# Patient Record
Sex: FEMALE | Race: WHITE | Employment: OTHER | URBAN - METROPOLITAN AREA
[De-identification: names, ages, dates, MRNs, and addresses within clinical notes are randomized per-mention and may not be internally consistent; named-entity substitution may affect disease eponyms.]

---

## 2024-04-09 ENCOUNTER — HOSPITAL ENCOUNTER (EMERGENCY)
Age: 69
Discharge: HOME | End: 2024-04-09
Payer: COMMERCIAL

## 2024-04-09 VITALS
TEMPERATURE: 97.9 F | SYSTOLIC BLOOD PRESSURE: 138 MMHG | WEIGHT: 184 LBS | HEART RATE: 62 BPM | DIASTOLIC BLOOD PRESSURE: 80 MMHG | OXYGEN SATURATION: 97 % | RESPIRATION RATE: 18 BRPM

## 2024-04-09 DIAGNOSIS — H61.23 BILATERAL IMPACTED CERUMEN: Primary | ICD-10-CM

## 2024-04-09 DIAGNOSIS — H92.01 EARACHE ON RIGHT: ICD-10-CM

## 2024-04-09 PROCEDURE — 99211 OFF/OP EST MAY X REQ PHY/QHP: CPT

## 2024-04-09 RX ORDER — POTASSIUM CHLORIDE 20 MEQ/1
20 TABLET, EXTENDED RELEASE ORAL 2 TIMES DAILY
COMMUNITY

## 2024-04-09 RX ORDER — AZITHROMYCIN 250 MG/1
TABLET, FILM COATED ORAL
Qty: 1 PACKET | Refills: 0 | Status: SHIPPED | OUTPATIENT
Start: 2024-04-09 | End: 2024-04-19

## 2024-04-09 RX ORDER — ATORVASTATIN CALCIUM 10 MG/1
10 TABLET, FILM COATED ORAL DAILY
COMMUNITY

## 2024-04-09 RX ORDER — ATENOLOL 25 MG/1
25 TABLET ORAL DAILY
COMMUNITY

## 2024-04-09 NOTE — ED PROVIDER NOTES
of this note were completed with a voice recognition program.  Efforts were made to edit the dictations but occasionally words are mis-transcribed.)    --------------------------------- IMPRESSION AND DISPOSITION ---------------------------------    IMPRESSION  1. Bilateral impacted cerumen    2. Earache on right        DISPOSITION Decision To Discharge 04/09/2024 10:20:14 AM    Disposition: Discharge to home  Patient condition is good    I am the Primary Clinician of Record.     Azam Lyles PA-C (electronically signed) on 4/9/2024 at 10:26 AM         Azam Lyles PA-C  04/09/24 1032

## 2024-04-09 NOTE — DISCHARGE INSTRUCTIONS
Starting on 4/10/24 instill 4 drops of debrox to both ears 4 times daily x 5 days. Then have ears irrigated to remove the wax.  Ibuprofen for pain and inflammation

## 2024-04-09 NOTE — ED NOTES
Josef ears flushed. Small amount of debre removed from left ear. None from right. Provider notified.      Deisy Gillis LPN  04/09/24 1020

## 2024-04-15 ENCOUNTER — HOSPITAL ENCOUNTER (EMERGENCY)
Age: 69
Discharge: HOME | End: 2024-04-15
Payer: COMMERCIAL

## 2024-04-15 VITALS
OXYGEN SATURATION: 99 % | WEIGHT: 185 LBS | RESPIRATION RATE: 18 BRPM | DIASTOLIC BLOOD PRESSURE: 68 MMHG | TEMPERATURE: 98.6 F | SYSTOLIC BLOOD PRESSURE: 138 MMHG | HEART RATE: 56 BPM

## 2024-04-15 DIAGNOSIS — H61.23 BILATERAL IMPACTED CERUMEN: Primary | ICD-10-CM

## 2024-04-15 PROCEDURE — 69209 REMOVE IMPACTED EAR WAX UNI: CPT

## 2024-04-15 PROCEDURE — 99211 OFF/OP EST MAY X REQ PHY/QHP: CPT

## 2024-04-15 PROCEDURE — 6370000000 HC RX 637 (ALT 250 FOR IP): Performed by: NURSE PRACTITIONER

## 2024-04-15 RX ADMIN — Medication 10 DROP: at 12:21

## 2024-04-15 ASSESSMENT — PAIN - FUNCTIONAL ASSESSMENT: PAIN_FUNCTIONAL_ASSESSMENT: 0-10

## 2024-04-15 ASSESSMENT — PAIN SCALES - GENERAL: PAINLEVEL_OUTOF10: 0

## 2024-04-15 NOTE — ED PROVIDER NOTES
auscultation and breath sounds equal.    CV: Regular rate and rhythm, normal heart sounds, without pathological murmurs, ectopy, gallops, or rubs.  Skin:  No rashes, erythema present, unless noted elsewhere.  Lymphatic: No lymphangitis or adenopathy noted.  Neurological:  Oriented.  Motor functions intact.    Lab / Imaging Results   (All laboratory and radiology results have been personally reviewed by myself)  Labs:  No results found for this visit on 04/15/24.  Imaging:  All Radiology results interpreted by Radiologist unless otherwise noted.  No orders to display     ED Course / Medical Decision Making     Medications   carbamide peroxide (DEBROX) 6.5 % otic solution 10 drop (10 drops Both Ears Given 4/15/24 1221)         Procedure(s):     PROCEDURE  4/15/24       Time: 1235    FOREIGN BODY REMOVAL  Risks, benefits and alternatives (for applicable procedures below) described.   Performed By: DONALD Fernando CNP.    Location:   Foreign body of Bilateral auditory canal.    Informed consent: Verbal consent obtained.  The patient was counseled regarding the procedure in person, it's indications, risks, potential complications and alternatives and any questions were answered. Verbal consent was obtained.  Skin Prep:  none  required.  Local Anesthesia:  not required/indicated.  The patient's head was positioned appropriately and the foreign body was removed using irrigation and an ear curette.  Complications: none.  Patient tolerated the procedure well.        MDM:   Patient is well-appearing, afebrile presents to urgent care today for cerumen impaction.  Cerumen was removed using Debrox drops, irrigation and an ear curette.  After removal tympanic membrane's were intact and normal-appearing.  Patient advised on home symptom control outpatient follow-up as well as return precautions.    Plan of Care/Counseling:  DONALD Fernando CNP reviewed today's visit with the patient in addition to providing specific

## 2025-04-22 ENCOUNTER — HOSPITAL ENCOUNTER (EMERGENCY)
Age: 70
Discharge: ANOTHER HOSPITAL | End: 2025-04-22
Attending: STUDENT IN AN ORGANIZED HEALTH CARE EDUCATION/TRAINING PROGRAM
Payer: COMMERCIAL

## 2025-04-22 ENCOUNTER — HOSPITAL ENCOUNTER (INPATIENT)
Facility: HOSPITAL | Age: 70
End: 2025-04-22
Attending: STUDENT IN AN ORGANIZED HEALTH CARE EDUCATION/TRAINING PROGRAM | Admitting: NEUROLOGICAL SURGERY
Payer: COMMERCIAL

## 2025-04-22 ENCOUNTER — ANESTHESIA (OUTPATIENT)
Dept: OPERATING ROOM | Facility: HOSPITAL | Age: 70
End: 2025-04-22
Payer: COMMERCIAL

## 2025-04-22 ENCOUNTER — ANESTHESIA EVENT (OUTPATIENT)
Dept: OPERATING ROOM | Facility: HOSPITAL | Age: 70
End: 2025-04-22
Payer: COMMERCIAL

## 2025-04-22 ENCOUNTER — APPOINTMENT (OUTPATIENT)
Dept: CT IMAGING | Age: 70
End: 2025-04-22
Payer: COMMERCIAL

## 2025-04-22 ENCOUNTER — CLINICAL SUPPORT (OUTPATIENT)
Dept: EMERGENCY MEDICINE | Facility: HOSPITAL | Age: 70
DRG: 003 | End: 2025-04-22
Payer: COMMERCIAL

## 2025-04-22 ENCOUNTER — APPOINTMENT (OUTPATIENT)
Dept: GENERAL RADIOLOGY | Age: 70
End: 2025-04-22
Payer: COMMERCIAL

## 2025-04-22 ENCOUNTER — APPOINTMENT (OUTPATIENT)
Dept: RADIOLOGY | Facility: HOSPITAL | Age: 70
DRG: 003 | End: 2025-04-22
Payer: COMMERCIAL

## 2025-04-22 ENCOUNTER — APPOINTMENT (OUTPATIENT)
Dept: CARDIOLOGY | Facility: HOSPITAL | Age: 70
DRG: 003 | End: 2025-04-22
Payer: COMMERCIAL

## 2025-04-22 VITALS
WEIGHT: 175 LBS | OXYGEN SATURATION: 97 % | RESPIRATION RATE: 16 BRPM | HEART RATE: 97 BPM | DIASTOLIC BLOOD PRESSURE: 58 MMHG | SYSTOLIC BLOOD PRESSURE: 80 MMHG

## 2025-04-22 DIAGNOSIS — I60.9 SAH (SUBARACHNOID HEMORRHAGE) (HCC): Primary | ICD-10-CM

## 2025-04-22 DIAGNOSIS — I46.9 CARDIAC ARREST (HCC): ICD-10-CM

## 2025-04-22 DIAGNOSIS — I46.9 CARDIAC ARREST, CAUSE UNSPECIFIED: ICD-10-CM

## 2025-04-22 DIAGNOSIS — S06.369A TRAUMATIC INTRACEREBRAL HEMORRHAGE WITH LOSS OF CONSCIOUSNESS, UNSPECIFIED LATERALITY, INITIAL ENCOUNTER (HCC): ICD-10-CM

## 2025-04-22 DIAGNOSIS — Q27.30 AVM (ARTERIOVENOUS MALFORMATION) (HHS-HCC): Primary | ICD-10-CM

## 2025-04-22 DIAGNOSIS — I60.9 SUBARACHNOID HEMORRHAGE (MULTI): ICD-10-CM

## 2025-04-22 LAB
ABO GROUP (TYPE) IN BLOOD: NORMAL
ABO GROUP (TYPE) IN BLOOD: NORMAL
ALBUMIN SERPL BCP-MCNC: 3.2 G/DL (ref 3.4–5)
ALBUMIN SERPL BCP-MCNC: 3.4 G/DL (ref 3.4–5)
ALBUMIN SERPL BCP-MCNC: 3.8 G/DL (ref 3.4–5)
ALBUMIN SERPL-MCNC: 4.3 G/DL (ref 3.5–5.2)
ALP SERPL-CCNC: 118 U/L (ref 35–104)
ALP SERPL-CCNC: 80 U/L (ref 33–136)
ALP SERPL-CCNC: 95 U/L (ref 33–136)
ALT SERPL W P-5'-P-CCNC: 601 U/L (ref 7–45)
ALT SERPL W P-5'-P-CCNC: 805 U/L (ref 7–45)
ALT SERPL-CCNC: 21 U/L (ref 0–32)
ANION GAP BLDA CALCULATED.4IONS-SCNC: 15 MMO/L (ref 10–25)
ANION GAP BLDA CALCULATED.4IONS-SCNC: 16 MMO/L (ref 10–25)
ANION GAP BLDA CALCULATED.4IONS-SCNC: 16 MMO/L (ref 10–25)
ANION GAP BLDA CALCULATED.4IONS-SCNC: 18 MMO/L (ref 10–25)
ANION GAP BLDA CALCULATED.4IONS-SCNC: 20 MMO/L (ref 10–25)
ANION GAP BLDV CALCULATED.4IONS-SCNC: 15 MMOL/L (ref 10–25)
ANION GAP SERPL CALC-SCNC: 15 MMOL/L (ref 10–20)
ANION GAP SERPL CALC-SCNC: 19 MMOL/L (ref 10–20)
ANION GAP SERPL CALC-SCNC: 20 MMOL/L (ref 10–20)
ANION GAP SERPL CALCULATED.3IONS-SCNC: 15 MMOL/L (ref 7–16)
ANTIBODY SCREEN: NORMAL
APTT PPP: 25 SECONDS (ref 26–36)
AST SERPL W P-5'-P-CCNC: 697 U/L (ref 9–39)
AST SERPL W P-5'-P-CCNC: 823 U/L (ref 9–39)
AST SERPL-CCNC: 26 U/L (ref 0–31)
ATRIAL RATE: 65 BPM
BASE EXCESS BLDA CALC-SCNC: -4.8 MMOL/L (ref -2–3)
BASE EXCESS BLDA CALC-SCNC: -5.8 MMOL/L (ref -2–3)
BASE EXCESS BLDA CALC-SCNC: -6.3 MMOL/L (ref -2–3)
BASE EXCESS BLDA CALC-SCNC: -7.1 MMOL/L (ref -2–3)
BASE EXCESS BLDA CALC-SCNC: -7.7 MMOL/L (ref -2–3)
BASE EXCESS BLDV CALC-SCNC: -1.8 MMOL/L (ref -2–3)
BASOPHILS # BLD AUTO: 0.02 X10*3/UL (ref 0–0.1)
BASOPHILS # BLD AUTO: 0.03 X10*3/UL (ref 0–0.1)
BASOPHILS # BLD: 0.08 K/UL (ref 0–0.2)
BASOPHILS NFR BLD AUTO: 0.1 %
BASOPHILS NFR BLD AUTO: 0.2 %
BASOPHILS NFR BLD: 1 % (ref 0–2)
BILIRUB SERPL-MCNC: 0.3 MG/DL (ref 0–1.2)
BILIRUB SERPL-MCNC: 0.4 MG/DL (ref 0–1.2)
BILIRUB SERPL-MCNC: 0.5 MG/DL (ref 0–1.2)
BLOOD EXPIRATION DATE: NORMAL
BNP SERPL-MCNC: 199 PG/ML (ref 0–450)
BODY TEMPERATURE: 34 DEGREES CELSIUS
BODY TEMPERATURE: 37 DEGREES CELSIUS
BODY TEMPERATURE: ABNORMAL
BODY TEMPERATURE: ABNORMAL
BUN SERPL-MCNC: 15 MG/DL (ref 6–23)
BUN SERPL-MCNC: 16 MG/DL (ref 6–23)
BUN SERPL-MCNC: 18 MG/DL (ref 6–23)
BUN SERPL-MCNC: 19 MG/DL (ref 6–23)
CA-I BLD-SCNC: 1.22 MMOL/L (ref 1.1–1.33)
CA-I BLDA-SCNC: 1.07 MMOL/L (ref 1.1–1.33)
CA-I BLDA-SCNC: 1.08 MMOL/L (ref 1.1–1.33)
CA-I BLDA-SCNC: 1.09 MMOL/L (ref 1.1–1.33)
CA-I BLDA-SCNC: 1.24 MMOL/L (ref 1.1–1.33)
CA-I BLDA-SCNC: 1.24 MMOL/L (ref 1.1–1.33)
CA-I BLDV-SCNC: 1.2 MMOL/L (ref 1.1–1.33)
CALCIUM SERPL-MCNC: 8.6 MG/DL (ref 8.6–10.6)
CALCIUM SERPL-MCNC: 9.1 MG/DL (ref 8.6–10.6)
CALCIUM SERPL-MCNC: 9.3 MG/DL (ref 8.6–10.6)
CALCIUM SERPL-MCNC: 9.8 MG/DL (ref 8.6–10.2)
CARDIAC TROPONIN I PNL SERPL HS: 650 NG/L (ref 0–34)
CARDIAC TROPONIN I PNL SERPL HS: 990 NG/L (ref 0–34)
CFT FORM KAOLIN IND BLD RES TEG: 0.8 MIN (ref 0.8–2.1)
CHLORIDE BLDA-SCNC: 104 MMOL/L (ref 98–107)
CHLORIDE BLDA-SCNC: 107 MMOL/L (ref 98–107)
CHLORIDE BLDA-SCNC: 109 MMOL/L (ref 98–107)
CHLORIDE BLDA-SCNC: 112 MMOL/L (ref 98–107)
CHLORIDE BLDA-SCNC: 112 MMOL/L (ref 98–107)
CHLORIDE BLDV-SCNC: 104 MMOL/L (ref 98–107)
CHLORIDE SERPL-SCNC: 103 MMOL/L (ref 98–107)
CHLORIDE SERPL-SCNC: 103 MMOL/L (ref 98–107)
CHLORIDE SERPL-SCNC: 111 MMOL/L (ref 98–107)
CHLORIDE SERPL-SCNC: 113 MMOL/L (ref 98–107)
CLOT ANGLE.KAOLIN INDUCED BLD RES TEG: 77.9 DEG (ref 63–78)
CLOT INIT KAO IND P HEP NEUT BLD RES TEG: 4.5 MIN (ref 4.6–9.1)
CLOT INIT KAO IND P HEP NEUT BLD RES TEG: 5.6 MIN (ref 4.3–8.3)
CO2 SERPL-SCNC: 19 MMOL/L (ref 21–32)
CO2 SERPL-SCNC: 19 MMOL/L (ref 21–32)
CO2 SERPL-SCNC: 21 MMOL/L (ref 22–29)
CO2 SERPL-SCNC: 24 MMOL/L (ref 21–32)
CREAT SERPL-MCNC: 0.8 MG/DL (ref 0.5–1)
CREAT SERPL-MCNC: 0.82 MG/DL (ref 0.5–1.05)
CREAT SERPL-MCNC: 0.87 MG/DL (ref 0.5–1.05)
CREAT SERPL-MCNC: 0.91 MG/DL (ref 0.5–1.05)
DISPENSE STATUS: NORMAL
EGFRCR SERPLBLD CKD-EPI 2021: 69 ML/MIN/1.73M*2
EGFRCR SERPLBLD CKD-EPI 2021: 73 ML/MIN/1.73M*2
EGFRCR SERPLBLD CKD-EPI 2021: 78 ML/MIN/1.73M*2
EOSINOPHIL # BLD AUTO: 0 X10*3/UL (ref 0–0.7)
EOSINOPHIL # BLD AUTO: 0.02 X10*3/UL (ref 0–0.7)
EOSINOPHIL # BLD: 0.26 K/UL (ref 0.05–0.5)
EOSINOPHIL NFR BLD AUTO: 0 %
EOSINOPHIL NFR BLD AUTO: 0.2 %
EOSINOPHILS RELATIVE PERCENT: 3 % (ref 0–6)
ERYTHROCYTE [DISTWIDTH] IN BLOOD BY AUTOMATED COUNT: 12.4 % (ref 11.5–14.5)
ERYTHROCYTE [DISTWIDTH] IN BLOOD BY AUTOMATED COUNT: 12.4 % (ref 11.5–15)
ERYTHROCYTE [DISTWIDTH] IN BLOOD BY AUTOMATED COUNT: 12.8 % (ref 11.5–14.5)
ERYTHROCYTE [DISTWIDTH] IN BLOOD BY AUTOMATED COUNT: 13.1 % (ref 11.5–14.5)
EST. AVERAGE GLUCOSE BLD GHB EST-MCNC: 111 MG/DL
FIBRINOGEN BLD CALC-MCNC: 447 MG/DL (ref 278–581)
FIO2: 100
GFR, ESTIMATED: 77 ML/MIN/1.73M2
GLUCOSE BLD MANUAL STRIP-MCNC: 178 MG/DL (ref 74–99)
GLUCOSE BLD MANUAL STRIP-MCNC: 224 MG/DL (ref 74–99)
GLUCOSE BLD-MCNC: 187 MG/DL (ref 74–99)
GLUCOSE BLDA-MCNC: 203 MG/DL (ref 74–99)
GLUCOSE BLDA-MCNC: 203 MG/DL (ref 74–99)
GLUCOSE BLDA-MCNC: 204 MG/DL (ref 74–99)
GLUCOSE BLDA-MCNC: 219 MG/DL (ref 74–99)
GLUCOSE BLDA-MCNC: 232 MG/DL (ref 74–99)
GLUCOSE BLDV-MCNC: 201 MG/DL (ref 74–99)
GLUCOSE SERPL-MCNC: 195 MG/DL (ref 74–99)
GLUCOSE SERPL-MCNC: 205 MG/DL (ref 74–99)
GLUCOSE SERPL-MCNC: 210 MG/DL (ref 74–99)
GLUCOSE SERPL-MCNC: 233 MG/DL (ref 74–99)
HBA1C MFR BLD: 5.5 % (ref ?–5.7)
HCO3 BLDA-SCNC: 16 MMOL/L (ref 22–26)
HCO3 BLDA-SCNC: 17 MMOL/L (ref 22–26)
HCO3 BLDA-SCNC: 18.3 MMOL/L (ref 22–26)
HCO3 BLDA-SCNC: 18.7 MMOL/L (ref 22–26)
HCO3 BLDA-SCNC: 19.4 MMOL/L (ref 22–26)
HCO3 BLDV-SCNC: 23.7 MMOL/L (ref 22–26)
HCT VFR BLD AUTO: 30.9 % (ref 36–46)
HCT VFR BLD AUTO: 34.5 % (ref 36–46)
HCT VFR BLD AUTO: 35.4 % (ref 36–46)
HCT VFR BLD AUTO: 39.5 % (ref 34–48)
HCT VFR BLD EST: 32 % (ref 36–46)
HCT VFR BLD EST: 32 % (ref 36–46)
HCT VFR BLD EST: 33 % (ref 36–46)
HCT VFR BLD EST: 34 % (ref 36–46)
HCT VFR BLD EST: 35 % (ref 36–46)
HCT VFR BLD EST: 38 % (ref 36–46)
HGB BLD-MCNC: 10.1 G/DL (ref 12–16)
HGB BLD-MCNC: 11.1 G/DL (ref 12–16)
HGB BLD-MCNC: 12 G/DL (ref 12–16)
HGB BLD-MCNC: 12.9 G/DL (ref 11.5–15.5)
HGB BLDA-MCNC: 10.7 G/DL (ref 12–16)
HGB BLDA-MCNC: 10.8 G/DL (ref 12–16)
HGB BLDA-MCNC: 11.1 G/DL (ref 12–16)
HGB BLDA-MCNC: 11.3 G/DL (ref 12–16)
HGB BLDA-MCNC: 11.7 G/DL (ref 12–16)
HGB BLDV-MCNC: 12.5 G/DL (ref 12–16)
IMM GRANULOCYTES # BLD AUTO: 0.06 K/UL (ref 0–0.58)
IMM GRANULOCYTES # BLD AUTO: 0.11 X10*3/UL (ref 0–0.7)
IMM GRANULOCYTES # BLD AUTO: 0.19 X10*3/UL (ref 0–0.7)
IMM GRANULOCYTES NFR BLD AUTO: 0.8 % (ref 0–0.9)
IMM GRANULOCYTES NFR BLD AUTO: 1.5 % (ref 0–0.9)
IMM GRANULOCYTES NFR BLD: 1 % (ref 0–5)
INHALED O2 CONCENTRATION: 100 %
INHALED O2 CONCENTRATION: 100 %
INHALED O2 CONCENTRATION: 36 %
INHALED O2 CONCENTRATION: 40 %
INHALED O2 CONCENTRATION: 40 %
INR PPP: 1.1
INR PPP: 1.1 (ref 0.9–1.1)
INR PPP: 1.2 (ref 0.9–1.1)
LACTATE BLDA-SCNC: 6 MMOL/L (ref 0.4–2)
LACTATE BLDA-SCNC: 7.2 MMOL/L (ref 0.4–2)
LACTATE BLDA-SCNC: 8 MMOL/L (ref 0.4–2)
LACTATE BLDA-SCNC: 8 MMOL/L (ref 0.4–2)
LACTATE BLDA-SCNC: 8.2 MMOL/L (ref 0.4–2)
LACTATE BLDV-SCNC: 5.5 MMOL/L (ref 0.4–2)
LACTATE BLDV-SCNC: 8.7 MMOL/L (ref 0.4–2)
LYMPHOCYTES # BLD AUTO: 0.69 X10*3/UL (ref 1.2–4.8)
LYMPHOCYTES # BLD AUTO: 0.76 X10*3/UL (ref 1.2–4.8)
LYMPHOCYTES NFR BLD AUTO: 5.1 %
LYMPHOCYTES NFR BLD AUTO: 5.9 %
LYMPHOCYTES NFR BLD: 3.82 K/UL (ref 1.5–4)
LYMPHOCYTES RELATIVE PERCENT: 39 % (ref 20–42)
MA KAOLIN BLD RES TEG: 66 MM (ref 52–69)
MA KAOLIN+TF BLD RES TEG: 66.5 MM (ref 52–70)
MA TF IND+IIB-IIIA INH BLD RES TEG: 24.5 MM (ref 15–32)
MAGNESIUM SERPL-MCNC: 1.84 MG/DL (ref 1.6–2.4)
MAGNESIUM SERPL-MCNC: 1.9 MG/DL (ref 1.6–2.6)
MCH RBC QN AUTO: 28.5 PG (ref 26–34)
MCH RBC QN AUTO: 28.8 PG (ref 26–35)
MCH RBC QN AUTO: 29.3 PG (ref 26–34)
MCH RBC QN AUTO: 29.4 PG (ref 26–34)
MCHC RBC AUTO-ENTMCNC: 31.4 G/DL (ref 32–36)
MCHC RBC AUTO-ENTMCNC: 32.7 G/DL (ref 32–34.5)
MCHC RBC AUTO-ENTMCNC: 32.7 G/DL (ref 32–36)
MCHC RBC AUTO-ENTMCNC: 34.8 G/DL (ref 32–36)
MCV RBC AUTO: 85 FL (ref 80–100)
MCV RBC AUTO: 88.2 FL (ref 80–99.9)
MCV RBC AUTO: 90 FL (ref 80–100)
MCV RBC AUTO: 91 FL (ref 80–100)
MODE: AC
MONOCYTES # BLD AUTO: 0.4 X10*3/UL (ref 0.1–1)
MONOCYTES # BLD AUTO: 0.51 X10*3/UL (ref 0.1–1)
MONOCYTES NFR BLD AUTO: 3 %
MONOCYTES NFR BLD AUTO: 4 %
MONOCYTES NFR BLD: 0.81 K/UL (ref 0.1–0.95)
MONOCYTES NFR BLD: 8 % (ref 2–12)
NEUTROPHILS # BLD AUTO: 11.37 X10*3/UL (ref 1.2–7.7)
NEUTROPHILS # BLD AUTO: 12.29 X10*3/UL (ref 1.2–7.7)
NEUTROPHILS NFR BLD AUTO: 88.2 %
NEUTROPHILS NFR BLD AUTO: 91 %
NEUTROPHILS NFR BLD: 49 % (ref 43–80)
NEUTS SEG NFR BLD: 4.89 K/UL (ref 1.8–7.3)
NRBC BLD-RTO: 0 /100 WBCS (ref 0–0)
NRBC BLD-RTO: 0 /100 WBCS (ref 0–0)
NRBC BLD-RTO: 0.2 /100 WBCS (ref 0–0)
O2 DELIVERY DEVICE: ABNORMAL
OXYHGB MFR BLDA: 96.9 % (ref 94–98)
OXYHGB MFR BLDA: 97.2 % (ref 94–98)
OXYHGB MFR BLDA: 97.6 % (ref 94–98)
OXYHGB MFR BLDA: 97.7 % (ref 94–98)
OXYHGB MFR BLDA: 98.7 % (ref 94–98)
OXYHGB MFR BLDV: 85.7 % (ref 45–75)
P AXIS: 62 DEGREES
P OFFSET: 165 MS
P ONSET: 98 MS
PCO2 BLDA: 23 MM HG (ref 38–42)
PCO2 BLDA: 25 MM HG (ref 38–42)
PCO2 BLDA: 32 MM HG (ref 38–42)
PCO2 BLDA: 38 MM HG (ref 38–42)
PCO2 BLDA: 38 MM HG (ref 38–42)
PCO2 BLDV: 42 MM HG (ref 41–51)
PEEP: 5
PH BLDA: 7.29 PH (ref 7.38–7.42)
PH BLDA: 7.3 PH (ref 7.38–7.42)
PH BLDA: 7.39 PH (ref 7.38–7.42)
PH BLDA: 7.44 PH (ref 7.38–7.42)
PH BLDA: 7.45 PH (ref 7.38–7.42)
PH BLDV: 7.36 PH (ref 7.33–7.43)
PHOSPHATE SERPL-MCNC: 3.3 MG/DL (ref 2.5–4.9)
PLATELET # BLD AUTO: 263 X10*3/UL (ref 150–450)
PLATELET # BLD AUTO: 287 X10*3/UL (ref 150–450)
PLATELET # BLD AUTO: 380 X10*3/UL (ref 150–450)
PLATELET # BLD AUTO: 410 K/UL (ref 130–450)
PMV BLD AUTO: 8.7 FL (ref 7–12)
PO2 BLDA: 142 MM HG (ref 85–95)
PO2 BLDA: 159 MM HG (ref 85–95)
PO2 BLDA: 184 MM HG (ref 85–95)
PO2 BLDA: 429 MM HG (ref 85–95)
PO2 BLDA: 441 MM HG (ref 85–95)
PO2 BLDV: 55 MM HG (ref 35–45)
POC HCO3: 26.8 MMOL/L (ref 22–26)
POC O2 SATURATION: 100 % (ref 92–98.5)
POC PCO2: 47.2 MM HG (ref 35–45)
POC PH: 7.36 (ref 7.35–7.45)
POC PO2: 390.3 MM HG (ref 60–80)
POSITIVE BASE EXCESS, ART: 1 MMOL/L (ref 0–3)
POTASSIUM BLDA-SCNC: 3 MMOL/L (ref 3.5–5.3)
POTASSIUM BLDA-SCNC: 3.5 MMOL/L (ref 3.5–5.3)
POTASSIUM BLDA-SCNC: 3.8 MMOL/L (ref 3.5–5.3)
POTASSIUM BLDA-SCNC: 3.8 MMOL/L (ref 3.5–5.3)
POTASSIUM BLDA-SCNC: 4.2 MMOL/L (ref 3.5–5.3)
POTASSIUM BLDV-SCNC: 3.4 MMOL/L (ref 3.5–5.3)
POTASSIUM SERPL-SCNC: 3.1 MMOL/L (ref 3.5–5)
POTASSIUM SERPL-SCNC: 3.4 MMOL/L (ref 3.5–5.3)
POTASSIUM SERPL-SCNC: 3.9 MMOL/L (ref 3.5–5.3)
POTASSIUM SERPL-SCNC: 3.9 MMOL/L (ref 3.5–5.3)
PR INTERVAL: 224 MS
PRODUCT BLOOD TYPE: 6200
PRODUCT BLOOD TYPE: 8400
PRODUCT CODE: NORMAL
PROT SERPL-MCNC: 6 G/DL (ref 6.4–8.2)
PROT SERPL-MCNC: 6.9 G/DL (ref 6.4–8.2)
PROT SERPL-MCNC: 8.1 G/DL (ref 6.4–8.3)
PROTHROMBIN TIME: 11.5 SEC (ref 9.3–12.4)
PROTHROMBIN TIME: 11.7 SECONDS (ref 9.8–12.4)
PROTHROMBIN TIME: 13.6 SECONDS (ref 9.8–12.4)
Q ONSET: 210 MS
QRS COUNT: 11 BEATS
QRS DURATION: 168 MS
QT INTERVAL: 530 MS
QTC CALCULATION(BAZETT): 551 MS
QTC FREDERICIA: 544 MS
R AXIS: 93 DEGREES
RBC # BLD AUTO: 3.45 X10*6/UL (ref 4–5.2)
RBC # BLD AUTO: 3.9 X10*6/UL (ref 4–5.2)
RBC # BLD AUTO: 4.08 X10*6/UL (ref 4–5.2)
RBC # BLD AUTO: 4.48 M/UL (ref 3.5–5.5)
RH FACTOR (ANTIGEN D): NORMAL
RH FACTOR (ANTIGEN D): NORMAL
SAO2 % BLDA: 100 % (ref 94–100)
SAO2 % BLDA: 100 % (ref 94–100)
SAO2 % BLDA: 99 % (ref 94–100)
SAO2 % BLDV: 87 % (ref 45–75)
SET RATE, POC: 16
SODIUM BLDA-SCNC: 135 MMOL/L (ref 136–145)
SODIUM BLDA-SCNC: 137 MMOL/L (ref 136–145)
SODIUM BLDA-SCNC: 138 MMOL/L (ref 136–145)
SODIUM BLDA-SCNC: 143 MMOL/L (ref 136–145)
SODIUM BLDA-SCNC: 146 MMOL/L (ref 136–145)
SODIUM BLDV-SCNC: 139 MMOL/L (ref 136–145)
SODIUM SERPL-SCNC: 139 MMOL/L (ref 132–146)
SODIUM SERPL-SCNC: 139 MMOL/L (ref 136–145)
SODIUM SERPL-SCNC: 146 MMOL/L (ref 136–145)
SODIUM SERPL-SCNC: 147 MMOL/L (ref 136–145)
T AXIS: 31 DEGREES
T OFFSET: 475 MS
T4 FREE SERPL-MCNC: 1.1 NG/DL (ref 0.9–1.7)
TIDAL VOLUME: 400
TROPONIN I SERPL HS-MCNC: 14 NG/L (ref 0–14)
TSH SERPL DL<=0.05 MIU/L-ACNC: 10.17 UIU/ML (ref 0.27–4.2)
UFH PPP CHRO-ACNC: <0.1 IU/ML (ref ?–1.1)
UNIT ABO: NORMAL
UNIT NUMBER: NORMAL
UNIT RH: NORMAL
UNIT VOLUME: 201
UNIT VOLUME: 210
UNIT VOLUME: 235
UNIT VOLUME: 285
VENTRICULAR RATE: 65 BPM
WBC # BLD AUTO: 10.7 X10*3/UL (ref 4.4–11.3)
WBC # BLD AUTO: 12.9 X10*3/UL (ref 4.4–11.3)
WBC # BLD AUTO: 13.5 X10*3/UL (ref 4.4–11.3)
WBC OTHER # BLD: 9.9 K/UL (ref 4.5–11.5)

## 2025-04-22 PROCEDURE — P9016 RBC LEUKOCYTES REDUCED: HCPCS

## 2025-04-22 PROCEDURE — 2500000005 HC RX 250 GENERAL PHARMACY W/O HCPCS: Performed by: ANESTHESIOLOGIST ASSISTANT

## 2025-04-22 PROCEDURE — 71045 X-RAY EXAM CHEST 1 VIEW: CPT | Mod: FOREIGN READ | Performed by: RADIOLOGY

## 2025-04-22 PROCEDURE — 71275 CT ANGIOGRAPHY CHEST: CPT

## 2025-04-22 PROCEDURE — 2500000004 HC RX 250 GENERAL PHARMACY W/ HCPCS (ALT 636 FOR OP/ED): Mod: JZ

## 2025-04-22 PROCEDURE — 2500000004 HC RX 250 GENERAL PHARMACY W/ HCPCS (ALT 636 FOR OP/ED): Mod: JZ | Performed by: ANESTHESIOLOGY

## 2025-04-22 PROCEDURE — 2500000005 HC RX 250 GENERAL PHARMACY W/O HCPCS: Performed by: NEUROLOGICAL SURGERY

## 2025-04-22 PROCEDURE — 86850 RBC ANTIBODY SCREEN: CPT

## 2025-04-22 PROCEDURE — 96375 TX/PRO/DX INJ NEW DRUG ADDON: CPT

## 2025-04-22 PROCEDURE — 05BL0ZZ EXCISION OF INTRACRANIAL VEIN, OPEN APPROACH: ICD-10-PCS | Performed by: NEUROLOGICAL SURGERY

## 2025-04-22 PROCEDURE — 69990 MICROSURGERY ADD-ON: CPT | Performed by: NEUROLOGICAL SURGERY

## 2025-04-22 PROCEDURE — 99223 1ST HOSP IP/OBS HIGH 75: CPT | Performed by: NEUROLOGICAL SURGERY

## 2025-04-22 PROCEDURE — 84439 ASSAY OF FREE THYROXINE: CPT

## 2025-04-22 PROCEDURE — 83735 ASSAY OF MAGNESIUM: CPT

## 2025-04-22 PROCEDURE — 3600000018 HC OR TIME - INITIAL BASE CHARGE - PROCEDURE LEVEL SIX: Performed by: NEUROLOGICAL SURGERY

## 2025-04-22 PROCEDURE — 61686 INTRACRANIAL VESSEL SURGERY: CPT | Performed by: NEUROLOGICAL SURGERY

## 2025-04-22 PROCEDURE — 2500000002 HC RX 250 W HCPCS SELF ADMINISTERED DRUGS (ALT 637 FOR MEDICARE OP, ALT 636 FOR OP/ED): Performed by: ANESTHESIOLOGIST ASSISTANT

## 2025-04-22 PROCEDURE — 93005 ELECTROCARDIOGRAM TRACING: CPT

## 2025-04-22 PROCEDURE — 96374 THER/PROPH/DIAG INJ IV PUSH: CPT

## 2025-04-22 PROCEDURE — 37799 UNLISTED PX VASCULAR SURGERY: CPT | Performed by: STUDENT IN AN ORGANIZED HEALTH CARE EDUCATION/TRAINING PROGRAM

## 2025-04-22 PROCEDURE — 37799 UNLISTED PX VASCULAR SURGERY: CPT

## 2025-04-22 PROCEDURE — 2720000007 HC OR 272 NO HCPCS: Performed by: NEUROLOGICAL SURGERY

## 2025-04-22 PROCEDURE — 93010 ELECTROCARDIOGRAM REPORT: CPT | Performed by: STUDENT IN AN ORGANIZED HEALTH CARE EDUCATION/TRAINING PROGRAM

## 2025-04-22 PROCEDURE — 2500000004 HC RX 250 GENERAL PHARMACY W/ HCPCS (ALT 636 FOR OP/ED)

## 2025-04-22 PROCEDURE — 3700000002 HC GENERAL ANESTHESIA TIME - EACH INCREMENTAL 1 MINUTE: Performed by: NEUROLOGICAL SURGERY

## 2025-04-22 PROCEDURE — 2500000004 HC RX 250 GENERAL PHARMACY W/ HCPCS (ALT 636 FOR OP/ED): Mod: JZ | Performed by: STUDENT IN AN ORGANIZED HEALTH CARE EDUCATION/TRAINING PROGRAM

## 2025-04-22 PROCEDURE — 70450 CT HEAD/BRAIN W/O DYE: CPT | Mod: RSC | Performed by: RADIOLOGY

## 2025-04-22 PROCEDURE — 84295 ASSAY OF SERUM SODIUM: CPT

## 2025-04-22 PROCEDURE — 6360000002 HC RX W HCPCS

## 2025-04-22 PROCEDURE — 3700000001 HC GENERAL ANESTHESIA TIME - INITIAL BASE CHARGE: Performed by: NEUROLOGICAL SURGERY

## 2025-04-22 PROCEDURE — 86920 COMPATIBILITY TEST SPIN: CPT

## 2025-04-22 PROCEDURE — 00C40ZZ EXTIRPATION OF MATTER FROM INTRACRANIAL SUBDURAL SPACE, OPEN APPROACH: ICD-10-PCS | Performed by: NEUROLOGICAL SURGERY

## 2025-04-22 PROCEDURE — 71045 X-RAY EXAM CHEST 1 VIEW: CPT

## 2025-04-22 PROCEDURE — 2020000001 HC ICU ROOM DAILY

## 2025-04-22 PROCEDURE — 2500000005 HC RX 250 GENERAL PHARMACY W/O HCPCS: Performed by: STUDENT IN AN ORGANIZED HEALTH CARE EDUCATION/TRAINING PROGRAM

## 2025-04-22 PROCEDURE — 2500000004 HC RX 250 GENERAL PHARMACY W/ HCPCS (ALT 636 FOR OP/ED): Mod: JW | Performed by: STUDENT IN AN ORGANIZED HEALTH CARE EDUCATION/TRAINING PROGRAM

## 2025-04-22 PROCEDURE — 2550000001 HC RX 255 CONTRASTS

## 2025-04-22 PROCEDURE — 82435 ASSAY OF BLOOD CHLORIDE: CPT

## 2025-04-22 PROCEDURE — 88313 SPECIAL STAINS GROUP 2: CPT | Performed by: PATHOLOGY

## 2025-04-22 PROCEDURE — 84484 ASSAY OF TROPONIN QUANT: CPT | Performed by: STUDENT IN AN ORGANIZED HEALTH CARE EDUCATION/TRAINING PROGRAM

## 2025-04-22 PROCEDURE — 70498 CT ANGIOGRAPHY NECK: CPT | Performed by: RADIOLOGY

## 2025-04-22 PROCEDURE — 2500000004 HC RX 250 GENERAL PHARMACY W/ HCPCS (ALT 636 FOR OP/ED): Mod: JZ | Performed by: REGISTERED NURSE

## 2025-04-22 PROCEDURE — 009630Z DRAINAGE OF CEREBRAL VENTRICLE WITH DRAINAGE DEVICE, PERCUTANEOUS APPROACH: ICD-10-PCS | Performed by: NEUROLOGICAL SURGERY

## 2025-04-22 PROCEDURE — 94002 VENT MGMT INPAT INIT DAY: CPT | Mod: CCI

## 2025-04-22 PROCEDURE — 83880 ASSAY OF NATRIURETIC PEPTIDE: CPT

## 2025-04-22 PROCEDURE — 5A1955Z RESPIRATORY VENTILATION, GREATER THAN 96 CONSECUTIVE HOURS: ICD-10-PCS | Performed by: STUDENT IN AN ORGANIZED HEALTH CARE EDUCATION/TRAINING PROGRAM

## 2025-04-22 PROCEDURE — 72125 CT NECK SPINE W/O DYE: CPT

## 2025-04-22 PROCEDURE — 87040 BLOOD CULTURE FOR BACTERIA: CPT

## 2025-04-22 PROCEDURE — 85610 PROTHROMBIN TIME: CPT

## 2025-04-22 PROCEDURE — 82810 BLOOD GASES O2 SAT ONLY: CPT

## 2025-04-22 PROCEDURE — 6360000004 HC RX CONTRAST MEDICATION: Performed by: RADIOLOGY

## 2025-04-22 PROCEDURE — 83036 HEMOGLOBIN GLYCOSYLATED A1C: CPT

## 2025-04-22 PROCEDURE — 85025 COMPLETE CBC W/AUTO DIFF WBC: CPT

## 2025-04-22 PROCEDURE — 88307 TISSUE EXAM BY PATHOLOGIST: CPT | Performed by: PATHOLOGY

## 2025-04-22 PROCEDURE — 94002 VENT MGMT INPAT INIT DAY: CPT

## 2025-04-22 PROCEDURE — 85520 HEPARIN ASSAY: CPT

## 2025-04-22 PROCEDURE — 99285 EMERGENCY DEPT VISIT HI MDM: CPT | Mod: 25 | Performed by: STUDENT IN AN ORGANIZED HEALTH CARE EDUCATION/TRAINING PROGRAM

## 2025-04-22 PROCEDURE — P9037 PLATE PHERES LEUKOREDU IRRAD: HCPCS

## 2025-04-22 PROCEDURE — 70450 CT HEAD/BRAIN W/O DYE: CPT

## 2025-04-22 PROCEDURE — 82810 BLOOD GASES O2 SAT ONLY: CPT | Performed by: STUDENT IN AN ORGANIZED HEALTH CARE EDUCATION/TRAINING PROGRAM

## 2025-04-22 PROCEDURE — 96374 THER/PROPH/DIAG INJ IV PUSH: CPT | Mod: 59

## 2025-04-22 PROCEDURE — 2580000003 HC RX 258: Performed by: STUDENT IN AN ORGANIZED HEALTH CARE EDUCATION/TRAINING PROGRAM

## 2025-04-22 PROCEDURE — 36415 COLL VENOUS BLD VENIPUNCTURE: CPT

## 2025-04-22 PROCEDURE — 2500000004 HC RX 250 GENERAL PHARMACY W/ HCPCS (ALT 636 FOR OP/ED): Performed by: NEUROLOGICAL SURGERY

## 2025-04-22 PROCEDURE — 85730 THROMBOPLASTIN TIME PARTIAL: CPT | Performed by: STUDENT IN AN ORGANIZED HEALTH CARE EDUCATION/TRAINING PROGRAM

## 2025-04-22 PROCEDURE — 36556 INSERT NON-TUNNEL CV CATH: CPT

## 2025-04-22 PROCEDURE — 03BG0ZZ EXCISION OF INTRACRANIAL ARTERY, OPEN APPROACH: ICD-10-PCS | Performed by: NEUROLOGICAL SURGERY

## 2025-04-22 PROCEDURE — 83605 ASSAY OF LACTIC ACID: CPT

## 2025-04-22 PROCEDURE — 2500000005 HC RX 250 GENERAL PHARMACY W/O HCPCS

## 2025-04-22 PROCEDURE — 80053 COMPREHEN METABOLIC PANEL: CPT

## 2025-04-22 PROCEDURE — 93010 ELECTROCARDIOGRAM REPORT: CPT | Performed by: INTERNAL MEDICINE

## 2025-04-22 PROCEDURE — 70498 CT ANGIOGRAPHY NECK: CPT

## 2025-04-22 PROCEDURE — 85025 COMPLETE CBC W/AUTO DIFF WBC: CPT | Performed by: STUDENT IN AN ORGANIZED HEALTH CARE EDUCATION/TRAINING PROGRAM

## 2025-04-22 PROCEDURE — 99291 CRITICAL CARE FIRST HOUR: CPT | Performed by: STUDENT IN AN ORGANIZED HEALTH CARE EDUCATION/TRAINING PROGRAM

## 2025-04-22 PROCEDURE — 99140 ANES COMP EMERGENCY COND: CPT | Performed by: ANESTHESIOLOGY

## 2025-04-22 PROCEDURE — 83605 ASSAY OF LACTIC ACID: CPT | Performed by: STUDENT IN AN ORGANIZED HEALTH CARE EDUCATION/TRAINING PROGRAM

## 2025-04-22 PROCEDURE — 36430 TRANSFUSION BLD/BLD COMPNT: CPT

## 2025-04-22 PROCEDURE — 36600 WITHDRAWAL OF ARTERIAL BLOOD: CPT

## 2025-04-22 PROCEDURE — 82962 GLUCOSE BLOOD TEST: CPT

## 2025-04-22 PROCEDURE — 70450 CT HEAD/BRAIN W/O DYE: CPT | Performed by: RADIOLOGY

## 2025-04-22 PROCEDURE — 74018 RADEX ABDOMEN 1 VIEW: CPT

## 2025-04-22 PROCEDURE — A61343 PR SUBOCCIPT DECOMP MEDULLA/SP CRD: Performed by: ANESTHESIOLOGY

## 2025-04-22 PROCEDURE — 31500 INSERT EMERGENCY AIRWAY: CPT

## 2025-04-22 PROCEDURE — 2500000003 HC RX 250 WO HCPCS: Performed by: STUDENT IN AN ORGANIZED HEALTH CARE EDUCATION/TRAINING PROGRAM

## 2025-04-22 PROCEDURE — 88307 TISSUE EXAM BY PATHOLOGIST: CPT | Mod: TC,SUR | Performed by: NEUROLOGICAL SURGERY

## 2025-04-22 PROCEDURE — 61107 TDH PNXR IMPLT VENTR CATH: CPT | Performed by: NEUROLOGICAL SURGERY

## 2025-04-22 PROCEDURE — 70496 CT ANGIOGRAPHY HEAD: CPT | Performed by: RADIOLOGY

## 2025-04-22 PROCEDURE — 85018 HEMOGLOBIN: CPT | Performed by: ANESTHESIOLOGIST ASSISTANT

## 2025-04-22 PROCEDURE — 85347 COAGULATION TIME ACTIVATED: CPT

## 2025-04-22 PROCEDURE — 3600000017 HC OR TIME - EACH INCREMENTAL 1 MINUTE - PROCEDURE LEVEL SIX: Performed by: NEUROLOGICAL SURGERY

## 2025-04-22 PROCEDURE — 3E043XZ INTRODUCTION OF VASOPRESSOR INTO CENTRAL VEIN, PERCUTANEOUS APPROACH: ICD-10-PCS | Performed by: STUDENT IN AN ORGANIZED HEALTH CARE EDUCATION/TRAINING PROGRAM

## 2025-04-22 PROCEDURE — 84443 ASSAY THYROID STIM HORMONE: CPT

## 2025-04-22 PROCEDURE — 2580000003 HC RX 258

## 2025-04-22 PROCEDURE — 84484 ASSAY OF TROPONIN QUANT: CPT

## 2025-04-22 PROCEDURE — 82947 ASSAY GLUCOSE BLOOD QUANT: CPT

## 2025-04-22 PROCEDURE — 96365 THER/PROPH/DIAG IV INF INIT: CPT

## 2025-04-22 PROCEDURE — A61343 PR SUBOCCIPT DECOMP MEDULLA/SP CRD: Performed by: ANESTHESIOLOGIST ASSISTANT

## 2025-04-22 PROCEDURE — 36430 TRANSFUSION BLD/BLD COMPNT: CPT | Performed by: ANESTHESIOLOGIST ASSISTANT

## 2025-04-22 PROCEDURE — 2500000004 HC RX 250 GENERAL PHARMACY W/ HCPCS (ALT 636 FOR OP/ED): Performed by: STUDENT IN AN ORGANIZED HEALTH CARE EDUCATION/TRAINING PROGRAM

## 2025-04-22 PROCEDURE — 70496 CT ANGIOGRAPHY HEAD: CPT

## 2025-04-22 PROCEDURE — 2500000004 HC RX 250 GENERAL PHARMACY W/ HCPCS (ALT 636 FOR OP/ED): Mod: TB | Performed by: STUDENT IN AN ORGANIZED HEALTH CARE EDUCATION/TRAINING PROGRAM

## 2025-04-22 PROCEDURE — 2500000005 HC RX 250 GENERAL PHARMACY W/O HCPCS: Performed by: ANESTHESIOLOGY

## 2025-04-22 PROCEDURE — 84484 ASSAY OF TROPONIN QUANT: CPT | Performed by: REGISTERED NURSE

## 2025-04-22 PROCEDURE — 03LG0CZ OCCLUSION OF INTRACRANIAL ARTERY WITH EXTRALUMINAL DEVICE, OPEN APPROACH: ICD-10-PCS | Performed by: NEUROLOGICAL SURGERY

## 2025-04-22 PROCEDURE — C1763 CONN TISS, NON-HUMAN: HCPCS | Performed by: NEUROLOGICAL SURGERY

## 2025-04-22 PROCEDURE — 85027 COMPLETE CBC AUTOMATED: CPT

## 2025-04-22 PROCEDURE — 82330 ASSAY OF CALCIUM: CPT

## 2025-04-22 PROCEDURE — 96365 THER/PROPH/DIAG IV INF INIT: CPT | Mod: 59

## 2025-04-22 PROCEDURE — 82803 BLOOD GASES ANY COMBINATION: CPT

## 2025-04-22 PROCEDURE — 51702 INSERT TEMP BLADDER CATH: CPT

## 2025-04-22 PROCEDURE — 2500000004 HC RX 250 GENERAL PHARMACY W/ HCPCS (ALT 636 FOR OP/ED): Mod: JZ | Performed by: ANESTHESIOLOGIST ASSISTANT

## 2025-04-22 PROCEDURE — 70450 CT HEAD/BRAIN W/O DYE: CPT | Mod: RSC

## 2025-04-22 PROCEDURE — 99285 EMERGENCY DEPT VISIT HI MDM: CPT

## 2025-04-22 PROCEDURE — 2780000003 HC OR 278 NO HCPCS: Performed by: NEUROLOGICAL SURGERY

## 2025-04-22 PROCEDURE — 6360000002 HC RX W HCPCS: Performed by: STUDENT IN AN ORGANIZED HEALTH CARE EDUCATION/TRAINING PROGRAM

## 2025-04-22 DEVICE — IMPLANTABLE DEVICE: Type: IMPLANTABLE DEVICE | Site: BRAIN | Status: NON-FUNCTIONAL

## 2025-04-22 DEVICE — IMPLANTABLE DEVICE: Type: IMPLANTABLE DEVICE | Site: BRAIN | Status: FUNCTIONAL

## 2025-04-22 DEVICE — DURAGEN® PLUS DURAL REGENERATION MATRIX , 4 IN X 5 IN
Type: IMPLANTABLE DEVICE | Site: BRAIN | Status: FUNCTIONAL
Brand: DURAGEN® PLUS

## 2025-04-22 RX ORDER — SODIUM CHLORIDE, SODIUM LACTATE, POTASSIUM CHLORIDE, CALCIUM CHLORIDE 600; 310; 30; 20 MG/100ML; MG/100ML; MG/100ML; MG/100ML
100 INJECTION, SOLUTION INTRAVENOUS CONTINUOUS
Status: ACTIVE | OUTPATIENT
Start: 2025-04-22 | End: 2025-04-23

## 2025-04-22 RX ORDER — NOREPINEPHRINE BITARTRATE/D5W 8 MG/250ML
.01-1 PLASTIC BAG, INJECTION (ML) INTRAVENOUS CONTINUOUS
Status: DISCONTINUED | OUTPATIENT
Start: 2025-04-22 | End: 2025-04-23

## 2025-04-22 RX ORDER — IOPAMIDOL 755 MG/ML
70 INJECTION, SOLUTION INTRAVASCULAR
Status: COMPLETED | OUTPATIENT
Start: 2025-04-22 | End: 2025-04-22

## 2025-04-22 RX ORDER — FENTANYL CITRATE 50 UG/ML
INJECTION, SOLUTION INTRAMUSCULAR; INTRAVENOUS AS NEEDED
Status: DISCONTINUED | OUTPATIENT
Start: 2025-04-22 | End: 2025-04-22

## 2025-04-22 RX ORDER — PROPOFOL 10 MG/ML
0-50 INJECTION, EMULSION INTRAVENOUS CONTINUOUS
Status: DISCONTINUED | OUTPATIENT
Start: 2025-04-22 | End: 2025-04-25

## 2025-04-22 RX ORDER — DEXAMETHASONE SODIUM PHOSPHATE 10 MG/ML
10 INJECTION, SOLUTION INTRA-ARTICULAR; INTRALESIONAL; INTRAMUSCULAR; INTRAVENOUS; SOFT TISSUE ONCE
Status: COMPLETED | OUTPATIENT
Start: 2025-04-22 | End: 2025-04-22

## 2025-04-22 RX ORDER — POLYETHYLENE GLYCOL 3350 17 G/17G
17 POWDER, FOR SOLUTION ORAL DAILY
Status: DISCONTINUED | OUTPATIENT
Start: 2025-04-22 | End: 2025-05-06

## 2025-04-22 RX ORDER — ONDANSETRON 2 MG/ML
4 INJECTION INTRAMUSCULAR; INTRAVENOUS EVERY 6 HOURS PRN
Status: DISCONTINUED | OUTPATIENT
Start: 2025-04-22 | End: 2025-04-22 | Stop reason: HOSPADM

## 2025-04-22 RX ORDER — POTASSIUM CHLORIDE 1.5 G/1.58G
40 POWDER, FOR SOLUTION ORAL EVERY 6 HOURS PRN
Status: DISCONTINUED | OUTPATIENT
Start: 2025-04-22 | End: 2025-05-11 | Stop reason: HOSPADM

## 2025-04-22 RX ORDER — SODIUM CHLORIDE, SODIUM LACTATE, POTASSIUM CHLORIDE, CALCIUM CHLORIDE 600; 310; 30; 20 MG/100ML; MG/100ML; MG/100ML; MG/100ML
INJECTION, SOLUTION INTRAVENOUS CONTINUOUS PRN
Status: DISCONTINUED | OUTPATIENT
Start: 2025-04-22 | End: 2025-04-22

## 2025-04-22 RX ORDER — POLYMYXIN B 500000 [USP'U]/1
INJECTION, POWDER, LYOPHILIZED, FOR SOLUTION INTRAMUSCULAR; INTRATHECAL; INTRAVENOUS; OPHTHALMIC AS NEEDED
Status: DISCONTINUED | OUTPATIENT
Start: 2025-04-22 | End: 2025-04-22 | Stop reason: HOSPADM

## 2025-04-22 RX ORDER — NICARDIPINE HYDROCHLORIDE 0.2 MG/ML
INJECTION INTRAVENOUS
Status: COMPLETED
Start: 2025-04-22 | End: 2025-04-22

## 2025-04-22 RX ORDER — 0.9 % SODIUM CHLORIDE 0.9 %
1000 INTRAVENOUS SOLUTION INTRAVENOUS ONCE
Status: COMPLETED | OUTPATIENT
Start: 2025-04-22 | End: 2025-04-22

## 2025-04-22 RX ORDER — MAGNESIUM SULFATE HEPTAHYDRATE 40 MG/ML
4 INJECTION, SOLUTION INTRAVENOUS EVERY 6 HOURS PRN
Status: DISCONTINUED | OUTPATIENT
Start: 2025-04-22 | End: 2025-05-11 | Stop reason: HOSPADM

## 2025-04-22 RX ORDER — CEFAZOLIN 1 G/1
INJECTION, POWDER, FOR SOLUTION INTRAVENOUS AS NEEDED
Status: DISCONTINUED | OUTPATIENT
Start: 2025-04-22 | End: 2025-04-22

## 2025-04-22 RX ORDER — INSULIN LISPRO 100 [IU]/ML
0-5 INJECTION, SOLUTION INTRAVENOUS; SUBCUTANEOUS EVERY 4 HOURS
Status: DISCONTINUED | OUTPATIENT
Start: 2025-04-23 | End: 2025-05-07

## 2025-04-22 RX ORDER — LEVETIRACETAM 15 MG/ML
INJECTION INTRAVASCULAR
Status: COMPLETED
Start: 2025-04-22 | End: 2025-04-22

## 2025-04-22 RX ORDER — CALCIUM CHLORIDE INJECTION 100 MG/ML
INJECTION, SOLUTION INTRAVENOUS AS NEEDED
Status: DISCONTINUED | OUTPATIENT
Start: 2025-04-22 | End: 2025-04-22

## 2025-04-22 RX ORDER — INSULIN LISPRO 100 [IU]/ML
INJECTION, SOLUTION INTRAVENOUS; SUBCUTANEOUS AS NEEDED
Status: DISCONTINUED | OUTPATIENT
Start: 2025-04-22 | End: 2025-04-22

## 2025-04-22 RX ORDER — CALCIUM GLUCONATE 20 MG/ML
2 INJECTION, SOLUTION INTRAVENOUS EVERY 6 HOURS PRN
Status: DISCONTINUED | OUTPATIENT
Start: 2025-04-22 | End: 2025-05-11 | Stop reason: HOSPADM

## 2025-04-22 RX ORDER — DESMOPRESSIN ACETATE 4 UG/ML
0.5 INJECTION, SOLUTION INTRAVENOUS; SUBCUTANEOUS ONCE
Status: DISCONTINUED | OUTPATIENT
Start: 2025-04-22 | End: 2025-04-22

## 2025-04-22 RX ORDER — DEXTROSE 50 % IN WATER (D50W) INTRAVENOUS SYRINGE
25
Status: DISCONTINUED | OUTPATIENT
Start: 2025-04-22 | End: 2025-05-11 | Stop reason: HOSPADM

## 2025-04-22 RX ORDER — IOPAMIDOL 755 MG/ML
70 INJECTION, SOLUTION INTRAVASCULAR
Status: DISCONTINUED | OUTPATIENT
Start: 2025-04-22 | End: 2025-04-22 | Stop reason: HOSPADM

## 2025-04-22 RX ORDER — LEVETIRACETAM 15 MG/ML
1500 INJECTION INTRAVASCULAR ONCE
Status: COMPLETED | OUTPATIENT
Start: 2025-04-22 | End: 2025-04-22

## 2025-04-22 RX ORDER — ROCURONIUM BROMIDE 10 MG/ML
INJECTION, SOLUTION INTRAVENOUS AS NEEDED
Status: DISCONTINUED | OUTPATIENT
Start: 2025-04-22 | End: 2025-04-22

## 2025-04-22 RX ORDER — POTASSIUM CHLORIDE 1500 MG/1
40 TABLET, EXTENDED RELEASE ORAL ONCE
Status: DISCONTINUED | OUTPATIENT
Start: 2025-04-22 | End: 2025-04-22

## 2025-04-22 RX ORDER — NOREPINEPHRINE BITARTRATE 0.03 MG/ML
INJECTION, SOLUTION INTRAVENOUS CONTINUOUS PRN
Status: DISCONTINUED | OUTPATIENT
Start: 2025-04-22 | End: 2025-04-22

## 2025-04-22 RX ORDER — SODIUM CHLORIDE 234 MG/ML
30 INJECTION, SOLUTION INTRAVENOUS ONCE
Status: COMPLETED | OUTPATIENT
Start: 2025-04-22 | End: 2025-04-22

## 2025-04-22 RX ORDER — NOREPINEPHRINE BITARTRATE/D5W 8 MG/250ML
PLASTIC BAG, INJECTION (ML) INTRAVENOUS
Status: COMPLETED
Start: 2025-04-22 | End: 2025-04-22

## 2025-04-22 RX ORDER — SODIUM BICARBONATE 1 MEQ/ML
SYRINGE (ML) INTRAVENOUS AS NEEDED
Status: DISCONTINUED | OUTPATIENT
Start: 2025-04-22 | End: 2025-04-22

## 2025-04-22 RX ORDER — INDOMETHACIN 25 MG/1
CAPSULE ORAL DAILY PRN
Status: COMPLETED | OUTPATIENT
Start: 2025-04-22 | End: 2025-04-22

## 2025-04-22 RX ORDER — FENTANYL CITRATE 0.05 MG/ML
50 INJECTION, SOLUTION INTRAMUSCULAR; INTRAVENOUS ONCE
Status: DISCONTINUED | OUTPATIENT
Start: 2025-04-22 | End: 2025-04-22

## 2025-04-22 RX ORDER — DEXTROSE 50 % IN WATER (D50W) INTRAVENOUS SYRINGE
12.5
Status: DISCONTINUED | OUTPATIENT
Start: 2025-04-22 | End: 2025-05-11 | Stop reason: HOSPADM

## 2025-04-22 RX ORDER — ONDANSETRON 4 MG/1
4 TABLET, ORALLY DISINTEGRATING ORAL EVERY 8 HOURS PRN
Status: DISCONTINUED | OUTPATIENT
Start: 2025-04-22 | End: 2025-04-22 | Stop reason: HOSPADM

## 2025-04-22 RX ORDER — NIMODIPINE 30 MG/1
60 CAPSULE, LIQUID FILLED ORAL EVERY 4 HOURS
Status: DISCONTINUED | OUTPATIENT
Start: 2025-04-22 | End: 2025-04-22

## 2025-04-22 RX ORDER — CEFAZOLIN SODIUM 2 G/100ML
2 INJECTION, SOLUTION INTRAVENOUS ONCE
Status: COMPLETED | OUTPATIENT
Start: 2025-04-22 | End: 2025-04-22

## 2025-04-22 RX ORDER — NICARDIPINE HYDROCHLORIDE 0.2 MG/ML
2.5-15 INJECTION INTRAVENOUS CONTINUOUS
Status: DISCONTINUED | OUTPATIENT
Start: 2025-04-22 | End: 2025-04-22

## 2025-04-22 RX ORDER — CALCIUM CHLORIDE 100 MG/ML
INJECTION INTRAVENOUS; INTRAVENTRICULAR DAILY PRN
Status: COMPLETED | OUTPATIENT
Start: 2025-04-22 | End: 2025-04-22

## 2025-04-22 RX ORDER — LEVETIRACETAM 500 MG/5ML
1500 INJECTION, SOLUTION, CONCENTRATE INTRAVENOUS ONCE
Status: COMPLETED | OUTPATIENT
Start: 2025-04-22 | End: 2025-04-22

## 2025-04-22 RX ORDER — EPINEPHRINE IN 0.9 % SOD CHLOR 4MG/250ML
0-1 PLASTIC BAG, INJECTION (ML) INTRAVENOUS CONTINUOUS
Status: DISCONTINUED | OUTPATIENT
Start: 2025-04-22 | End: 2025-04-22

## 2025-04-22 RX ORDER — LIDOCAINE HYDROCHLORIDE AND EPINEPHRINE 5; 5 MG/ML; UG/ML
INJECTION, SOLUTION INFILTRATION; PERINEURAL AS NEEDED
Status: DISCONTINUED | OUTPATIENT
Start: 2025-04-22 | End: 2025-04-22 | Stop reason: HOSPADM

## 2025-04-22 RX ORDER — LABETALOL HYDROCHLORIDE 5 MG/ML
10 INJECTION, SOLUTION INTRAVENOUS EVERY 10 MIN PRN
Status: DISCONTINUED | OUTPATIENT
Start: 2025-04-22 | End: 2025-05-02

## 2025-04-22 RX ORDER — LEVETIRACETAM 5 MG/ML
500 INJECTION INTRAVASCULAR EVERY 12 HOURS
Status: DISCONTINUED | OUTPATIENT
Start: 2025-04-22 | End: 2025-04-22

## 2025-04-22 RX ORDER — HYDRALAZINE HYDROCHLORIDE 20 MG/ML
10 INJECTION INTRAMUSCULAR; INTRAVENOUS
Status: DISCONTINUED | OUTPATIENT
Start: 2025-04-22 | End: 2025-05-02

## 2025-04-22 RX ORDER — MAGNESIUM SULFATE HEPTAHYDRATE 40 MG/ML
2 INJECTION, SOLUTION INTRAVENOUS EVERY 6 HOURS PRN
Status: DISCONTINUED | OUTPATIENT
Start: 2025-04-22 | End: 2025-05-11 | Stop reason: HOSPADM

## 2025-04-22 RX ORDER — POTASSIUM CHLORIDE 1.5 G/1.58G
20 POWDER, FOR SOLUTION ORAL EVERY 6 HOURS PRN
Status: DISCONTINUED | OUTPATIENT
Start: 2025-04-22 | End: 2025-05-11 | Stop reason: HOSPADM

## 2025-04-22 RX ORDER — SODIUM CHLORIDE 9 MG/ML
100 INJECTION, SOLUTION INTRAVENOUS CONTINUOUS
Status: DISCONTINUED | OUTPATIENT
Start: 2025-04-22 | End: 2025-04-22

## 2025-04-22 RX ORDER — MANNITOL 20 G/100ML
100 INJECTION, SOLUTION INTRAVENOUS ONCE
Status: COMPLETED | OUTPATIENT
Start: 2025-04-22 | End: 2025-04-22

## 2025-04-22 RX ORDER — POTASSIUM CHLORIDE 29.8 MG/ML
INJECTION INTRAVENOUS AS NEEDED
Status: DISCONTINUED | OUTPATIENT
Start: 2025-04-22 | End: 2025-04-22

## 2025-04-22 RX ORDER — POTASSIUM CHLORIDE 20 MEQ/1
40 TABLET, EXTENDED RELEASE ORAL EVERY 6 HOURS PRN
Status: DISCONTINUED | OUTPATIENT
Start: 2025-04-22 | End: 2025-05-11 | Stop reason: HOSPADM

## 2025-04-22 RX ORDER — MAGNESIUM SULFATE HEPTAHYDRATE 500 MG/ML
INJECTION, SOLUTION INTRAMUSCULAR; INTRAVENOUS DAILY PRN
Status: COMPLETED | OUTPATIENT
Start: 2025-04-22 | End: 2025-04-22

## 2025-04-22 RX ORDER — POTASSIUM CHLORIDE 7.45 MG/ML
10 INJECTION INTRAVENOUS
Status: DISCONTINUED | OUTPATIENT
Start: 2025-04-22 | End: 2025-04-22 | Stop reason: HOSPADM

## 2025-04-22 RX ORDER — NICARDIPINE HYDROCHLORIDE 0.2 MG/ML
1-15 INJECTION INTRAVENOUS CONTINUOUS PRN
Status: DISCONTINUED | OUTPATIENT
Start: 2025-04-22 | End: 2025-04-23

## 2025-04-22 RX ORDER — BACITRACIN 500 [USP'U]/G
OINTMENT TOPICAL AS NEEDED
Status: DISCONTINUED | OUTPATIENT
Start: 2025-04-22 | End: 2025-04-22 | Stop reason: HOSPADM

## 2025-04-22 RX ORDER — EPINEPHRINE IN SOD CHLOR,ISO 1 MG/10 ML
SYRINGE (ML) INTRAVENOUS DAILY PRN
Status: COMPLETED | OUTPATIENT
Start: 2025-04-22 | End: 2025-04-22

## 2025-04-22 RX ORDER — FENTANYL CITRATE-0.9 % NACL/PF 10 MCG/ML
25-200 PLASTIC BAG, INJECTION (ML) INTRAVENOUS CONTINUOUS
Status: DISCONTINUED | OUTPATIENT
Start: 2025-04-22 | End: 2025-04-26

## 2025-04-22 RX ORDER — POTASSIUM CHLORIDE 20 MEQ/1
20 TABLET, EXTENDED RELEASE ORAL EVERY 6 HOURS PRN
Status: DISCONTINUED | OUTPATIENT
Start: 2025-04-22 | End: 2025-05-11 | Stop reason: HOSPADM

## 2025-04-22 RX ORDER — LEVETIRACETAM 500 MG/1
500 TABLET ORAL 2 TIMES DAILY
Status: DISCONTINUED | OUTPATIENT
Start: 2025-04-22 | End: 2025-04-22

## 2025-04-22 RX ORDER — LEVETIRACETAM 15 MG/ML
1500 INJECTION INTRAVASCULAR ONCE
Status: DISCONTINUED | OUTPATIENT
Start: 2025-04-22 | End: 2025-04-22

## 2025-04-22 RX ORDER — PHENYLEPHRINE HCL IN 0.9% NACL 0.4MG/10ML
SYRINGE (ML) INTRAVENOUS AS NEEDED
Status: DISCONTINUED | OUTPATIENT
Start: 2025-04-22 | End: 2025-04-22

## 2025-04-22 RX ORDER — CALCIUM GLUCONATE 20 MG/ML
1 INJECTION, SOLUTION INTRAVENOUS EVERY 6 HOURS PRN
Status: DISCONTINUED | OUTPATIENT
Start: 2025-04-22 | End: 2025-05-11 | Stop reason: HOSPADM

## 2025-04-22 RX ORDER — LEVETIRACETAM 5 MG/ML
500 INJECTION INTRAVASCULAR EVERY 12 HOURS
Status: COMPLETED | OUTPATIENT
Start: 2025-04-22 | End: 2025-04-25

## 2025-04-22 RX ORDER — AMOXICILLIN 250 MG
2 CAPSULE ORAL 2 TIMES DAILY
Status: DISCONTINUED | OUTPATIENT
Start: 2025-04-22 | End: 2025-05-06

## 2025-04-22 RX ADMIN — SODIUM CHLORIDE: 0.9 INJECTION, SOLUTION INTRAVENOUS at 15:05

## 2025-04-22 RX ADMIN — SODIUM CHLORIDE 1000 ML: 0.9 INJECTION, SOLUTION INTRAVENOUS at 09:50

## 2025-04-22 RX ADMIN — NOREPINEPHRINE BITARTRATE 8 MCG: 1 INJECTION, SOLUTION, CONCENTRATE INTRAVENOUS at 14:23

## 2025-04-22 RX ADMIN — SODIUM CHLORIDE, SODIUM LACTATE, POTASSIUM CHLORIDE, AND CALCIUM CHLORIDE 1000 ML: .6; .31; .03; .02 INJECTION, SOLUTION INTRAVENOUS at 21:56

## 2025-04-22 RX ADMIN — FENTANYL CITRATE 50 MCG: 50 INJECTION, SOLUTION INTRAMUSCULAR; INTRAVENOUS at 13:56

## 2025-04-22 RX ADMIN — DESMOPRESSIN ACETATE 22.4 MCG: 4 INJECTION, SOLUTION INTRAVENOUS; SUBCUTANEOUS at 15:01

## 2025-04-22 RX ADMIN — HYDRALAZINE HYDROCHLORIDE 20 MG: 20 INJECTION INTRAMUSCULAR; INTRAVENOUS at 13:10

## 2025-04-22 RX ADMIN — POTASSIUM CHLORIDE 10 MEQ: 7.46 INJECTION, SOLUTION INTRAVENOUS at 09:56

## 2025-04-22 RX ADMIN — MAGNESIUM SULFATE HEPTAHYDRATE 2000 MG: 500 INJECTION, SOLUTION INTRAMUSCULAR; INTRAVENOUS at 08:19

## 2025-04-22 RX ADMIN — SODIUM CHLORIDE, SODIUM LACTATE, POTASSIUM CHLORIDE, AND CALCIUM CHLORIDE 100 ML/HR: .6; .31; .03; .02 INJECTION, SOLUTION INTRAVENOUS at 19:32

## 2025-04-22 RX ADMIN — ROCURONIUM BROMIDE 20 MG: 10 INJECTION INTRAVENOUS at 16:01

## 2025-04-22 RX ADMIN — Medication 25 MCG/HR: at 18:10

## 2025-04-22 RX ADMIN — CALCIUM CHLORIDE 1 G: 100 INJECTION INTRAVENOUS; INTRAVENTRICULAR at 15:18

## 2025-04-22 RX ADMIN — SODIUM BICARBONATE 50 MEQ: 84 INJECTION, SOLUTION INTRAVENOUS at 08:18

## 2025-04-22 RX ADMIN — SODIUM CHLORIDE: 0.9 INJECTION, SOLUTION INTRAVENOUS at 13:41

## 2025-04-22 RX ADMIN — NOREPINEPHRINE BITARTRATE 8 MCG: 1 INJECTION, SOLUTION, CONCENTRATE INTRAVENOUS at 15:01

## 2025-04-22 RX ADMIN — IOPAMIDOL 70 ML: 755 INJECTION, SOLUTION INTRAVENOUS at 08:00

## 2025-04-22 RX ADMIN — ROCURONIUM BROMIDE 20 MG: 10 INJECTION INTRAVENOUS at 17:12

## 2025-04-22 RX ADMIN — ROCURONIUM BROMIDE 20 MG: 10 INJECTION INTRAVENOUS at 16:42

## 2025-04-22 RX ADMIN — INSULIN LISPRO 2 UNITS: 100 INJECTION, SOLUTION INTRAVENOUS; SUBCUTANEOUS at 15:16

## 2025-04-22 RX ADMIN — SODIUM CHLORIDE, SODIUM LACTATE, POTASSIUM CHLORIDE, AND CALCIUM CHLORIDE: 600; 310; 30; 20 INJECTION, SOLUTION INTRAVENOUS at 13:41

## 2025-04-22 RX ADMIN — Medication 40 PERCENT: at 21:12

## 2025-04-22 RX ADMIN — Medication 100 PERCENT: at 12:40

## 2025-04-22 RX ADMIN — LEVETIRACETAM 500 MG: 5 INJECTION INTRAVENOUS at 21:51

## 2025-04-22 RX ADMIN — ROCURONIUM BROMIDE 50 MG: 10 INJECTION INTRAVENOUS at 13:47

## 2025-04-22 RX ADMIN — VASOPRESSIN 120 MEQ: 20 INJECTION, SOLUTION INTRAVENOUS at 13:00

## 2025-04-22 RX ADMIN — LEVETIRACETAM 1500 MG: 15 INJECTION INTRAVASCULAR at 11:58

## 2025-04-22 RX ADMIN — SODIUM CHLORIDE 1000 ML: 0.9 INJECTION, SOLUTION INTRAVENOUS at 12:04

## 2025-04-22 RX ADMIN — PHENYLEPHRINE HYDROCHLORIDE 30 MCG/MIN: 10 INJECTION INTRAVENOUS at 09:53

## 2025-04-22 RX ADMIN — SODIUM CHLORIDE, POTASSIUM CHLORIDE, SODIUM LACTATE AND CALCIUM CHLORIDE: 600; 310; 30; 20 INJECTION, SOLUTION INTRAVENOUS at 15:05

## 2025-04-22 RX ADMIN — Medication 30 PERCENT: at 17:50

## 2025-04-22 RX ADMIN — LEVETIRACETAM 1500 MG: 500 INJECTION, SOLUTION, CONCENTRATE INTRAVENOUS at 08:59

## 2025-04-22 RX ADMIN — Medication 0.02 MCG/KG/MIN: at 13:23

## 2025-04-22 RX ADMIN — CEFAZOLIN SODIUM 2 G: 2 INJECTION, SOLUTION INTRAVENOUS at 13:00

## 2025-04-22 RX ADMIN — SODIUM BICARBONATE 50 MEQ: 84 INJECTION INTRAVENOUS at 15:39

## 2025-04-22 RX ADMIN — SODIUM CHLORIDE 100 ML/HR: 0.9 INJECTION, SOLUTION INTRAVENOUS at 18:24

## 2025-04-22 RX ADMIN — IOPAMIDOL 70 ML: 755 INJECTION, SOLUTION INTRAVENOUS at 08:27

## 2025-04-22 RX ADMIN — NICARDIPINE HYDROCHLORIDE 2.5 MG/HR: 0.2 INJECTION, SOLUTION INTRAVENOUS at 11:28

## 2025-04-22 RX ADMIN — POTASSIUM CHLORIDE 40 MEQ: 29.8 INJECTION, SOLUTION INTRAVENOUS at 14:34

## 2025-04-22 RX ADMIN — IOHEXOL 75 ML: 350 INJECTION, SOLUTION INTRAVENOUS at 11:43

## 2025-04-22 RX ADMIN — DEXAMETHASONE SODIUM PHOSPHATE 10 MG: 10 INJECTION INTRAMUSCULAR; INTRAVENOUS at 09:15

## 2025-04-22 RX ADMIN — NOREPINEPHRINE BITARTRATE 16 MCG: 1 INJECTION, SOLUTION, CONCENTRATE INTRAVENOUS at 15:55

## 2025-04-22 RX ADMIN — CEFAZOLIN 2 G: 1 INJECTION, POWDER, FOR SOLUTION INTRAMUSCULAR; INTRAVENOUS at 14:11

## 2025-04-22 RX ADMIN — MANNITOL 100 G: 20 INJECTION, SOLUTION INTRAVENOUS at 13:10

## 2025-04-22 RX ADMIN — SODIUM BICARBONATE 50 MEQ: 84 INJECTION INTRAVENOUS at 15:25

## 2025-04-22 RX ADMIN — Medication 0.06 MCG/KG/MIN: at 13:41

## 2025-04-22 RX ADMIN — SODIUM BICARBONATE 50 MEQ: 84 INJECTION, SOLUTION INTRAVENOUS at 08:13

## 2025-04-22 RX ADMIN — NICARDIPINE HYDROCHLORIDE 2.5 MG/HR: 0.2 INJECTION INTRAVENOUS at 11:28

## 2025-04-22 RX ADMIN — LEVETIRACETAM 1500 MG: 15 INJECTION INTRAVENOUS at 11:58

## 2025-04-22 RX ADMIN — Medication 1 MG: at 08:13

## 2025-04-22 RX ADMIN — SODIUM CHLORIDE, SODIUM LACTATE, POTASSIUM CHLORIDE, AND CALCIUM CHLORIDE 1000 ML: .6; .31; .03; .02 INJECTION, SOLUTION INTRAVENOUS at 19:45

## 2025-04-22 RX ADMIN — Medication 80 MCG: at 15:55

## 2025-04-22 RX ADMIN — ROCURONIUM BROMIDE 20 MG: 10 INJECTION INTRAVENOUS at 15:36

## 2025-04-22 RX ADMIN — NICARDIPINE HYDROCHLORIDE 2.5 MG/HR: 0.2 INJECTION, SOLUTION INTRAVENOUS at 21:11

## 2025-04-22 RX ADMIN — SODIUM CHLORIDE 1000 ML: 0.9 INJECTION, SOLUTION INTRAVENOUS at 08:59

## 2025-04-22 RX ADMIN — CALCIUM CHLORIDE 1000 MG: 100 INJECTION, SOLUTION INTRAVENOUS at 08:16

## 2025-04-22 RX ADMIN — PROPOFOL 15 MCG/KG/MIN: 10 INJECTION, EMULSION INTRAVENOUS at 17:33

## 2025-04-22 RX ADMIN — NOREPINEPHRINE BITARTRATE 8 MCG: 1 INJECTION, SOLUTION, CONCENTRATE INTRAVENOUS at 14:43

## 2025-04-22 RX ADMIN — SODIUM CHLORIDE 1000 ML: 0.9 INJECTION, SOLUTION INTRAVENOUS at 18:34

## 2025-04-22 RX ADMIN — Medication 100 PERCENT: at 11:22

## 2025-04-22 RX ADMIN — INSULIN LISPRO 2 UNITS: 100 INJECTION, SOLUTION INTRAVENOUS; SUBCUTANEOUS at 17:11

## 2025-04-22 RX ADMIN — NOREPINEPHRINE BITARTRATE 0.02 MCG/KG/MIN: 8 INJECTION, SOLUTION INTRAVENOUS at 13:23

## 2025-04-22 ASSESSMENT — COLUMBIA-SUICIDE SEVERITY RATING SCALE - C-SSRS
2. HAVE YOU ACTUALLY HAD ANY THOUGHTS OF KILLING YOURSELF?: NO
1. IN THE PAST MONTH, HAVE YOU WISHED YOU WERE DEAD OR WISHED YOU COULD GO TO SLEEP AND NOT WAKE UP?: NO
6. HAVE YOU EVER DONE ANYTHING, STARTED TO DO ANYTHING, OR PREPARED TO DO ANYTHING TO END YOUR LIFE?: NO

## 2025-04-22 ASSESSMENT — PULMONARY FUNCTION TESTS
PIF_VALUE: 20
PIF_VALUE: 21

## 2025-04-22 ASSESSMENT — PAIN SCALES - GENERAL: PAIN_LEVEL: 0

## 2025-04-22 ASSESSMENT — PAIN - FUNCTIONAL ASSESSMENT
PAIN_FUNCTIONAL_ASSESSMENT: CPOT (CRITICAL CARE PAIN OBSERVATION TOOL)
PAIN_FUNCTIONAL_ASSESSMENT: CPOT (CRITICAL CARE PAIN OBSERVATION TOOL)

## 2025-04-22 NOTE — SIGNIFICANT EVENT
Excepted from [facility] as ED to ED transfer.  69-year-old female with subarachnoid hemorrhage noted on CT head with arrest in CT scan.  Intubated and initiated on Cardene.  SBP goal <140.  Patient to be flown with CCT.

## 2025-04-22 NOTE — ED TRIAGE NOTES
Transfer from [place], [place] for post cardiac arrest. Per Flight nurse, patient was at home with her  got some bad news, hung up the phone, sudden onset headache and vomiting. OSH scans showed SAH. Patient intubated and sedated upon arrival, GCS 3.

## 2025-04-22 NOTE — ED PROVIDER NOTES
[facility] EMERGENCY DEPARTMENT  EMERGENCY DEPARTMENT ENCOUNTER      Pt Name: Jessie Morris  MRN: [id number]  Birthdate 1955  Date of evaluation: 4/22/2025  Provider: Zaid Dick MD  PCP: Maciej Triplett MD  Note Started: 7:48 AM EDT 4/22/25    CHIEF COMPLAINT       Chief Complaint   Patient presents with    Unresponsive     Pt from home, pt's family states she was not responding.        HISTORY OF PRESENT ILLNESS: 1 or more Elements   History From: Patient, patient's   Limitations to history : None    Jessie Morris is a 69 y.o. female who presents brought in by private vehicle from home with complaints of headache and episode of unresponsiveness that occurred just prior to arrival.  Patient currently responsive alert and oriented x 3 although she does appear diaphoretic and uncomfortable on my initial evaluation.  Reports that she was told some stressful news by her daughter last night and began having a headache.  Patient's  who presents afterwards reports that she did take some aspirin prior to presenting to the emergency department.  Patient's  drove her here and he reports that by the time they arrived at the emergency department she appeared to be unresponsive.  Patient endorsing headache at the top of her head at this time.  Denies other acute complaints.  Denies chest pain.    Nursing Notes were all reviewed and agreed with or any disagreements were addressed in the HPI.    REVIEW OF SYSTEMS :    Positives and Pertinent negatives as per HPI.     PAST MEDICAL HISTORY/Chronic Conditions Affecting Care    has a past medical history of Hypertension and Panic attack.     SURGICAL HISTORY     Past Surgical History:   Procedure Laterality Date    FOOT SURGERY Right     TONSILLECTOMY      WISDOM TOOTH EXTRACTION         CURRENTMEDICATIONS       Previous Medications    ATENOLOL (TENORMIN) 25 MG TABLET    Take 1 tablet by mouth daily    ATORVASTATIN

## 2025-04-22 NOTE — ANESTHESIA PREPROCEDURE EVALUATION
Patient: Muriel De Souza    Procedure Information       Anesthesia Start Date/Time: 04/22/25 1341    Procedure: DECOMPRESSIVE PRONE SUBOCCIPITAL CRANIECTOMY - PRONE, GEL ROLLS, AVM CLIPS    Location: [facility] OR  / [facility] OR    Surgeons: Moy Hood MD            Relevant Problems   No relevant active problems       Clinical information reviewed:    Allergies                NPO Detail:  No data recorded     Physical Exam    Airway  Mallampati: unable to assess     Cardiovascular   Rhythm: regular     Dental    Pulmonary - normal exam   Abdominal            Anesthesia Plan    History of general anesthesia?: unknown/emergency  History of complications of general anesthesia?: unknown/emergency    ASA 5 - emergent     general   (CVC, art line, additional IV access. Hot line. Blood in room. Pressors and inotropes available. )  Plan/risks discussed with: emergent.    Blood Products Consent Comment: emergent  Plan discussed with CRNA and attending.

## 2025-04-22 NOTE — CODE DOCUMENTATION
RRT CALLED TO CT FOR THIS PT. PATIENT WAS UNRESPONSIVE WITHOUT A PULSE. CPR/ACLS PROTOCOL STARTED AT THIS TIME. BAGGING PATIENT. PT BROUGHT TO ER ROOM 10.

## 2025-04-22 NOTE — OP NOTE
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)  Intraoperative microscope for microdissection    Surgeons      * Moy Hood - Primary    Resident/Fellow/Other Assistant:  Surgeons and Role:     * Rudolph Álvarez MD - Resident - Assisting     * Chaevz Vang MD - Resident - Assisting    Staff:   Circulator: Mary  Scrub Person: Emma Campos Circulator: Camryn Campos Scrub: Akilah Campos Scrub: Antal    Anesthesia Staff: Anesthesiologist: Fifi Padgett MD; Bucky Sung MD; Liliana Boss MD  C-AA: SLIME Jenkins  BISHNU: Esteban Sigala    Procedure Summary  Anesthesia: General  ASA: V  Estimated Blood Loss: 800mL  Intra-op Medications:   Administrations occurring from 1305 to 1835 on 25:   Medication Name Total Dose   polymyxin B injection 500,000 Units   lidocaine-epinephrine (Xylocaine W/EPI) injection 10 mL   thrombin-bovine (JMI) 5,000 unit topical solution 20,000 Units   bacitracin ointment 1 Application   calcium chloride 100 mg/mL syringe 1 g   ceFAZolin (Ancef) vial 1 g 2 g   EPINEPHrine (Adrenalin) 4 mg in sodium chloride 0.9% 250 mL (16 mcg/mL) infusion (premix) Cannot be calculated   fentaNYL (Sublimaze) injection 50 mcg/mL 50 mcg   hydrALAZINE (Apresoline) injection 10 mg 20 mg   insulin lispro injection 100 units/mL 2 Units   LR bolus Cannot be calculated   LR infusion 350 mL   norepinephrine (Levophed) 8 mg in dextrose 5% 250 mL (0.032  mg/mL) infusion (premix) 1.26 mg   norepinephrine (Levophed) 8 mg / 250 mL NS  (premix) 1.93 mg   norepinephrine (Levophed) 16 mcg/mL IV bolus 40 mcg   perflutren lipid microspheres (Definity) injection 0.5-10 mL of dilution Cannot be calculated   phenylephrine 40 mcg/mL syringe 10 mL 80 mcg   potassium chloride 40 mEq in 100 mL IV premix 40 mEq   rocuronium 10 mg/mL 110 mg   Sodium Bicaronate 8.4% Intravenous Syringe 100 mEq   NaCl 0.9 % bolus Cannot be calculated   NaCl 0.9 % bolus Cannot be calculated   desmopressin (DDAVP) 24.4 mcg in sodium chloride 0.9% 50 mL IV 22.4 mcg   levETIRAcetam (Keppra) 1,500 mg in sodium chloride (iso)  mL Cannot be calculated   mannitol 20 %  g 100 g              Anesthesia Record               Intraprocedure I/O Totals          Intake    RBC - EMERGENT 350.00 mL    NaCl 0.9 % bolus 1400.00 mL    Norepinephrine Drip 0.00 mL    The total shown is the total volume documented since Anesthesia Start was filed.    desmopressin (DDAVP) 24.4 mcg in sodium chloride 0.9% 50 mL IV 56.10 mL    Total Intake 1806.1 mL       Output    Est. Blood Loss 800 mL    Urine 1100 mL    Total Output 1900 mL       Net    Net Volume -93.9 mL          Specimen:   ID Type Source Tests Collected by Time   1 : AVM Tissue BRAIN RESECTION SURGICAL PATHOLOGY EXAM Moy Hood MD 4/22/2025 1616                 Drains and/or Catheters:   Intracranial Pressure/Ventriculostomy Ventricular drainage catheter with ICP transducer Right Frontal region (Active)   Drain Status Clamped 04/22/25 1322   Level 15 cm;At external auditory meatus 04/22/25 1322   Site Assessment Clean;Dry 04/22/25 1322   CSF Color Clear 04/22/25 1322   Dressing Status Clean;Dry;Occlusive 04/22/25 1322   Ventric/ICP Waveform Appropriate;Square wave test performed 04/22/25 1322   Ventric/ICP Interventions Zeroed and calibrated;Tubing changed;Connections checked and tightened 04/22/25 1322   Site Care Performed 04/22/25 1322   Dressing  Intervention New dressing 04/22/25 1322   Dressing Change Due 04/23/25 04/22/25 1322       Tourniquet Times:         Implants:  Implants       Type Name Action Serial No.      Graft GRAFT MATRIX, DURAL, DURAGEN PLUS 4X5 (1/PK) - [id number] Implanted      Neuro Interventional Implant CLIP, ANEURYSM, YASARGIL, MINI, STRAIGHT, 7 MM, TITANIUM - [id number] Implanted      Neuro Interventional Implant CLIP, ANEURYSM, STRAIGHT, YASARGIL, STANDARD, 7 MM, TITANIUM - [id number] Implanted      Neuro Interventional Implant CLIP, ANEURYSM YASARGIL YC033A - [id number] Implanted      Neuro Interventional Implant CLIP, ANEURYSM, YASARGIL, STANDARD, CURVED, 6.4 MM, TITANIUM - [id number] Used, Not Implanted               Findings: Large left cerebellar AVM with multiple feeding vessels; nidus >3 cm, eloquent location (deep cerebellar nuclei)    Indications: Muriel De Souza is an 69 y.o. female who is having surgery for AVM (arteriovenous malformation) ([facility]) [Q27.30].     The patient was seen in the preoperative area. The risks, benefits, complications, treatment options, non-operative alternatives, expected recovery and outcomes were discussed with the patient. The possibilities of reaction to medication, pulmonary aspiration, injury to surrounding structures, bleeding, recurrent infection, the need for additional procedures, failure to diagnose a condition, and creating a complication requiring transfusion or operation were discussed with the patient. The patient concurred with the proposed plan, giving informed consent.  The site of surgery was properly noted/marked if necessary per policy. The patient has been actively warmed in preoperative area. Preoperative antibiotics have been ordered and given within 1 hours of incision. Venous thrombosis prophylaxis have been ordered including bilateral sequential compression devices    Procedure Details:     The patient was brought back from NSU to OR by the anesthesia and OR staff  emergently. A preoperative timeout was performed in order to confirm the correct procedure, patient, and laterality. The patient was already intubated with appropriate vascular access and ventriculostomy in place.  The patient was placed in the prone position with gel rolls on a regular neuro bed. The arms were padded. The patient's head was placed on a Ventura head medina in a flexed position. The occipital scalp and neck were cleansed, prepped and draped in usual sterile fashion, and a midline linear incision was marked from inion to upper cervical spine.  Another timeout was performed. Local anesthetic was used to infiltrate skin over incision.      The incision was fully opened with sharp dissection and electrocautery.The scalp was retracted using Cerebellar retractors. A craniectomy was planned widely.     A large craniectomy was made with the  drill, acorn drill, kerrasin rongeurs, and curettes with care taken not to violate the underlying dura. Hemostasis was achieved and dura was coagulated. The dura was opened with a #11 blade and Metzenbaum scissors in standard Y-shaped fashion. There was obvious subdural hematoma present, which was irrigated copiously and evacuated. The occipital sinus was ligated prior to cutting.     At this point the operative microscope was brought in for better visualization.    The cerebellar hemispheres were carefully dissected. Cisterna magna was opened in order to trace PICA proximal to distal. It appeared that the AVM was supplied in part by a cortical PICA. This was ligated with a 7mm straight aneurysm clip. This vessel was traced superiorly until portions of AVM nidus was identified, which was seen radiographically to be draining into the torcula/transverse sinus. Significant bleeding was encountered during this dissection, which was controlled with bipolar electrocautery. We were able to dissect around much of the nidus and surrounding intraparenchymal hematoma.  The draining vein was also ligated with a 7mm straight aneurysm clip. We identified a feeding artery at the posterior aspect of the resection cavity, possibly the AICA feeder, and a 7mm straight aneurysm was placed. The flow-related aneurysm seen on CTA was likely deeper within the parenchyma, and was not identified. Clot was removed with suction, irrigation, and gentle forceps retraction. The nidus and surrounding clot/parenchyma was sent for pathology.     The resection cavity was then copiously irrigated and hemostasis was obtained using thrombin soaked gel foam, floseal, and cotton balls. Surgicel was placed in the resection bed and over the cerebellar tonsilsfor hemostasis. The dural defect was covered with duragen inlay and onlay, and duraseal was placed over this closure.      Incision was irrigated one last time and incision was closed with 0 vicryl suture in the mucle and fascia, 2-0 vicryls on superficial dermal layers, and nylon suture for skin. Patient was taken out of Ventura pins, turned in supine position. A dressing was placed and patient was turned over to anesthesia without complication.     All counts were correct at end of case.  Patient was planned to be taken to NSU in guarded position    Complications:  None; patient tolerated the procedure well.    Disposition: ICU - intubated and critically ill.  Condition: stable       Attending Attestation: I was present and scrubbed for the key portions of the procedure.    Moy Hood  Phone Number: [phone]

## 2025-04-22 NOTE — ED PROVIDER NOTES
Emergency Department Provider Note          History of Present Illness     CC: poat cardiac arrest     History provided by: Patient and EMS  Limitations to History: Altered Mental Status and Respiratory Distress    HPI:   Muriel De Souza is a 69 y.o.female with PMH HTN, presenting to the Emergency Department for became unresponsive following an episode of severe headache and vomiting earlier today after receiving a stressful phone call. Prior to arriving at the emergency department, the patient was reportedly well and asymptomatic, according to his spouse. Patient developed a sudden onset headache severe enough to prompt a visit to the emergency room, leading to subsequent unresponsiveness and a Pulseless Electrical Activity arrest during a CT scan. The patient received cardiopulmonary resuscitation for approximately seven minutes and was subsequently intubated. Return of spontaneous circulation was achieved, and the patient was stabilized.  CT imaging at outside hospital showed subarachnoid hemorrhage and was transferred to [facility] for definitive neurosurgical evaluation.  On arrival to [facility], the patients current [place] Coma Score is 5 (withdrawing in lower extremities).     Records Reviewed: Recent available ED and inpatient notes reviewed in EMR.    PMHx/PSHx:  Per HPI.   - has no past medical history on file.  - has no past surgical history on file.  - has AVM (arteriovenous malformation) ([facility]) on their problem list.    Medications:  No current outpatient medications     Allergies:  Patient has no known allergies.    Social History:  - Tobacco:  has no history on file for tobacco use.   - Alcohol:  has no history on file for alcohol use.   - Illicit Drugs:  has no history on file for drug use.     ROS:  Per HPI.       Physical Exam     Triage Vitals:  T 34.9 °C (94.8 °F)  HR 62  /83  RR 18  O2 100 % Supplemental oxygen    General: Intubated, minimallu responsive.  Eyes: Gaze conjugate.  No  scleral icterus or injection  HENT: Normo-cephalic, atraumatic. No stridor  CV: Regular rate, regular rhythm. Radial pulses 2+ bilaterally  Resp: Breathing non-labored, speaking in full sentences.  Clear to auscultation bilaterally  GI: Pelvic distention consistent with urinary retention.  MSK/Extremities: No gross bony deformities. Moving all extremities  Skin: Warm. Appropriate color  Neuro: Withdrawing in the bilateral lower extremities, no upper extremity withdrawal.  No gag, no corneal reflex.  Psych: Appropriate mood and affect          [place] Coma Scale Score: 3                    Medical Decision Making & ED Course     EKG: EKG interpreted by myself. If applicable, please see ED Course for full interpretation.    Medical Decision Making   Muriel De Souza is a 69 y.o.female presenting to the Emergency Department as a transfer from [facility] for subarachnoid hemorrhage and cardiac arrest.  On arrival, blood pressure 150/66.  Rest of vital signs within normal limits.  Saturating 100%.  Currently vented with good equal breath sounds bilaterally.  On examination, has notable pelvic distention consistent with urinary retention, Brooks was placed at bedside.  OG in place.  Patient started on Cardene drip for blood pressure goals of 130-150.  Head of bed raised to 30 degrees.  Seen emergently to CT scan that showed diffuse subarachnoid hemorrhage with Hydrocephalus.  AVM suspected to left cerebellar hemisphere that is the cause of her symptoms today.  Neurosurgery at bedside.  Exam on arrival shows withdrawing to the bilateral lower extremities.  No gag, no corneal reflex.  No withdrawal in the bilateral upper extremities.  Started on 20 cc/kg Keppra for seizure prophylaxis.  Initial venous blood gas showed a lactate of 5.5 likely secondary to patient's recent cardiac arrest.  Normal pH.  Started on normal saline bolus.  Per neurosurgery recommendations, sent emergently to the NSU for potential  decompression versus EVD placement in guarded condition.      ED Course as of 04/22/25 1522   e Apr 22, 2025   1206 EKG interpreted by me: Regular rate, rate rhythm.  FL interval notably prolonged at 224 consistent with first-degree block.  QRS notably prolonged at 168 consistent with right bundle branch block.  QTc notably prolonged at 551.  No obvious ST elevations.  T wave inversion seen in lead V1. [AS]   1211 I independently evaluated and agree with resident interpretation of ECG, except for QTc 458   [KV]   1220 Family updated at bedside about her clinical condition and potential prognosis. [AS]      ED Course User Index  [AS] Bill Matute MD  [KV] Valentina Canas MD         Diagnoses as of 04/22/25 1522   Subarachnoid hemorrhage (Multi)   AVM (arteriovenous malformation) ([facility])       Independent Result Review and Interpretation: Relevant laboratory and radiographic results were reviewed and independently interpreted by myself.  As necessary, they are commented on in the ED Course.    Chronic conditions affecting the patient's care: As documented above in MDM.      Disposition   Admit    Bill Matute MD  Emergency Medicine PGY3      Procedures     Procedures ? SmartLinks last updated 4/22/2025 3:22 PM        Bill Matute MD  Resident  04/22/25 1522

## 2025-04-22 NOTE — PROGRESS NOTES
[facility]  NEUROSCIENCE INTENSIVE CARE UNIT  DAILY PROGRESS NOTE       Patient Name: Muriel De Souza   MRN: [id number]     Admit Date: 2025     : 1955 AGE: 69 y.o. GENDER: female        Subjective    Muriel De Souza is a 69 y.o. female with h/o HTN, HLD p/w HA, arrested in CT scanner at OSH, ROSC after 7 min CPR,  CTH w diffuse SAH and L cerebellar ICH, triventriculomegally, effaced 4th vents, CTA with L cerebellar AVM w venous varices s/p RF EVD and now admitted for further management.    Per chart review, patient had received bad news at home and then had sudden onset headache and vomiting. Found to have SAH at OSH with course complicated by cardiac arrest. She was intubated and sedated and transferred from [place], [place] to [facility].    Significant Events:  - post-op    Interval Events:   -  EVD drain placed, went to OR for decompression     Objective   VITALS (24H):  Temp:  [34.1 °C (93.4 °F)-35.3 °C (95.5 °F)] 34.1 °C (93.4 °F)  Heart Rate:  [48-95] 95  Resp:  [18-27] 22  BP: ()/(38-93) 121/38  Arterial Line BP 1: ()/(40-65) 97/40  INTAKE/OUTPUT:  Intake/Output Summary (Last 24 hours) at 2025 1441  Last data filed at 2025 1426  Gross per 24 hour   Intake 267 ml   Output 400 ml   Net -133 ml     VENT SETTINGS:  Vent Mode: Volume control/assist control  S RR:  [18-22] 22  S VT:  [400 mL-450 mL] 400 mL  PEEP/CPAP (cm H2O):  [5 cm H20] 5 cm H20  MAP (cm H2O):  [10] 10     PHYSICAL EXAM:  NEURO:  - Intubated, sedated  - Eyes closed to nox stim  - No spontaneous movements  CV:  - RRR on telemetry, NSR  - Arterial line in place  RESP:  - intubated  :  - Brooks catheter in place  GI:  - Abdomen NT/ND, soft  SKIN:  - Intact    MEDICATIONS:  Scheduled: PRN: Continuous:   Scheduled Medications[1] PRN Medications[2] Continuous Medications[3]     IMAGING RESULTS:  CT head wo IV contrast   Final Result   * Diffuse subarachnoid hemorrhage with hydrocephalus   *Left  cerebellar hematoma suggesting a likely source.        MACRO:   none        Signed by: Cruz Salgado 4/22/2025 11:59 AM   Dictation workstation:   [id number]      CT angio head and neck w and wo IV contrast   Final Result   * Diffuse subarachnoid hemorrhage with hydrocephalus as described   *Suspected vascular malformation in the left cerebellar hemisphere   with enlarged arterial and venous structures largely in the left   cerebellar hemisphere and residual partial opacification of a venous   aneurysm near the torcula. *Bilateral carotid stenosis as described        MACRO:   none        Signed by: Cruz Salgado 4/22/2025 12:07 PM   Dictation workstation:   [id number]             Assessment/Plan    Muriel De Souza is a 69 y.o. female with h/o HTN, HLD p/w HA, arrested in CT scanner at OSH, ROSC after 7 min CPR,  CTH w diffuse SAH and L cerebellar ICH, triventriculomegally, effaced 4th vents, CTA with L cerebellar AVM w venous varices s/p RF EVD and now admitted for further management.    NEURO:  #SAH  # L cerebellar ICH  #Triventriculomegaly with effaced 4th ventricle  #L cerebellar AVM w venous varices  #Intracranial hypertension  # Unsecured AVM  Assessment:  - First day 4/22 presented with SAH, L cerebellar ICH, and ventriculomegaly on CTH  - 4/22 CTA showing L cerebellar AVM  - 4/22 RF EVD for ICH  - 4/22 OR for decompressive occipital craniotomy  Plan:  - NSU  - Keppra  - Neuro Checks: Q1H  - Sedation: Propofol and Fentanyl  - Pain: PRN  - Nausea: ondansetron  - PT/OT/SLP    CARDIOVASCULAR:  #Cardiac arrest  #Lactic acidosis  Assessment:  - 7 min to ROSC  - Assess for ischemic and stress-induced cardiomyopathy   - Assess for arrhythmias following cardiac arrest     Plan:  - Cardiology consult (4/23)  - ECHO plan for 4/23  - Pressors PRN  - Trend troponins q4  - Monitor on telemetry  - BP goal: SBP < 140    --> PRN: Labetalol, Hydralazine, and Nicardipine     RESPIRATORY:  #Acute hypoxic respiratory  failure  #Mechanical ventilation  Assessment:  - Pt intubated for decreased LOC    Plan:  - Continue mechanical ventilation    RENAL/:  #TIM  Assessment:  Results from last 72 hours   Lab Units 25  1132   BUN mg/dL 19   CREATININE mg/dL 0.91       Plan:  - Monitor with daily RFP  - Maintain forbes catheter for: critically ill patient who need accurate urinary output measurements    FEN/GI:  #TIM  Assessment:    Plan:  - Monitor and replace electrolytes per protocol  - IVF: PRN  - Diet: PRN    - Bowel Regimen: Docusate-Senna PRN and Miralax PRN    ENDOCRINE:  #TIM  Assessment:  Results from last 7 days   Lab Units 25  1132   GLUCOSE mg/dL 205*   SODIUM mmol/L 139    - HbA1C 5.5 on     Plan:  - Accuchecks & ISS PRN     HEMATOLOGY:  #TIM  Assessment:  - Baseline Hgb: 12.0  - Baseline Plts: 287  Results from last 7 days   Lab Units 25  1132   HEMOGLOBIN g/dL 12.0   HEMATOCRIT % 34.5*   PLATELETS AUTO x10*3/uL 287     Plan:  - Continue to monitor with daily CBC and Coag panel    INFECTIOUS DISEASE:  #TIM  Assessment:  Results from last 7 days   Lab Units 25  1132   WBC AUTO x10*3/uL 12.9*    - Temp (24hrs), Av.6 °C (94.3 °F), Min:34.1 °C (93.4 °F), Max:35.3 °C (95.5 °F)    Plan:  - Continue to monitor for s/sx of infection  - Pan culture for temperature > 38.4 C    MUSCULOSKELETAL:  - No acute issues    SKIN:  - No acute issues  - Turns and skin care per NSU protocol    ACCESS:  - R Femoral  - R arterial  - L PIV    PROPHYLAXIS:  - DVT Ppx: SCDs  - GI Ppx: PRN    RESTRAINTS:  Not indicated/Patient does not meet criteria for restraints    LUIS LLAMAS  Neuroscience Intensive Care    Jian Ellington MD PhD         Plan of care to be discussed with neurocritical care attending         [1] ceFAZolin, 2 g, intravenous, Once  [Transfer Hold] desmopressin, 0.3 mcg/kg, intravenous, Once  dexAMETHasone, 2 mg, intravenous, q8h BHARGAV  levETIRAcetam, 500 mg, intravenous, q12h  niMODipine, 60 mg, oral,  q4h   Or  niMODipine, 60 mg, oral, q4h  oxygen, , inhalation, Continuous - Inhalation  [Transfer Hold] perflutren lipid microspheres, 0.5-10 mL of dilution, intravenous, Once in imaging  polyethylene glycol, 17 g, oral, Daily  sennosides-docusate sodium, 2 tablet, oral, BID  [2] PRN medications: bacitracin, hydrALAZINE, labetaloL, lidocaine-epinephrine, niCARdipine, oxygen, polymyxin B  [3] EPINEPHrine, 0-1 mcg/kg/min  fentaNYL,  mcg/hr  niCARdipine, 1-15 mg/hr  norepinephrine, 0.01-1 mcg/kg/min, Last Rate: 0.05 mcg/kg/min (04/22/25 2589)  propofol, 0-50 mcg/kg/min

## 2025-04-22 NOTE — Clinical Note
Diagnostic angiogram complete with anesthesia. VSS. Dressing C/D/I with a 6 Fr. mynx closure device. Neuro exam unchanged. Report given to NSU RNAnalia. Pt to lie flat or <30 degrees for 3 hours, RN aware. EVD clamped and drained. pt in transport back to room in NSU.

## 2025-04-22 NOTE — PROGRESS NOTES
ABG drawn x 1 from Right Radial. Patient had NormalAllen's Test.  Patient was on 100% O2 via ventilator AC/VC 16rr 400tv +5 PEEP at time of puncture. Pressure held for 5.  No bleeding or bruising noted at puncture site.  Patient tolerated procedure well.      Performed by Gali Wagoner RCP

## 2025-04-22 NOTE — ANESTHESIA POSTPROCEDURE EVALUATION
Patient: Muriel De Souza    Procedure Summary       Date: 04/22/25 Room / Location: [facility] OR 23 / [facility] [place] [facility]    Anesthesia Start: 1341 Anesthesia Stop: 1753    Procedure: AVM RESECTION Diagnosis:       AVM (arteriovenous malformation) (HHS-HCC)      (AVM (arteriovenous malformation) (HHS-HCC) [Q27.30])    Surgeons: Moy Hood MD Responsible Provider: Bucky Sung MD    Anesthesia Type: general ASA Status: 5 - Emergent            Anesthesia Type: general    Vitals Value Taken Time   /60 04/22/25 17:55   Temp 33.9 °C (93.02 °F) 04/22/25 17:56   Pulse 63 04/22/25 17:56   Resp 16 04/22/25 17:56   SpO2 100 % 04/22/25 17:56   Vitals shown include unfiled device data.    Anesthesia Post Evaluation    Patient location during evaluation: ICU  Patient participation: complete - patient participated  Level of consciousness: awake and obtunded/minimal responses  Pain score: 0  Pain management: adequate  Airway patency: patent  Cardiovascular status: acceptable  Respiratory status: acceptable  Hydration status: acceptable  Postoperative Nausea and Vomiting: none        There were no known notable events for this encounter.

## 2025-04-22 NOTE — PROCEDURES
"Ventriculostomy    Date/Time: 4/22/2025 1:49 PM    Performed by: Mayra Dumont MD  Authorized by: Moy Hood MD  Consent: Written consent obtained  Risks and benefits: risks, benefits and alternatives were discussed  Consent given by: spouse  Patient understanding: patient states understanding of the procedure being performed  Patient consent: the patient's understanding of the procedure matches consent given  Procedure consent: procedure consent matches procedure scheduled  Relevant documents: relevant documents present and verified  Test results: test results available and properly labeled  Site marked: the operative site was marked  Imaging studies: imaging studies available  Required items: required blood products, implants, devices, and special equipment available  Patient identity confirmed: arm band  Time out: Immediately prior to procedure a \"time out\" was called to verify the correct patient, procedure, equipment, support staff and site/side marked as required.  Preparation: Patient was prepped and draped in the usual sterile fashion.  Local anesthesia used: yes  Anesthesia: local infiltration and see MAR for details    Anesthesia:  Local anesthesia used: yes  Anesthetic total: 3 mL    Sedation:  Patient sedated: yes  Sedatives: see MAR for details  Analgesia: see MAR for details    Patient tolerance: patient tolerated the procedure well with no immediate complications        The right side of the patients head was shaved, prepped, and draped in the usual sterile fashion. Approximately 3 cc 1% lidocaine was used to anesthetize the scalp at Kocher's point. A 1 cm incision was made and the hand drill was used to penetrate the calvarium. The dura was then incised and the ventriculostomy catheter was inserted to approximately 7 cm to the level of the skin with good CSF return. The catheter was sutured in place and connected to the MoniTorr drainage bag. The skin was closed with a running moncryl. The area " was cleaned.    Opening pressure was noted at 18 mmHg.    Mayra Dumont MD

## 2025-04-22 NOTE — ED PROCEDURE NOTE
Brief Attending Summary: 69-year-old female sent by flight for neurosurgical evaluation given intraparenchymal/subarachnoid hemorrhage, already intubated, tube confirmed, writing slightly high on x-ray but oxygenating well, sent for repeat CT head and CTA showing active stroke with likely cerebellum etiology.  Patient admitted to neurosurgery ICU.     I have reviewed and evaluated the most recent data and results, personally examined the patient, and formulated the plan of care as presented above. This patient was critically ill and required continued critical care treatment. Teaching and any separately billable procedures are not included in the time calculation.    Billing Provider Critical Care Time: 40 minutes     Valentina Canas MD  04/22/25 9769

## 2025-04-22 NOTE — H&P
"History Of Present Illness  Muriel De Souza is a 69 y.o. female with h/o HTN, HLD p/w HA, arrested in CT scanner at OSH, ROSC after 7 min CPR,  CTH w diffuse SAH and L cerebellar ICH, triventriculomegally, effaced 4th vents, CTA H/N L cerebellar AVM w venous varices , s/p RF EVD (OP 18)     Per patient's spouse, pt became unresponsive following an episode of severe headache and vomiting earlier today after receiving a stressful phone call. The patient was reportedly well and asymptomatic, according to his spouse. Patient developed a sudden onset headache severe enough to prompt a visit to the emergency room, leading to subsequent unresponsiveness and a Pulseless Electrical Activity arrest during a CT scan. The patient received CPR for about seven minutes and ROSC and was subsequently intubated    Patient's past, patient received 4 tabs of 81 mg aspirin this morning, however, she vomited right after.    Otherwise, she is not on any AC/AP.    Imaging is personally reviewed and CTH w diffuse SAH and L cerebellar ICH, triventriculomegally, effaced 4th vents, CTA H/N L cerebellar AVM w venous varices    Past Medical History  Medical History[1]    Surgical History  Surgical History[2]     Social History  She has no history on file for tobacco use, alcohol use, and drug use.    Family History  Family History[3]     Allergies  Patient has no known allergies.    Review of Systems   Review of systems was reviewed and otherwise negative other than what was listed in the HPI.    Physical Exam   int  ECNS  Ou3 min reac  -corneasl, weak c/g  BUE flaccid  BLE TF    Last Recorded Vitals  Blood pressure 133/66, pulse 73, temperature 34.9 °C (94.8 °F), temperature source Temporal, resp. rate 18, height 1.727 m (5' 8\"), weight 81.6 kg (180 lb), SpO2 100%.    Relevant Results        Results for orders placed or performed during the hospital encounter of 04/22/25 (from the past 24 hours)   Blood Gas Venous Full Panel Unsolicited "   Result Value Ref Range    POCT pH, Venous 7.36 7.33 - 7.43 pH    POCT pCO2, Venous 42 41 - 51 mm Hg    POCT pO2, Venous 55 (H) 35 - 45 mm Hg    POCT SO2, Venous 87 (H) 45 - 75 %    POCT Oxy Hemoglobin, Venous 85.7 (H) 45.0 - 75.0 %    POCT Hematocrit Calculated, Venous 38.0 36.0 - 46.0 %    POCT Sodium, Venous 139 136 - 145 mmol/L    POCT Potassium, Venous 3.4 (L) 3.5 - 5.3 mmol/L    POCT Chloride, Venous 104 98 - 107 mmol/L    POCT Ionized Calicum, Venous 1.20 1.10 - 1.33 mmol/L    POCT Glucose, Venous 201 (H) 74 - 99 mg/dL    POCT Lactate, Venous 5.5 (HH) 0.4 - 2.0 mmol/L    POCT Base Excess, Venous -1.8 -2.0 - 3.0 mmol/L    POCT HCO3 Calculated, Venous 23.7 22.0 - 26.0 mmol/L    POCT Hemoglobin, Venous 12.5 12.0 - 16.0 g/dL    POCT Anion Gap, Venous 15.0 10.0 - 25.0 mmol/L    Patient Temperature 37.0 degrees Celsius   CBC and Auto Differential   Result Value Ref Range    WBC 12.9 (H) 4.4 - 11.3 x10*3/uL    nRBC 0.2 (H) 0.0 - 0.0 /100 WBCs    RBC 4.08 4.00 - 5.20 x10*6/uL    Hemoglobin 12.0 12.0 - 16.0 g/dL    Hematocrit 34.5 (L) 36.0 - 46.0 %    MCV 85 80 - 100 fL    MCH 29.4 26.0 - 34.0 pg    MCHC 34.8 32.0 - 36.0 g/dL    RDW 12.4 11.5 - 14.5 %    Platelets 287 150 - 450 x10*3/uL    Neutrophils % 88.2 40.0 - 80.0 %    Immature Granulocytes %, Automated 1.5 (H) 0.0 - 0.9 %    Lymphocytes % 5.9 13.0 - 44.0 %    Monocytes % 4.0 2.0 - 10.0 %    Eosinophils % 0.2 0.0 - 6.0 %    Basophils % 0.2 0.0 - 2.0 %    Neutrophils Absolute 11.37 (H) 1.20 - 7.70 x10*3/uL    Immature Granulocytes Absolute, Automated 0.19 0.00 - 0.70 x10*3/uL    Lymphocytes Absolute 0.76 (L) 1.20 - 4.80 x10*3/uL    Monocytes Absolute 0.51 0.10 - 1.00 x10*3/uL    Eosinophils Absolute 0.02 0.00 - 0.70 x10*3/uL    Basophils Absolute 0.03 0.00 - 0.10 x10*3/uL          Assessment & Plan  Subarachnoid hemorrhage (Multi)      Muriel De Souza is a 69 y.o. female with h/o HTN, HLD p/w HA, arrested in CT scanner at OSH, ROSC after 7 min CPR,  [facility] w  diffuse SAH and L cerebellar ICH, triventriculomegally, effaced 4th vents, CTA H/N L cerebellar AVM w venous varices , s/p RF EVD (OP 18)     Patient is presenting with ruptured cerebellar AVM with diffuse subarachnoid hemorrhage and hydrocephalus.  Given patient's imaging finding, will place EVD emergently and the patient will go to the OR for suboccipital craniectomy for decompression.    Plan  NSU  RF EVD at 15  OR for SOC  SBP <140  angio planning  TCDs  IO  SLP  PTOT      Mayra Dumont MD         [1] No past medical history on file.  [2] No past surgical history on file.  [3] No family history on file.

## 2025-04-22 NOTE — PROGRESS NOTES
Patient has been identified as having an emergent need for administration of iodinated contrast for CT scan prior to result of laboratory studies OR despite known elevated GFR due to possibility of life and/or limb threatening pathology.    I acknowledge the risks and benefits of emergently proceeding with contrast administration including that, at present, it is the position of the American College of Radiology that contrast induced nephropathy (CANDELARIO) is a rare but possible consequence. At this time the benefits of proceeding with contrast administration outweigh the risks.    Attempts will be made to mitigate possible CANDELARIO risk with IV fluid hydration if able.    Bill Matute, PGY3  Emergency Medicine

## 2025-04-22 NOTE — DISCHARGE INSTR - COC
Continuity of Care Form    Patient Name: Jessie Mesa   :  1955  MRN:  [id number]    Admit date:  2025  Discharge date:  ***    Code Status Order: No Order   Advance Directives:     Admitting Physician:  No admitting provider for patient encounter.  PCP: Maciej Triplett MD    Discharging Nurse: ***  Discharging Hospital Unit/Room#: 10/10  Discharging Unit Phone Number: ***    Emergency Contact:   Extended Emergency Contact Information  Primary Emergency Contact: mary mesa  Mobile Phone: [phone]  Relation: Spouse    Past Surgical History:  Past Surgical History:   Procedure Laterality Date    FOOT SURGERY Right     TONSILLECTOMY      WISDOM TOOTH EXTRACTION         Immunization History:   Immunization History   Administered Date(s) Administered    COVID-19, MODERNA Bivalent, (age 12y+), IM, 50 mcg/0.5 mL 10/03/2022    Influenza, FLUCELVAX, (age 6 mo+), MDCK, Quadv PF, 0.5mL 2020    Pneumococcal, PCV-13, PREVNAR 13, (age 6w+), IM, 0.5mL 2022    Zoster Recombinant (Shingrix) 2020, 2020       Active Problems:  There is no problem list on file for this patient.      Isolation/Infection:   Isolation            No Isolation          Patient Infection Status    None to display         Nurse Assessment:  Last Vital Signs: BP (!) 80/58   Pulse 97   Resp 16   Wt 79.4 kg (175 lb)   SpO2 97%     Last documented pain score (0-10 scale):    Last Weight:   Wt Readings from Last 1 Encounters:   25 79.4 kg (175 lb)     Mental Status:  {IP PT MENTAL STATUS:}    IV Access:  { GILMA IV ACCESS:804191510}    Nursing Mobility/ADLs:  Walking   {CHP DME ADLs:409740211}  Transfer  {CHP DME ADLs:602415865}  Bathing  {CHP DME ADLs:123322236}  Dressing  {CHP DME ADLs:702082430}  Toileting  {CHP DME ADLs:374472418}  Feeding  {P DME ADLs:866225258}  Med Admin  {P DME ADLs:250208758}  Med Delivery   { GILMA MED Delivery:193177025}    Wound Care Documentation and

## 2025-04-22 NOTE — ED NOTES
Radiology Procedure Waiver   Name: Jessie Morris  : 1955  MRN: [id number]    Date:  25    Time: 7:50 AM EDT    Benefits of immediately proceeding with Radiology exam(s) without pre-testing outweigh the risks or are not indicated as specified below and therefore the following is/are being waived:    [] Pregnancy test   [] Patients LMP on-time and regular.   [] Patient had Tubal Ligation or has other Contraception Device.   [] Patient  is Menopausal or Premenarcheal.    [] Patient had Full or Partial Hysterectomy.    [] Protocol for Iodine allergy    [] MRI Questionnaire     [x] BUN/Creatinine   [] Patient age w/no hx of renal dysfunction.   [] Patient on Dialysis.   [] Recent Normal Labs.  Electronically signed by Zaid Dick MD on 25 at 7:50 AM EDT

## 2025-04-23 ENCOUNTER — ANESTHESIA (OUTPATIENT)
Dept: RADIOLOGY | Facility: HOSPITAL | Age: 70
End: 2025-04-23
Payer: COMMERCIAL

## 2025-04-23 ENCOUNTER — APPOINTMENT (OUTPATIENT)
Dept: RADIOLOGY | Facility: HOSPITAL | Age: 70
DRG: 003 | End: 2025-04-23
Payer: COMMERCIAL

## 2025-04-23 ENCOUNTER — APPOINTMENT (OUTPATIENT)
Dept: CARDIOLOGY | Facility: HOSPITAL | Age: 70
DRG: 003 | End: 2025-04-23
Payer: COMMERCIAL

## 2025-04-23 ENCOUNTER — APPOINTMENT (OUTPATIENT)
Dept: CARDIOLOGY | Facility: HOSPITAL | Age: 70
End: 2025-04-23
Payer: COMMERCIAL

## 2025-04-23 ENCOUNTER — ANESTHESIA EVENT (OUTPATIENT)
Dept: RADIOLOGY | Facility: HOSPITAL | Age: 70
End: 2025-04-23
Payer: COMMERCIAL

## 2025-04-23 LAB
ACT BLD: 136 SEC (ref 83–199)
ALBUMIN SERPL BCP-MCNC: 3 G/DL (ref 3.4–5)
ALBUMIN SERPL BCP-MCNC: 3 G/DL (ref 3.4–5)
ALBUMIN SERPL BCP-MCNC: 3.1 G/DL (ref 3.4–5)
ALBUMIN SERPL BCP-MCNC: 3.2 G/DL (ref 3.4–5)
ALP SERPL-CCNC: 63 U/L (ref 33–136)
ALT SERPL W P-5'-P-CCNC: 375 U/L (ref 7–45)
ANION GAP BLDA CALCULATED.4IONS-SCNC: 10 MMO/L (ref 10–25)
ANION GAP BLDA CALCULATED.4IONS-SCNC: 11 MMO/L (ref 10–25)
ANION GAP BLDA CALCULATED.4IONS-SCNC: 9 MMO/L (ref 10–25)
ANION GAP SERPL CALC-SCNC: 12 MMOL/L (ref 10–20)
ANION GAP SERPL CALC-SCNC: 16 MMOL/L (ref 10–20)
AORTIC VALVE PEAK VELOCITY: 1.86 M/S
AST SERPL W P-5'-P-CCNC: 301 U/L (ref 9–39)
ATRIAL RATE: 65 BPM
AV PEAK GRADIENT: 14 MMHG
AVA (PEAK VEL): 3.25 CM2
BASE EXCESS BLDA CALC-SCNC: 0.1 MMOL/L (ref -2–3)
BASE EXCESS BLDA CALC-SCNC: 1.2 MMOL/L (ref -2–3)
BASE EXCESS BLDA CALC-SCNC: 1.2 MMOL/L (ref -2–3)
BASE EXCESS BLDA CALC-SCNC: 1.7 MMOL/L (ref -2–3)
BASOPHILS # BLD AUTO: 0.01 X10*3/UL (ref 0–0.1)
BASOPHILS NFR BLD AUTO: 0.1 %
BILIRUB SERPL-MCNC: 0.3 MG/DL (ref 0–1.2)
BLOOD EXPIRATION DATE: NORMAL
BODY TEMPERATURE: ABNORMAL
BUN SERPL-MCNC: 14 MG/DL (ref 6–23)
CA-I BLD-SCNC: 1.2 MMOL/L (ref 1.1–1.33)
CA-I BLDA-SCNC: 1.19 MMOL/L (ref 1.1–1.33)
CA-I BLDA-SCNC: 1.2 MMOL/L (ref 1.1–1.33)
CA-I BLDA-SCNC: 1.22 MMOL/L (ref 1.1–1.33)
CALCIUM SERPL-MCNC: 8.3 MG/DL (ref 8.6–10.6)
CALCIUM SERPL-MCNC: 8.3 MG/DL (ref 8.6–10.6)
CALCIUM SERPL-MCNC: 8.5 MG/DL (ref 8.6–10.6)
CALCIUM SERPL-MCNC: 8.8 MG/DL (ref 8.6–10.6)
CARDIAC TROPONIN I PNL SERPL HS: 1111 NG/L (ref 0–34)
CARDIAC TROPONIN I PNL SERPL HS: 471 NG/L (ref 0–34)
CARDIAC TROPONIN I PNL SERPL HS: 740 NG/L (ref 0–34)
CARDIAC TROPONIN I PNL SERPL HS: 931 NG/L (ref 0–34)
CFT FORM KAOLIN IND BLD RES TEG: 0.8 MIN (ref 0.8–2.1)
CHLORIDE BLDA-SCNC: 112 MMOL/L (ref 98–107)
CHLORIDE BLDA-SCNC: 112 MMOL/L (ref 98–107)
CHLORIDE BLDA-SCNC: 113 MMOL/L (ref 98–107)
CHLORIDE SERPL-SCNC: 111 MMOL/L (ref 98–107)
CHLORIDE SERPL-SCNC: 112 MMOL/L (ref 98–107)
CHLORIDE SERPL-SCNC: 113 MMOL/L (ref 98–107)
CHLORIDE SERPL-SCNC: 113 MMOL/L (ref 98–107)
CLOT ANGLE.KAOLIN INDUCED BLD RES TEG: 78.2 DEG (ref 63–78)
CLOT INIT KAO IND P HEP NEUT BLD RES TEG: 4.1 MIN (ref 4.6–9.1)
CLOT INIT KAO IND P HEP NEUT BLD RES TEG: 5.4 MIN (ref 4.3–8.3)
CO2 SERPL-SCNC: 22 MMOL/L (ref 21–32)
CO2 SERPL-SCNC: 25 MMOL/L (ref 21–32)
CO2 SERPL-SCNC: 26 MMOL/L (ref 21–32)
CO2 SERPL-SCNC: 26 MMOL/L (ref 21–32)
CREAT SERPL-MCNC: 0.52 MG/DL (ref 0.5–1.05)
CREAT SERPL-MCNC: 0.6 MG/DL (ref 0.5–1.05)
CREAT SERPL-MCNC: 0.68 MG/DL (ref 0.5–1.05)
CREAT SERPL-MCNC: 0.7 MG/DL (ref 0.5–1.05)
DISPENSE STATUS: NORMAL
EGFRCR SERPLBLD CKD-EPI 2021: >90 ML/MIN/1.73M*2
EJECTION FRACTION APICAL 4 CHAMBER: 71
EJECTION FRACTION: 71 %
EKG ATRIAL RATE: 59 BPM
EKG Q-T INTERVAL: 454 MS
EKG QRS DURATION: 158 MS
EKG QTC CALCULATION (BAZETT): 567 MS
EKG R AXIS: 103 DEGREES
EKG T AXIS: -60 DEGREES
EKG VENTRICULAR RATE: 94 BPM
EOSINOPHIL # BLD AUTO: 0 X10*3/UL (ref 0–0.7)
EOSINOPHIL NFR BLD AUTO: 0 %
ERYTHROCYTE [DISTWIDTH] IN BLOOD BY AUTOMATED COUNT: 13.2 % (ref 11.5–14.5)
FIBRINOGEN BLD CALC-MCNC: 433 MG/DL (ref 278–581)
GLUCOSE BLD MANUAL STRIP-MCNC: 119 MG/DL (ref 74–99)
GLUCOSE BLD MANUAL STRIP-MCNC: 134 MG/DL (ref 74–99)
GLUCOSE BLD MANUAL STRIP-MCNC: 134 MG/DL (ref 74–99)
GLUCOSE BLD MANUAL STRIP-MCNC: 143 MG/DL (ref 74–99)
GLUCOSE BLD MANUAL STRIP-MCNC: 147 MG/DL (ref 74–99)
GLUCOSE BLDA-MCNC: 135 MG/DL (ref 74–99)
GLUCOSE BLDA-MCNC: 139 MG/DL (ref 74–99)
GLUCOSE BLDA-MCNC: 144 MG/DL (ref 74–99)
GLUCOSE SERPL-MCNC: 130 MG/DL (ref 74–99)
GLUCOSE SERPL-MCNC: 135 MG/DL (ref 74–99)
GLUCOSE SERPL-MCNC: 136 MG/DL (ref 74–99)
GLUCOSE SERPL-MCNC: 147 MG/DL (ref 74–99)
HCO3 BLDA-SCNC: 23.8 MMOL/L (ref 22–26)
HCO3 BLDA-SCNC: 24.7 MMOL/L (ref 22–26)
HCO3 BLDA-SCNC: 24.9 MMOL/L (ref 22–26)
HCO3 BLDA-SCNC: 25.6 MMOL/L (ref 22–26)
HCT VFR BLD AUTO: 25.1 % (ref 36–46)
HCT VFR BLD EST: 20 % (ref 36–46)
HCT VFR BLD EST: 27 % (ref 36–46)
HCT VFR BLD EST: 28 % (ref 36–46)
HGB BLD-MCNC: 8.7 G/DL (ref 12–16)
HGB BLDA-MCNC: 6.7 G/DL (ref 12–16)
HGB BLDA-MCNC: 9 G/DL (ref 12–16)
HGB BLDA-MCNC: 9.4 G/DL (ref 12–16)
IMM GRANULOCYTES # BLD AUTO: 0.04 X10*3/UL (ref 0–0.7)
IMM GRANULOCYTES NFR BLD AUTO: 0.4 % (ref 0–0.9)
INHALED O2 CONCENTRATION: 40 %
LACTATE BLDA-SCNC: 1.3 MMOL/L (ref 0.4–2)
LACTATE BLDA-SCNC: 1.5 MMOL/L (ref 0.4–2)
LACTATE BLDA-SCNC: 2.4 MMOL/L (ref 0.4–2)
LACTATE SERPL-SCNC: 2.5 MMOL/L (ref 0.4–2)
LACTATE SERPL-SCNC: 3.8 MMOL/L (ref 0.4–2)
LACTATE SERPL-SCNC: 6.4 MMOL/L (ref 0.4–2)
LEFT ATRIUM VOLUME AREA LENGTH INDEX BSA: 37.3 ML/M2
LEFT VENTRICLE INTERNAL DIMENSION DIASTOLE: 4.6 CM (ref 3.5–6)
LEFT VENTRICULAR OUTFLOW TRACT DIAMETER: 2.2 CM
LYMPHOCYTES # BLD AUTO: 0.58 X10*3/UL (ref 1.2–4.8)
LYMPHOCYTES NFR BLD AUTO: 5.5 %
MA KAOLIN BLD RES TEG: 65.8 MM (ref 52–69)
MA KAOLIN+TF BLD RES TEG: 66 MM (ref 52–70)
MA TF IND+IIB-IIIA INH BLD RES TEG: 23.7 MM (ref 15–32)
MAGNESIUM SERPL-MCNC: 1.7 MG/DL (ref 1.6–2.4)
MCH RBC QN AUTO: 29.2 PG (ref 26–34)
MCHC RBC AUTO-ENTMCNC: 34.7 G/DL (ref 32–36)
MCV RBC AUTO: 84 FL (ref 80–100)
MITRAL VALVE E/A RATIO: 0.9
MONOCYTES # BLD AUTO: 0.61 X10*3/UL (ref 0.1–1)
MONOCYTES NFR BLD AUTO: 5.8 %
NEUTROPHILS # BLD AUTO: 9.36 X10*3/UL (ref 1.2–7.7)
NEUTROPHILS NFR BLD AUTO: 88.2 %
NRBC BLD-RTO: 0 /100 WBCS (ref 0–0)
OXYHGB MFR BLDA: 96.3 % (ref 94–98)
OXYHGB MFR BLDA: 96.7 % (ref 94–98)
OXYHGB MFR BLDA: 97.6 % (ref 94–98)
OXYHGB MFR BLDA: 98.4 % (ref 94–98)
P AXIS: 57 DEGREES
P OFFSET: 158 MS
P ONSET: 105 MS
PCO2 BLDA: 34 MM HG (ref 38–42)
PCO2 BLDA: 35 MM HG (ref 38–42)
PCO2 BLDA: 35 MM HG (ref 38–42)
PCO2 BLDA: 36 MM HG (ref 38–42)
PH BLDA: 7.44 PH (ref 7.38–7.42)
PH BLDA: 7.46 PH (ref 7.38–7.42)
PH BLDA: 7.46 PH (ref 7.38–7.42)
PH BLDA: 7.47 PH (ref 7.38–7.42)
PHOSPHATE SERPL-MCNC: 3 MG/DL (ref 2.5–4.9)
PHOSPHATE SERPL-MCNC: 3.1 MG/DL (ref 2.5–4.9)
PHOSPHATE SERPL-MCNC: 3.5 MG/DL (ref 2.5–4.9)
PLATELET # BLD AUTO: 226 X10*3/UL (ref 150–450)
PO2 BLDA: 117 MM HG (ref 85–95)
PO2 BLDA: 122 MM HG (ref 85–95)
PO2 BLDA: 81 MM HG (ref 85–95)
PO2 BLDA: 87 MM HG (ref 85–95)
POTASSIUM BLDA-SCNC: 3.4 MMOL/L (ref 3.5–5.3)
POTASSIUM BLDA-SCNC: 3.5 MMOL/L (ref 3.5–5.3)
POTASSIUM BLDA-SCNC: 3.8 MMOL/L (ref 3.5–5.3)
POTASSIUM SERPL-SCNC: 3.5 MMOL/L (ref 3.5–5.3)
POTASSIUM SERPL-SCNC: 3.6 MMOL/L (ref 3.5–5.3)
POTASSIUM SERPL-SCNC: 3.8 MMOL/L (ref 3.5–5.3)
POTASSIUM SERPL-SCNC: 3.8 MMOL/L (ref 3.5–5.3)
PR INTERVAL: 216 MS
PRODUCT BLOOD TYPE: 6200
PRODUCT BLOOD TYPE: 6200
PRODUCT BLOOD TYPE: 9500
PRODUCT CODE: NORMAL
PROT SERPL-MCNC: 5 G/DL (ref 6.4–8.2)
Q ONSET: 213 MS
QRS COUNT: 11 BEATS
QRS DURATION: 156 MS
QT INTERVAL: 564 MS
QTC CALCULATION(BAZETT): 586 MS
QTC FREDERICIA: 579 MS
R AXIS: 95 DEGREES
RBC # BLD AUTO: 2.98 X10*6/UL (ref 4–5.2)
RIGHT VENTRICLE FREE WALL PEAK S': 13.4 CM/S
RIGHT VENTRICLE PEAK SYSTOLIC PRESSURE: 31.5 MMHG
SAO2 % BLDA: 98 % (ref 94–100)
SAO2 % BLDA: 98 % (ref 94–100)
SAO2 % BLDA: 99 % (ref 94–100)
SAO2 % BLDA: 99 % (ref 94–100)
SODIUM BLDA-SCNC: 143 MMOL/L (ref 136–145)
SODIUM BLDA-SCNC: 144 MMOL/L (ref 136–145)
SODIUM BLDA-SCNC: 144 MMOL/L (ref 136–145)
SODIUM SERPL-SCNC: 145 MMOL/L (ref 136–145)
SODIUM SERPL-SCNC: 146 MMOL/L (ref 136–145)
SODIUM SERPL-SCNC: 146 MMOL/L (ref 136–145)
SODIUM SERPL-SCNC: 147 MMOL/L (ref 136–145)
T AXIS: 51 DEGREES
T OFFSET: 495 MS
TRICUSPID ANNULAR PLANE SYSTOLIC EXCURSION: 3.2 CM
UNIT ABO: NORMAL
UNIT NUMBER: NORMAL
UNIT RH: NORMAL
UNIT VOLUME: 267
UNIT VOLUME: 277
UNIT VOLUME: 278
UNIT VOLUME: 281
UNIT VOLUME: 287
UNIT VOLUME: 315
UNIT VOLUME: 350
UNIT VOLUME: 350
VENTRICULAR RATE: 65 BPM
WBC # BLD AUTO: 10.6 X10*3/UL (ref 4.4–11.3)
XM INTEP: NORMAL

## 2025-04-23 PROCEDURE — 36430 TRANSFUSION BLD/BLD COMPNT: CPT

## 2025-04-23 PROCEDURE — 99291 CRITICAL CARE FIRST HOUR: CPT | Performed by: PSYCHIATRY & NEUROLOGY

## 2025-04-23 PROCEDURE — C1760 CLOSURE DEV, VASC: HCPCS | Performed by: STUDENT IN AN ORGANIZED HEALTH CARE EDUCATION/TRAINING PROGRAM

## 2025-04-23 PROCEDURE — 85347 COAGULATION TIME ACTIVATED: CPT | Performed by: REGISTERED NURSE

## 2025-04-23 PROCEDURE — 85025 COMPLETE CBC W/AUTO DIFF WBC: CPT | Performed by: STUDENT IN AN ORGANIZED HEALTH CARE EDUCATION/TRAINING PROGRAM

## 2025-04-23 PROCEDURE — 36222 PLACE CATH CAROTID/INOM ART: CPT | Mod: RT | Performed by: STUDENT IN AN ORGANIZED HEALTH CARE EDUCATION/TRAINING PROGRAM

## 2025-04-23 PROCEDURE — 2500000004 HC RX 250 GENERAL PHARMACY W/ HCPCS (ALT 636 FOR OP/ED): Performed by: STUDENT IN AN ORGANIZED HEALTH CARE EDUCATION/TRAINING PROGRAM

## 2025-04-23 PROCEDURE — C1769 GUIDE WIRE: HCPCS | Performed by: STUDENT IN AN ORGANIZED HEALTH CARE EDUCATION/TRAINING PROGRAM

## 2025-04-23 PROCEDURE — 93010 ELECTROCARDIOGRAM REPORT: CPT | Performed by: INTERNAL MEDICINE

## 2025-04-23 PROCEDURE — 2500000005 HC RX 250 GENERAL PHARMACY W/O HCPCS: Performed by: STUDENT IN AN ORGANIZED HEALTH CARE EDUCATION/TRAINING PROGRAM

## 2025-04-23 PROCEDURE — 36226 PLACE CATH VERTEBRAL ART: CPT | Performed by: STUDENT IN AN ORGANIZED HEALTH CARE EDUCATION/TRAINING PROGRAM

## 2025-04-23 PROCEDURE — C8929 TTE W OR WO FOL WCON,DOPPLER: HCPCS

## 2025-04-23 PROCEDURE — 93308 TTE F-UP OR LMTD: CPT | Performed by: INTERNAL MEDICINE

## 2025-04-23 PROCEDURE — 82435 ASSAY OF BLOOD CHLORIDE: CPT | Performed by: STUDENT IN AN ORGANIZED HEALTH CARE EDUCATION/TRAINING PROGRAM

## 2025-04-23 PROCEDURE — 82435 ASSAY OF BLOOD CHLORIDE: CPT

## 2025-04-23 PROCEDURE — 37799 UNLISTED PX VASCULAR SURGERY: CPT

## 2025-04-23 PROCEDURE — 2500000004 HC RX 250 GENERAL PHARMACY W/ HCPCS (ALT 636 FOR OP/ED): Mod: JZ | Performed by: REGISTERED NURSE

## 2025-04-23 PROCEDURE — 2020000001 HC ICU ROOM DAILY

## 2025-04-23 PROCEDURE — 94003 VENT MGMT INPAT SUBQ DAY: CPT

## 2025-04-23 PROCEDURE — 2780000003 HC OR 278 NO HCPCS: Performed by: STUDENT IN AN ORGANIZED HEALTH CARE EDUCATION/TRAINING PROGRAM

## 2025-04-23 PROCEDURE — 93306 TTE W/DOPPLER COMPLETE: CPT | Performed by: INTERNAL MEDICINE

## 2025-04-23 PROCEDURE — 83605 ASSAY OF LACTIC ACID: CPT

## 2025-04-23 PROCEDURE — B31G1ZZ FLUOROSCOPY OF BILATERAL VERTEBRAL ARTERIES USING LOW OSMOLAR CONTRAST: ICD-10-PCS | Performed by: STUDENT IN AN ORGANIZED HEALTH CARE EDUCATION/TRAINING PROGRAM

## 2025-04-23 PROCEDURE — 2500000004 HC RX 250 GENERAL PHARMACY W/ HCPCS (ALT 636 FOR OP/ED): Mod: JZ

## 2025-04-23 PROCEDURE — 84484 ASSAY OF TROPONIN QUANT: CPT | Performed by: REGISTERED NURSE

## 2025-04-23 PROCEDURE — 36227 PLACE CATH XTRNL CAROTID: CPT | Mod: BILATERAL PROCEDURE | Performed by: STUDENT IN AN ORGANIZED HEALTH CARE EDUCATION/TRAINING PROGRAM

## 2025-04-23 PROCEDURE — 2500000001 HC RX 250 WO HCPCS SELF ADMINISTERED DRUGS (ALT 637 FOR MEDICARE OP): Performed by: STUDENT IN AN ORGANIZED HEALTH CARE EDUCATION/TRAINING PROGRAM

## 2025-04-23 PROCEDURE — 93005 ELECTROCARDIOGRAM TRACING: CPT

## 2025-04-23 PROCEDURE — 76377 3D RENDER W/INTRP POSTPROCES: CPT | Performed by: STUDENT IN AN ORGANIZED HEALTH CARE EDUCATION/TRAINING PROGRAM

## 2025-04-23 PROCEDURE — 83735 ASSAY OF MAGNESIUM: CPT | Performed by: STUDENT IN AN ORGANIZED HEALTH CARE EDUCATION/TRAINING PROGRAM

## 2025-04-23 PROCEDURE — 82330 ASSAY OF CALCIUM: CPT | Performed by: STUDENT IN AN ORGANIZED HEALTH CARE EDUCATION/TRAINING PROGRAM

## 2025-04-23 PROCEDURE — B3181ZZ FLUOROSCOPY OF BILATERAL INTERNAL CAROTID ARTERIES USING LOW OSMOLAR CONTRAST: ICD-10-PCS | Performed by: STUDENT IN AN ORGANIZED HEALTH CARE EDUCATION/TRAINING PROGRAM

## 2025-04-23 PROCEDURE — B31C1ZZ FLUOROSCOPY OF BILATERAL EXTERNAL CAROTID ARTERIES USING LOW OSMOLAR CONTRAST: ICD-10-PCS | Performed by: STUDENT IN AN ORGANIZED HEALTH CARE EDUCATION/TRAINING PROGRAM

## 2025-04-23 PROCEDURE — 2500000002 HC RX 250 W HCPCS SELF ADMINISTERED DRUGS (ALT 637 FOR MEDICARE OP, ALT 636 FOR OP/ED): Performed by: REGISTERED NURSE

## 2025-04-23 PROCEDURE — 36226 PLACE CATH VERTEBRAL ART: CPT | Mod: BILATERAL PROCEDURE | Performed by: STUDENT IN AN ORGANIZED HEALTH CARE EDUCATION/TRAINING PROGRAM

## 2025-04-23 PROCEDURE — 82805 BLOOD GASES W/O2 SATURATION: CPT | Performed by: STUDENT IN AN ORGANIZED HEALTH CARE EDUCATION/TRAINING PROGRAM

## 2025-04-23 PROCEDURE — 3700000002 HC GENERAL ANESTHESIA TIME - EACH INCREMENTAL 1 MINUTE: Performed by: STUDENT IN AN ORGANIZED HEALTH CARE EDUCATION/TRAINING PROGRAM

## 2025-04-23 PROCEDURE — 36227 PLACE CATH XTRNL CAROTID: CPT | Performed by: STUDENT IN AN ORGANIZED HEALTH CARE EDUCATION/TRAINING PROGRAM

## 2025-04-23 PROCEDURE — 37799 UNLISTED PX VASCULAR SURGERY: CPT | Performed by: STUDENT IN AN ORGANIZED HEALTH CARE EDUCATION/TRAINING PROGRAM

## 2025-04-23 PROCEDURE — 2500000004 HC RX 250 GENERAL PHARMACY W/ HCPCS (ALT 636 FOR OP/ED): Mod: JW | Performed by: STUDENT IN AN ORGANIZED HEALTH CARE EDUCATION/TRAINING PROGRAM

## 2025-04-23 PROCEDURE — 36224 PLACE CATH CAROTD ART: CPT | Performed by: STUDENT IN AN ORGANIZED HEALTH CARE EDUCATION/TRAINING PROGRAM

## 2025-04-23 PROCEDURE — 2500000001 HC RX 250 WO HCPCS SELF ADMINISTERED DRUGS (ALT 637 FOR MEDICARE OP)

## 2025-04-23 PROCEDURE — C9248 INJ, CLEVIDIPINE BUTYRATE: HCPCS | Mod: JZ,TB | Performed by: STUDENT IN AN ORGANIZED HEALTH CARE EDUCATION/TRAINING PROGRAM

## 2025-04-23 PROCEDURE — 36224 PLACE CATH CAROTD ART: CPT | Mod: BILATERAL PROCEDURE | Performed by: STUDENT IN AN ORGANIZED HEALTH CARE EDUCATION/TRAINING PROGRAM

## 2025-04-23 PROCEDURE — 2500000004 HC RX 250 GENERAL PHARMACY W/ HCPCS (ALT 636 FOR OP/ED): Mod: JZ | Performed by: STUDENT IN AN ORGANIZED HEALTH CARE EDUCATION/TRAINING PROGRAM

## 2025-04-23 PROCEDURE — 2720000007 HC OR 272 NO HCPCS: Performed by: STUDENT IN AN ORGANIZED HEALTH CARE EDUCATION/TRAINING PROGRAM

## 2025-04-23 PROCEDURE — 99223 1ST HOSP IP/OBS HIGH 75: CPT

## 2025-04-23 PROCEDURE — 84484 ASSAY OF TROPONIN QUANT: CPT

## 2025-04-23 PROCEDURE — 3700000001 HC GENERAL ANESTHESIA TIME - INITIAL BASE CHARGE: Performed by: STUDENT IN AN ORGANIZED HEALTH CARE EDUCATION/TRAINING PROGRAM

## 2025-04-23 PROCEDURE — 80053 COMPREHEN METABOLIC PANEL: CPT | Performed by: STUDENT IN AN ORGANIZED HEALTH CARE EDUCATION/TRAINING PROGRAM

## 2025-04-23 PROCEDURE — 85347 COAGULATION TIME ACTIVATED: CPT

## 2025-04-23 PROCEDURE — 2500000004 HC RX 250 GENERAL PHARMACY W/ HCPCS (ALT 636 FOR OP/ED)

## 2025-04-23 PROCEDURE — 82947 ASSAY GLUCOSE BLOOD QUANT: CPT

## 2025-04-23 PROCEDURE — 2550000001 HC RX 255 CONTRASTS: Mod: JZ | Performed by: NEUROLOGICAL SURGERY

## 2025-04-23 PROCEDURE — 93308 TTE F-UP OR LMTD: CPT

## 2025-04-23 PROCEDURE — B3131ZZ FLUOROSCOPY OF RIGHT COMMON CAROTID ARTERY USING LOW OSMOLAR CONTRAST: ICD-10-PCS | Performed by: STUDENT IN AN ORGANIZED HEALTH CARE EDUCATION/TRAINING PROGRAM

## 2025-04-23 PROCEDURE — P9017 PLASMA 1 DONOR FRZ W/IN 8 HR: HCPCS

## 2025-04-23 PROCEDURE — 75710 ARTERY X-RAYS ARM/LEG: CPT | Performed by: STUDENT IN AN ORGANIZED HEALTH CARE EDUCATION/TRAINING PROGRAM

## 2025-04-23 RX ORDER — POTASSIUM CHLORIDE 29.8 MG/ML
40 INJECTION INTRAVENOUS ONCE
Status: COMPLETED | OUTPATIENT
Start: 2025-04-23 | End: 2025-04-23

## 2025-04-23 RX ORDER — GLYCOPYRROLATE 0.2 MG/ML
INJECTION INTRAMUSCULAR; INTRAVENOUS AS NEEDED
Status: DISCONTINUED | OUTPATIENT
Start: 2025-04-23 | End: 2025-04-23

## 2025-04-23 RX ORDER — ATORVASTATIN CALCIUM 10 MG/1
10 TABLET, FILM COATED ORAL NIGHTLY
Status: DISCONTINUED | OUTPATIENT
Start: 2025-04-23 | End: 2025-05-06

## 2025-04-23 RX ORDER — ATENOLOL 25 MG/1
25 TABLET ORAL DAILY
COMMUNITY
End: 2025-05-11 | Stop reason: HOSPADM

## 2025-04-23 RX ORDER — ESCITALOPRAM OXALATE 20 MG/1
10 TABLET ORAL DAILY
COMMUNITY
Start: 2025-01-29 | End: 2025-05-11 | Stop reason: HOSPADM

## 2025-04-23 RX ORDER — PHENYLEPHRINE HCL IN 0.9% NACL 0.4MG/10ML
SYRINGE (ML) INTRAVENOUS AS NEEDED
Status: DISCONTINUED | OUTPATIENT
Start: 2025-04-23 | End: 2025-04-23

## 2025-04-23 RX ORDER — POTASSIUM CHLORIDE 20 MEQ/1
20 TABLET, EXTENDED RELEASE ORAL EVERY 20 MIN
Status: DISCONTINUED | OUTPATIENT
Start: 2025-04-23 | End: 2025-04-23

## 2025-04-23 RX ORDER — PHENYLEPHRINE 10 MG/250 ML(40 MCG/ML)IN 0.9 % SOD.CHLORIDE INTRAVENOUS
CONTINUOUS PRN
Status: DISCONTINUED | OUTPATIENT
Start: 2025-04-23 | End: 2025-04-23

## 2025-04-23 RX ORDER — ATORVASTATIN CALCIUM 10 MG/1
10 TABLET, FILM COATED ORAL DAILY
COMMUNITY

## 2025-04-23 RX ORDER — SODIUM CHLORIDE, SODIUM LACTATE, POTASSIUM CHLORIDE, CALCIUM CHLORIDE 600; 310; 30; 20 MG/100ML; MG/100ML; MG/100ML; MG/100ML
100 INJECTION, SOLUTION INTRAVENOUS CONTINUOUS
Status: DISCONTINUED | OUTPATIENT
Start: 2025-04-23 | End: 2025-04-23

## 2025-04-23 RX ORDER — SODIUM CHLORIDE, SODIUM LACTATE, POTASSIUM CHLORIDE, CALCIUM CHLORIDE 600; 310; 30; 20 MG/100ML; MG/100ML; MG/100ML; MG/100ML
100 INJECTION, SOLUTION INTRAVENOUS CONTINUOUS
Status: DISCONTINUED | OUTPATIENT
Start: 2025-04-23 | End: 2025-04-24

## 2025-04-23 RX ORDER — ROCURONIUM BROMIDE 10 MG/ML
INJECTION, SOLUTION INTRAVENOUS AS NEEDED
Status: DISCONTINUED | OUTPATIENT
Start: 2025-04-23 | End: 2025-04-23

## 2025-04-23 RX ORDER — ATENOLOL 25 MG/1
25 TABLET ORAL DAILY
Status: DISCONTINUED | OUTPATIENT
Start: 2025-04-23 | End: 2025-04-25

## 2025-04-23 RX ADMIN — CLEVIPIDINE 6.75 MG/HR: 0.5 EMULSION INTRAVENOUS at 19:50

## 2025-04-23 RX ADMIN — Medication 75 MCG/HR: at 19:53

## 2025-04-23 RX ADMIN — PROPOFOL 30 MCG/KG/MIN: 10 INJECTION, EMULSION INTRAVENOUS at 18:40

## 2025-04-23 RX ADMIN — Medication 80 MCG: at 10:30

## 2025-04-23 RX ADMIN — LEVETIRACETAM 500 MG: 5 INJECTION INTRAVENOUS at 08:38

## 2025-04-23 RX ADMIN — Medication 120 MCG: at 09:24

## 2025-04-23 RX ADMIN — SODIUM CHLORIDE, SODIUM LACTATE, POTASSIUM CHLORIDE, AND CALCIUM CHLORIDE 1000 ML: .6; .31; .03; .02 INJECTION, SOLUTION INTRAVENOUS at 00:05

## 2025-04-23 RX ADMIN — GLYCOPYRROLATE 0.2 MG: 0.2 INJECTION INTRAMUSCULAR; INTRAVENOUS at 09:36

## 2025-04-23 RX ADMIN — Medication 80 MCG: at 09:46

## 2025-04-23 RX ADMIN — SUGAMMADEX 200 MG: 100 INJECTION, SOLUTION INTRAVENOUS at 11:46

## 2025-04-23 RX ADMIN — POTASSIUM CHLORIDE 40 MEQ: 29.8 INJECTION, SOLUTION INTRAVENOUS at 15:58

## 2025-04-23 RX ADMIN — Medication 120 MCG: at 09:36

## 2025-04-23 RX ADMIN — SODIUM CHLORIDE, SODIUM LACTATE, POTASSIUM CHLORIDE, AND CALCIUM CHLORIDE 100 ML/HR: .6; .31; .03; .02 INJECTION, SOLUTION INTRAVENOUS at 13:45

## 2025-04-23 RX ADMIN — Medication 40 MCG: at 10:43

## 2025-04-23 RX ADMIN — IOHEXOL 100 ML: 350 INJECTION, SOLUTION INTRAVENOUS at 11:03

## 2025-04-23 RX ADMIN — LEVETIRACETAM 500 MG: 5 INJECTION INTRAVENOUS at 20:50

## 2025-04-23 RX ADMIN — Medication 80 MCG: at 09:31

## 2025-04-23 RX ADMIN — INSULIN LISPRO 1 UNITS: 100 INJECTION, SOLUTION INTRAVENOUS; SUBCUTANEOUS at 00:04

## 2025-04-23 RX ADMIN — ATORVASTATIN CALCIUM 10 MG: 10 TABLET, FILM COATED ORAL at 20:50

## 2025-04-23 RX ADMIN — PROPOFOL 40 MCG/KG/MIN: 10 INJECTION, EMULSION INTRAVENOUS at 12:45

## 2025-04-23 RX ADMIN — ATENOLOL 25 MG: 25 TABLET ORAL at 20:50

## 2025-04-23 RX ADMIN — PROPOFOL 35 MCG/KG/MIN: 10 INJECTION, EMULSION INTRAVENOUS at 01:06

## 2025-04-23 RX ADMIN — Medication 40 PERCENT: at 07:46

## 2025-04-23 RX ADMIN — CLEVIPIDINE 6.75 MG/HR: 0.5 EMULSION INTRAVENOUS at 23:20

## 2025-04-23 RX ADMIN — Medication 40 PERCENT: at 12:19

## 2025-04-23 RX ADMIN — CLEVIPIDINE 1 MG/HR: 0.5 EMULSION INTRAVENOUS at 08:36

## 2025-04-23 RX ADMIN — ROCURONIUM 20 MG: 50 INJECTION, SOLUTION INTRAVENOUS at 10:33

## 2025-04-23 RX ADMIN — Medication 75 MCG/HR: at 04:07

## 2025-04-23 RX ADMIN — Medication 40 MCG: at 10:49

## 2025-04-23 RX ADMIN — PERFLUTREN 2 ML OF DILUTION: 6.52 INJECTION, SUSPENSION INTRAVENOUS at 07:24

## 2025-04-23 RX ADMIN — CLEVIPIDINE 6.75 MG/HR: 0.5 EMULSION INTRAVENOUS at 16:00

## 2025-04-23 RX ADMIN — PROPOFOL 40 MCG/KG/MIN: 10 INJECTION, EMULSION INTRAVENOUS at 05:23

## 2025-04-23 RX ADMIN — Medication 40 PERCENT: at 14:01

## 2025-04-23 RX ADMIN — PHENYLEPHRINE-NACL IV SOLUTION 10 MG/250ML-0.9% 0.2 MCG/KG/MIN: 10-0.9/25 SOLUTION at 09:40

## 2025-04-23 RX ADMIN — IOHEXOL 100 ML: 350 INJECTION, SOLUTION INTRAVENOUS at 11:24

## 2025-04-23 RX ADMIN — IOHEXOL 100 ML: 350 INJECTION, SOLUTION INTRAVENOUS at 10:27

## 2025-04-23 RX ADMIN — SODIUM CHLORIDE, SODIUM LACTATE, POTASSIUM CHLORIDE, AND CALCIUM CHLORIDE 100 ML/HR: .6; .31; .03; .02 INJECTION, SOLUTION INTRAVENOUS at 20:53

## 2025-04-23 RX ADMIN — ROCURONIUM 50 MG: 50 INJECTION, SOLUTION INTRAVENOUS at 09:07

## 2025-04-23 RX ADMIN — SENNOSIDES AND DOCUSATE SODIUM 2 TABLET: 50; 8.6 TABLET ORAL at 20:50

## 2025-04-23 RX ADMIN — CLEVIPIDINE 1.5 MG/HR: 0.5 EMULSION INTRAVENOUS at 08:45

## 2025-04-23 RX ADMIN — Medication 80 MCG: at 10:18

## 2025-04-23 ASSESSMENT — COGNITIVE AND FUNCTIONAL STATUS - GENERAL
WALKING IN HOSPITAL ROOM: TOTAL
MOBILITY SCORE: 6
TURNING FROM BACK TO SIDE WHILE IN FLAT BAD: TOTAL
DRESSING REGULAR UPPER BODY CLOTHING: TOTAL
MOVING FROM LYING ON BACK TO SITTING ON SIDE OF FLAT BED WITH BEDRAILS: TOTAL
CLIMB 3 TO 5 STEPS WITH RAILING: TOTAL
MOVING TO AND FROM BED TO CHAIR: TOTAL
TOILETING: TOTAL
EATING MEALS: TOTAL
HELP NEEDED FOR BATHING: TOTAL
STANDING UP FROM CHAIR USING ARMS: TOTAL
PERSONAL GROOMING: TOTAL
DRESSING REGULAR LOWER BODY CLOTHING: TOTAL
DAILY ACTIVITIY SCORE: 6

## 2025-04-23 ASSESSMENT — PAIN - FUNCTIONAL ASSESSMENT
PAIN_FUNCTIONAL_ASSESSMENT: CPOT (CRITICAL CARE PAIN OBSERVATION TOOL)

## 2025-04-23 ASSESSMENT — ACTIVITIES OF DAILY LIVING (ADL): LACK_OF_TRANSPORTATION: NO

## 2025-04-23 NOTE — PRE-PROCEDURE NOTE
Pre-Procedure H&P     Provider Assessment:  Diagnosis/Reason for Procedure: Cerebellar Hemorrhage   Procedure: Diagnostic Cerebral Angiogram  Medications Reviewed:   yes   Prophylatic Antibiotics Needed:   no    Neuro status: intubated sedated  Mouth Opening OK: yes   Neck Flexibility OK: yes   Sedation Plan: moderate sedation   COVID-19 Risk Consent:  Surgeon has reviewed key risks related to the risk of jim COVID-19 and if they contract COVID-19 what the risks are.

## 2025-04-23 NOTE — PROGRESS NOTES
Communication Note    Patient Name: Muriel De Souza  MRN: [id number]  Today's Date: 4/23/2025   Room: 07/07A    Discipline: Physical Therapy      PT Missed Visit: Yes  Missed Visit Reason:  (Pt with up-trending Troponin I and pending angio today (presently off unit for this.) Hold PT.)      04/23/25 at 9:02 AM   Reshma Reese, PT   Rehab Office: [phone]

## 2025-04-23 NOTE — PROGRESS NOTES
04/23/25 1208   Discharge Planning   Living Arrangements Spouse/significant other   Support Systems Spouse/significant other;Children   Type of Residence Private residence   Number of Stairs to Enter Residence 2   Number of Stairs Within Residence 12   Home or Post Acute Services   (TBD: PT and OT will evaluate for discharge level of care recommendations.)   Does the patient need discharge transport arranged? Yes   RoundTrip coordination needed? Yes   Has discharge transport been arranged? No   Financial Resource Strain   How hard is it for you to pay for the very basics like food, housing, medical care, and heating? Not hard  (per )   Housing Stability   In the last 12 months, was there a time when you were not able to pay the mortgage or rent on time? N  (per )   At any time in the past 12 months, were you homeless or living in a shelter (including now)? N  (per )   Transportation Needs   In the past 12 months, has lack of transportation kept you from medical appointments or from getting medications? no  (per )   In the past 12 months, has lack of transportation kept you from meetings, work, or from getting things needed for daily living? No  (per )     Social Work Discharge Planning note:    -Patient discussed during interdisciplinary rounds.   -Team members present: Fellow, PT and OT, and SW  -Plan per medical team: Pt is not medically ready for discharge. Angio this morning.   -Payer: Erica  -Status: Inpatient  -Discharge disposition: TBD - SW will continue to follow for PT and OT recommendations, and to assist with discharge planning.   -Support to Pt/family: Pt does not appear to be oriented so SW met with Pt's family (, Oliver; and daughter, Kimberly) to offer support and to further discuss discharge planning. Per family, Pt was fully independent with her ADL's at baseline, with no equipment needs. Oliver is still working, so SW will follow to assist with FMLA.  Work excuses given for Ursula (their son). Family reported no other issues or concerns. SW will continue to follow to assist with discharge planning.    -Potential Barriers: none  -Anticipated Date of Discharge:  4/29/25    This SW   ALBA Wiggins, LSW    Office: [phone]  Secure chat via [employer]

## 2025-04-23 NOTE — ANESTHESIA PREPROCEDURE EVALUATION
Patient: Muriel De Souza    Procedure Information       Anesthesia Start Date/Time: 04/23/25 0900    Scheduled providers: Nick Panda MD    Procedure: IR INTERVENTION NEURO EMBOLIZATION    Location: [facility]            Muriel De Souza is a 69 y.o. female with h/o HTN, HLD p/w HA, arrested in CT scanner at OSH, ROSC after 7 min CPR,  CTH w diffuse SAH and L cerebellar ICH, triventriculomegally, effaced 4th vents, CTA with L cerebellar AVM w venous varices s/p RF EVD       Clinical information reviewed: PMH, PSH, allergies     Meds               NPO Detail:  No data recorded     Physical Exam    Airway  Mallampati: unable to assess     Cardiovascular   Rhythm: regular  Rate: normal     Dental    Pulmonary Breath sounds clear to auscultation  Comments: Intubated   Abdominal            Anesthesia Plan    History of general anesthesia?: yes  History of complications of general anesthesia?: no    ASA 4 - emergent     general       Plan discussed with CRNA.

## 2025-04-23 NOTE — PROGRESS NOTES
Pharmacy Medication History Review    Muriel De Souza is a 69 y.o. female admitted for AVM (arteriovenous malformation) ([facility] Pharmacy reviewed the patient's [id number] medications and allergies for accuracy.    Medications ADDED  All medications  Medications CHANGED  N/A  Medications REMOVED/NOT TAKING   N/A     The list below reflects the updated PTA list.   Prior to Admission Medications   Prescriptions Last Dose Informant   CALCIUM ORAL  Spouse/Significant Other   Sig: Take 1 tablet by mouth 2 times a day.   atenolol (Tenormin) 25 mg tablet  Spouse/Significant Other   Sig: Take 1 tablet (25 mg) by mouth once daily.   atorvastatin (Lipitor) 10 mg tablet  Spouse/Significant Other   Sig: Take 1 tablet (10 mg) by mouth once daily.   escitalopram (Lexapro) 20 mg tablet  Spouse/Significant Other   Sig: Take 0.5 tablets (10 mg) by mouth once daily.      Facility-Administered Medications: None       The list below reflects the updated allergy list. Please review each documented allergy for additional clarification and justification.  Allergies  Indicated as Unable to Assess by Darnell Israel PharmD on 4/23/2025 (Patient unable to communicate)        Severity Reactions Comments    Thimerosal Not Specified Unknown             Patient was unable to be assessed for M2B at discharge. Pharmacy has been updated to [facility] in [place].    Sources  [facility]  Pharmacy dispense history  Spouse, Good historian   Picture of home medication list available on phone for review    Additional Comments  N/A    Darnell Israel PharmD  [facility]  [address]  [facility], [address]  [place] [address]  Phone: [phone]  Please reach out via Secure Chat for questions, or if no response call [employer] or [employer]

## 2025-04-23 NOTE — PROCEDURES
Pre-Procedure Verification and Time Out:  Procedure Location: procedure area  HUDDLE - Pre-procedure Verification:  completed  TIME OUT - Final Verification:  completed immediately prior to procedure start  DEBRIEF: completed    Complications:  None; patient tolerated the procedure well.     Disposition: NSU   Condition: stable  Specimens Collected: No specimens collected    General Information:   Anesthesia/ sedation: Non-Anesthesia  Indication(s)/Pre - Procedure Diagnoses: Cerebellar Hemorrhage  Post-Procedure Diagnosis: Dural Arteriovenous Fistula   Procedure Name: Diagnostic Cerebral Angiogram  Procedure performed by: Valentina Shaw   Assistant(s): Jimmy Panda   Estimated Blood Loss (mL): 10  Specimen: no  Informed Consent: consent obtained and in chart    Access: 6 Fr Sheath in L CFA  Closure: Mynx  Vessels Injected: R VA, R CCA, R ECA, R ICA, L ICA, L ECA, L VA, L CFA   Deep sedation provided by independent anesthesia   Findings: dural arteriovenous fistula with direct cortical venous drainage and ectasia, Full report to follow in PACS dictation

## 2025-04-23 NOTE — PROGRESS NOTES
[facility]  NEUROSCIENCE INTENSIVE CARE UNIT  DAILY PROGRESS NOTE       Patient Name: Muriel De Souza   MRN: [id number]     Admit Date: 2025     : 1955 AGE: 69 y.o. GENDER: female        Subjective    Muriel De Souza is a 69 y.o. female with h/o HTN, HLD p/w HA, arrested in CT scanner at OSH, ROSC after 7 min CPR,  CTH w diffuse SAH and L cerebellar ICH, triventriculomegally, effaced 4th vents, CTA with L cerebellar AVM w venous varices s/p RF EVD and now admitted for further management.    Per chart review, patient had received bad news at home and then had sudden onset headache and vomiting. Found to have SAH at OSH with course complicated by cardiac arrest. She was intubated and sedated and transferred from [place], [place] to [facility].    Significant Events:  -  came in with L cerebellar AVM bleed with course complicated by cardiac arrest ROSC after 7 min CPR, uppon arrival to [facility] NSU, EVD drain placed, went to OR for decompression and AVM resection    Interval Events:   Overnight, large volume fluid resuscitation with improvement in lactate, troponins still uptrending       Objective   VITALS (24H):  Temp:  [33.8 °C (92.8 °F)-37 °C (98.6 °F)] 37 °C (98.6 °F)  Heart Rate:  [] 69  Resp:  [13-27] 14  BP: ()/(38-93) 125/51  Arterial Line BP 1: ()/(38-76) 140/55  FiO2 (%):  [40 %] 40 %  INTAKE/OUTPUT:  Intake/Output Summary (Last 24 hours) at 2025 0726  Last data filed at 2025 0600  Gross per 24 hour   Intake 75067.59 ml   Output 6882 ml   Net 5590.59 ml     VENT SETTINGS:  Vent Mode: Volume control/assist control  FiO2 (%):  [40 %] 40 %  S RR:  [14-22] 14  S VT:  [400 mL-450 mL] 400 mL  PEEP/CPAP (cm H2O):  [5 cm H20] 5 cm H20  MAP (cm H2O):  [8-10] 8     - EVD Output: ; 37 mL in 24H    PHYSICAL EXAM:  NEURO: (examined on fentanyl 75 and propofol 40  - Intubated, sedated  - Eyes closed to nox stim  - no VOR  - pupils minimally reactive to light  - corneals  negative  - no cough or gag  - no spontaneous movements  - no increased tone  CV:  - RRR on telemetry, NSR  - Arterial line in place  RESP:  - intubated  :  - Brooks catheter in place  GI:  - Abdomen NT/ND, soft  SKIN:  - Intact  - left arm swollen and tense to palpation    MEDICATIONS:  Scheduled: PRN: Continuous:   Scheduled Medications[1] PRN Medications[2] Continuous Medications[3]     IMAGING RESULTS:  CT head wo IV contrast   Final Result   * Diffuse subarachnoid hemorrhage with hydrocephalus   *Left cerebellar hematoma suggesting a likely source.        MACRO:   none        Signed by: Cruz Salgado 4/22/2025 11:59 AM   Dictation workstation:   [id number]      CT angio head and neck w and wo IV contrast   Final Result   * Diffuse subarachnoid hemorrhage with hydrocephalus as described   *Suspected vascular malformation in the left cerebellar hemisphere   with enlarged arterial and venous structures largely in the left   cerebellar hemisphere and residual partial opacification of a venous   aneurysm near the torcula. *Bilateral carotid stenosis as described        MACRO:   none        Signed by: Cruz Salgado 4/22/2025 12:07 PM   Dictation workstation:   [id number]             Assessment/Plan    Muriel De Souza is a 69 y.o. female with h/o HTN, HLD p/w HA, arrested in CT scanner at OSH, ROSC after 7 min CPR,  CTH w diffuse SAH and L cerebellar ICH, triventriculomegally, effaced 4th vents, CTA with L cerebellar AVM w venous varices s/p RF EVD as well as AVM resection on 4/22 and now admitted for further management.    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  #Intracranial hypertension  Assessment:  - First day 4/22 presented with SAH, L cerebellar  ICH, and ventriculomegaly on CTH  - 4/22 CTA showing L cerebellar AVM  - 4/22 RF EVD for ICH  - 4/22 OR AVM resection  Plan:  - NSU  - Keppra  - repeat angio per NSGY timing  - EVD in place  - Neuro Checks: Q1H  - Sedation: Propofol and Fentanyl  - Pain: PRN  - Nausea: ondansetron  - PT/OT/SLP    CARDIOVASCULAR:  #Cardiac arrest  #Lactic acidosis, improving  Assessment:  - 7 min to ROSC  - Assess for ischemic and stress-induced cardiomyopathy   - Assess for arrhythmias following cardiac arrest   Plan:  - Cardiology consult (4/23)  - ECHO 4/23:  CONCLUSIONS:   1. Left ventricular ejection fraction is normal, calculated by Adrian's biplane at 71%.   2. Spectral Doppler shows a Grade I (impaired relaxation pattern) of left ventricular diastolic filling with normal left atrial filling pressure.   3. There is normal right ventricular global systolic function.   4. The left atrium is mildly dilated.   5. There is no evidence of a patent foramen ovale.   6. The inferior vena cava appears mildly dilated, on vent.    - Pressors PRN  - Trend troponins q4  - Monitor on telemetry  - BP goal: SBP < 140    --> PRN: Labetalol, Hydralazine, and Nicardipine     RESPIRATORY:  #Acute hypoxic respiratory failure  #Mechanical ventilation  Assessment:  - Pt intubated for decreased LOC  Plan:  - Continue mechanical ventilation    RENAL/:  #TIM  Assessment:  Results from last 72 hours   Lab Units 04/23/25 0412 04/22/25 2056   BUN mg/dL 14  14 15   CREATININE mg/dL 0.70  0.68 0.82       Plan:  - Monitor with daily RFP  - Maintain forbes catheter for: critically ill patient who need accurate urinary output measurements    FEN/GI:  #TIM  Assessment:    Plan:  - Monitor and replace electrolytes per protocol  - IVF: PRN  - Diet: PRN    - Bowel Regimen: Docusate-Senna PRN and Miralax PRN    ENDOCRINE:  #TIM  Assessment:  Results from last 7 days   Lab Units 04/23/25  0412 04/23/25  0337 04/22/25 2328 04/22/25 2056 04/22/25 2010  25  1132   POCT GLUCOSE mg/dL  --  134* 178*  --  224*  --   --    GLUCOSE mg/dL 130*  135*  --   --  210*  --  233* 205*   SODIUM mmol/L 147*  146*  --   --  147*  --  146* 139    - HbA1C 5.5 on     Plan:  - Accuchecks & ISS PRN     HEMATOLOGY:  #Anemia, likely dilutional  Assessment:  - Baseline Hgb: 12.0  - Baseline Plts: 287  Results from last 7 days   Lab Units 25  175   HEMOGLOBIN g/dL 8.7* 10.1* 11.1*   HEMATOCRIT % 25.1* 30.9* 35.4*   PLATELETS AUTO x10*3/uL 226 263 380     Plan:  - Continue to monitor with daily CBC and Coag panel  - maintain active type and screen as needed    INFECTIOUS DISEASE:  #TIM  Assessment:  Results from last 7 days   Lab Units 25   WBC AUTO x10*3/uL 10.6 10.7 13.5*    - Temp (24hrs), Av.7 °C (96.3 °F), Min:33.8 °C (92.8 °F), Max:37 °C (98.6 °F)    Plan:  - Continue to monitor for s/sx of infection  - Pan culture for temperature > 38.4 C    MUSCULOSKELETAL:  - No acute issues    SKIN:  - No acute issues  - Turns and skin care per NSU protocol    ACCESS:  - R Femoral  - R arterial  - L PIV    PROPHYLAXIS:  - DVT Ppx: SCDs  - GI Ppx: PRN    RESTRAINTS:  Not indicated/Patient does not meet criteria for restraints    Jian Ellington MD PhD  Neuroscience Intensive Care         Plan of care to be discussed with neurocritical care attending      Fellow Addendum:  I have reviewed the information above and I agree with the assessment and plan as outlined by the resident/medical student with the following additions/and or corrections:    69 y.o. female with h/o HTN, HLD p/w HA, arrested in CT scanner at OSH, ROSC after 7 min CPR, CTH w diffuse SAH and L cerebellar ICH, triventriculomegally, effaced 4th vents, CTA with L cerebellar AVM w/ venous varices s/p RF EVD as well as AVM resection on  and now admitted for further management.     Neuro: Exam remains poor with only weak cough  on prop/fent. Angio 4/23. EVD per nsgy.   CV:   -cardiac arrest on arrival to hospital, likely PEA in setting of profound intracranial pressure elevation.   -Labile blood pressures, goal 100-140. On Cleviprex.   Resp: Intubated, minimal vent settings.   Renal: Monitor UOP  FEN/GI: Advance TF as tolerated. Mild elevation in AST/ALT  Endo: SSI PRN  Heme: Hb goal >7  ID: watch for signs of fever, not on abx      Jace Hernandez MD - 04/23/25 at 10:02 AM  Neurocritical Care Fellow  PGY-6             [1] insulin lispro, 0-5 Units, subcutaneous, q4h  levETIRAcetam, 500 mg, intravenous, q12h  oxygen, , inhalation, Continuous - Inhalation  perflutren protein A microsphere, 0.5 mL, intravenous, Once in imaging  polyethylene glycol, 17 g, oral, Daily  potassium chloride, 40 mEq, intravenous, Once  sennosides-docusate sodium, 2 tablet, oral, BID  sulfur hexafluoride microsphr, 2 mL, intravenous, Once in imaging     [2] PRN medications: calcium gluconate, calcium gluconate, dextrose, dextrose, glucagon, glucagon, hydrALAZINE, labetaloL, magnesium sulfate, magnesium sulfate, niCARdipine, oxygen, potassium chloride CR **OR** potassium chloride, potassium chloride CR **OR** potassium chloride  [3] fentaNYL,  mcg/hr, Last Rate: 75 mcg/hr (04/23/25 0448)  lactated Ringer's, 100 mL/hr, Last Rate: 100 mL/hr (04/23/25 0448)  niCARdipine, 1-15 mg/hr, Last Rate: 2.5 mg/hr (04/22/25 2111)  norepinephrine, 0.01-1 mcg/kg/min, Last Rate: Stopped (04/22/25 2200)  propofol, 0-50 mcg/kg/min, Last Rate: 40 mcg/kg/min (04/23/25 4223)

## 2025-04-23 NOTE — CONSULTS
"Reason For Consult  \"Arrhythmia\" \"Cardiac arrest\"     History Of Present Illness  Muriel De Souza is a 69 y.o. female with history of HTN, DLD; initally presented with a headache; had a cardiac arrest in CT scanner at OSH. Per notes; she had ROSC after 7 mins of CPR; PEA. CT head showed SAH and L cerebellar ICH. CTA with L cerebellar AVM w venous varices. RF EVD as well as AVM resection on 4/22.      Per NSGY team; patient had some form of arrhythmia for which the cardiology team is consulted as well as post cardiac arrest care.     TTE 4/23;   CONCLUSIONS:   1. Left ventricular ejection fraction is normal, calculated by Adrian's biplane at 71%.   2. Spectral Doppler shows a Grade I (impaired relaxation pattern) of left ventricular diastolic filling with normal left atrial filling pressure.   3. There is normal right ventricular global systolic function.   4. The left atrium is mildly dilated.   5. There is no evidence of a patent foramen ovale.   6. The inferior vena cava appears mildly dilated, on vent.    EKG;   4/22; Sinus rhythm, RBBB    No baseline EKG    Past Medical History  She has no past medical history on file.    Surgical History  She has no past surgical history on file.     Social History  She has no history on file for tobacco use, alcohol use, and drug use.    Family History  Family History[1]     Allergies  Thimerosal      Physical Exam  Vitals reviewed.   Cardiovascular:      Rate and Rhythm: Normal rate and regular rhythm.      Heart sounds: No murmur heard.  Pulmonary:      Effort: Pulmonary effort is normal.      Breath sounds: Normal breath sounds.   Abdominal:      General: Abdomen is flat.      Palpations: Abdomen is soft.   Musculoskeletal:         General: Normal range of motion.             Last Recorded Vitals  Blood pressure 139/63, pulse 74, temperature 37 °C (98.6 °F), resp. rate 14, height 1.727 m (5' 8\"), weight 93 kg (205 lb 0.4 oz), SpO2 100%.    Relevant Results  Scheduled " medications  Scheduled Medications[2]  Continuous medications  Continuous Medications[3]  PRN medications  PRN Medications[4]       Assessment/Plan     Muriel De Souza is a 69 y.o. female with history of HTN, DLD; initally presented with a headache; had a cardiac arrest in CT scanner at OSH. Per notes; she had ROSC after 7 mins of CPR; PEA. CT head showed SAH and L cerebellar ICH. CTA with L cerebellar AVM w venous varices. RF EVD as well as AVM resection on 4/22.      Per NSGY team; patient had some form of arrhythmia for which the cardiology team is consulted as well as post cardiac arrest care.       Impression   #PEA arrest  -In the setting of the above. I am unable to locate any rhythm strips or documentation of any ventricular arrhythmias  -EKG with first degree AV block; RBBB  -TTE normal     #Type 2 MI (demand ischemia)    -650 > 990 > 931 > 1111   -In the setting of SAH  -This is the result of excess sympathetic activity and cathecholamine effect.     Recommendations  -Please let us know if any rhythm strips or EKGs showing malignant rhythms are available and we can review.   -Once troponin trends down; please stop trending   -Post arrest care per the ICU team.     Cardiology will sign off     Patient was staffed with Dr. Pearce     Pager numbers:  General Cardiology Consult Pager: [phone] (weekday 7AM-6PM and weekend 7AM-2PM) and other: [phone]  EP Consult Pager: [phone] (weekday 7AM-6PM and weekend 7AM-2PM) and other: [phone]  EP Device Nurse Pager: [phone] (weekday 7AM-5PM)   Advanced Heart Failure Consult Pager: [phone] anytime  CICU Fellow Pager: [phone] anytime  Endovascular /Limb Salvage Team Pager: [phone] for day coverage (8am-5pm)  Interventional Cardiology Fellow Pager: [phone] for night and weekend coverage  Structural Heart Team Pager: [phone] anytime  Night coverage: [id number] [phone]      I spent 30 minutes in the professional and overall care of this patient.      Karla Green MD         [1] No family history on  file.  [2] insulin lispro, 0-5 Units, subcutaneous, q4h  levETIRAcetam, 500 mg, intravenous, q12h  oxygen, , inhalation, Continuous - Inhalation  perflutren protein A microsphere, 0.5 mL, intravenous, Once in imaging  polyethylene glycol, 17 g, oral, Daily  potassium chloride, 40 mEq, intravenous, Once  sennosides-docusate sodium, 2 tablet, oral, BID  sulfur hexafluoride microsphr, 2 mL, intravenous, Once in imaging  [3] clevidipine, 0-16 mg/hr, Last Rate: 2 mg/hr (04/23/25 1200)  fentaNYL,  mcg/hr, Last Rate: 75 mcg/hr (04/23/25 1200)  lactated Ringer's, 100 mL/hr, Last Rate: 100 mL/hr (04/23/25 0900)  lactated Ringer's, 100 mL/hr  niCARdipine, 1-15 mg/hr, Last Rate: Stopped (04/23/25 0920)  norepinephrine, 0.01-1 mcg/kg/min, Last Rate: Stopped (04/22/25 2200)  propofol, 0-50 mcg/kg/min, Last Rate: 40 mcg/kg/min (04/23/25 1245)  [4] PRN medications: calcium gluconate, calcium gluconate, dextrose, dextrose, glucagon, glucagon, hydrALAZINE, labetaloL, magnesium sulfate, magnesium sulfate, niCARdipine, oxygen, potassium chloride CR **OR** potassium chloride, potassium chloride CR **OR** potassium chloride

## 2025-04-23 NOTE — PROGRESS NOTES
"Muriel De Souza is a 69 y.o. female on day 1 of admission presenting with AVM (arteriovenous malformation) ([facility]).    Subjective   No events overnight    Objective     Physical Exam  Intubated  ECNS  Ou2min R  +cough, weak gag, -corneal  BUE flaccid  BLE withdraw  EVD in place    Last Recorded Vitals  Blood pressure 139/63, pulse 74, temperature 37 °C (98.6 °F), resp. rate 14, height 1.727 m (5' 8\"), weight 93 kg (205 lb 0.4 oz), SpO2 100%.  Intake/Output last 3 Shifts:  I/O last 3 completed shifts:  In: 68905.6 (134.1 mL/kg) [I.V.:1499.5 (16.1 mL/kg); Blood:942; IV Piggyback:61409.1]  Out: 6882 (74 mL/kg) [Urine:5945 (1.8 mL/kg/hr); Emesis/NG output:100; Drains:37; Blood:800]  Weight: 93 kg     Relevant Results            This patient currently has cardiac telemetry ordered; if you would like to modify or discontinue the telemetry order, click here to go to the orders activity to modify/discontinue the order.  This patient has a central line   Reason for the central line remaining today? Hemodynamic monitoring      This patient is intubated   Reason for patient to remain intubated today? they are unable to protect their airway             Assessment & Plan  AVM (arteriovenous malformation) ([facility])    Muriel De Souza is a 69 y.o. 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, s/p 1u Plt and DDAVP, s/p RF EVD (OP 18), 4/22 s/p SOC/C1 lami for AVM resection, CTH POC, stable vents, 4/22 TEG R low s/p 1 uFFP    NSU  Q1 NC  EVD at 15, monitor output and ICPs  Angiogram today  SBP <140  Cards recs  TTE  Q6 CBC, RFP, ABG, troponin  Final path  PTOT  SLP    Kiran Robledo MD    "

## 2025-04-23 NOTE — CARE PLAN
The patient's goals for the shift include  yamile    The clinical goals for the shift include patient will remain hemodynamically stable     Over the shift, the patient did not make progress toward the following goals. Barriers to progression include neurological status. Recommendations to address these barriers include continue to monitor.

## 2025-04-24 ENCOUNTER — APPOINTMENT (OUTPATIENT)
Dept: RADIOLOGY | Facility: HOSPITAL | Age: 70
End: 2025-04-24
Payer: COMMERCIAL

## 2025-04-24 LAB
ALBUMIN SERPL BCP-MCNC: 3 G/DL (ref 3.4–5)
ALBUMIN SERPL BCP-MCNC: 3.1 G/DL (ref 3.4–5)
ALBUMIN SERPL BCP-MCNC: 3.2 G/DL (ref 3.4–5)
ALBUMIN SERPL BCP-MCNC: 3.2 G/DL (ref 3.4–5)
ANION GAP BLDA CALCULATED.4IONS-SCNC: 9 MMO/L (ref 10–25)
ANION GAP SERPL CALC-SCNC: 10 MMOL/L (ref 10–20)
ANION GAP SERPL CALC-SCNC: 11 MMOL/L (ref 10–20)
ANION GAP SERPL CALC-SCNC: 11 MMOL/L (ref 10–20)
ANION GAP SERPL CALC-SCNC: 12 MMOL/L (ref 10–20)
ANION GAP SERPL CALC-SCNC: 13 MMOL/L (ref 10–20)
ANION GAP SERPL CALC-SCNC: 9 MMOL/L (ref 10–20)
BASE EXCESS BLDA CALC-SCNC: 1.8 MMOL/L (ref -2–3)
BASOPHILS # BLD AUTO: 0.02 X10*3/UL (ref 0–0.1)
BASOPHILS # BLD AUTO: 0.02 X10*3/UL (ref 0–0.1)
BASOPHILS NFR BLD AUTO: 0.1 %
BASOPHILS NFR BLD AUTO: 0.1 %
BODY TEMPERATURE: 37 DEGREES CELSIUS
BUN SERPL-MCNC: 13 MG/DL (ref 6–23)
BUN SERPL-MCNC: 14 MG/DL (ref 6–23)
BUN SERPL-MCNC: 14 MG/DL (ref 6–23)
BUN SERPL-MCNC: 15 MG/DL (ref 6–23)
BUN SERPL-MCNC: 15 MG/DL (ref 6–23)
BUN SERPL-MCNC: 16 MG/DL (ref 6–23)
CA-I BLD-SCNC: 1.17 MMOL/L (ref 1.1–1.33)
CA-I BLDA-SCNC: 1.19 MMOL/L (ref 1.1–1.33)
CALCIUM SERPL-MCNC: 8.1 MG/DL (ref 8.6–10.6)
CALCIUM SERPL-MCNC: 8.2 MG/DL (ref 8.6–10.6)
CALCIUM SERPL-MCNC: 8.3 MG/DL (ref 8.6–10.6)
CALCIUM SERPL-MCNC: 8.3 MG/DL (ref 8.6–10.6)
CALCIUM SERPL-MCNC: 8.5 MG/DL (ref 8.6–10.6)
CALCIUM SERPL-MCNC: 8.6 MG/DL (ref 8.6–10.6)
CHLORIDE BLDA-SCNC: 110 MMOL/L (ref 98–107)
CHLORIDE SERPL-SCNC: 107 MMOL/L (ref 98–107)
CHLORIDE SERPL-SCNC: 108 MMOL/L (ref 98–107)
CHLORIDE SERPL-SCNC: 109 MMOL/L (ref 98–107)
CHLORIDE SERPL-SCNC: 110 MMOL/L (ref 98–107)
CHLORIDE SERPL-SCNC: 111 MMOL/L (ref 98–107)
CHLORIDE SERPL-SCNC: 111 MMOL/L (ref 98–107)
CO2 SERPL-SCNC: 26 MMOL/L (ref 21–32)
CO2 SERPL-SCNC: 27 MMOL/L (ref 21–32)
CO2 SERPL-SCNC: 27 MMOL/L (ref 21–32)
CREAT SERPL-MCNC: 0.49 MG/DL (ref 0.5–1.05)
CREAT SERPL-MCNC: 0.49 MG/DL (ref 0.5–1.05)
CREAT SERPL-MCNC: 0.5 MG/DL (ref 0.5–1.05)
CREAT SERPL-MCNC: 0.5 MG/DL (ref 0.5–1.05)
CREAT SERPL-MCNC: 0.51 MG/DL (ref 0.5–1.05)
CREAT SERPL-MCNC: 0.51 MG/DL (ref 0.5–1.05)
EGFRCR SERPLBLD CKD-EPI 2021: >90 ML/MIN/1.73M*2
EOSINOPHIL # BLD AUTO: 0 X10*3/UL (ref 0–0.7)
EOSINOPHIL # BLD AUTO: 0.01 X10*3/UL (ref 0–0.7)
EOSINOPHIL NFR BLD AUTO: 0 %
EOSINOPHIL NFR BLD AUTO: 0.1 %
ERYTHROCYTE [DISTWIDTH] IN BLOOD BY AUTOMATED COUNT: 14 % (ref 11.5–14.5)
ERYTHROCYTE [DISTWIDTH] IN BLOOD BY AUTOMATED COUNT: 14.2 % (ref 11.5–14.5)
GLUCOSE BLD MANUAL STRIP-MCNC: 128 MG/DL (ref 74–99)
GLUCOSE BLD MANUAL STRIP-MCNC: 136 MG/DL (ref 74–99)
GLUCOSE BLD MANUAL STRIP-MCNC: 143 MG/DL (ref 74–99)
GLUCOSE BLD MANUAL STRIP-MCNC: 143 MG/DL (ref 74–99)
GLUCOSE BLD MANUAL STRIP-MCNC: 147 MG/DL (ref 74–99)
GLUCOSE BLDA-MCNC: 137 MG/DL (ref 74–99)
GLUCOSE SERPL-MCNC: 124 MG/DL (ref 74–99)
GLUCOSE SERPL-MCNC: 130 MG/DL (ref 74–99)
GLUCOSE SERPL-MCNC: 132 MG/DL (ref 74–99)
GLUCOSE SERPL-MCNC: 133 MG/DL (ref 74–99)
GLUCOSE SERPL-MCNC: 142 MG/DL (ref 74–99)
GLUCOSE SERPL-MCNC: 144 MG/DL (ref 74–99)
HCO3 BLDA-SCNC: 25.7 MMOL/L (ref 22–26)
HCT VFR BLD AUTO: 26.6 % (ref 36–46)
HCT VFR BLD AUTO: 27.3 % (ref 36–46)
HCT VFR BLD EST: 39 % (ref 36–46)
HGB BLD-MCNC: 8.5 G/DL (ref 12–16)
HGB BLD-MCNC: 9 G/DL (ref 12–16)
HGB BLDA-MCNC: 13 G/DL (ref 12–16)
IMM GRANULOCYTES # BLD AUTO: 0.11 X10*3/UL (ref 0–0.7)
IMM GRANULOCYTES # BLD AUTO: 0.13 X10*3/UL (ref 0–0.7)
IMM GRANULOCYTES NFR BLD AUTO: 0.7 % (ref 0–0.9)
IMM GRANULOCYTES NFR BLD AUTO: 0.9 % (ref 0–0.9)
INHALED O2 CONCENTRATION: 40 %
LACTATE BLDA-SCNC: 1 MMOL/L (ref 0.4–2)
LYMPHOCYTES # BLD AUTO: 1.01 X10*3/UL (ref 1.2–4.8)
LYMPHOCYTES # BLD AUTO: 1.03 X10*3/UL (ref 1.2–4.8)
LYMPHOCYTES NFR BLD AUTO: 6.2 %
LYMPHOCYTES NFR BLD AUTO: 6.6 %
MAGNESIUM SERPL-MCNC: 1.87 MG/DL (ref 1.6–2.4)
MCH RBC QN AUTO: 29.3 PG (ref 26–34)
MCH RBC QN AUTO: 29.6 PG (ref 26–34)
MCHC RBC AUTO-ENTMCNC: 32 G/DL (ref 32–36)
MCHC RBC AUTO-ENTMCNC: 33 G/DL (ref 32–36)
MCV RBC AUTO: 90 FL (ref 80–100)
MCV RBC AUTO: 92 FL (ref 80–100)
MONOCYTES # BLD AUTO: 0.77 X10*3/UL (ref 0.1–1)
MONOCYTES # BLD AUTO: 0.81 X10*3/UL (ref 0.1–1)
MONOCYTES NFR BLD AUTO: 4.9 %
MONOCYTES NFR BLD AUTO: 5 %
NEUTROPHILS # BLD AUTO: 13.36 X10*3/UL (ref 1.2–7.7)
NEUTROPHILS # BLD AUTO: 14.57 X10*3/UL (ref 1.2–7.7)
NEUTROPHILS NFR BLD AUTO: 87.4 %
NEUTROPHILS NFR BLD AUTO: 88 %
NRBC BLD-RTO: 0 /100 WBCS (ref 0–0)
NRBC BLD-RTO: 0 /100 WBCS (ref 0–0)
OXYHGB MFR BLDA: 96.6 % (ref 94–98)
PCO2 BLDA: 37 MM HG (ref 38–42)
PH BLDA: 7.45 PH (ref 7.38–7.42)
PHOSPHATE SERPL-MCNC: 2.5 MG/DL (ref 2.5–4.9)
PHOSPHATE SERPL-MCNC: 2.6 MG/DL (ref 2.5–4.9)
PHOSPHATE SERPL-MCNC: 2.7 MG/DL (ref 2.5–4.9)
PHOSPHATE SERPL-MCNC: 2.9 MG/DL (ref 2.5–4.9)
PLATELET # BLD AUTO: 210 X10*3/UL (ref 150–450)
PLATELET # BLD AUTO: 221 X10*3/UL (ref 150–450)
PO2 BLDA: 85 MM HG (ref 85–95)
POTASSIUM BLDA-SCNC: 3.2 MMOL/L (ref 3.5–5.3)
POTASSIUM SERPL-SCNC: 3.4 MMOL/L (ref 3.5–5.3)
POTASSIUM SERPL-SCNC: 3.6 MMOL/L (ref 3.5–5.3)
RBC # BLD AUTO: 2.9 X10*6/UL (ref 4–5.2)
RBC # BLD AUTO: 3.04 X10*6/UL (ref 4–5.2)
SAO2 % BLDA: 98 % (ref 94–100)
SODIUM BLDA-SCNC: 141 MMOL/L (ref 136–145)
SODIUM SERPL-SCNC: 142 MMOL/L (ref 136–145)
SODIUM SERPL-SCNC: 143 MMOL/L (ref 136–145)
SODIUM SERPL-SCNC: 144 MMOL/L (ref 136–145)
SODIUM SERPL-SCNC: 145 MMOL/L (ref 136–145)
TRIGL SERPL-MCNC: 208 MG/DL (ref 0–149)
WBC # BLD AUTO: 15.3 X10*3/UL (ref 4.4–11.3)
WBC # BLD AUTO: 16.6 X10*3/UL (ref 4.4–11.3)

## 2025-04-24 PROCEDURE — 70450 CT HEAD/BRAIN W/O DYE: CPT

## 2025-04-24 PROCEDURE — 2500000004 HC RX 250 GENERAL PHARMACY W/ HCPCS (ALT 636 FOR OP/ED): Mod: JZ

## 2025-04-24 PROCEDURE — 2500000002 HC RX 250 W HCPCS SELF ADMINISTERED DRUGS (ALT 637 FOR MEDICARE OP, ALT 636 FOR OP/ED)

## 2025-04-24 PROCEDURE — 70450 CT HEAD/BRAIN W/O DYE: CPT | Performed by: RADIOLOGY

## 2025-04-24 PROCEDURE — 80069 RENAL FUNCTION PANEL: CPT

## 2025-04-24 PROCEDURE — 2500000001 HC RX 250 WO HCPCS SELF ADMINISTERED DRUGS (ALT 637 FOR MEDICARE OP): Performed by: STUDENT IN AN ORGANIZED HEALTH CARE EDUCATION/TRAINING PROGRAM

## 2025-04-24 PROCEDURE — 37799 UNLISTED PX VASCULAR SURGERY: CPT | Performed by: STUDENT IN AN ORGANIZED HEALTH CARE EDUCATION/TRAINING PROGRAM

## 2025-04-24 PROCEDURE — 2500000004 HC RX 250 GENERAL PHARMACY W/ HCPCS (ALT 636 FOR OP/ED): Performed by: STUDENT IN AN ORGANIZED HEALTH CARE EDUCATION/TRAINING PROGRAM

## 2025-04-24 PROCEDURE — 83930 ASSAY OF BLOOD OSMOLALITY: CPT

## 2025-04-24 PROCEDURE — 85018 HEMOGLOBIN: CPT

## 2025-04-24 PROCEDURE — 83735 ASSAY OF MAGNESIUM: CPT | Performed by: STUDENT IN AN ORGANIZED HEALTH CARE EDUCATION/TRAINING PROGRAM

## 2025-04-24 PROCEDURE — 80069 RENAL FUNCTION PANEL: CPT | Performed by: STUDENT IN AN ORGANIZED HEALTH CARE EDUCATION/TRAINING PROGRAM

## 2025-04-24 PROCEDURE — 84295 ASSAY OF SERUM SODIUM: CPT

## 2025-04-24 PROCEDURE — 82330 ASSAY OF CALCIUM: CPT | Performed by: STUDENT IN AN ORGANIZED HEALTH CARE EDUCATION/TRAINING PROGRAM

## 2025-04-24 PROCEDURE — 94003 VENT MGMT INPAT SUBQ DAY: CPT

## 2025-04-24 PROCEDURE — 99291 CRITICAL CARE FIRST HOUR: CPT

## 2025-04-24 PROCEDURE — 82435 ASSAY OF BLOOD CHLORIDE: CPT | Performed by: STUDENT IN AN ORGANIZED HEALTH CARE EDUCATION/TRAINING PROGRAM

## 2025-04-24 PROCEDURE — 85025 COMPLETE CBC W/AUTO DIFF WBC: CPT

## 2025-04-24 PROCEDURE — 2500000005 HC RX 250 GENERAL PHARMACY W/O HCPCS: Performed by: STUDENT IN AN ORGANIZED HEALTH CARE EDUCATION/TRAINING PROGRAM

## 2025-04-24 PROCEDURE — C9248 INJ, CLEVIDIPINE BUTYRATE: HCPCS | Mod: JZ,TB | Performed by: STUDENT IN AN ORGANIZED HEALTH CARE EDUCATION/TRAINING PROGRAM

## 2025-04-24 PROCEDURE — 2580000001 HC RX 258 IV SOLUTIONS

## 2025-04-24 PROCEDURE — 82947 ASSAY GLUCOSE BLOOD QUANT: CPT

## 2025-04-24 PROCEDURE — 84478 ASSAY OF TRIGLYCERIDES: CPT

## 2025-04-24 PROCEDURE — 2020000001 HC ICU ROOM DAILY

## 2025-04-24 PROCEDURE — 2500000004 HC RX 250 GENERAL PHARMACY W/ HCPCS (ALT 636 FOR OP/ED): Mod: JZ,TB | Performed by: STUDENT IN AN ORGANIZED HEALTH CARE EDUCATION/TRAINING PROGRAM

## 2025-04-24 PROCEDURE — 2500000001 HC RX 250 WO HCPCS SELF ADMINISTERED DRUGS (ALT 637 FOR MEDICARE OP)

## 2025-04-24 PROCEDURE — 85025 COMPLETE CBC W/AUTO DIFF WBC: CPT | Performed by: STUDENT IN AN ORGANIZED HEALTH CARE EDUCATION/TRAINING PROGRAM

## 2025-04-24 PROCEDURE — 2500000004 HC RX 250 GENERAL PHARMACY W/ HCPCS (ALT 636 FOR OP/ED): Mod: JZ | Performed by: STUDENT IN AN ORGANIZED HEALTH CARE EDUCATION/TRAINING PROGRAM

## 2025-04-24 PROCEDURE — 2500000001 HC RX 250 WO HCPCS SELF ADMINISTERED DRUGS (ALT 637 FOR MEDICARE OP): Performed by: REGISTERED NURSE

## 2025-04-24 PROCEDURE — 84100 ASSAY OF PHOSPHORUS: CPT | Performed by: STUDENT IN AN ORGANIZED HEALTH CARE EDUCATION/TRAINING PROGRAM

## 2025-04-24 PROCEDURE — 51701 INSERT BLADDER CATHETER: CPT

## 2025-04-24 RX ORDER — SODIUM CHLORIDE, SODIUM LACTATE, POTASSIUM CHLORIDE, CALCIUM CHLORIDE 600; 310; 30; 20 MG/100ML; MG/100ML; MG/100ML; MG/100ML
100 INJECTION, SOLUTION INTRAVENOUS CONTINUOUS
Status: ACTIVE | OUTPATIENT
Start: 2025-04-24 | End: 2025-04-25

## 2025-04-24 RX ORDER — POTASSIUM CHLORIDE 20 MEQ/1
40 TABLET, EXTENDED RELEASE ORAL ONCE
Status: COMPLETED | OUTPATIENT
Start: 2025-04-24 | End: 2025-04-24

## 2025-04-24 RX ORDER — POTASSIUM CHLORIDE 1.5 G/1.58G
40 POWDER, FOR SOLUTION ORAL ONCE
Status: COMPLETED | OUTPATIENT
Start: 2025-04-24 | End: 2025-04-24

## 2025-04-24 RX ORDER — 3% SODIUM CHLORIDE 3 G/100ML
75 INJECTION, SOLUTION INTRAVENOUS CONTINUOUS
Status: DISCONTINUED | OUTPATIENT
Start: 2025-04-24 | End: 2025-04-26

## 2025-04-24 RX ORDER — 3% SODIUM CHLORIDE 3 G/100ML
75 INJECTION, SOLUTION INTRAVENOUS ONCE
Status: DISCONTINUED | OUTPATIENT
Start: 2025-04-24 | End: 2025-04-24

## 2025-04-24 RX ADMIN — CLEVIPIDINE 6.75 MG/HR: 0.5 EMULSION INTRAVENOUS at 11:45

## 2025-04-24 RX ADMIN — SENNOSIDES AND DOCUSATE SODIUM 2 TABLET: 50; 8.6 TABLET ORAL at 21:53

## 2025-04-24 RX ADMIN — POTASSIUM CHLORIDE 40 MEQ: 1500 TABLET, EXTENDED RELEASE ORAL at 09:54

## 2025-04-24 RX ADMIN — POTASSIUM CHLORIDE 40 MEQ: 1.5 POWDER, FOR SOLUTION ORAL at 02:29

## 2025-04-24 RX ADMIN — SODIUM CHLORIDE 75 ML/HR: 3 INJECTION, SOLUTION INTRAVENOUS at 18:20

## 2025-04-24 RX ADMIN — CLEVIPIDINE 6.75 MG/HR: 0.5 EMULSION INTRAVENOUS at 06:43

## 2025-04-24 RX ADMIN — SODIUM CHLORIDE 500 ML: 0.9 INJECTION, SOLUTION INTRAVENOUS at 18:20

## 2025-04-24 RX ADMIN — SODIUM CHLORIDE, SODIUM LACTATE, POTASSIUM CHLORIDE, AND CALCIUM CHLORIDE 100 ML/HR: .6; .31; .03; .02 INJECTION, SOLUTION INTRAVENOUS at 18:19

## 2025-04-24 RX ADMIN — ATORVASTATIN CALCIUM 10 MG: 10 TABLET, FILM COATED ORAL at 21:53

## 2025-04-24 RX ADMIN — Medication 40 PERCENT: at 07:47

## 2025-04-24 RX ADMIN — CLEVIPIDINE 6.75 MG/HR: 0.5 EMULSION INTRAVENOUS at 03:09

## 2025-04-24 RX ADMIN — CLEVIPIDINE 10.12 MG/HR: 0.5 EMULSION INTRAVENOUS at 18:20

## 2025-04-24 RX ADMIN — CLEVIPIDINE 15.18 MG/HR: 0.5 EMULSION INTRAVENOUS at 15:52

## 2025-04-24 RX ADMIN — Medication 50 MCG/HR: at 14:01

## 2025-04-24 RX ADMIN — SODIUM CHLORIDE, SODIUM LACTATE, POTASSIUM CHLORIDE, AND CALCIUM CHLORIDE 100 ML/HR: .6; .31; .03; .02 INJECTION, SOLUTION INTRAVENOUS at 09:39

## 2025-04-24 RX ADMIN — CLEVIPIDINE 10.12 MG/HR: 0.5 EMULSION INTRAVENOUS at 21:54

## 2025-04-24 RX ADMIN — PROPOFOL 15 MCG/KG/MIN: 10 INJECTION, EMULSION INTRAVENOUS at 16:03

## 2025-04-24 RX ADMIN — PROPOFOL 20 MCG/KG/MIN: 10 INJECTION, EMULSION INTRAVENOUS at 03:10

## 2025-04-24 RX ADMIN — ATENOLOL 25 MG: 25 TABLET ORAL at 21:53

## 2025-04-24 RX ADMIN — LEVETIRACETAM 500 MG: 5 INJECTION INTRAVENOUS at 09:56

## 2025-04-24 RX ADMIN — POLYETHYLENE GLYCOL 3350 17 G: 17 POWDER, FOR SOLUTION ORAL at 09:55

## 2025-04-24 RX ADMIN — LEVETIRACETAM 500 MG: 5 INJECTION INTRAVENOUS at 23:10

## 2025-04-24 RX ADMIN — SENNOSIDES AND DOCUSATE SODIUM 2 TABLET: 50; 8.6 TABLET ORAL at 09:54

## 2025-04-24 RX ADMIN — CLEVIPIDINE 10.12 MG/HR: 0.5 EMULSION INTRAVENOUS at 20:28

## 2025-04-24 ASSESSMENT — COGNITIVE AND FUNCTIONAL STATUS - GENERAL
MOVING TO AND FROM BED TO CHAIR: TOTAL
HELP NEEDED FOR BATHING: TOTAL
MOBILITY SCORE: 6
EATING MEALS: TOTAL
STANDING UP FROM CHAIR USING ARMS: TOTAL
CLIMB 3 TO 5 STEPS WITH RAILING: TOTAL
DRESSING REGULAR LOWER BODY CLOTHING: TOTAL
DRESSING REGULAR UPPER BODY CLOTHING: TOTAL
WALKING IN HOSPITAL ROOM: TOTAL
PERSONAL GROOMING: TOTAL
TURNING FROM BACK TO SIDE WHILE IN FLAT BAD: TOTAL
TOILETING: TOTAL
DAILY ACTIVITIY SCORE: 6
MOVING FROM LYING ON BACK TO SITTING ON SIDE OF FLAT BED WITH BEDRAILS: TOTAL

## 2025-04-24 ASSESSMENT — PAIN - FUNCTIONAL ASSESSMENT
PAIN_FUNCTIONAL_ASSESSMENT: CPOT (CRITICAL CARE PAIN OBSERVATION TOOL)

## 2025-04-24 NOTE — PROGRESS NOTES
Communication Note    Patient Name: Muriel De Souza  MRN: [id number]  Today's Date: 4/24/2025   Room: 07/07-A    Discipline: Physical Therapy      PT Missed Visit: Yes  Missed Visit Reason:  (RASS -4, not ready for PT services. Hold.)      04/24/25 at 8:55 AM   Reshma Reese, PT   Rehab Office: [phone]

## 2025-04-24 NOTE — CARE PLAN
Problem: General Stroke  Goal: Maintain BP within ordered limits throughout shift  Outcome: Progressing     Problem: ICU Stroke  Goal: Maintain ICP within ordered limits throughout shift  Outcome: Progressing     Problem: Pain - Adult  Goal: Verbalizes/displays adequate comfort level or baseline comfort level  Outcome: Progressing     Problem: Chronic Conditions and Co-morbidities  Goal: Patient's chronic conditions and co-morbidity symptoms are monitored and maintained or improved  Outcome: Progressing  Flowsheets (Taken 4/24/2025 0113)  Care Plan - Patient's Chronic Conditions and Co-Morbidity Symptoms are Monitored and Maintained or Improved:   Monitor and assess patient's chronic conditions and comorbid symptoms for stability, deterioration, or improvement   Collaborate with multidisciplinary team to address chronic and comorbid conditions and prevent exacerbation or deterioration   Update acute care plan with appropriate goals if chronic or comorbid symptoms are exacerbated and prevent overall improvement and discharge     Problem: Nutrition  Goal: Nutrient intake appropriate for maintaining nutritional needs  Outcome: Progressing     Problem: Mechanical Ventilation  Goal: Oral health is maintained or improved  Outcome: Progressing  Flowsheets (Taken 4/24/2025 0113)  Oral Health is Maintained or Improved:   Assess and monitor condition of lips and mouth and perform oral care per hospital policy   Perform oral care with an oral swab   Apply water-based moisturizer to lips   Suction oral pharynx     Problem: Skin  Goal: Participates in plan/prevention/treatment measures  Outcome: Progressing  Flowsheets (Taken 4/24/2025 0113)  Participates in plan/prevention/treatment measures: Elevate heels  Goal: Prevent/minimize sheer/friction injuries  Flowsheets (Taken 4/24/2025 0113)  Prevent/minimize sheer/friction injuries:   Complete micro-shifts as needed if patient unable. Adjust patient position to relieve pressure  points, not a full turn   HOB 30 degrees or less   Increase activity/out of bed for meals   Turn/reposition every 2 hours/use positioning/transfer devices   Use pull sheet   Utilize specialty bed per algorithm

## 2025-04-24 NOTE — PROGRESS NOTES
[facility]  NEUROSCIENCE INTENSIVE CARE UNIT  DAILY PROGRESS NOTE       Patient Name: Muriel De Souza   MRN: [id number]     Admit Date: 2025     : 1955 AGE: 69 y.o. GENDER: female        Subjective    Muriel De Souza is a 69 y.o. female with h/o HTN, HLD p/w HA, arrested in CT scanner at OSH, ROSC after 7 min CPR,  CTH w diffuse SAH and L cerebellar ICH, triventriculomegally, effaced 4th vents, CTA with L cerebellar AVM w venous varices s/p RF EVD and now admitted for further management.    Per chart review, patient had received bad news at home and then had sudden onset headache and vomiting. Found to have SAH at OSH with course complicated by cardiac arrest. She was intubated and sedated and transferred from [place], [place] to [facility].    Significant Events:  -  came in with L cerebellar AVM bleed with course complicated by cardiac arrest ROSC after 7 min CPR, uppon arrival to [facility] NSU, EVD drain placed, went to OR for decompression and AVM resection    Interval Events:   - BP goal < 140 maintained on prop, fent, and clevidipine  - EVD maintained at 10 this morning   - +981.5 oer 24 hours  - Trop trending down, d/c'd overnight  - Started trickle feeds, ordered nutrition recs       Objective   VITALS (24H):  Temp:  [35.7 °C (96.3 °F)-36.9 °C (98.4 °F)] 35.7 °C (96.3 °F)  Heart Rate:  [69-85] 82  Resp:  [11-18] 14  BP: (110-136)/(46-63) 114/48  Arterial Line BP 1: (121-180)/(40-66) 136/51  FiO2 (%):  [40 %] 40 %  INTAKE/OUTPUT:  Intake/Output Summary (Last 24 hours) at 2025 0838  Last data filed at 2025 0700  Gross per 24 hour   Intake 3602.2 ml   Output 2887 ml   Net 715.2 ml     VENT SETTINGS:  Vent Mode: Volume control/assist control  FiO2 (%):  [40 %] 40 %  S RR:  [12-14] 14  S VT:  [400 mL] 400 mL  PEEP/CPAP (cm H2O):  [5 cm H20] 5 cm H20  MAP (cm H2O):  [9] 9       PHYSICAL EXAM:  NEURO: (examined on fentanyl 50 and propofol 15)  - Intubated, sedated  - Eyes closed  to nox stim  - pupils minimally reactive to light  - no VOR  - corneals negative  - no cough or gag  - no spontaneous movements  - No response to pain in UEs  - wiggling toes to pain RLE  - possible triple flexion in LLE  - no increased tone  CV:  - RRR on telemetry, NSR  - Arterial line in place  RESP:  - intubated  :  - Brooks catheter in place  GI:  - Abdomen NT/ND, soft  SKIN:  - Intact  - left arm swollen and tense to palpation, not pitting, non-erythematous    MEDICATIONS:  Scheduled: PRN: Continuous:   Scheduled Medications[1] PRN Medications[2] Continuous Medications[3]     IMAGING RESULTS:  CT head wo IV contrast   Final Result   * Diffuse subarachnoid hemorrhage with hydrocephalus   *Left cerebellar hematoma suggesting a likely source.        MACRO:   none        Signed by: Cruz Salgado 4/22/2025 11:59 AM   Dictation workstation:   [id number]      CT angio head and neck w and wo IV contrast   Final Result   * Diffuse subarachnoid hemorrhage with hydrocephalus as described   *Suspected vascular malformation in the left cerebellar hemisphere   with enlarged arterial and venous structures largely in the left   cerebellar hemisphere and residual partial opacification of a venous   aneurysm near the torcula. *Bilateral carotid stenosis as described        MACRO:   none        Signed by: Cruz Salgado 4/22/2025 12:07 PM   Dictation workstation:   [id number]             Assessment/Plan    Muriel De Souza is a 69 y.o. female with h/o HTN, HLD p/w HA, arrested in CT scanner at OSH, ROSC after 7 min CPR,  CTH w diffuse SAH and L cerebellar ICH, triventriculomegally, effaced 4th vents, CTA with L cerebellar AVM w venous varices s/p RF EVD as well as AVM resection on 4/22 and now admitted for further management.    Changes 04/24/25  - cards consulted  - possible repeat angio today  - sensitive to anti-hypertensives to switching cardene to cleviprex  - echo normal ef    NEURO:  #SAH  # L cerebellar  ICH  #Triventriculomegaly with effaced 4th ventricle  #L cerebellar AVM w venous varices s/p resection on 4/22  #Intracranial hypertension  Assessment:  - First day 4/22 presented with SAH, L cerebellar ICH, and ventriculomegaly on CTH  - 4/22 CTA showing L cerebellar AVM  - 4/22 RF EVD for ICH  - 4/22 OR AVM resection  Plan:  - NSU  - Keppra  - repeat angio per NSGY timing  - EVD in place, maintained at 10  - CTH today 4/24, consider EVD clamp  - Neuro Checks: Q1H  - Sedation: Propofol and Fentanyl  - Pain: PRN  - Nausea: ondansetron  - PT/OT/SLP    CARDIOVASCULAR:  #Cardiac arrest  #Lactic acidosis, improving  # elevated troponins, improved  Assessment:  - 7 min to ROSC  - Assess for ischemic and stress-induced cardiomyopathy   - Assess for arrhythmias following cardiac arrest   - Cards consult (4/23)  - Type 2 MI (demand ischemia), leading to troponin up-trend (4/23)  - Trop trending down, d/c'd 4/24  - ECHO 4/23:  CONCLUSIONS:   1. Left ventricular ejection fraction is normal, calculated by Adrian's biplane at 71%.   2. Spectral Doppler shows a Grade I (impaired relaxation pattern) of left ventricular diastolic filling with normal left atrial filling pressure.   3. There is normal right ventricular global systolic function.   4. The left atrium is mildly dilated.   5. There is no evidence of a patent foramen ovale.   6. The inferior vena cava appears mildly dilated, on vent.    Plan:  - Pressors PRN  - Monitor on telemetry  - BP goal: SBP < 140    --> PRN: Labetalol, Hydralazine, and Nicardipine     RESPIRATORY:  #Acute hypoxic respiratory failure  #Mechanical ventilation  Assessment:  - Pt intubated for decreased LOC  Plan:  - Continue mechanical ventilation    RENAL/:  #TIM  Assessment:  Results from last 72 hours   Lab Units 04/24/25  0651 04/24/25  0302   BUN mg/dL 14 13   CREATININE mg/dL 0.51 0.50       Plan:  - Monitor with daily RFP  - Maintain forbes catheter for: critically ill patient who need  accurate urinary output measurements    FEN/GI:  #TIM  Plan:  - Monitor and replace electrolytes per protocol  - IVF: PRN  - Diet: PRN    - Bowel Regimen: Docusate-Senna PRN and Miralax PRN    ENDOCRINE:  #TIM  Assessment:  Results from last 7 days   Lab Units 25  0809 25  0651 25  0429 25  0302 25  0034 25  2348 25  1947 25  1848 25  1457 25  1333 25  0811 25  0412   POCT GLUCOSE mg/dL 128*  --  136*  --   --  147* 143*  --  119*  --  134*  --    GLUCOSE mg/dL  --  133*  --  142* 144*  --   --  147*  --  136*  --  130*  135*   SODIUM mmol/L  --  143  --  143 145  --   --  145  --  146*  --  147*  146*    - HbA1C 5.5 on     Plan:  - Accuchecks & ISS PRN     HEMATOLOGY:  #Anemia, likely dilutional  Assessment:  - Baseline Hgb: 12.0  - Baseline Plts: 287  Results from last 7 days   Lab Units 25   HEMOGLOBIN g/dL 9.0* 8.7* 10.1*   HEMATOCRIT % 27.3* 25.1* 30.9*   PLATELETS AUTO x10*3/uL 210 226 263     Plan:  - Continue to monitor with daily CBC and Coag panel  - maintain active type and screen as needed    INFECTIOUS DISEASE:  #TIM  Assessment:  Results from last 7 days   Lab Units 25  03025  0412 25  205   WBC AUTO x10*3/uL 16.6* 10.6 10.7    - Temp (24hrs), Av.1 °C (96.9 °F), Min:35.7 °C (96.3 °F), Max:36.9 °C (98.4 °F)  - Afebrile with increase in WBCs overnight (10.6 --> 16.6), pt not on steroids or Tylenol  - Neutrophil predominance, no increase in bands  - Brokos in place, urine clear  - CXR (): Perihilar and interstitial opacities  - CPIS score 4, low likelihood of VAP    Plan:  - CXR  - UA with reflex culture  - Continue to monitor for s/sx of infection  - Pan culture for temperature > 38.4 C    MUSCULOSKELETAL:  - No acute issues    SKIN:  #Swollen left arm  Assessment:   - LUE with persistent swelling since   - Non-pitting edema, non-erythematous  - POD2  -  Not on DVT prophylaxis  - +715.2 over 24 hours  - Swelling may be due to volume status and/or inactivity    Plan:   - Turns and skin care per NSU protocol    ACCESS:  - R Femoral  - R arterial  - L PIV    PROPHYLAXIS:  - DVT Ppx: SCDs  - GI Ppx: PRN    RESTRAINTS:  Not indicated/Patient does not meet criteria for restraints    [facility]  Neuroscience Intensive Care    Jian Ellington MD PhD  PGY-2 Neurology         [1] atenolol, 25 mg, oral, Daily  atorvastatin, 10 mg, oral, Nightly  insulin lispro, 0-5 Units, subcutaneous, q4h  levETIRAcetam, 500 mg, intravenous, q12h  perflutren protein A microsphere, 0.5 mL, intravenous, Once in imaging  polyethylene glycol, 17 g, oral, Daily  potassium chloride CR, 40 mEq, nasogastric tube, Once  sennosides-docusate sodium, 2 tablet, oral, BID  sulfur hexafluoride microsphr, 2 mL, intravenous, Once in imaging     [2] PRN medications: calcium gluconate, calcium gluconate, dextrose, dextrose, glucagon, glucagon, hydrALAZINE, labetaloL, magnesium sulfate, magnesium sulfate, oxygen, oxygen, potassium chloride CR **OR** potassium chloride, potassium chloride CR **OR** potassium chloride  [3] clevidipine, 0-16 mg/hr, Last Rate: 6.75 mg/hr (04/24/25 0643)  fentaNYL,  mcg/hr, Last Rate: 50 mcg/hr (04/24/25 0000)  lactated Ringer's, 100 mL/hr, Last Rate: 100 mL/hr (04/24/25 0400)  lactated Ringer's, 100 mL/hr  propofol, 0-50 mcg/kg/min, Last Rate: 20 mcg/kg/min (04/24/25 0400)

## 2025-04-24 NOTE — ANESTHESIA POSTPROCEDURE EVALUATION
Patient: Muriel De Souza    Procedure Summary       Date: 04/23/25 Room / Location: [facility]    Anesthesia Start: 0900 Anesthesia Stop: 1211    Procedure: IR ANGIOGRAM CEREBRAL BILATERAL Diagnosis: (AVM s/p resection)    Scheduled Providers: Nick Panda MD Responsible Provider: Mariusz Dexter MD    Anesthesia Type: general ASA Status: 4 - Emergent            Anesthesia Type: general    Vitals Value Taken Time   /55 04/23/25 23:00   Temp 35.7 °C (96.26 °F) 04/23/25 23:00   Pulse 70 04/23/25 23:00   Resp 14 04/23/25 23:00   SpO2 95 % 04/23/25 23:00   Vitals shown include unfiled device data.    Anesthesia Post Evaluation    Patient location during evaluation: ICU  Patient participation: complete - patient cannot participate  Level of consciousness: sedated  Pain management: adequate  Airway patency: patent  Cardiovascular status: hemodynamically stable  Respiratory status: ETT and ventilator  Hydration status: acceptable  Postoperative Nausea and Vomiting: none        There were no known notable events for this encounter.

## 2025-04-24 NOTE — CONSULTS
"Nutrition Initial Assessment:   Nutrition Assessment    Reason for Assessment: Enteral assessment/recommendation (TF)    Patient is a 69 y.o. female initially presented with HA - arrested in CT scanner at OSH, ROSC after 7 min CPR. CTH w diffuse SAH and L cerebellar ICH, triventriculomegally, effaced 4th vents, CTA with L cerebellar AVM w venous varices s/p RF EVD as well as AVM resection on 4/22 and now admitted for further management.     Pt currently intubated, Isosource 1.5 running @ 10ml/hr via OGT. Propofol running @ ~7ml/hr (provides 185kcals)      Nutrition History:  Energy Intake: Good > 75 %  Food and Nutrient History: No recent decrease in intake. Family noted she had been using the \"Lose It\" hung in order to try and lose weight for their vacation. He said that she was trying to eat better as well as she is pre-diabetic       Anthropometrics:  Height: 172.7 cm (5' 7.99\")   Weight: 93 kg (205 lb 0.4 oz)   BMI (Calculated): 31.18  IBW/kg (Dietitian Calculated): 63.6 kg  Percent of IBW: 146 %  Adjusted Body Weight (kg): 71 kg    Weight History:   Wt Readings from Last 15 Encounters:   04/23/25 93 kg (205 lb 0.4 oz)     Weight Change %:  Weight History / % Weight Change: Family denied any changes    Nutrition Focused Physical Exam Findings:    Subcutaneous Fat Loss:   Orbital Fat Pads: Well nourished (slightly bulging fat pads)  Buccal Fat Pads: Well nourished (full, rounded cheeks)  Triceps: Well nourished (ample fat tissue)  Ribs: Defer  Muscle Wasting:  Temporalis: Well nourished (well-defined muscle)  Pectoralis (Clavicular Region): Well nourished (clavicle not visible)  Deltoid/Trapezius: Well nourished (rounded appearance at arm, shoulder, neck)  Interosseous: Defer  Trapezius/Infraspinatus/Supraspinatus (Scapular Region): Defer  Quadriceps: Well nourished (well developed, well rounded)  Gastrocnemius: Defer  Edema:  Edema: +2 mild  Edema Location: generalized    Nutrition Significant Labs:  CBC Trend: "   Results from last 7 days   Lab Units 04/24/25  0302 04/23/25  0412 04/22/25  2056 04/22/25  1752   WBC AUTO x10*3/uL 16.6* 10.6 10.7 13.5*   RBC AUTO x10*6/uL 3.04* 2.98* 3.45* 3.90*   HEMOGLOBIN g/dL 9.0* 8.7* 10.1* 11.1*   HEMATOCRIT % 27.3* 25.1* 30.9* 35.4*   MCV fL 90 84 90 91   PLATELETS AUTO x10*3/uL 210 226 263 380    , BMP Trend:   Results from last 7 days   Lab Units 04/24/25  1234 04/24/25  0651 04/24/25  0302 04/24/25  0034   GLUCOSE mg/dL 124* 133* 142* 144*   CALCIUM mg/dL 8.3* 8.5* 8.3* 8.6   SODIUM mmol/L 143 143 143 145   POTASSIUM mmol/L 3.6 3.4* 3.4* 3.4*   CO2 mmol/L 26 26 26 26   CHLORIDE mmol/L 108* 109* 111* 111*   BUN mg/dL 15 14 13 14   CREATININE mg/dL 0.51 0.51 0.50 0.49*    , A1C:  Lab Results   Component Value Date    HGBA1C 5.5 04/22/2025   , BG POCT trend:   Results from last 7 days   Lab Units 04/24/25  1155 04/24/25  0809 04/24/25  0429 04/23/25  2348 04/23/25  1947   POCT GLUCOSE mg/dL 143* 128* 136* 147* 143*    , Renal Lab Trend:   Results from last 7 days   Lab Units 04/24/25  1234 04/24/25  0651 04/24/25  0302 04/24/25  0034   POTASSIUM mmol/L 3.6 3.4* 3.4* 3.4*   PHOSPHORUS mg/dL 2.5 2.6 2.6 2.7   SODIUM mmol/L 143 143 143 145   MAGNESIUM mg/dL  --   --  1.87  --    EGFR mL/min/1.73m*2 >90 >90 >90 >90   BUN mg/dL 15 14 13 14   CREATININE mg/dL 0.51 0.51 0.50 0.49*     Scheduled medications  Scheduled Medications[1]  Continuous medications  Continuous Medications[2]    Dietary Orders (From admission, onward)       Start     Ordered    04/23/25 2150  Enteral feeding with NPO Isosource 1.5; PO/NG (by mouth/nasogastric tube); 10; 200; Water; Tap water; Every 6 hours  Diet effective now        Question Answer Comment   Tube feeding formula: Isosource 1.5    Feeding route: PO/NG (by mouth/nasogastric tube)    Tube feeding continuous rate (mL/hr): 10    Tube feeding flush (mL): 200    Flush type: Water    Water type: Tap water    Flush frequency: Every 6 hours        04/23/25 2151          Estimated Needs:   Total Energy Estimated Needs in 24 hours (kCal): 1300 kCal  Method for Estimating Needs: 14kcal/kg per act wt (ASPEN Critical Care guidelines BMI >30)  Total Protein Estimated Needs in 24 Hours (g): 125 g  Method for Estimating 24 Hour Protein Needs: 2.0g/kg per IBW (ASPEN Critical Care guidelines BMI >30)  Total Fluid Estimated Needs in 24 Hours (mL):  (per team)        Nutrition Diagnosis   Malnutrition Diagnosis  Patient has Malnutrition Diagnosis: No    Nutrition Diagnosis  Patient has Nutrition Diagnosis: Yes  Diagnosis Status (1): New  Nutrition Diagnosis 1: Increased nutrient needs  Related to (1): increased metabolic demand  As Evidenced by (1): s/p resection       Nutrition Interventions/Recommendations   Nutrition prescription for enteral nutrition    Nutrition Recommendations:  Will change to the following regimen:   Start Pivot 1.5 @ 10ml/hr, increase by 10mls every 8hrs to reach goal of 30ml/hr.   Provide 1 packet Pro Stat AWC TID.   Additional water flushes per team.    Nutrition Interventions/Goals:   Enteral Intake: Management of delivery rate of enteral nutrition  Goal: Goal TF rate + Pro Stat provides: 1380kcals and 118g pro       Nutrition Monitoring and Evaluation   Food/Nutrient Related History Monitoring  Monitoring and Evaluation Plan: Enteral and parenteral nutrition intake determination  Enteral and Parenteral Nutrition Intake Determination: Enteral nutrition intake - Tolerate TF at goal rate    Anthropometric Measurements  Monitoring and Evaluation Plan: Body weight  Body Weight: Body weight - Maintain stable weight    Biochemical Data, Medical Tests and Procedures  Monitoring and Evaluation Plan: Electrolyte/renal panel, Glucose/endocrine profile  Electrolyte and Renal Panel: Electrolytes within normal limits  Glucose/Endocrine Profile: Glucose within normal limits - ICU (140-180 mg/dL)    Goal Status: New goal(s) identified    Time Spent (min): 60 minutes               [1] atenolol, 25 mg, oral, Daily  atorvastatin, 10 mg, oral, Nightly  insulin lispro, 0-5 Units, subcutaneous, q4h  levETIRAcetam, 500 mg, intravenous, q12h  perflutren protein A microsphere, 0.5 mL, intravenous, Once in imaging  polyethylene glycol, 17 g, oral, Daily  sennosides-docusate sodium, 2 tablet, oral, BID  sulfur hexafluoride microsphr, 2 mL, intravenous, Once in imaging     [2] clevidipine, 0-16 mg/hr, Last Rate: 10.12 mg/hr (04/24/25 1625)  fentaNYL,  mcg/hr, Last Rate: 50 mcg/hr (04/24/25 1401)  lactated Ringer's, 100 mL/hr, Last Rate: 100 mL/hr (04/24/25 0939)  propofol, 0-50 mcg/kg/min, Last Rate: 15 mcg/kg/min (04/24/25 1603)

## 2025-04-24 NOTE — PROGRESS NOTES
"Muriel De Souza is a 69 y.o. female on day 2 of admission presenting with AVM (arteriovenous malformation) ([facility]).    Subjective   No events overnight    Objective     Physical Exam  Intubated  ECNS  Ou2min R  +cough, weak gag, -corneal  BUE flaccid  BLE withdraw  EVD in place    Last Recorded Vitals  Blood pressure 115/51, pulse 77, temperature 36.2 °C (97.2 °F), resp. rate 14, height 1.727 m (5' 8\"), weight 93 kg (205 lb 0.4 oz), SpO2 97%.  Intake/Output last 3 Shifts:  I/O last 3 completed shifts:  In: 23117.1 (153.8 mL/kg) [I.V.:3230 (34.7 mL/kg); Blood:942; IV Piggyback:66756.1]  Out: 8591 (92.4 mL/kg) [Urine:7530 (2.2 mL/kg/hr); Emesis/NG output:100; Drains:161; Blood:800]  Weight: 93 kg     Relevant Results            This patient currently has cardiac telemetry ordered; if you would like to modify or discontinue the telemetry order, click here to go to the orders activity to modify/discontinue the order.  This patient has a central line   Reason for the central line remaining today? Hemodynamic monitoring      This patient is intubated   Reason for patient to remain intubated today? they are unable to protect their airway             Assessment & Plan  AVM (arteriovenous malformation) ([facility])    Muriel De Souza is a 69 y.o. 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

## 2025-04-25 ENCOUNTER — APPOINTMENT (OUTPATIENT)
Dept: RADIOLOGY | Facility: HOSPITAL | Age: 70
DRG: 003 | End: 2025-04-25
Payer: COMMERCIAL

## 2025-04-25 ENCOUNTER — APPOINTMENT (OUTPATIENT)
Dept: RADIOLOGY | Facility: HOSPITAL | Age: 70
End: 2025-04-25
Payer: COMMERCIAL

## 2025-04-25 LAB
ALBUMIN SERPL BCP-MCNC: 2.9 G/DL (ref 3.4–5)
ALBUMIN SERPL BCP-MCNC: 3 G/DL (ref 3.4–5)
ALBUMIN SERPL BCP-MCNC: 3.6 G/DL (ref 3.4–5)
ANION GAP BLDA CALCULATED.4IONS-SCNC: 10 MMO/L (ref 10–25)
ANION GAP BLDA CALCULATED.4IONS-SCNC: 9 MMO/L (ref 10–25)
ANION GAP BLDA CALCULATED.4IONS-SCNC: 9 MMO/L (ref 10–25)
ANION GAP SERPL CALC-SCNC: 10 MMOL/L (ref 10–20)
ANION GAP SERPL CALC-SCNC: 12 MMOL/L (ref 10–20)
ANION GAP SERPL CALC-SCNC: 12 MMOL/L (ref 10–20)
APPEARANCE UR: CLEAR
BASE EXCESS BLDA CALC-SCNC: 1.1 MMOL/L (ref -2–3)
BASE EXCESS BLDA CALC-SCNC: 1.6 MMOL/L (ref -2–3)
BASE EXCESS BLDA CALC-SCNC: 1.9 MMOL/L (ref -2–3)
BASOPHILS # BLD AUTO: 0.02 X10*3/UL (ref 0–0.1)
BASOPHILS NFR BLD AUTO: 0.2 %
BILIRUB UR STRIP.AUTO-MCNC: NEGATIVE MG/DL
BODY TEMPERATURE: ABNORMAL
BUN SERPL-MCNC: 15 MG/DL (ref 6–23)
BUN SERPL-MCNC: 16 MG/DL (ref 6–23)
CA-I BLD-SCNC: 1.27 MMOL/L (ref 1.1–1.33)
CA-I BLDA-SCNC: 1.16 MMOL/L (ref 1.1–1.33)
CA-I BLDA-SCNC: 1.2 MMOL/L (ref 1.1–1.33)
CA-I BLDA-SCNC: 1.2 MMOL/L (ref 1.1–1.33)
CALCIUM SERPL-MCNC: 8 MG/DL (ref 8.6–10.6)
CALCIUM SERPL-MCNC: 8 MG/DL (ref 8.6–10.6)
CALCIUM SERPL-MCNC: 8.2 MG/DL (ref 8.6–10.6)
CALCIUM SERPL-MCNC: 8.4 MG/DL (ref 8.6–10.6)
CALCIUM SERPL-MCNC: 8.4 MG/DL (ref 8.6–10.6)
CARDIAC TROPONIN I PNL SERPL HS: 101 NG/L (ref 0–34)
CARDIAC TROPONIN I PNL SERPL HS: 99 NG/L (ref 0–34)
CHLORIDE BLDA-SCNC: 113 MMOL/L (ref 98–107)
CHLORIDE BLDA-SCNC: 114 MMOL/L (ref 98–107)
CHLORIDE BLDA-SCNC: 116 MMOL/L (ref 98–107)
CHLORIDE SERPL-SCNC: 112 MMOL/L (ref 98–107)
CHLORIDE SERPL-SCNC: 112 MMOL/L (ref 98–107)
CHLORIDE SERPL-SCNC: 114 MMOL/L (ref 98–107)
CHLORIDE SERPL-SCNC: 115 MMOL/L (ref 98–107)
CHLORIDE SERPL-SCNC: 117 MMOL/L (ref 98–107)
CO2 SERPL-SCNC: 26 MMOL/L (ref 21–32)
CO2 SERPL-SCNC: 27 MMOL/L (ref 21–32)
COLOR UR: COLORLESS
CREAT SERPL-MCNC: 0.45 MG/DL (ref 0.5–1.05)
CREAT SERPL-MCNC: 0.48 MG/DL (ref 0.5–1.05)
CREAT SERPL-MCNC: 0.48 MG/DL (ref 0.5–1.05)
CREAT SERPL-MCNC: 0.51 MG/DL (ref 0.5–1.05)
CREAT SERPL-MCNC: 0.59 MG/DL (ref 0.5–1.05)
EGFRCR SERPLBLD CKD-EPI 2021: >90 ML/MIN/1.73M*2
EOSINOPHIL # BLD AUTO: 0 X10*3/UL (ref 0–0.7)
EOSINOPHIL # BLD AUTO: 0.01 X10*3/UL (ref 0–0.7)
EOSINOPHIL # BLD AUTO: 0.01 X10*3/UL (ref 0–0.7)
EOSINOPHIL NFR BLD AUTO: 0 %
EOSINOPHIL NFR BLD AUTO: 0.1 %
EOSINOPHIL NFR BLD AUTO: 0.1 %
ERYTHROCYTE [DISTWIDTH] IN BLOOD BY AUTOMATED COUNT: 14 % (ref 11.5–14.5)
ERYTHROCYTE [DISTWIDTH] IN BLOOD BY AUTOMATED COUNT: 14.1 % (ref 11.5–14.5)
ERYTHROCYTE [DISTWIDTH] IN BLOOD BY AUTOMATED COUNT: 14.2 % (ref 11.5–14.5)
GLUCOSE BLD MANUAL STRIP-MCNC: 134 MG/DL (ref 74–99)
GLUCOSE BLD MANUAL STRIP-MCNC: 138 MG/DL (ref 74–99)
GLUCOSE BLD MANUAL STRIP-MCNC: 154 MG/DL (ref 74–99)
GLUCOSE BLD MANUAL STRIP-MCNC: 168 MG/DL (ref 74–99)
GLUCOSE BLD MANUAL STRIP-MCNC: 180 MG/DL (ref 74–99)
GLUCOSE BLD MANUAL STRIP-MCNC: 181 MG/DL (ref 74–99)
GLUCOSE BLDA-MCNC: 142 MG/DL (ref 74–99)
GLUCOSE BLDA-MCNC: 164 MG/DL (ref 74–99)
GLUCOSE BLDA-MCNC: 187 MG/DL (ref 74–99)
GLUCOSE SERPL-MCNC: 123 MG/DL (ref 74–99)
GLUCOSE SERPL-MCNC: 146 MG/DL (ref 74–99)
GLUCOSE SERPL-MCNC: 153 MG/DL (ref 74–99)
GLUCOSE SERPL-MCNC: 161 MG/DL (ref 74–99)
GLUCOSE SERPL-MCNC: 186 MG/DL (ref 74–99)
GLUCOSE UR STRIP.AUTO-MCNC: NORMAL MG/DL
HCO3 BLDA-SCNC: 25.8 MMOL/L (ref 22–26)
HCO3 BLDA-SCNC: 26 MMOL/L (ref 22–26)
HCO3 BLDA-SCNC: 26.6 MMOL/L (ref 22–26)
HCT VFR BLD AUTO: 26.5 % (ref 36–46)
HCT VFR BLD AUTO: 26.5 % (ref 36–46)
HCT VFR BLD AUTO: 27.8 % (ref 36–46)
HCT VFR BLD EST: 26 % (ref 36–46)
HCT VFR BLD EST: 27 % (ref 36–46)
HCT VFR BLD EST: 27 % (ref 36–46)
HGB BLD-MCNC: 8.4 G/DL (ref 12–16)
HGB BLD-MCNC: 8.5 G/DL (ref 12–16)
HGB BLD-MCNC: 8.5 G/DL (ref 12–16)
HGB BLDA-MCNC: 8.7 G/DL (ref 12–16)
HGB BLDA-MCNC: 9.1 G/DL (ref 12–16)
HGB BLDA-MCNC: 9.1 G/DL (ref 12–16)
IMM GRANULOCYTES # BLD AUTO: 0.13 X10*3/UL (ref 0–0.7)
IMM GRANULOCYTES # BLD AUTO: 0.13 X10*3/UL (ref 0–0.7)
IMM GRANULOCYTES # BLD AUTO: 0.15 X10*3/UL (ref 0–0.7)
IMM GRANULOCYTES NFR BLD AUTO: 1 % (ref 0–0.9)
IMM GRANULOCYTES NFR BLD AUTO: 1.1 % (ref 0–0.9)
IMM GRANULOCYTES NFR BLD AUTO: 1.2 % (ref 0–0.9)
INHALED O2 CONCENTRATION: 40 %
INHALED O2 CONCENTRATION: 40 %
INHALED O2 CONCENTRATION: 50 %
KETONES UR STRIP.AUTO-MCNC: NEGATIVE MG/DL
LACTATE BLDA-SCNC: 0.5 MMOL/L (ref 0.4–2)
LACTATE BLDA-SCNC: 0.5 MMOL/L (ref 0.4–2)
LACTATE BLDA-SCNC: 1.2 MMOL/L (ref 0.4–2)
LEUKOCYTE ESTERASE UR QL STRIP.AUTO: NEGATIVE
LYMPHOCYTES # BLD AUTO: 0.57 X10*3/UL (ref 1.2–4.8)
LYMPHOCYTES # BLD AUTO: 0.6 X10*3/UL (ref 1.2–4.8)
LYMPHOCYTES # BLD AUTO: 0.86 X10*3/UL (ref 1.2–4.8)
LYMPHOCYTES NFR BLD AUTO: 4.5 %
LYMPHOCYTES NFR BLD AUTO: 4.8 %
LYMPHOCYTES NFR BLD AUTO: 6.9 %
MAGNESIUM SERPL-MCNC: 1.97 MG/DL (ref 1.6–2.4)
MCH RBC QN AUTO: 28.4 PG (ref 26–34)
MCH RBC QN AUTO: 29 PG (ref 26–34)
MCH RBC QN AUTO: 29.7 PG (ref 26–34)
MCHC RBC AUTO-ENTMCNC: 30.6 G/DL (ref 32–36)
MCHC RBC AUTO-ENTMCNC: 31.7 G/DL (ref 32–36)
MCHC RBC AUTO-ENTMCNC: 32.1 G/DL (ref 32–36)
MCV RBC AUTO: 91 FL (ref 80–100)
MCV RBC AUTO: 93 FL (ref 80–100)
MCV RBC AUTO: 93 FL (ref 80–100)
MONOCYTES # BLD AUTO: 0.52 X10*3/UL (ref 0.1–1)
MONOCYTES # BLD AUTO: 0.55 X10*3/UL (ref 0.1–1)
MONOCYTES # BLD AUTO: 0.65 X10*3/UL (ref 0.1–1)
MONOCYTES NFR BLD AUTO: 4.1 %
MONOCYTES NFR BLD AUTO: 4.4 %
MONOCYTES NFR BLD AUTO: 5.2 %
NEUTROPHILS # BLD AUTO: 10.82 X10*3/UL (ref 1.2–7.7)
NEUTROPHILS # BLD AUTO: 11.07 X10*3/UL (ref 1.2–7.7)
NEUTROPHILS # BLD AUTO: 11.54 X10*3/UL (ref 1.2–7.7)
NEUTROPHILS NFR BLD AUTO: 86.6 %
NEUTROPHILS NFR BLD AUTO: 89.4 %
NEUTROPHILS NFR BLD AUTO: 90 %
NITRITE UR QL STRIP.AUTO: NEGATIVE
NRBC BLD-RTO: 0 /100 WBCS (ref 0–0)
OSMOLALITY SERPL: 297 MOSM/KG (ref 280–300)
OSMOLALITY SERPL: 301 MOSM/KG (ref 280–300)
OSMOLALITY SERPL: 304 MOSM/KG (ref 280–300)
OSMOLALITY SERPL: 305 MOSM/KG (ref 280–300)
OSMOLALITY SERPL: 306 MOSM/KG (ref 280–300)
OSMOLALITY SERPL: 311 MOSM/KG (ref 280–300)
OSMOLALITY SERPL: 320 MOSM/KG (ref 280–300)
OXYHGB MFR BLDA: 94.9 % (ref 94–98)
OXYHGB MFR BLDA: 97.3 % (ref 94–98)
OXYHGB MFR BLDA: 98.2 % (ref 94–98)
PCO2 BLDA: 38 MM HG (ref 38–42)
PCO2 BLDA: 41 MM HG (ref 38–42)
PCO2 BLDA: 42 MM HG (ref 38–42)
PH BLDA: 7.4 PH (ref 7.38–7.42)
PH BLDA: 7.42 PH (ref 7.38–7.42)
PH BLDA: 7.44 PH (ref 7.38–7.42)
PH UR STRIP.AUTO: 5 [PH]
PHOSPHATE SERPL-MCNC: 2.2 MG/DL (ref 2.5–4.9)
PHOSPHATE SERPL-MCNC: 2.3 MG/DL (ref 2.5–4.9)
PHOSPHATE SERPL-MCNC: 2.4 MG/DL (ref 2.5–4.9)
PHOSPHATE SERPL-MCNC: 2.8 MG/DL (ref 2.5–4.9)
PHOSPHATE SERPL-MCNC: 3.4 MG/DL (ref 2.5–4.9)
PLATELET # BLD AUTO: 202 X10*3/UL (ref 150–450)
PLATELET # BLD AUTO: 202 X10*3/UL (ref 150–450)
PLATELET # BLD AUTO: 205 X10*3/UL (ref 150–450)
PO2 BLDA: 112 MM HG (ref 85–95)
PO2 BLDA: 67 MM HG (ref 85–95)
PO2 BLDA: 90 MM HG (ref 85–95)
POTASSIUM BLDA-SCNC: 3.2 MMOL/L (ref 3.5–5.3)
POTASSIUM BLDA-SCNC: 3.6 MMOL/L (ref 3.5–5.3)
POTASSIUM BLDA-SCNC: 3.6 MMOL/L (ref 3.5–5.3)
POTASSIUM SERPL-SCNC: 3.3 MMOL/L (ref 3.5–5.3)
POTASSIUM SERPL-SCNC: 3.6 MMOL/L (ref 3.5–5.3)
POTASSIUM SERPL-SCNC: 3.6 MMOL/L (ref 3.5–5.3)
POTASSIUM SERPL-SCNC: 3.7 MMOL/L (ref 3.5–5.3)
POTASSIUM SERPL-SCNC: 3.7 MMOL/L (ref 3.5–5.3)
PROT UR STRIP.AUTO-MCNC: NEGATIVE MG/DL
RBC # BLD AUTO: 2.86 X10*6/UL (ref 4–5.2)
RBC # BLD AUTO: 2.9 X10*6/UL (ref 4–5.2)
RBC # BLD AUTO: 2.99 X10*6/UL (ref 4–5.2)
RBC # UR STRIP.AUTO: NEGATIVE MG/DL
SAO2 % BLDA: 96 % (ref 94–100)
SAO2 % BLDA: 99 % (ref 94–100)
SAO2 % BLDA: 99 % (ref 94–100)
SODIUM BLDA-SCNC: 145 MMOL/L (ref 136–145)
SODIUM BLDA-SCNC: 146 MMOL/L (ref 136–145)
SODIUM BLDA-SCNC: 148 MMOL/L (ref 136–145)
SODIUM SERPL-SCNC: 145 MMOL/L (ref 136–145)
SODIUM SERPL-SCNC: 147 MMOL/L (ref 136–145)
SODIUM SERPL-SCNC: 147 MMOL/L (ref 136–145)
SODIUM SERPL-SCNC: 149 MMOL/L (ref 136–145)
SODIUM SERPL-SCNC: 150 MMOL/L (ref 136–145)
SODIUM SERPL-SCNC: 151 MMOL/L (ref 136–145)
SODIUM SERPL-SCNC: 152 MMOL/L (ref 136–145)
SODIUM SERPL-SCNC: 153 MMOL/L (ref 136–145)
SP GR UR STRIP.AUTO: 1.01
UROBILINOGEN UR STRIP.AUTO-MCNC: NORMAL MG/DL
WBC # BLD AUTO: 12.4 X10*3/UL (ref 4.4–11.3)
WBC # BLD AUTO: 12.5 X10*3/UL (ref 4.4–11.3)
WBC # BLD AUTO: 12.8 X10*3/UL (ref 4.4–11.3)

## 2025-04-25 PROCEDURE — 93971 EXTREMITY STUDY: CPT | Mod: DISTINCT PROCEDURAL SERVICE | Performed by: RADIOLOGY

## 2025-04-25 PROCEDURE — 2500000004 HC RX 250 GENERAL PHARMACY W/ HCPCS (ALT 636 FOR OP/ED): Mod: JZ

## 2025-04-25 PROCEDURE — 2500000001 HC RX 250 WO HCPCS SELF ADMINISTERED DRUGS (ALT 637 FOR MEDICARE OP)

## 2025-04-25 PROCEDURE — 84295 ASSAY OF SERUM SODIUM: CPT

## 2025-04-25 PROCEDURE — 2500000004 HC RX 250 GENERAL PHARMACY W/ HCPCS (ALT 636 FOR OP/ED): Mod: JZ | Performed by: STUDENT IN AN ORGANIZED HEALTH CARE EDUCATION/TRAINING PROGRAM

## 2025-04-25 PROCEDURE — 82330 ASSAY OF CALCIUM: CPT

## 2025-04-25 PROCEDURE — 84484 ASSAY OF TROPONIN QUANT: CPT

## 2025-04-25 PROCEDURE — 94669 MECHANICAL CHEST WALL OSCILL: CPT

## 2025-04-25 PROCEDURE — 83605 ASSAY OF LACTIC ACID: CPT | Performed by: STUDENT IN AN ORGANIZED HEALTH CARE EDUCATION/TRAINING PROGRAM

## 2025-04-25 PROCEDURE — 2500000002 HC RX 250 W HCPCS SELF ADMINISTERED DRUGS (ALT 637 FOR MEDICARE OP, ALT 636 FOR OP/ED): Performed by: REGISTERED NURSE

## 2025-04-25 PROCEDURE — 83930 ASSAY OF BLOOD OSMOLALITY: CPT

## 2025-04-25 PROCEDURE — 2020000001 HC ICU ROOM DAILY

## 2025-04-25 PROCEDURE — 71045 X-RAY EXAM CHEST 1 VIEW: CPT

## 2025-04-25 PROCEDURE — 2580000001 HC RX 258 IV SOLUTIONS

## 2025-04-25 PROCEDURE — 94640 AIRWAY INHALATION TREATMENT: CPT

## 2025-04-25 PROCEDURE — 99291 CRITICAL CARE FIRST HOUR: CPT | Performed by: PSYCHIATRY & NEUROLOGY

## 2025-04-25 PROCEDURE — 70450 CT HEAD/BRAIN W/O DYE: CPT

## 2025-04-25 PROCEDURE — 37799 UNLISTED PX VASCULAR SURGERY: CPT

## 2025-04-25 PROCEDURE — 2500000004 HC RX 250 GENERAL PHARMACY W/ HCPCS (ALT 636 FOR OP/ED): Performed by: STUDENT IN AN ORGANIZED HEALTH CARE EDUCATION/TRAINING PROGRAM

## 2025-04-25 PROCEDURE — 82330 ASSAY OF CALCIUM: CPT | Performed by: STUDENT IN AN ORGANIZED HEALTH CARE EDUCATION/TRAINING PROGRAM

## 2025-04-25 PROCEDURE — C9248 INJ, CLEVIDIPINE BUTYRATE: HCPCS | Mod: JZ,TB | Performed by: STUDENT IN AN ORGANIZED HEALTH CARE EDUCATION/TRAINING PROGRAM

## 2025-04-25 PROCEDURE — 80069 RENAL FUNCTION PANEL: CPT | Performed by: STUDENT IN AN ORGANIZED HEALTH CARE EDUCATION/TRAINING PROGRAM

## 2025-04-25 PROCEDURE — 2500000002 HC RX 250 W HCPCS SELF ADMINISTERED DRUGS (ALT 637 FOR MEDICARE OP, ALT 636 FOR OP/ED): Performed by: STUDENT IN AN ORGANIZED HEALTH CARE EDUCATION/TRAINING PROGRAM

## 2025-04-25 PROCEDURE — 93970 EXTREMITY STUDY: CPT | Performed by: RADIOLOGY

## 2025-04-25 PROCEDURE — 94003 VENT MGMT INPAT SUBQ DAY: CPT

## 2025-04-25 PROCEDURE — 2500000001 HC RX 250 WO HCPCS SELF ADMINISTERED DRUGS (ALT 637 FOR MEDICARE OP): Performed by: STUDENT IN AN ORGANIZED HEALTH CARE EDUCATION/TRAINING PROGRAM

## 2025-04-25 PROCEDURE — 2500000005 HC RX 250 GENERAL PHARMACY W/O HCPCS

## 2025-04-25 PROCEDURE — 37799 UNLISTED PX VASCULAR SURGERY: CPT | Performed by: STUDENT IN AN ORGANIZED HEALTH CARE EDUCATION/TRAINING PROGRAM

## 2025-04-25 PROCEDURE — 51701 INSERT BLADDER CATHETER: CPT

## 2025-04-25 PROCEDURE — 70450 CT HEAD/BRAIN W/O DYE: CPT | Performed by: RADIOLOGY

## 2025-04-25 PROCEDURE — 81003 URINALYSIS AUTO W/O SCOPE: CPT

## 2025-04-25 PROCEDURE — 87075 CULTR BACTERIA EXCEPT BLOOD: CPT

## 2025-04-25 PROCEDURE — 85025 COMPLETE CBC W/AUTO DIFF WBC: CPT

## 2025-04-25 PROCEDURE — 93970 EXTREMITY STUDY: CPT

## 2025-04-25 PROCEDURE — 80069 RENAL FUNCTION PANEL: CPT

## 2025-04-25 PROCEDURE — 2500000004 HC RX 250 GENERAL PHARMACY W/ HCPCS (ALT 636 FOR OP/ED)

## 2025-04-25 PROCEDURE — 3E0G76Z INTRODUCTION OF NUTRITIONAL SUBSTANCE INTO UPPER GI, VIA NATURAL OR ARTIFICIAL OPENING: ICD-10-PCS | Performed by: PSYCHIATRY & NEUROLOGY

## 2025-04-25 PROCEDURE — 83735 ASSAY OF MAGNESIUM: CPT | Performed by: STUDENT IN AN ORGANIZED HEALTH CARE EDUCATION/TRAINING PROGRAM

## 2025-04-25 PROCEDURE — 2500000005 HC RX 250 GENERAL PHARMACY W/O HCPCS: Performed by: STUDENT IN AN ORGANIZED HEALTH CARE EDUCATION/TRAINING PROGRAM

## 2025-04-25 PROCEDURE — 82947 ASSAY GLUCOSE BLOOD QUANT: CPT

## 2025-04-25 PROCEDURE — 84100 ASSAY OF PHOSPHORUS: CPT | Performed by: STUDENT IN AN ORGANIZED HEALTH CARE EDUCATION/TRAINING PROGRAM

## 2025-04-25 RX ORDER — VANCOMYCIN HYDROCHLORIDE 1 G/20ML
INJECTION, POWDER, LYOPHILIZED, FOR SOLUTION INTRAVENOUS DAILY PRN
Status: DISCONTINUED | OUTPATIENT
Start: 2025-04-25 | End: 2025-04-28

## 2025-04-25 RX ORDER — CARVEDILOL 12.5 MG/1
6.25 TABLET ORAL EVERY 12 HOURS
Status: DISCONTINUED | OUTPATIENT
Start: 2025-04-25 | End: 2025-05-06

## 2025-04-25 RX ORDER — FENTANYL CITRATE 50 UG/ML
25 INJECTION, SOLUTION INTRAMUSCULAR; INTRAVENOUS EVERY 30 MIN PRN
Status: DISCONTINUED | OUTPATIENT
Start: 2025-04-25 | End: 2025-05-02

## 2025-04-25 RX ORDER — SODIUM CHLORIDE, SODIUM LACTATE, POTASSIUM CHLORIDE, CALCIUM CHLORIDE 600; 310; 30; 20 MG/100ML; MG/100ML; MG/100ML; MG/100ML
100 INJECTION, SOLUTION INTRAVENOUS CONTINUOUS
Status: DISCONTINUED | OUTPATIENT
Start: 2025-04-25 | End: 2025-04-26

## 2025-04-25 RX ORDER — FUROSEMIDE 10 MG/ML
20 INJECTION INTRAMUSCULAR; INTRAVENOUS ONCE
Status: COMPLETED | OUTPATIENT
Start: 2025-04-25 | End: 2025-04-25

## 2025-04-25 RX ORDER — PANTOPRAZOLE SODIUM 40 MG/10ML
40 INJECTION, POWDER, LYOPHILIZED, FOR SOLUTION INTRAVENOUS
Status: DISCONTINUED | OUTPATIENT
Start: 2025-04-25 | End: 2025-05-11 | Stop reason: HOSPADM

## 2025-04-25 RX ORDER — ALBUTEROL SULFATE 0.83 MG/ML
2.5 SOLUTION RESPIRATORY (INHALATION) EVERY 4 HOURS PRN
Status: DISCONTINUED | OUTPATIENT
Start: 2025-04-25 | End: 2025-05-11 | Stop reason: HOSPADM

## 2025-04-25 RX ORDER — NICARDIPINE HYDROCHLORIDE 0.2 MG/ML
2.5-15 INJECTION INTRAVENOUS CONTINUOUS
Status: DISCONTINUED | OUTPATIENT
Start: 2025-04-25 | End: 2025-05-02

## 2025-04-25 RX ORDER — HYDRALAZINE HYDROCHLORIDE 50 MG/1
50 TABLET, FILM COATED ORAL EVERY 8 HOURS
Status: DISCONTINUED | OUTPATIENT
Start: 2025-04-25 | End: 2025-04-26

## 2025-04-25 RX ORDER — HEPARIN SODIUM 5000 [USP'U]/ML
5000 INJECTION, SOLUTION INTRAVENOUS; SUBCUTANEOUS EVERY 8 HOURS
Status: DISCONTINUED | OUTPATIENT
Start: 2025-04-25 | End: 2025-05-02

## 2025-04-25 RX ADMIN — SODIUM CHLORIDE 75 ML/HR: 3 INJECTION, SOLUTION INTRAVENOUS at 21:38

## 2025-04-25 RX ADMIN — NICARDIPINE HYDROCHLORIDE 15 MG/HR: 0.2 INJECTION, SOLUTION INTRAVENOUS at 21:11

## 2025-04-25 RX ADMIN — HYDRALAZINE HYDROCHLORIDE 10 MG: 20 INJECTION INTRAMUSCULAR; INTRAVENOUS at 15:00

## 2025-04-25 RX ADMIN — Medication 50 PERCENT: at 08:10

## 2025-04-25 RX ADMIN — NICARDIPINE HYDROCHLORIDE 15 MG/HR: 0.2 INJECTION, SOLUTION INTRAVENOUS at 18:29

## 2025-04-25 RX ADMIN — ALBUTEROL SULFATE 2.5 MG: 2.5 SOLUTION RESPIRATORY (INHALATION) at 09:14

## 2025-04-25 RX ADMIN — ATORVASTATIN CALCIUM 10 MG: 10 TABLET, FILM COATED ORAL at 20:11

## 2025-04-25 RX ADMIN — CLEVIPIDINE 10.12 MG/HR: 0.5 EMULSION INTRAVENOUS at 00:25

## 2025-04-25 RX ADMIN — LABETALOL HYDROCHLORIDE 10 MG: 5 INJECTION, SOLUTION INTRAVENOUS at 15:17

## 2025-04-25 RX ADMIN — NICARDIPINE HYDROCHLORIDE 15 MG/HR: 0.2 INJECTION, SOLUTION INTRAVENOUS at 23:53

## 2025-04-25 RX ADMIN — VANCOMYCIN HYDROCHLORIDE 1750 MG: 5 INJECTION, POWDER, LYOPHILIZED, FOR SOLUTION INTRAVENOUS at 12:19

## 2025-04-25 RX ADMIN — PROPOFOL 15 MCG/KG/MIN: 10 INJECTION, EMULSION INTRAVENOUS at 05:40

## 2025-04-25 RX ADMIN — LABETALOL HYDROCHLORIDE 10 MG: 5 INJECTION, SOLUTION INTRAVENOUS at 14:52

## 2025-04-25 RX ADMIN — NICARDIPINE HYDROCHLORIDE 15 MG/HR: 0.2 INJECTION, SOLUTION INTRAVENOUS at 16:13

## 2025-04-25 RX ADMIN — HYDRALAZINE HYDROCHLORIDE 10 MG: 20 INJECTION INTRAMUSCULAR; INTRAVENOUS at 15:10

## 2025-04-25 RX ADMIN — HYDRALAZINE HYDROCHLORIDE 10 MG: 20 INJECTION INTRAMUSCULAR; INTRAVENOUS at 13:09

## 2025-04-25 RX ADMIN — SENNOSIDES AND DOCUSATE SODIUM 2 TABLET: 50; 8.6 TABLET ORAL at 20:11

## 2025-04-25 RX ADMIN — FENTANYL CITRATE 25 MCG: 50 INJECTION INTRAMUSCULAR; INTRAVENOUS at 21:50

## 2025-04-25 RX ADMIN — HEPARIN SODIUM 5000 UNITS: 5000 INJECTION INTRAVENOUS; SUBCUTANEOUS at 16:18

## 2025-04-25 RX ADMIN — Medication 50 MCG/HR: at 08:49

## 2025-04-25 RX ADMIN — LABETALOL HYDROCHLORIDE 10 MG: 5 INJECTION, SOLUTION INTRAVENOUS at 11:24

## 2025-04-25 RX ADMIN — HEPARIN SODIUM 5000 UNITS: 5000 INJECTION INTRAVENOUS; SUBCUTANEOUS at 02:56

## 2025-04-25 RX ADMIN — LEVETIRACETAM 500 MG: 5 INJECTION INTRAVENOUS at 08:49

## 2025-04-25 RX ADMIN — CLEVIPIDINE 10.12 MG/HR: 0.5 EMULSION INTRAVENOUS at 05:40

## 2025-04-25 RX ADMIN — HYDRALAZINE HYDROCHLORIDE 10 MG: 20 INJECTION INTRAMUSCULAR; INTRAVENOUS at 23:54

## 2025-04-25 RX ADMIN — LABETALOL HYDROCHLORIDE 10 MG: 5 INJECTION, SOLUTION INTRAVENOUS at 15:07

## 2025-04-25 RX ADMIN — CLEVIPIDINE 16 MG/HR: 0.5 EMULSION INTRAVENOUS at 10:37

## 2025-04-25 RX ADMIN — SENNOSIDES AND DOCUSATE SODIUM 2 TABLET: 50; 8.6 TABLET ORAL at 08:49

## 2025-04-25 RX ADMIN — POTASSIUM PHOSPHATE, MONOBASIC AND POTASSIUM PHOSPHATE, DIBASIC 14.99 MMOL: 224; 236 INJECTION, SOLUTION, CONCENTRATE INTRAVENOUS at 11:31

## 2025-04-25 RX ADMIN — INSULIN LISPRO 1 UNITS: 100 INJECTION, SOLUTION INTRAVENOUS; SUBCUTANEOUS at 12:39

## 2025-04-25 RX ADMIN — CLEVIPIDINE 16 MG/HR: 0.5 EMULSION INTRAVENOUS at 11:40

## 2025-04-25 RX ADMIN — PANTOPRAZOLE SODIUM 40 MG: 40 INJECTION, POWDER, FOR SOLUTION INTRAVENOUS at 09:23

## 2025-04-25 RX ADMIN — SODIUM CHLORIDE 75 ML/HR: 3 INJECTION, SOLUTION INTRAVENOUS at 14:51

## 2025-04-25 RX ADMIN — SODIUM CHLORIDE, SODIUM LACTATE, POTASSIUM CHLORIDE, AND CALCIUM CHLORIDE 100 ML/HR: .6; .31; .03; .02 INJECTION, SOLUTION INTRAVENOUS at 21:03

## 2025-04-25 RX ADMIN — HEPARIN SODIUM 5000 UNITS: 5000 INJECTION INTRAVENOUS; SUBCUTANEOUS at 09:47

## 2025-04-25 RX ADMIN — PIPERACILLIN SODIUM AND TAZOBACTAM SODIUM 4.5 G: 4; .5 INJECTION, SOLUTION INTRAVENOUS at 16:12

## 2025-04-25 RX ADMIN — NICARDIPINE HYDROCHLORIDE 2.5 MG/HR: 0.2 INJECTION, SOLUTION INTRAVENOUS at 13:00

## 2025-04-25 RX ADMIN — Medication 40 PERCENT: at 15:10

## 2025-04-25 RX ADMIN — INSULIN LISPRO 1 UNITS: 100 INJECTION, SOLUTION INTRAVENOUS; SUBCUTANEOUS at 20:33

## 2025-04-25 RX ADMIN — SODIUM CHLORIDE 250 ML: 3 INJECTION, SOLUTION INTRAVENOUS at 14:00

## 2025-04-25 RX ADMIN — CARVEDILOL 6.25 MG: 12.5 TABLET, FILM COATED ORAL at 18:30

## 2025-04-25 RX ADMIN — HYDRALAZINE HYDROCHLORIDE 50 MG: 50 TABLET ORAL at 16:55

## 2025-04-25 RX ADMIN — CLEVIPIDINE 10.12 MG/HR: 0.5 EMULSION INTRAVENOUS at 02:56

## 2025-04-25 RX ADMIN — LABETALOL HYDROCHLORIDE 10 MG: 5 INJECTION, SOLUTION INTRAVENOUS at 21:19

## 2025-04-25 RX ADMIN — LABETALOL HYDROCHLORIDE 10 MG: 5 INJECTION, SOLUTION INTRAVENOUS at 16:21

## 2025-04-25 RX ADMIN — SODIUM CHLORIDE 75 ML/HR: 3 INJECTION, SOLUTION INTRAVENOUS at 10:32

## 2025-04-25 RX ADMIN — PIPERACILLIN SODIUM AND TAZOBACTAM SODIUM 4.5 G: 4; .5 INJECTION, SOLUTION INTRAVENOUS at 11:37

## 2025-04-25 RX ADMIN — POLYETHYLENE GLYCOL 3350 17 G: 17 POWDER, FOR SOLUTION ORAL at 08:49

## 2025-04-25 RX ADMIN — CLEVIPIDINE 10.12 MG/HR: 0.5 EMULSION INTRAVENOUS at 08:40

## 2025-04-25 RX ADMIN — HYDRALAZINE HYDROCHLORIDE 10 MG: 20 INJECTION INTRAMUSCULAR; INTRAVENOUS at 20:41

## 2025-04-25 RX ADMIN — SODIUM CHLORIDE 75 ML/HR: 3 INJECTION, SOLUTION INTRAVENOUS at 02:56

## 2025-04-25 RX ADMIN — LABETALOL HYDROCHLORIDE 10 MG: 5 INJECTION, SOLUTION INTRAVENOUS at 13:21

## 2025-04-25 RX ADMIN — LABETALOL HYDROCHLORIDE 10 MG: 5 INJECTION, SOLUTION INTRAVENOUS at 20:11

## 2025-04-25 RX ADMIN — FUROSEMIDE 20 MG: 10 INJECTION, SOLUTION INTRAVENOUS at 14:00

## 2025-04-25 ASSESSMENT — PAIN - FUNCTIONAL ASSESSMENT

## 2025-04-25 NOTE — PROGRESS NOTES
Social Work Discharge Planning note:    -Patient discussed during interdisciplinary rounds.   -Team members present: Fellow, PT and OT, and SW  -Plan per medical team: Pt is not medically ready for discharge.   -Payer: Erica  -Status: Inpatient  -Discharge disposition: TBD - SW will continue to follow for PT and OT recommendations, and to assist with discharge planning.    -Potential Barriers: none  -Anticipated Date of Discharge:  5/2/25    This SW   ALBA Wiggins, LSW    Office: [phone]  Secure chat via [employer]

## 2025-04-25 NOTE — PROGRESS NOTES
"Muriel De Souza is a 69 y.o. female on day 3 of admission presenting with AVM (arteriovenous malformation) ([facility]).    Subjective   No events overnight    Objective     Physical Exam  Intubated  ECNS  Ou2min R  -cough, gag, -corneal  BUE flaccid  BLE withdraw  EVD in place    Last Recorded Vitals  Blood pressure (!) 140/49, pulse 81, temperature 36.5 °C (97.7 °F), resp. rate 12, height 1.727 m (5' 7.99\"), weight 93 kg (205 lb 0.4 oz), SpO2 96%.  Intake/Output last 3 Shifts:  I/O last 3 completed shifts:  In: 7569.4 (81.4 mL/kg) [I.V.:5339.4 (57.4 mL/kg); NG/GT:1330; IV Piggyback:900]  Out: 4307 (46.3 mL/kg) [Urine:3910 (1.2 mL/kg/hr); Drains:397]  Weight: 93 kg     Relevant Results    Assessment & Plan  AVM (arteriovenous malformation) ([facility])    Muriel De Souza is a 69 y.o. 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

## 2025-04-25 NOTE — PROGRESS NOTES
[facility]  NEUROSCIENCE INTENSIVE CARE UNIT  DAILY PROGRESS NOTE       Patient Name: Muriel De Souza   MRN: [id number]     Admit Date: 2025     : 1955 AGE: 69 y.o. GENDER: female        Subjective    Muriel De Souza is a 69 y.o. female with h/o HTN, HLD p/w HA, arrested in CT scanner at OSH, ROSC after 7 min CPR,  CTH w diffuse SAH and L cerebellar ICH, triventriculomegally, effaced 4th vents, CTA with L cerebellar AVM w venous varices s/p RF EVD and now admitted for further management.    Per chart review, patient had received bad news at home and then had sudden onset headache and vomiting. Found to have SAH at OSH with course complicated by cardiac arrest. She was intubated and sedated and transferred from [place], [place] to [facility].    Significant Events:  -  came in with L cerebellar AVM bleed with course complicated by cardiac arrest ROSC after 7 min CPR, uppon arrival to [facility] NSU, EVD drain placed, went to OR for decompression and AVM resection    Interval Events:   - NNE        Objective   VITALS (24H):  Temp:  [35.7 °C (96.3 °F)-36.9 °C (98.4 °F)] 36.4 °C (97.5 °F)  Heart Rate:  [75-85] 75  Resp:  [12-17] 12  BP: (104-143)/(42-64) 132/51  Arterial Line BP 1: (117-144)/(41-51) 129/50  FiO2 (%):  [40 %-50 %] 50 %  INTAKE/OUTPUT:  Intake/Output Summary (Last 24 hours) at 2025 0627  Last data filed at 2025 0400  Gross per 24 hour   Intake 4856.28 ml   Output 2806 ml   Net 2050.28 ml     VENT SETTINGS:  Vent Mode: Volume control/assist control  FiO2 (%):  [40 %-50 %] 50 %  S RR:  [12-14] 12  S VT:  [400 mL] 400 mL  PEEP/CPAP (cm H2O):  [5 cm H20] 5 cm H20  MAP (cm H2O):  [8-9] 8       PHYSICAL EXAM:  NEURO: (examined on fentanyl 50 and propofol 15)  - Intubated, sedated  - Eyes closed to nox stim  - pupils minimally reactive to light  - no VOR  - corneals negative  - no cough or gag  - no spontaneous movements  - No response to pain in UEs  - wiggling toes to pain RLE  -  possible triple flexion in LLE  - no increased tone  CV:  - RRR on telemetry, NSR  - Arterial line in place  RESP:  - intubated  :  - Brooks catheter in place  GI:  - Abdomen NT/ND, soft  SKIN:  - Intact  - left arm swollen and tense to palpation, not pitting, non-erythematous    MEDICATIONS:  Scheduled: PRN: Continuous:   Scheduled Medications[1] PRN Medications[2] Continuous Medications[3]     IMAGING RESULTS:  CT head wo IV contrast   Final Result   * Diffuse subarachnoid hemorrhage with hydrocephalus   *Left cerebellar hematoma suggesting a likely source.        MACRO:   none        Signed by: Cruz Salgado 4/22/2025 11:59 AM   Dictation workstation:   [id number]      CT angio head and neck w and wo IV contrast   Final Result   * Diffuse subarachnoid hemorrhage with hydrocephalus as described   *Suspected vascular malformation in the left cerebellar hemisphere   with enlarged arterial and venous structures largely in the left   cerebellar hemisphere and residual partial opacification of a venous   aneurysm near the torcula. *Bilateral carotid stenosis as described        MACRO:   none        Signed by: Cruz Salgado 4/22/2025 12:07 PM   Dictation workstation:   [id number]             Assessment/Plan    Muriel De Souza is a 69 y.o. female with h/o HTN, HLD p/w HA, arrested in CT scanner at OSH, ROSC after 7 min CPR,  CTH w diffuse SAH and L cerebellar ICH, triventriculomegally, effaced 4th vents, CTA with L cerebellar AVM w venous varices s/p RF EVD as well as AVM resection on 4/22 and now admitted for further management.      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  #Intracranial hypertension  Assessment:  - First day 4/22 presented with SAH, L cerebellar ICH, and ventriculomegaly on CTH  - 4/22 CTA showing L cerebellar AVM  - 4/22 RF EVD for ICH  - 4/22 OR AVM resection  Plan:  - NSU  - Keppra  -  repeat angio per NSGY timing  - EVD in place, maintained at 10  - CTH today 4/24, consider EVD clamp  - Neuro Checks: Q1H  - Sedation: Propofol and Fentanyl  - Pain: PRN  - Nausea: ondansetron  - PT/OT/SLP    CARDIOVASCULAR:  #Cardiac arrest  #Lactic acidosis, improving  # elevated troponins, improved  Assessment:  - 7 min to ROSC  - Assess for ischemic and stress-induced cardiomyopathy   - Assess for arrhythmias following cardiac arrest   - Cards consult (4/23)  - Type 2 MI (demand ischemia), leading to troponin up-trend (4/23)  - Trop trending down, d/c'd 4/24  - ECHO 4/23:  CONCLUSIONS:   1. Left ventricular ejection fraction is normal, calculated by Adrian's biplane at 71%.   2. Spectral Doppler shows a Grade I (impaired relaxation pattern) of left ventricular diastolic filling with normal left atrial filling pressure.   3. There is normal right ventricular global systolic function.   4. The left atrium is mildly dilated.   5. There is no evidence of a patent foramen ovale.   6. The inferior vena cava appears mildly dilated, on vent.    Plan:  - Pressors PRN  - Monitor on telemetry  - BP goal: SBP < 140    --> PRN: Labetalol, Hydralazine, and Nicardipine   - Cleviprex infusion    RESPIRATORY:  #Acute hypoxic respiratory failure  #Mechanical ventilation  Assessment:  - Pt intubated for decreased LOC  Plan:  - Continue mechanical ventilation    RENAL/:  #TIM  Assessment:  Results from last 72 hours   Lab Units 04/25/25  0259 04/25/25  0017   BUN mg/dL 15 15   CREATININE mg/dL 0.45* 0.48*       Plan:  - Monitor with daily RFP  - Maintain forbes catheter for: critically ill patient who need accurate urinary output measurements    FEN/GI:  #TIM  Plan:  - Monitor and replace electrolytes per protocol  - IVF: PRN  - Diet: Enteral feeding through NG  - Bowel Regimen: Docusate-Senna PRN and Miralax PRN    ENDOCRINE:  #TIM  Assessment:  Results from last 7 days   Lab Units 04/25/25  0419 04/25/25  0259 04/25/25  0034  25  0017 25  2115 25  2000 25  1835 25  1804 25  1234 25  1155 25  0809 25  0651   POCT GLUCOSE mg/dL 134*  --  138*  --   --  143*  --  147*  --  143* 128*  --    GLUCOSE mg/dL  --  146*  --  153* 132*  --  130*  --  124*  --   --  133*   SODIUM mmol/L  --  145  145  --  145 144  143  --  142  --  143  --   --  143    - HbA1C 5.5 on     Plan:  - Accuchecks & ISS PRN     HEMATOLOGY:  #Anemia, likely dilutional  Assessment:  - Baseline Hgb: 12.0  - Baseline Plts: 287  Results from last 7 days   Lab Units 25  0259 25  18325  0302   HEMOGLOBIN g/dL 8.5* 8.5* 9.0*   HEMATOCRIT % 26.5* 26.6* 27.3*   PLATELETS AUTO x10*3/uL 202 221 210     Plan:  - Continue to monitor with daily CBC and Coag panel  - maintain active type and screen as needed    INFECTIOUS DISEASE:  #TIM  Assessment:  Results from last 7 days   Lab Units 25  0259 25  18325  0302   WBC AUTO x10*3/uL 12.5* 15.3* 16.6*    - Temp (24hrs), Av.5 °C (97.7 °F), Min:35.7 °C (96.3 °F), Max:36.9 °C (98.4 °F)  - Afebrile with increase in WBCs overnight (10.6 --> 16.6), pt not on steroids or Tylenol  - Neutrophil predominance, no increase in bands  - Brooks in place, urine clear  - CXR (): Perihilar and interstitial opacities  - CPIS score 4, low likelihood of VAP      MUSCULOSKELETAL:  - No acute issues    SKIN:  #Swollen left arm  Assessment:   - LUE with persistent swelling since   - Non-pitting edema, non-erythematous  - POD2  - Not on DVT prophylaxis      Plan:   - Turns and skin care per NSU protocol    ACCESS:  - R Femoral  - R arterial  - L PIV    PROPHYLAXIS:  - DVT Ppx: SCDs, SQH  - GI: PPI    RESTRAINTS:  Not indicated/Patient does not meet criteria for restraints    Yessenia Jimenez MD  Emergency Medicine, PGY-2    Fellow Addendum:  I have reviewed the information above and I agree with the assessment and plan as outlined by the resident/medical  [facility] provides services at a reduced cost to those who are determined to be eligible through [facility]’s financial assistance program. Information regarding [facility]’s financial assistance program can be found by going to [email or web address] or by calling [phone]. student with the following additions/and or corrections:    69 y.o. female with h/o HTN, HLD p/w HA, arrested in CT scanner at OSH, ROSC after 7 min CPR, CTH w diffuse SAH and L cerebellar ICH, triventriculomegally, effaced 4th vents, CTA with L cerebellar AVM w/ venous varices s/p RF EVD as well as AVM resection on 4/22    Neuro: Exam remains poor, only pupils intact without other brainstem reflexes ~20 mins with prop/fent held. EVD per nsgy. Continue to hold sedation for neuro exam.   CV:   -cardiac arrest on arrival to hospital, likely PEA in setting of profound intracranial pressure elevation  -Labile blood pressures, goal 100-140. On Cleviprex.   Resp: Intubated, vent settings increased briefy with secretions, increase pulm hygiene today  Renal: Monitor UOP  FEN/GI: Advance TF as tolerated. Mild elevation in AST/ALT. Continue to trend.   Endo: SSI PRN  Heme: Hb goal >7  ID: watch for signs of fever, add abx for desat/secretions        Jace Hernandez MD - 04/25/25 at 11:22 AM  Neurocritical Care Fellow  PGY-6             [1] atenolol, 25 mg, oral, Daily  atorvastatin, 10 mg, oral, Nightly  heparin (porcine), 5,000 Units, subcutaneous, q8h  insulin lispro, 0-5 Units, subcutaneous, q4h  levETIRAcetam, 500 mg, intravenous, q12h  perflutren protein A microsphere, 0.5 mL, intravenous, Once in imaging  polyethylene glycol, 17 g, oral, Daily  sennosides-docusate sodium, 2 tablet, oral, BID  sulfur hexafluoride microsphr, 2 mL, intravenous, Once in imaging     [2] PRN medications: calcium gluconate, calcium gluconate, dextrose, dextrose, glucagon, glucagon, hydrALAZINE, labetaloL, magnesium sulfate, magnesium sulfate, oxygen, potassium chloride CR **OR** potassium chloride, potassium chloride CR **OR** potassium chloride  [3] clevidipine, 0-16 mg/hr, Last Rate: 10.12 mg/hr (04/25/25 0540)  fentaNYL,  mcg/hr, Last Rate: 50 mcg/hr (04/25/25 0400)  lactated Ringer's, 100 mL/hr, Last Rate: 100 mL/hr (04/25/25  0400)  propofol, 0-50 mcg/kg/min, Last Rate: 15 mcg/kg/min (04/25/25 0540)  sodium chloride, 75 mL/hr, Last Rate: 75 mL/hr (04/25/25 0400)

## 2025-04-25 NOTE — CONSULTS
Vancomycin Dosing by Pharmacy- INITIAL    Muriel De Souza is a 69 y.o. year old female who Pharmacy has been consulted for vancomycin dosing for sepsis. Based on the patient's indication and renal status this patient will be dosed based on a goal AUC of 500-600.     Renal function is currently stable.    Visit Vitals  BP (!) 140/49   Pulse 81   Temp 36.5 °C (97.7 °F)   Resp 12        Lab Results   Component Value Date    CREATININE 0.45 (L) 2025    CREATININE 0.48 (L) 2025    CREATININE 0.50 2025    CREATININE 0.49 (L) 2025        Patient weight is as follows:   Vitals:    25 0000   Weight: 93 kg (205 lb 0.4 oz)       Cultures:  No results found for the encounter in last 14 days.        I/O last 3 completed shifts:  In: 7569.4 (81.4 mL/kg) [I.V.:5339.4 (57.4 mL/kg); NG/GT:1330; IV Piggyback:900]  Out: 4307 (46.3 mL/kg) [Urine:3910 (1.2 mL/kg/hr); Drains:397]  Weight: 93 kg   I/O during current shift:  I/O this shift:  In: 227.5 [I.V.:207.5; NG/GT:20]  Out: 205 [Urine:200; Drains:5]    Temp (24hrs), Av.5 °C (97.7 °F), Min:36 °C (96.8 °F), Max:36.9 °C (98.4 °F)         Assessment/Plan     Patient will be given a loading dose of 1750 mg.  Will initiate vancomycin maintenance,  1500 mg every 12 hours.    This dosing regimen is predicted by [employer] to result in the following pharmacokinetic parameters:  Loading dose: 1750 mg at 10:30 2025.  Regimen: 1500 mg IV every 12 hours.  Start time: 18:30 on 2025  Exposure target: AUC24 (range)400-600 mg/L.hr   [id number]: 509 mg/L.hr  AUC24,ss: 553 mg/L.hr  Probability of AUC24 > 400: 80 %  Ctrough,ss: 16.6 mg/L  Probability of Ctrough,ss > 20: 36 %    Follow-up level will be ordered on  at 1000 unless clinically indicated sooner.  Will continue to monitor renal function daily while on vancomycin and order serum creatinine at least every 48 hours if not already ordered.  Follow for continued vancomycin needs, clinical response,  and signs/symptoms of toxicity.       KOKI WALL, PharmD

## 2025-04-25 NOTE — CARE PLAN
Problem: General Stroke  Goal: Establish a mutual long term goal with patient by discharge  Outcome: Progressing  Goal: No symptoms of aspiration throughout shift  Outcome: Progressing     Problem: ICU Stroke  Goal: Maintain ICP within ordered limits throughout shift  Outcome: Progressing     Problem: Skin  Goal: Prevent/manage excess moisture  Outcome: Progressing  Flowsheets (Taken 4/25/2025 3460)  Prevent/manage excess moisture:   Cleanse incontinence/protect with barrier cream   Monitor for/manage infection if present   Follow provider orders for dressing changes   Use wicking fabric (obtain order)   Moisturize dry skin

## 2025-04-26 LAB
ALBUMIN SERPL BCP-MCNC: 2.8 G/DL (ref 3.4–5)
ALBUMIN SERPL BCP-MCNC: 2.9 G/DL (ref 3.4–5)
ANION GAP BLDA CALCULATED.4IONS-SCNC: 10 MMO/L (ref 10–25)
ANION GAP BLDA CALCULATED.4IONS-SCNC: 10 MMO/L (ref 10–25)
ANION GAP BLDA CALCULATED.4IONS-SCNC: 9 MMO/L (ref 10–25)
ANION GAP SERPL CALC-SCNC: 10 MMOL/L (ref 10–20)
ANION GAP SERPL CALC-SCNC: 11 MMOL/L (ref 10–20)
ANION GAP SERPL CALC-SCNC: 12 MMOL/L (ref 10–20)
ANION GAP SERPL CALC-SCNC: 12 MMOL/L (ref 10–20)
ANION GAP SERPL CALC-SCNC: 13 MMOL/L (ref 10–20)
BACTERIA BLD CULT: NORMAL
BACTERIA BLD CULT: NORMAL
BASE EXCESS BLDA CALC-SCNC: 0.8 MMOL/L (ref -2–3)
BASE EXCESS BLDA CALC-SCNC: 1.2 MMOL/L (ref -2–3)
BASE EXCESS BLDA CALC-SCNC: 1.8 MMOL/L (ref -2–3)
BASOPHILS # BLD AUTO: 0.01 X10*3/UL (ref 0–0.1)
BASOPHILS # BLD AUTO: 0.02 X10*3/UL (ref 0–0.1)
BASOPHILS NFR BLD AUTO: 0.1 %
BASOPHILS NFR BLD AUTO: 0.2 %
BODY TEMPERATURE: ABNORMAL
BUN SERPL-MCNC: 21 MG/DL (ref 6–23)
BUN SERPL-MCNC: 23 MG/DL (ref 6–23)
BUN SERPL-MCNC: 24 MG/DL (ref 6–23)
CA-I BLDA-SCNC: 1.17 MMOL/L (ref 1.1–1.33)
CA-I BLDA-SCNC: 1.2 MMOL/L (ref 1.1–1.33)
CA-I BLDA-SCNC: 1.2 MMOL/L (ref 1.1–1.33)
CALCIUM SERPL-MCNC: 8.2 MG/DL (ref 8.6–10.6)
CALCIUM SERPL-MCNC: 8.3 MG/DL (ref 8.6–10.6)
CALCIUM SERPL-MCNC: 8.3 MG/DL (ref 8.6–10.6)
CALCIUM SERPL-MCNC: 8.4 MG/DL (ref 8.6–10.6)
CALCIUM SERPL-MCNC: 8.5 MG/DL (ref 8.6–10.6)
CARDIAC TROPONIN I PNL SERPL HS: 78 NG/L (ref 0–34)
CHLORIDE BLDA-SCNC: 123 MMOL/L (ref 98–107)
CHLORIDE BLDA-SCNC: 124 MMOL/L (ref 98–107)
CHLORIDE BLDA-SCNC: 125 MMOL/L (ref 98–107)
CHLORIDE SERPL-SCNC: 123 MMOL/L (ref 98–107)
CHLORIDE SERPL-SCNC: 124 MMOL/L (ref 98–107)
CHLORIDE SERPL-SCNC: 124 MMOL/L (ref 98–107)
CHLORIDE SERPL-SCNC: 125 MMOL/L (ref 98–107)
CHLORIDE SERPL-SCNC: 125 MMOL/L (ref 98–107)
CO2 SERPL-SCNC: 25 MMOL/L (ref 21–32)
CO2 SERPL-SCNC: 26 MMOL/L (ref 21–32)
CO2 SERPL-SCNC: 26 MMOL/L (ref 21–32)
CREAT SERPL-MCNC: 0.59 MG/DL (ref 0.5–1.05)
CREAT SERPL-MCNC: 0.61 MG/DL (ref 0.5–1.05)
CREAT SERPL-MCNC: 0.65 MG/DL (ref 0.5–1.05)
CREAT SERPL-MCNC: 0.66 MG/DL (ref 0.5–1.05)
CREAT SERPL-MCNC: 0.69 MG/DL (ref 0.5–1.05)
EGFRCR SERPLBLD CKD-EPI 2021: >90 ML/MIN/1.73M*2
EOSINOPHIL # BLD AUTO: 0 X10*3/UL (ref 0–0.7)
EOSINOPHIL # BLD AUTO: 0 X10*3/UL (ref 0–0.7)
EOSINOPHIL NFR BLD AUTO: 0 %
EOSINOPHIL NFR BLD AUTO: 0 %
ERYTHROCYTE [DISTWIDTH] IN BLOOD BY AUTOMATED COUNT: 14.5 % (ref 11.5–14.5)
ERYTHROCYTE [DISTWIDTH] IN BLOOD BY AUTOMATED COUNT: 14.6 % (ref 11.5–14.5)
GLUCOSE BLD MANUAL STRIP-MCNC: 147 MG/DL (ref 74–99)
GLUCOSE BLD MANUAL STRIP-MCNC: 150 MG/DL (ref 74–99)
GLUCOSE BLD MANUAL STRIP-MCNC: 163 MG/DL (ref 74–99)
GLUCOSE BLD MANUAL STRIP-MCNC: 166 MG/DL (ref 74–99)
GLUCOSE BLD MANUAL STRIP-MCNC: 169 MG/DL (ref 74–99)
GLUCOSE BLD MANUAL STRIP-MCNC: 170 MG/DL (ref 74–99)
GLUCOSE BLDA-MCNC: 159 MG/DL (ref 74–99)
GLUCOSE BLDA-MCNC: 161 MG/DL (ref 74–99)
GLUCOSE BLDA-MCNC: 168 MG/DL (ref 74–99)
GLUCOSE SERPL-MCNC: 162 MG/DL (ref 74–99)
GLUCOSE SERPL-MCNC: 166 MG/DL (ref 74–99)
GLUCOSE SERPL-MCNC: 174 MG/DL (ref 74–99)
GLUCOSE SERPL-MCNC: 186 MG/DL (ref 74–99)
GLUCOSE SERPL-MCNC: 191 MG/DL (ref 74–99)
HCO3 BLDA-SCNC: 24.2 MMOL/L (ref 22–26)
HCO3 BLDA-SCNC: 25.3 MMOL/L (ref 22–26)
HCO3 BLDA-SCNC: 25.6 MMOL/L (ref 22–26)
HCT VFR BLD AUTO: 25.7 % (ref 36–46)
HCT VFR BLD AUTO: 27.1 % (ref 36–46)
HCT VFR BLD EST: 26 % (ref 36–46)
HCT VFR BLD EST: 26 % (ref 36–46)
HCT VFR BLD EST: 27 % (ref 36–46)
HGB BLD-MCNC: 8.5 G/DL (ref 12–16)
HGB BLD-MCNC: 8.5 G/DL (ref 12–16)
HGB BLDA-MCNC: 8.5 G/DL (ref 12–16)
HGB BLDA-MCNC: 8.8 G/DL (ref 12–16)
HGB BLDA-MCNC: 9.1 G/DL (ref 12–16)
IMM GRANULOCYTES # BLD AUTO: 0.11 X10*3/UL (ref 0–0.7)
IMM GRANULOCYTES # BLD AUTO: 0.11 X10*3/UL (ref 0–0.7)
IMM GRANULOCYTES NFR BLD AUTO: 1 % (ref 0–0.9)
IMM GRANULOCYTES NFR BLD AUTO: 1 % (ref 0–0.9)
INHALED O2 CONCENTRATION: 40 %
LACTATE BLDA-SCNC: 0.4 MMOL/L (ref 0.4–2)
LACTATE BLDA-SCNC: 0.9 MMOL/L (ref 0.4–2)
LACTATE BLDA-SCNC: 1.2 MMOL/L (ref 0.4–2)
LYMPHOCYTES # BLD AUTO: 0.54 X10*3/UL (ref 1.2–4.8)
LYMPHOCYTES # BLD AUTO: 0.77 X10*3/UL (ref 1.2–4.8)
LYMPHOCYTES NFR BLD AUTO: 4.8 %
LYMPHOCYTES NFR BLD AUTO: 7.2 %
MCH RBC QN AUTO: 29.2 PG (ref 26–34)
MCH RBC QN AUTO: 29.2 PG (ref 26–34)
MCHC RBC AUTO-ENTMCNC: 31.4 G/DL (ref 32–36)
MCHC RBC AUTO-ENTMCNC: 33.1 G/DL (ref 32–36)
MCV RBC AUTO: 88 FL (ref 80–100)
MCV RBC AUTO: 93 FL (ref 80–100)
MONOCYTES # BLD AUTO: 0.49 X10*3/UL (ref 0.1–1)
MONOCYTES # BLD AUTO: 0.64 X10*3/UL (ref 0.1–1)
MONOCYTES NFR BLD AUTO: 4.3 %
MONOCYTES NFR BLD AUTO: 6 %
NEUTROPHILS # BLD AUTO: 10.2 X10*3/UL (ref 1.2–7.7)
NEUTROPHILS # BLD AUTO: 9.14 X10*3/UL (ref 1.2–7.7)
NEUTROPHILS NFR BLD AUTO: 85.7 %
NEUTROPHILS NFR BLD AUTO: 89.7 %
NRBC BLD-RTO: 0 /100 WBCS (ref 0–0)
NRBC BLD-RTO: 0 /100 WBCS (ref 0–0)
OSMOLALITY SERPL: 325 MOSM/KG (ref 280–300)
OSMOLALITY SERPL: 326 MOSM/KG (ref 280–300)
OSMOLALITY SERPL: 330 MOSM/KG (ref 280–300)
OSMOLALITY SERPL: 332 MOSM/KG (ref 280–300)
OSMOLALITY SERPL: 332 MOSM/KG (ref 280–300)
OSMOLALITY SERPL: 334 MOSM/KG (ref 280–300)
OXYHGB MFR BLDA: 96.5 % (ref 94–98)
OXYHGB MFR BLDA: 96.7 % (ref 94–98)
OXYHGB MFR BLDA: 97.9 % (ref 94–98)
PCO2 BLDA: 31 MM HG (ref 38–42)
PCO2 BLDA: 36 MM HG (ref 38–42)
PCO2 BLDA: 39 MM HG (ref 38–42)
PH BLDA: 7.42 PH (ref 7.38–7.42)
PH BLDA: 7.46 PH (ref 7.38–7.42)
PH BLDA: 7.5 PH (ref 7.38–7.42)
PHOSPHATE SERPL-MCNC: 2.2 MG/DL (ref 2.5–4.9)
PHOSPHATE SERPL-MCNC: 2.5 MG/DL (ref 2.5–4.9)
PHOSPHATE SERPL-MCNC: 2.5 MG/DL (ref 2.5–4.9)
PHOSPHATE SERPL-MCNC: 2.6 MG/DL (ref 2.5–4.9)
PHOSPHATE SERPL-MCNC: 2.7 MG/DL (ref 2.5–4.9)
PLATELET # BLD AUTO: 231 X10*3/UL (ref 150–450)
PLATELET # BLD AUTO: 265 X10*3/UL (ref 150–450)
PO2 BLDA: 103 MM HG (ref 85–95)
PO2 BLDA: 70 MM HG (ref 85–95)
PO2 BLDA: 86 MM HG (ref 85–95)
POTASSIUM BLDA-SCNC: 2.9 MMOL/L (ref 3.5–5.3)
POTASSIUM BLDA-SCNC: 3 MMOL/L (ref 3.5–5.3)
POTASSIUM BLDA-SCNC: 3 MMOL/L (ref 3.5–5.3)
POTASSIUM SERPL-SCNC: 3.1 MMOL/L (ref 3.5–5.3)
POTASSIUM SERPL-SCNC: 3.2 MMOL/L (ref 3.5–5.3)
POTASSIUM SERPL-SCNC: 3.2 MMOL/L (ref 3.5–5.3)
RBC # BLD AUTO: 2.91 X10*6/UL (ref 4–5.2)
RBC # BLD AUTO: 2.91 X10*6/UL (ref 4–5.2)
SAO2 % BLDA: 100 % (ref 94–100)
SAO2 % BLDA: 98 % (ref 94–100)
SAO2 % BLDA: 99 % (ref 94–100)
SODIUM BLDA-SCNC: 154 MMOL/L (ref 136–145)
SODIUM BLDA-SCNC: 156 MMOL/L (ref 136–145)
SODIUM BLDA-SCNC: 157 MMOL/L (ref 136–145)
SODIUM SERPL-SCNC: 154 MMOL/L (ref 136–145)
SODIUM SERPL-SCNC: 156 MMOL/L (ref 136–145)
SODIUM SERPL-SCNC: 157 MMOL/L (ref 136–145)
SODIUM SERPL-SCNC: 158 MMOL/L (ref 136–145)
SODIUM SERPL-SCNC: 159 MMOL/L (ref 136–145)
SODIUM SERPL-SCNC: 160 MMOL/L (ref 136–145)
SODIUM SERPL-SCNC: 160 MMOL/L (ref 136–145)
VANCOMYCIN SERPL-MCNC: 12.1 UG/ML (ref 5–20)
WBC # BLD AUTO: 10.7 X10*3/UL (ref 4.4–11.3)
WBC # BLD AUTO: 11.4 X10*3/UL (ref 4.4–11.3)

## 2025-04-26 PROCEDURE — 2500000001 HC RX 250 WO HCPCS SELF ADMINISTERED DRUGS (ALT 637 FOR MEDICARE OP)

## 2025-04-26 PROCEDURE — 85025 COMPLETE CBC W/AUTO DIFF WBC: CPT

## 2025-04-26 PROCEDURE — 82435 ASSAY OF BLOOD CHLORIDE: CPT

## 2025-04-26 PROCEDURE — 82805 BLOOD GASES W/O2 SATURATION: CPT

## 2025-04-26 PROCEDURE — 2500000002 HC RX 250 W HCPCS SELF ADMINISTERED DRUGS (ALT 637 FOR MEDICARE OP, ALT 636 FOR OP/ED): Performed by: REGISTERED NURSE

## 2025-04-26 PROCEDURE — 2500000004 HC RX 250 GENERAL PHARMACY W/ HCPCS (ALT 636 FOR OP/ED): Mod: JZ

## 2025-04-26 PROCEDURE — 84484 ASSAY OF TROPONIN QUANT: CPT

## 2025-04-26 PROCEDURE — 2020000001 HC ICU ROOM DAILY

## 2025-04-26 PROCEDURE — 2500000005 HC RX 250 GENERAL PHARMACY W/O HCPCS: Performed by: STUDENT IN AN ORGANIZED HEALTH CARE EDUCATION/TRAINING PROGRAM

## 2025-04-26 PROCEDURE — 80069 RENAL FUNCTION PANEL: CPT | Performed by: STUDENT IN AN ORGANIZED HEALTH CARE EDUCATION/TRAINING PROGRAM

## 2025-04-26 PROCEDURE — 2500000004 HC RX 250 GENERAL PHARMACY W/ HCPCS (ALT 636 FOR OP/ED): Mod: JZ | Performed by: STUDENT IN AN ORGANIZED HEALTH CARE EDUCATION/TRAINING PROGRAM

## 2025-04-26 PROCEDURE — 2500000004 HC RX 250 GENERAL PHARMACY W/ HCPCS (ALT 636 FOR OP/ED): Performed by: STUDENT IN AN ORGANIZED HEALTH CARE EDUCATION/TRAINING PROGRAM

## 2025-04-26 PROCEDURE — 80069 RENAL FUNCTION PANEL: CPT

## 2025-04-26 PROCEDURE — 2500000001 HC RX 250 WO HCPCS SELF ADMINISTERED DRUGS (ALT 637 FOR MEDICARE OP): Performed by: STUDENT IN AN ORGANIZED HEALTH CARE EDUCATION/TRAINING PROGRAM

## 2025-04-26 PROCEDURE — 2580000001 HC RX 258 IV SOLUTIONS

## 2025-04-26 PROCEDURE — 84295 ASSAY OF SERUM SODIUM: CPT

## 2025-04-26 PROCEDURE — 82947 ASSAY GLUCOSE BLOOD QUANT: CPT

## 2025-04-26 PROCEDURE — 94669 MECHANICAL CHEST WALL OSCILL: CPT

## 2025-04-26 PROCEDURE — 37799 UNLISTED PX VASCULAR SURGERY: CPT

## 2025-04-26 PROCEDURE — 80202 ASSAY OF VANCOMYCIN: CPT

## 2025-04-26 PROCEDURE — 83930 ASSAY OF BLOOD OSMOLALITY: CPT

## 2025-04-26 PROCEDURE — 99291 CRITICAL CARE FIRST HOUR: CPT | Performed by: PSYCHIATRY & NEUROLOGY

## 2025-04-26 PROCEDURE — 83605 ASSAY OF LACTIC ACID: CPT

## 2025-04-26 PROCEDURE — 94003 VENT MGMT INPAT SUBQ DAY: CPT

## 2025-04-26 RX ORDER — POTASSIUM CHLORIDE 14.9 MG/ML
20 INJECTION INTRAVENOUS
Status: DISCONTINUED | OUTPATIENT
Start: 2025-04-26 | End: 2025-04-26

## 2025-04-26 RX ORDER — POTASSIUM CHLORIDE 14.9 MG/ML
20 INJECTION INTRAVENOUS
Status: COMPLETED | OUTPATIENT
Start: 2025-04-26 | End: 2025-04-27

## 2025-04-26 RX ORDER — POTASSIUM CHLORIDE 1.5 G/1.58G
20 POWDER, FOR SOLUTION ORAL ONCE
Status: COMPLETED | OUTPATIENT
Start: 2025-04-26 | End: 2025-04-26

## 2025-04-26 RX ORDER — HYDRALAZINE HYDROCHLORIDE 50 MG/1
75 TABLET, FILM COATED ORAL EVERY 6 HOURS
Status: DISCONTINUED | OUTPATIENT
Start: 2025-04-26 | End: 2025-05-06

## 2025-04-26 RX ORDER — LISINOPRIL 20 MG/1
10 TABLET ORAL DAILY
Status: DISCONTINUED | OUTPATIENT
Start: 2025-04-26 | End: 2025-04-30

## 2025-04-26 RX ORDER — ACETAMINOPHEN 160 MG/5ML
975 SOLUTION ORAL ONCE
Status: COMPLETED | OUTPATIENT
Start: 2025-04-26 | End: 2025-04-26

## 2025-04-26 RX ORDER — POTASSIUM CHLORIDE 14.9 MG/ML
20 INJECTION INTRAVENOUS
Status: COMPLETED | OUTPATIENT
Start: 2025-04-26 | End: 2025-04-26

## 2025-04-26 RX ADMIN — CARVEDILOL 6.25 MG: 12.5 TABLET, FILM COATED ORAL at 18:11

## 2025-04-26 RX ADMIN — POTASSIUM CHLORIDE 20 MEQ: 14.9 INJECTION, SOLUTION INTRAVENOUS at 16:47

## 2025-04-26 RX ADMIN — NICARDIPINE HYDROCHLORIDE 12.5 MG/HR: 0.2 INJECTION, SOLUTION INTRAVENOUS at 12:01

## 2025-04-26 RX ADMIN — LABETALOL HYDROCHLORIDE 10 MG: 5 INJECTION, SOLUTION INTRAVENOUS at 19:55

## 2025-04-26 RX ADMIN — VANCOMYCIN HYDROCHLORIDE 1500 MG: 1.5 INJECTION, POWDER, LYOPHILIZED, FOR SOLUTION INTRAVENOUS at 13:48

## 2025-04-26 RX ADMIN — INSULIN LISPRO 1 UNITS: 100 INJECTION, SOLUTION INTRAVENOUS; SUBCUTANEOUS at 03:56

## 2025-04-26 RX ADMIN — ACETAMINOPHEN 1000 MG: 160 SOLUTION ORAL at 11:17

## 2025-04-26 RX ADMIN — INSULIN LISPRO 1 UNITS: 100 INJECTION, SOLUTION INTRAVENOUS; SUBCUTANEOUS at 02:21

## 2025-04-26 RX ADMIN — PANTOPRAZOLE SODIUM 40 MG: 40 INJECTION, POWDER, FOR SOLUTION INTRAVENOUS at 06:14

## 2025-04-26 RX ADMIN — VANCOMYCIN HYDROCHLORIDE 1500 MG: 1.5 INJECTION, POWDER, LYOPHILIZED, FOR SOLUTION INTRAVENOUS at 23:34

## 2025-04-26 RX ADMIN — SENNOSIDES AND DOCUSATE SODIUM 2 TABLET: 50; 8.6 TABLET ORAL at 20:12

## 2025-04-26 RX ADMIN — LABETALOL HYDROCHLORIDE 10 MG: 5 INJECTION, SOLUTION INTRAVENOUS at 07:39

## 2025-04-26 RX ADMIN — SODIUM CHLORIDE 75 ML/HR: 3 INJECTION, SOLUTION INTRAVENOUS at 08:35

## 2025-04-26 RX ADMIN — HYDRALAZINE HYDROCHLORIDE 75 MG: 50 TABLET ORAL at 20:11

## 2025-04-26 RX ADMIN — HYDRALAZINE HYDROCHLORIDE 50 MG: 50 TABLET ORAL at 02:20

## 2025-04-26 RX ADMIN — PIPERACILLIN SODIUM AND TAZOBACTAM SODIUM 4.5 G: 4; .5 INJECTION, SOLUTION INTRAVENOUS at 00:07

## 2025-04-26 RX ADMIN — VANCOMYCIN HYDROCHLORIDE 1500 MG: 1.5 INJECTION, POWDER, LYOPHILIZED, FOR SOLUTION INTRAVENOUS at 00:07

## 2025-04-26 RX ADMIN — POLYETHYLENE GLYCOL 3350 17 G: 17 POWDER, FOR SOLUTION ORAL at 08:30

## 2025-04-26 RX ADMIN — INSULIN LISPRO 1 UNITS: 100 INJECTION, SOLUTION INTRAVENOUS; SUBCUTANEOUS at 17:40

## 2025-04-26 RX ADMIN — LABETALOL HYDROCHLORIDE 10 MG: 5 INJECTION, SOLUTION INTRAVENOUS at 20:42

## 2025-04-26 RX ADMIN — INSULIN LISPRO 1 UNITS: 100 INJECTION, SOLUTION INTRAVENOUS; SUBCUTANEOUS at 19:56

## 2025-04-26 RX ADMIN — LABETALOL HYDROCHLORIDE 10 MG: 5 INJECTION, SOLUTION INTRAVENOUS at 10:37

## 2025-04-26 RX ADMIN — NICARDIPINE HYDROCHLORIDE 15 MG/HR: 0.2 INJECTION, SOLUTION INTRAVENOUS at 02:39

## 2025-04-26 RX ADMIN — HYDRALAZINE HYDROCHLORIDE 75 MG: 50 TABLET ORAL at 15:17

## 2025-04-26 RX ADMIN — NICARDIPINE HYDROCHLORIDE 15 MG/HR: 0.2 INJECTION, SOLUTION INTRAVENOUS at 19:31

## 2025-04-26 RX ADMIN — HEPARIN SODIUM 5000 UNITS: 5000 INJECTION INTRAVENOUS; SUBCUTANEOUS at 02:20

## 2025-04-26 RX ADMIN — HYDRALAZINE HYDROCHLORIDE 50 MG: 50 TABLET ORAL at 08:30

## 2025-04-26 RX ADMIN — POTASSIUM CHLORIDE 20 MEQ: 14.9 INJECTION, SOLUTION INTRAVENOUS at 21:44

## 2025-04-26 RX ADMIN — POTASSIUM CHLORIDE 20 MEQ: 14.9 INJECTION, SOLUTION INTRAVENOUS at 18:44

## 2025-04-26 RX ADMIN — NICARDIPINE HYDROCHLORIDE 12.5 MG/HR: 0.2 INJECTION, SOLUTION INTRAVENOUS at 16:45

## 2025-04-26 RX ADMIN — PIPERACILLIN SODIUM AND TAZOBACTAM SODIUM 4.5 G: 4; .5 INJECTION, SOLUTION INTRAVENOUS at 12:11

## 2025-04-26 RX ADMIN — LABETALOL HYDROCHLORIDE 10 MG: 5 INJECTION, SOLUTION INTRAVENOUS at 17:36

## 2025-04-26 RX ADMIN — Medication 40 PERCENT: at 08:58

## 2025-04-26 RX ADMIN — LABETALOL HYDROCHLORIDE 10 MG: 5 INJECTION, SOLUTION INTRAVENOUS at 00:19

## 2025-04-26 RX ADMIN — HYDRALAZINE HYDROCHLORIDE 10 MG: 20 INJECTION INTRAMUSCULAR; INTRAVENOUS at 15:22

## 2025-04-26 RX ADMIN — POTASSIUM CHLORIDE 20 MEQ: 14.9 INJECTION, SOLUTION INTRAVENOUS at 23:37

## 2025-04-26 RX ADMIN — HEPARIN SODIUM 5000 UNITS: 5000 INJECTION INTRAVENOUS; SUBCUTANEOUS at 08:30

## 2025-04-26 RX ADMIN — INSULIN LISPRO 1 UNITS: 100 INJECTION, SOLUTION INTRAVENOUS; SUBCUTANEOUS at 08:21

## 2025-04-26 RX ADMIN — ATORVASTATIN CALCIUM 10 MG: 10 TABLET, FILM COATED ORAL at 20:12

## 2025-04-26 RX ADMIN — SENNOSIDES AND DOCUSATE SODIUM 2 TABLET: 50; 8.6 TABLET ORAL at 08:30

## 2025-04-26 RX ADMIN — NICARDIPINE HYDROCHLORIDE 15 MG/HR: 0.2 INJECTION, SOLUTION INTRAVENOUS at 23:31

## 2025-04-26 RX ADMIN — POTASSIUM CHLORIDE 20 MEQ: 14.9 INJECTION, SOLUTION INTRAVENOUS at 05:52

## 2025-04-26 RX ADMIN — NICARDIPINE HYDROCHLORIDE 12.5 MG/HR: 0.2 INJECTION, SOLUTION INTRAVENOUS at 08:58

## 2025-04-26 RX ADMIN — CARVEDILOL 6.25 MG: 12.5 TABLET, FILM COATED ORAL at 06:14

## 2025-04-26 RX ADMIN — NICARDIPINE HYDROCHLORIDE 15 MG/HR: 0.2 INJECTION, SOLUTION INTRAVENOUS at 06:14

## 2025-04-26 RX ADMIN — HEPARIN SODIUM 5000 UNITS: 5000 INJECTION INTRAVENOUS; SUBCUTANEOUS at 18:01

## 2025-04-26 RX ADMIN — POTASSIUM CHLORIDE 20 MEQ: 1.5 POWDER, FOR SOLUTION ORAL at 21:44

## 2025-04-26 RX ADMIN — LISINOPRIL 10 MG: 20 TABLET ORAL at 18:11

## 2025-04-26 RX ADMIN — PIPERACILLIN SODIUM AND TAZOBACTAM SODIUM 4.5 G: 4; .5 INJECTION, SOLUTION INTRAVENOUS at 18:01

## 2025-04-26 RX ADMIN — HYDRALAZINE HYDROCHLORIDE 10 MG: 20 INJECTION INTRAMUSCULAR; INTRAVENOUS at 09:08

## 2025-04-26 RX ADMIN — PIPERACILLIN SODIUM AND TAZOBACTAM SODIUM 4.5 G: 4; .5 INJECTION, SOLUTION INTRAVENOUS at 05:50

## 2025-04-26 ASSESSMENT — PAIN - FUNCTIONAL ASSESSMENT
PAIN_FUNCTIONAL_ASSESSMENT: CPOT (CRITICAL CARE PAIN OBSERVATION TOOL)

## 2025-04-26 NOTE — CARE PLAN
Problem: General Stroke  Goal: Establish a mutual long term goal with patient by discharge  Outcome: Progressing     Problem: ICU Stroke  Goal: Achieve/maintain targeted sodium level throughout shift  Outcome: Progressing     Problem: Mechanical Ventilation  Goal: Oral health is maintained or improved  Outcome: Progressing     Problem: Skin  Goal: Prevent/minimize sheer/friction injuries  Outcome: Progressing  Flowsheets (Taken 4/24/2025 0113)  Prevent/minimize sheer/friction injuries:   Complete micro-shifts as needed if patient unable. Adjust patient position to relieve pressure points, not a full turn   HOB 30 degrees or less   Increase activity/out of bed for meals   Turn/reposition every 2 hours/use positioning/transfer devices   Use pull sheet   Utilize specialty bed per algorithm

## 2025-04-26 NOTE — PROGRESS NOTES
Vancomycin Dosing by Pharmacy- FOLLOW UP    Muriel De Souza is a 69 y.o. year old female who Pharmacy has been consulted for vancomycin dosing for other (sepsis) . Based on the patient's indication and renal status this patient is being dosed based on a goal AUC of 500-600.     Renal function is currently stable.    Current vancomycin dose: 1500 mg given every 12 hours    Estimated vancomycin AUC on current dose: 563 mg/L.hr     Visit Vitals  /51   Pulse 84   Temp (!) 38.1 °C (100.6 °F) Comment: see MAR   Resp 15        Lab Results   Component Value Date    CREATININE 0.65 2025    CREATININE 0.61 2025    CREATININE 0.59 2025    CREATININE 0.59 2025        Patient weight is as follows:   Vitals:    25 0000   Weight: 95.9 kg (211 lb 6.7 oz)       Cultures:  No results found for the encounter in last 14 days.       I/O last 3 completed shifts:  In: 73618.4 (126.8 mL/kg) [I.V.:8217.5 (85.7 mL/kg); NG/GT:1800; IV Piggyback:2139.9]  Out: 5326 (55.5 mL/kg) [Urine:4965 (1.4 mL/kg/hr); Drains:361]  Weight: 95.9 kg   I/O during current shift:  I/O this shift:  In: 1577.3 [I.V.:1167.3; NG/GT:410]  Out: 944 [Urine:865; Drains:79]    Temp (24hrs), Av °C (98.6 °F), Min:36.3 °C (97.3 °F), Max:38.1 °C (100.6 °F)      Assessment/Plan    Within goal AUC range. Continue current vancomycin regimen.    This dosing regimen is predicted by [employer] to result in the following pharmacokinetic parameters:  Loading dose: N/A  Regimen: 1500 mg IV every 12 hours.  Start time: 13:35 on 2025  Exposure target: AUC24 (range)400-600 mg/L.hr   [id number]: 523 mg/L.hr  AUC24,ss: 563 mg/L.hr  Probability of AUC24 > 400: 97 %  Ctrough,ss: 17.5 mg/L  Probability of Ctrough,ss > 20: 34 %    The next level will be obtained on  at 1000. May be obtained sooner if clinically indicated.   Will continue to monitor renal function daily while on vancomycin and order serum creatinine at least every 48 hours if not  already ordered.  Follow for continued vancomycin needs, clinical response, and signs/symptoms of toxicity.       ROSALIE BURTON, PharmD

## 2025-04-26 NOTE — PROGRESS NOTES
Occupational Therapy    Communication Note    Patient Name: Muriel De Souza  MRN: [id number]  Today's Date: 4/26/2025   Room: 07/07A    Discipline: Occupational Therapy      Missed Visit Reason: Patient placed on medical hold (Patient intubated, RASS -4, no command following per RN; will attempt OT next visit as appropriate.)      04/26/25 at 7:58 AM   Barbie Hodgson OT   Rehab Office: [phone]

## 2025-04-26 NOTE — PROGRESS NOTES
[facility]  NEUROSCIENCE INTENSIVE CARE UNIT  DAILY PROGRESS NOTE       Patient Name: Muriel De Souza   MRN: [id number]     Admit Date: 2025     : 1955 AGE: 69 y.o. GENDER: female        Subjective    Muriel De Souza is a 69 y.o. female with h/o HTN, HLD p/w HA, arrested in CT scanner at OSH, ROSC after 7 min CPR,  CTH w diffuse SAH and L cerebellar ICH, triventriculomegally, effaced 4th vents, CTA with L cerebellar AVM w venous varices s/p RF EVD and now admitted for further management.    Per chart review, patient had received bad news at home and then had sudden onset headache and vomiting. Found to have SAH at OSH with course complicated by cardiac arrest. She was intubated and sedated and transferred from [place], [place] to [facility].    Significant Events:  -  came in with L cerebellar AVM bleed with course complicated by cardiac arrest ROSC after 7 min CPR, uppon arrival to [facility] NSU, EVD drain placed, went to OR for decompression and AVM resection    Interval Events:   - Off sedation   - IV clevidipine switched to IV nicardipine  - Home PO atenolol 25 mg switched to carvedilol 6.25 mg BID  - Scheduled hydralazine 50 mg q8  - SBP goal < 140       Objective   VITALS (24H):  Temp:  [36 °C (96.8 °F)-37.4 °C (99.3 °F)] 36.8 °C (98.2 °F)  Heart Rate:  [75-90] 83  Resp:  [12-20] 15  BP: (123-145)/(48-56) 142/51  Arterial Line BP 1: (121-170)/(43-60) 147/46  FiO2 (%):  [40 %-50 %] 40 %  INTAKE/OUTPUT:  Intake/Output Summary (Last 24 hours) at 2025 0724  Last data filed at 2025 0600  Gross per 24 hour   Intake 8126.67 ml   Output 3625 ml   Net 4501.67 ml     VENT SETTINGS:  Vent Mode: Volume control/assist control  FiO2 (%):  [40 %-50 %] 40 %  S RR:  [12] 12  S VT:  [400 mL] 400 mL  PEEP/CPAP (cm H2O):  [5 cm H20-8 cm H20] 8 cm H20  MAP (cm H2O):  [10-13] 12       PHYSICAL EXAM:  NEURO: Off sedation  - Intubated  - Eyes closed to nox stim  - pupils minimally reactive to  light  - no VOR  - corneals negative  - no cough or gag  - no spontaneous movements  - No response to pain in UEs  - No response to pain in RLE  - possible triple flexion in LLE  - no increased tone  CV:  - RRR on telemetry, NSR  - Arterial line in place  RESP:  - intubated  :  - Brooks catheter in place  GI:  - Abdomen NT/ND, soft  SKIN:  - Intact  - left arm swollen and tense to palpation, not pitting, non-erythematous    MEDICATIONS:  Scheduled: PRN: Continuous:   Scheduled Medications[1] PRN Medications[2] Continuous Medications[3]     IMAGING RESULTS:  CT head wo IV contrast   Final Result   * Diffuse subarachnoid hemorrhage with hydrocephalus   *Left cerebellar hematoma suggesting a likely source.        MACRO:   none        Signed by: Cruz Salgado 4/22/2025 11:59 AM   Dictation workstation:   [id number]      CT angio head and neck w and wo IV contrast   Final Result   * Diffuse subarachnoid hemorrhage with hydrocephalus as described   *Suspected vascular malformation in the left cerebellar hemisphere   with enlarged arterial and venous structures largely in the left   cerebellar hemisphere and residual partial opacification of a venous   aneurysm near the torcula. *Bilateral carotid stenosis as described        MACRO:   none        Signed by: Cruz Salgado 4/22/2025 12:07 PM   Dictation workstation:   [id number]             Assessment/Plan    Muriel De Souza is a 69 y.o. female with h/o HTN, HLD p/w HA, arrested in CT scanner at OSH, ROSC after 7 min CPR,  CTH w diffuse SAH and L cerebellar ICH, triventriculomegally, effaced 4th vents, CTA with L cerebellar AVM w venous varices s/p RF EVD as well as AVM resection on 4/22 and now admitted for further management.      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  #Intracranial hypertension  Assessment:  - First day 4/22 presented with SAH, L cerebellar  ICH, and ventriculomegaly on CTH  - 4/22 CTA showing L cerebellar AVM  - 4/22 RF EVD for ICH  - 4/22 OR AVM resection  Plan:  - NSU  - Keppra  - repeat angio per NSGY timing  - EVD in place, maintained at 10  - Neuro Checks: Q1H  - Sedation: Propofol and Fentanyl  - Pain: PRN  - Nausea: ondansetron  - PT/OT/SLP    CARDIOVASCULAR:  #Cardiac arrest  #Lactic acidosis, improving  # elevated troponins, improved  Assessment:  - 7 min to ROSC  - Assess for ischemic and stress-induced cardiomyopathy   - Assess for arrhythmias following cardiac arrest   - Cards consult (4/23)  - Type 2 MI (demand ischemia), leading to troponin up-trend (4/23)  - Trop trending down, d/c'd 4/24  - ECHO 4/23:  CONCLUSIONS:   1. Left ventricular ejection fraction is normal, calculated by Adrian's biplane at 71%.   2. Spectral Doppler shows a Grade I (impaired relaxation pattern) of left ventricular diastolic filling with normal left atrial filling pressure.   3. There is normal right ventricular global systolic function.   4. The left atrium is mildly dilated.   5. There is no evidence of a patent foramen ovale.   6. The inferior vena cava appears mildly dilated, on vent.    Plan:  - Pressors PRN  - Monitor on telemetry  - BP goal: SBP < 140    --> PRN: Labetalol, Hydralazine, and Nicardipine   - IV nicardipine  - PO hydralazine 50 mg q8  - PO Carvedilol 6.25 mg BID    RESPIRATORY:  #Acute hypoxic respiratory failure  #Mechanical ventilation  Assessment:  - Pt intubated for decreased LOC  Plan:  - Continue mechanical ventilation    RENAL/:  #TIM  Assessment:  Results from last 72 hours   Lab Units 04/26/25  0207 04/25/25  1640   BUN mg/dL 21 16   CREATININE mg/dL 0.59 0.59     Plan:  - Monitor with daily RFP  - Maintain forbes catheter for: critically ill patient who need accurate urinary output measurements    FEN/GI:  #TIM    0   Lab Value Date/Time     (H) 04/26/2025 0614     (H) 04/26/2025 0207     (H) 04/25/2025 2018      (H) 2025 1640     (H) 2025     Plan:  - Monitor and replace electrolytes per protocol  - Sodium goal 150 - 160 per NSGY  - IVF: PRN  - Diet: Enteral feeding through NG  - Bowel Regimen: Docusate-Senna PRN and Miralax PRN    ENDOCRINE:  #TIM  Assessment:  Results from last 7 days   Lab Units 25  0319 25  020250 25  1640 25  1609 25  1224 25  1138 25  0759 25  0419 25  025   POCT GLUCOSE mg/dL 170*  --  181*  --  180*  --  168*  --  154*  --  134*  --    GLUCOSE mg/dL  --  186*  --   --   --  186*  --  161*  --  123*  --  146*   SODIUM mmol/L  --  157*  --  153*  --  152*  151*  --  150*  149*  --  147*  147*  --  145  145    - HbA1C 5.5 on     Plan:  - Accuchecks & ISS PRN     HEMATOLOGY:  #Anemia, likely dilutional  Assessment:  - Baseline Hgb: 12.0  - Baseline Plts: 287  Results from last 7 days   Lab Units 25  02025  1224   HEMOGLOBIN g/dL 8.5* 8.5* 8.4*   HEMATOCRIT % 27.1* 27.8* 26.5*   PLATELETS AUTO x10*3/uL 231 205 202     Plan:  - Continue to monitor with daily CBC and Coag panel  - maintain active type and screen as needed    INFECTIOUS DISEASE:  #TIM  Assessment:  Results from last 7 days   Lab Units 25  02025  1224   WBC AUTO x10*3/uL 11.4* 12.8* 12.4*    - Temp (24hrs), Av.7 °C (98.1 °F), Min:36 °C (96.8 °F), Max:37.4 °C (99.3 °F)  - White count trending down  - Pt afebrile  - Bcx no growth at 4 days ()  - Sputum cult  no predominant organism  - UA  unremarkable    MUSCULOSKELETAL:  - No acute issues    SKIN:  #Swollen left arm  Assessment:   - LUE with persistent swelling since   - Non-pitting edema, non-erythematous  - POD4  - SQH started ()  - No DVT in LE ()  - No DVT in RUE ()  - Superficial thrombosis in basilic vein in LUE ()    Plan:   - Turns and skin care per NSU  protocol    ACCESS:  - R Femoral  - R arterial  - L PIV    PROPHYLAXIS:  - DVT Ppx: SCDs, SQH  - GI: PPI    RESTRAINTS:  Not indicated/Patient does not meet criteria for restraints    Antonio Leong  Neuroscience Intensive Care    Fellow Addendum:  I have reviewed the information above and I agree with the assessment and plan as outlined by the resident/medical student with the following additions/and or corrections:    69 y.o. female with h/o HTN, HLD p/w HA, arrested in CT scanner at OSH, ROSC after 7 min CPR, CTH w diffuse SAH and L cerebellar ICH, triventriculomegally, effaced 4th vents, CTA with L cerebellar AVM w/ venous varices s/p RF EVD as well as AVM resection on 4/22 and now admitted for further management.     Neuro: Exam remains poor, only pupils intact and infrequently triggering vent without other brainstem reflexes now off sedation  CV:   -cardiac arrest on arrival to hospital, likely PEA in setting of profound intracranial pressure elevation.   -Labile blood pressures, goal 100-140. Now on nicardipine, coreg, PO Hydral. Increase as needed.  Resp: Intubated, vent settings minimal  Renal: Monitor UOP  FEN/GI: Advance TF as tolerated. Mild elevation in AST/ALT  Endo: SSI PRN  Heme: Hb goal >7  ID: watch for signs of fever, add abx for desat/secretions    Jace Hernandez MD - 04/26/25 at 9:29 AM  Neurocritical Care Fellow  PGY-6             [1] atorvastatin, 10 mg, oral, Nightly  carvedilol, 6.25 mg, oral, q12h  heparin (porcine), 5,000 Units, subcutaneous, q8h  hydrALAZINE, 50 mg, oral, q8h  insulin lispro, 0-5 Units, subcutaneous, q4h  pantoprazole, 40 mg, intravenous, Daily before breakfast  perflutren protein A microsphere, 0.5 mL, intravenous, Once in imaging  piperacillin-tazobactam, 4.5 g, intravenous, q6h  polyethylene glycol, 17 g, oral, Daily  potassium chloride, 20 mEq, intravenous, q2h  sennosides-docusate sodium, 2 tablet, oral, BID  sulfur hexafluoride microsphr, 2 mL, intravenous, Once  in imaging  vancomycin, 1,500 mg, intravenous, q12h     [2] PRN medications: albuterol, calcium gluconate, calcium gluconate, dextrose, dextrose, fentaNYL, glucagon, glucagon, hydrALAZINE, labetaloL, magnesium sulfate, magnesium sulfate, oxygen, potassium chloride CR **OR** potassium chloride, potassium chloride CR **OR** potassium chloride, vancomycin  [3] fentaNYL,  mcg/hr, Last Rate: Stopped (04/25/25 0859)  lactated Ringer's, 100 mL/hr, Last Rate: 100 mL/hr (04/26/25 0400)  niCARdipine, 2.5-15 mg/hr, Last Rate: 12.5 mg/hr (04/26/25 0620)  sodium chloride, 75 mL/hr, Last Rate: 75 mL/hr (04/26/25 0400)

## 2025-04-26 NOTE — PROGRESS NOTES
Communication Note    Patient Name: Mruiel De Souza  MRN: [id number]  Today's Date: 4/26/2025   Room: 07/07-A    Discipline: Physical Therapy      Missed Visit Reason: Patient placed on medical hold (RASS -4, no following commands, will hold PT eval and follow as appropriate)      04/26/25 at 7:59 AM   Maddie Ferguson, PT   Rehab Office: [phone]

## 2025-04-26 NOTE — PROGRESS NOTES
"Muriel De Souza is a 69 y.o. female on day 4 of admission presenting with AVM (arteriovenous malformation) ([facility]).    Subjective   No events overnight    Objective     Physical Exam  Intubated  ECNS  Ou2min R  -cough, gag, -corneal  BUE flaccid  BLE TF  EVD in place    Last Recorded Vitals  Blood pressure 139/54, pulse 85, temperature 37.4 °C (99.3 °F), resp. rate 20, height 1.727 m (5' 7.99\"), weight 95.9 kg (211 lb 6.7 oz), SpO2 98%.  Intake/Output last 3 Shifts:  I/O last 3 completed shifts:  In: 31204.6 (110 mL/kg) [I.V.:6660.7 (71.6 mL/kg); NG/GT:1530; IV Piggyback:2039.9]  Out: 5676 (61 mL/kg) [Urine:5295 (1.6 mL/kg/hr); Drains:381]  Weight: 93 kg     Relevant Results    Assessment & Plan  AVM (arteriovenous malformation) ([facility])    Muriel De Souza is a 69 y.o. 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, s/p 1u Plt and DDAVP, s/p RF EVD (OP 18), 4/22 s/p SOC/C1 lami for AVM resection, CTH POC, stable vents, 4/22 TEG R low s/p 1u FFP, 4/23 TTE EF 71%, 4/23 s/p angio tentorial dural AV fistual w direct cortical venous drainage fed by b/l MMA, b/l tentorial arteries, incidental 2mm R pcomm aneurysm, 4/24 CTH slight decr vents    NSU  EVD at 10, monitor output and ICPs  Na 150-160  SBP <140  Cards recs  Final path  Vasc conf recs  Q12 CBC, RFP  Abx, cxs  PTOT  SLP    Chavez Vang MD    "

## 2025-04-27 ENCOUNTER — APPOINTMENT (OUTPATIENT)
Dept: RADIOLOGY | Facility: HOSPITAL | Age: 70
End: 2025-04-27
Payer: COMMERCIAL

## 2025-04-27 VITALS
TEMPERATURE: 98.1 F | RESPIRATION RATE: 14 BRPM | WEIGHT: 211.42 LBS | DIASTOLIC BLOOD PRESSURE: 45 MMHG | HEART RATE: 68 BPM | BODY MASS INDEX: 32.04 KG/M2 | HEIGHT: 68 IN | OXYGEN SATURATION: 96 % | SYSTOLIC BLOOD PRESSURE: 121 MMHG

## 2025-04-27 LAB
ALBUMIN SERPL BCP-MCNC: 2.8 G/DL (ref 3.4–5)
ALBUMIN SERPL BCP-MCNC: 2.9 G/DL (ref 3.4–5)
ANION GAP BLDA CALCULATED.4IONS-SCNC: 11 MMO/L (ref 10–25)
ANION GAP BLDA CALCULATED.4IONS-SCNC: 8 MMO/L (ref 10–25)
ANION GAP SERPL CALC-SCNC: 11 MMOL/L (ref 10–20)
ANION GAP SERPL CALC-SCNC: 13 MMOL/L (ref 10–20)
BACTERIA SPEC RESP CULT: NORMAL
BASE EXCESS BLDA CALC-SCNC: -0.1 MMOL/L (ref -2–3)
BASE EXCESS BLDA CALC-SCNC: 1.5 MMOL/L (ref -2–3)
BASOPHILS # BLD AUTO: 0.01 X10*3/UL (ref 0–0.1)
BASOPHILS # BLD AUTO: 0.03 X10*3/UL (ref 0–0.1)
BASOPHILS NFR BLD AUTO: 0.1 %
BASOPHILS NFR BLD AUTO: 0.2 %
BODY TEMPERATURE: ABNORMAL
BODY TEMPERATURE: ABNORMAL
BUN SERPL-MCNC: 30 MG/DL (ref 6–23)
BUN SERPL-MCNC: 34 MG/DL (ref 6–23)
CA-I BLD-SCNC: 1.2 MMOL/L (ref 1.1–1.33)
CA-I BLDA-SCNC: 1.16 MMOL/L (ref 1.1–1.33)
CA-I BLDA-SCNC: 1.24 MMOL/L (ref 1.1–1.33)
CALCIUM SERPL-MCNC: 8 MG/DL (ref 8.6–10.6)
CALCIUM SERPL-MCNC: 8.2 MG/DL (ref 8.6–10.6)
CHLORIDE BLDA-SCNC: 123 MMOL/L (ref 98–107)
CHLORIDE BLDA-SCNC: 124 MMOL/L (ref 98–107)
CHLORIDE SERPL-SCNC: 119 MMOL/L (ref 98–107)
CHLORIDE SERPL-SCNC: 122 MMOL/L (ref 98–107)
CO2 SERPL-SCNC: 26 MMOL/L (ref 21–32)
CO2 SERPL-SCNC: 27 MMOL/L (ref 21–32)
CREAT SERPL-MCNC: 0.73 MG/DL (ref 0.5–1.05)
CREAT SERPL-MCNC: 0.85 MG/DL (ref 0.5–1.05)
EGFRCR SERPLBLD CKD-EPI 2021: 75 ML/MIN/1.73M*2
EGFRCR SERPLBLD CKD-EPI 2021: 90 ML/MIN/1.73M*2
EOSINOPHIL # BLD AUTO: 0 X10*3/UL (ref 0–0.7)
EOSINOPHIL # BLD AUTO: 0.01 X10*3/UL (ref 0–0.7)
EOSINOPHIL NFR BLD AUTO: 0 %
EOSINOPHIL NFR BLD AUTO: 0.1 %
ERYTHROCYTE [DISTWIDTH] IN BLOOD BY AUTOMATED COUNT: 14.6 % (ref 11.5–14.5)
ERYTHROCYTE [DISTWIDTH] IN BLOOD BY AUTOMATED COUNT: 14.9 % (ref 11.5–14.5)
GLUCOSE BLD MANUAL STRIP-MCNC: 141 MG/DL (ref 74–99)
GLUCOSE BLD MANUAL STRIP-MCNC: 151 MG/DL (ref 74–99)
GLUCOSE BLD MANUAL STRIP-MCNC: 158 MG/DL (ref 74–99)
GLUCOSE BLD MANUAL STRIP-MCNC: 161 MG/DL (ref 74–99)
GLUCOSE BLD MANUAL STRIP-MCNC: 172 MG/DL (ref 74–99)
GLUCOSE BLD MANUAL STRIP-MCNC: 173 MG/DL (ref 74–99)
GLUCOSE BLDA-MCNC: 154 MG/DL (ref 74–99)
GLUCOSE BLDA-MCNC: 172 MG/DL (ref 74–99)
GLUCOSE SERPL-MCNC: 163 MG/DL (ref 74–99)
GLUCOSE SERPL-MCNC: 164 MG/DL (ref 74–99)
GRAM STN SPEC: NORMAL
GRAM STN SPEC: NORMAL
HCO3 BLDA-SCNC: 23.5 MMOL/L (ref 22–26)
HCO3 BLDA-SCNC: 25.9 MMOL/L (ref 22–26)
HCT VFR BLD AUTO: 26.8 % (ref 36–46)
HCT VFR BLD AUTO: 27.1 % (ref 36–46)
HCT VFR BLD EST: 24 % (ref 36–46)
HCT VFR BLD EST: 28 % (ref 36–46)
HGB BLD-MCNC: 8.4 G/DL (ref 12–16)
HGB BLD-MCNC: 8.6 G/DL (ref 12–16)
HGB BLDA-MCNC: 8.1 G/DL (ref 12–16)
HGB BLDA-MCNC: 9.2 G/DL (ref 12–16)
IMM GRANULOCYTES # BLD AUTO: 0.08 X10*3/UL (ref 0–0.7)
IMM GRANULOCYTES # BLD AUTO: 0.12 X10*3/UL (ref 0–0.7)
IMM GRANULOCYTES NFR BLD AUTO: 0.8 % (ref 0–0.9)
IMM GRANULOCYTES NFR BLD AUTO: 1 % (ref 0–0.9)
INHALED O2 CONCENTRATION: 50 %
INHALED O2 CONCENTRATION: 50 %
LACTATE BLDA-SCNC: 0.6 MMOL/L (ref 0.4–2)
LACTATE BLDA-SCNC: 0.7 MMOL/L (ref 0.4–2)
LYMPHOCYTES # BLD AUTO: 0.89 X10*3/UL (ref 1.2–4.8)
LYMPHOCYTES # BLD AUTO: 1.17 X10*3/UL (ref 1.2–4.8)
LYMPHOCYTES NFR BLD AUTO: 8.4 %
LYMPHOCYTES NFR BLD AUTO: 9.7 %
MAGNESIUM SERPL-MCNC: 2.09 MG/DL (ref 1.6–2.4)
MCH RBC QN AUTO: 28.7 PG (ref 26–34)
MCH RBC QN AUTO: 29.1 PG (ref 26–34)
MCHC RBC AUTO-ENTMCNC: 31 G/DL (ref 32–36)
MCHC RBC AUTO-ENTMCNC: 32.1 G/DL (ref 32–36)
MCV RBC AUTO: 91 FL (ref 80–100)
MCV RBC AUTO: 93 FL (ref 80–100)
MONOCYTES # BLD AUTO: 0.77 X10*3/UL (ref 0.1–1)
MONOCYTES # BLD AUTO: 1.02 X10*3/UL (ref 0.1–1)
MONOCYTES NFR BLD AUTO: 7.3 %
MONOCYTES NFR BLD AUTO: 8.5 %
NEUTROPHILS # BLD AUTO: 8.81 X10*3/UL (ref 1.2–7.7)
NEUTROPHILS # BLD AUTO: 9.7 X10*3/UL (ref 1.2–7.7)
NEUTROPHILS NFR BLD AUTO: 80.5 %
NEUTROPHILS NFR BLD AUTO: 83.4 %
NRBC BLD-RTO: 0 /100 WBCS (ref 0–0)
NRBC BLD-RTO: 0 /100 WBCS (ref 0–0)
OSMOLALITY SERPL: 328 MOSM/KG (ref 280–300)
OSMOLALITY SERPL: 329 MOSM/KG (ref 280–300)
OSMOLALITY SERPL: 330 MOSM/KG (ref 280–300)
OSMOLALITY SERPL: 330 MOSM/KG (ref 280–300)
OSMOLALITY SERPL: 331 MOSM/KG (ref 280–300)
OSMOLALITY SERPL: 332 MOSM/KG (ref 280–300)
OXYHGB MFR BLDA: 96.6 % (ref 94–98)
OXYHGB MFR BLDA: 96.8 % (ref 94–98)
PCO2 BLDA: 33 MM HG (ref 38–42)
PCO2 BLDA: 39 MM HG (ref 38–42)
PH BLDA: 7.43 PH (ref 7.38–7.42)
PH BLDA: 7.46 PH (ref 7.38–7.42)
PHOSPHATE SERPL-MCNC: 2.4 MG/DL (ref 2.5–4.9)
PHOSPHATE SERPL-MCNC: 2.9 MG/DL (ref 2.5–4.9)
PLATELET # BLD AUTO: 250 X10*3/UL (ref 150–450)
PLATELET # BLD AUTO: 283 X10*3/UL (ref 150–450)
PO2 BLDA: 80 MM HG (ref 85–95)
PO2 BLDA: 97 MM HG (ref 85–95)
POTASSIUM BLDA-SCNC: 3.2 MMOL/L (ref 3.5–5.3)
POTASSIUM BLDA-SCNC: 3.3 MMOL/L (ref 3.5–5.3)
POTASSIUM SERPL-SCNC: 3.1 MMOL/L (ref 3.5–5.3)
POTASSIUM SERPL-SCNC: 3.2 MMOL/L (ref 3.5–5.3)
RBC # BLD AUTO: 2.93 X10*6/UL (ref 4–5.2)
RBC # BLD AUTO: 2.96 X10*6/UL (ref 4–5.2)
SAO2 % BLDA: 99 % (ref 94–100)
SAO2 % BLDA: 99 % (ref 94–100)
SODIUM BLDA-SCNC: 154 MMOL/L (ref 136–145)
SODIUM BLDA-SCNC: 155 MMOL/L (ref 136–145)
SODIUM SERPL-SCNC: 154 MMOL/L (ref 136–145)
SODIUM SERPL-SCNC: 155 MMOL/L (ref 136–145)
SODIUM SERPL-SCNC: 156 MMOL/L (ref 136–145)
SODIUM SERPL-SCNC: 157 MMOL/L (ref 136–145)
WBC # BLD AUTO: 10.6 X10*3/UL (ref 4.4–11.3)
WBC # BLD AUTO: 12.1 X10*3/UL (ref 4.4–11.3)

## 2025-04-27 PROCEDURE — 36620 INSERTION CATHETER ARTERY: CPT

## 2025-04-27 PROCEDURE — 2500000001 HC RX 250 WO HCPCS SELF ADMINISTERED DRUGS (ALT 637 FOR MEDICARE OP): Performed by: STUDENT IN AN ORGANIZED HEALTH CARE EDUCATION/TRAINING PROGRAM

## 2025-04-27 PROCEDURE — 84295 ASSAY OF SERUM SODIUM: CPT

## 2025-04-27 PROCEDURE — 85025 COMPLETE CBC W/AUTO DIFF WBC: CPT

## 2025-04-27 PROCEDURE — 82435 ASSAY OF BLOOD CHLORIDE: CPT

## 2025-04-27 PROCEDURE — 2500000001 HC RX 250 WO HCPCS SELF ADMINISTERED DRUGS (ALT 637 FOR MEDICARE OP)

## 2025-04-27 PROCEDURE — 83930 ASSAY OF BLOOD OSMOLALITY: CPT

## 2025-04-27 PROCEDURE — 2500000004 HC RX 250 GENERAL PHARMACY W/ HCPCS (ALT 636 FOR OP/ED): Performed by: STUDENT IN AN ORGANIZED HEALTH CARE EDUCATION/TRAINING PROGRAM

## 2025-04-27 PROCEDURE — 2500000004 HC RX 250 GENERAL PHARMACY W/ HCPCS (ALT 636 FOR OP/ED): Mod: JZ

## 2025-04-27 PROCEDURE — 71045 X-RAY EXAM CHEST 1 VIEW: CPT

## 2025-04-27 PROCEDURE — 94003 VENT MGMT INPAT SUBQ DAY: CPT

## 2025-04-27 PROCEDURE — 2500000004 HC RX 250 GENERAL PHARMACY W/ HCPCS (ALT 636 FOR OP/ED): Mod: JZ | Performed by: STUDENT IN AN ORGANIZED HEALTH CARE EDUCATION/TRAINING PROGRAM

## 2025-04-27 PROCEDURE — 37799 UNLISTED PX VASCULAR SURGERY: CPT

## 2025-04-27 PROCEDURE — 2500000004 HC RX 250 GENERAL PHARMACY W/ HCPCS (ALT 636 FOR OP/ED)

## 2025-04-27 PROCEDURE — 82330 ASSAY OF CALCIUM: CPT

## 2025-04-27 PROCEDURE — 99291 CRITICAL CARE FIRST HOUR: CPT | Performed by: PSYCHIATRY & NEUROLOGY

## 2025-04-27 PROCEDURE — 2500000005 HC RX 250 GENERAL PHARMACY W/O HCPCS: Performed by: STUDENT IN AN ORGANIZED HEALTH CARE EDUCATION/TRAINING PROGRAM

## 2025-04-27 PROCEDURE — 83735 ASSAY OF MAGNESIUM: CPT

## 2025-04-27 PROCEDURE — 2500000002 HC RX 250 W HCPCS SELF ADMINISTERED DRUGS (ALT 637 FOR MEDICARE OP, ALT 636 FOR OP/ED): Performed by: REGISTERED NURSE

## 2025-04-27 PROCEDURE — 2020000001 HC ICU ROOM DAILY

## 2025-04-27 PROCEDURE — 82947 ASSAY GLUCOSE BLOOD QUANT: CPT

## 2025-04-27 PROCEDURE — 94669 MECHANICAL CHEST WALL OSCILL: CPT

## 2025-04-27 RX ORDER — AMLODIPINE BESYLATE 10 MG/1
10 TABLET ORAL DAILY
Status: DISCONTINUED | OUTPATIENT
Start: 2025-04-27 | End: 2025-04-27

## 2025-04-27 RX ORDER — POTASSIUM CHLORIDE 1.5 G/1.58G
40 POWDER, FOR SOLUTION ORAL ONCE
Status: COMPLETED | OUTPATIENT
Start: 2025-04-27 | End: 2025-04-27

## 2025-04-27 RX ORDER — BISACODYL 10 MG/1
10 SUPPOSITORY RECTAL DAILY
Status: DISCONTINUED | OUTPATIENT
Start: 2025-04-27 | End: 2025-05-11 | Stop reason: HOSPADM

## 2025-04-27 RX ORDER — FUROSEMIDE 10 MG/ML
40 INJECTION INTRAMUSCULAR; INTRAVENOUS ONCE
Status: COMPLETED | OUTPATIENT
Start: 2025-04-27 | End: 2025-04-27

## 2025-04-27 RX ADMIN — HYDRALAZINE HYDROCHLORIDE 75 MG: 50 TABLET ORAL at 08:26

## 2025-04-27 RX ADMIN — INSULIN LISPRO 1 UNITS: 100 INJECTION, SOLUTION INTRAVENOUS; SUBCUTANEOUS at 20:01

## 2025-04-27 RX ADMIN — HYDRALAZINE HYDROCHLORIDE 10 MG: 20 INJECTION INTRAMUSCULAR; INTRAVENOUS at 09:48

## 2025-04-27 RX ADMIN — PIPERACILLIN SODIUM AND TAZOBACTAM SODIUM 4.5 G: 4; .5 INJECTION, SOLUTION INTRAVENOUS at 00:52

## 2025-04-27 RX ADMIN — LABETALOL HYDROCHLORIDE 10 MG: 5 INJECTION, SOLUTION INTRAVENOUS at 05:20

## 2025-04-27 RX ADMIN — NICARDIPINE HYDROCHLORIDE 12.5 MG/HR: 0.2 INJECTION, SOLUTION INTRAVENOUS at 11:15

## 2025-04-27 RX ADMIN — PANTOPRAZOLE SODIUM 40 MG: 40 INJECTION, POWDER, FOR SOLUTION INTRAVENOUS at 06:28

## 2025-04-27 RX ADMIN — INSULIN LISPRO 1 UNITS: 100 INJECTION, SOLUTION INTRAVENOUS; SUBCUTANEOUS at 16:44

## 2025-04-27 RX ADMIN — LABETALOL HYDROCHLORIDE 10 MG: 5 INJECTION, SOLUTION INTRAVENOUS at 19:57

## 2025-04-27 RX ADMIN — LABETALOL HYDROCHLORIDE 10 MG: 5 INJECTION, SOLUTION INTRAVENOUS at 00:56

## 2025-04-27 RX ADMIN — SENNOSIDES AND DOCUSATE SODIUM 2 TABLET: 50; 8.6 TABLET ORAL at 20:01

## 2025-04-27 RX ADMIN — NICARDIPINE HYDROCHLORIDE 15 MG/HR: 0.2 INJECTION, SOLUTION INTRAVENOUS at 19:32

## 2025-04-27 RX ADMIN — INSULIN LISPRO 1 UNITS: 100 INJECTION, SOLUTION INTRAVENOUS; SUBCUTANEOUS at 08:29

## 2025-04-27 RX ADMIN — NICARDIPINE HYDROCHLORIDE 15 MG/HR: 0.2 INJECTION, SOLUTION INTRAVENOUS at 22:06

## 2025-04-27 RX ADMIN — HYDRALAZINE HYDROCHLORIDE 10 MG: 20 INJECTION INTRAMUSCULAR; INTRAVENOUS at 21:17

## 2025-04-27 RX ADMIN — ATORVASTATIN CALCIUM 10 MG: 10 TABLET, FILM COATED ORAL at 20:01

## 2025-04-27 RX ADMIN — HYDRALAZINE HYDROCHLORIDE 75 MG: 50 TABLET ORAL at 02:31

## 2025-04-27 RX ADMIN — HEPARIN SODIUM 5000 UNITS: 5000 INJECTION INTRAVENOUS; SUBCUTANEOUS at 08:26

## 2025-04-27 RX ADMIN — HYDRALAZINE HYDROCHLORIDE 75 MG: 50 TABLET ORAL at 13:39

## 2025-04-27 RX ADMIN — CARVEDILOL 6.25 MG: 12.5 TABLET, FILM COATED ORAL at 18:07

## 2025-04-27 RX ADMIN — POLYETHYLENE GLYCOL 3350 17 G: 17 POWDER, FOR SOLUTION ORAL at 08:26

## 2025-04-27 RX ADMIN — NICARDIPINE HYDROCHLORIDE 15 MG/HR: 0.2 INJECTION, SOLUTION INTRAVENOUS at 13:57

## 2025-04-27 RX ADMIN — NICARDIPINE HYDROCHLORIDE 15 MG/HR: 0.2 INJECTION, SOLUTION INTRAVENOUS at 16:58

## 2025-04-27 RX ADMIN — NICARDIPINE HYDROCHLORIDE 15 MG/HR: 0.2 INJECTION, SOLUTION INTRAVENOUS at 03:12

## 2025-04-27 RX ADMIN — LABETALOL HYDROCHLORIDE 10 MG: 5 INJECTION, SOLUTION INTRAVENOUS at 11:08

## 2025-04-27 RX ADMIN — NICARDIPINE HYDROCHLORIDE 15 MG/HR: 0.2 INJECTION, SOLUTION INTRAVENOUS at 08:23

## 2025-04-27 RX ADMIN — BISACODYL 10 MG: 10 SUPPOSITORY RECTAL at 08:27

## 2025-04-27 RX ADMIN — NICARDIPINE HYDROCHLORIDE 15 MG/HR: 0.2 INJECTION, SOLUTION INTRAVENOUS at 05:30

## 2025-04-27 RX ADMIN — HEPARIN SODIUM 5000 UNITS: 5000 INJECTION INTRAVENOUS; SUBCUTANEOUS at 16:47

## 2025-04-27 RX ADMIN — Medication 50 PERCENT: at 08:17

## 2025-04-27 RX ADMIN — POTASSIUM CHLORIDE 40 MEQ: 1.5 POWDER, FOR SOLUTION ORAL at 09:57

## 2025-04-27 RX ADMIN — VANCOMYCIN HYDROCHLORIDE 1500 MG: 1.5 INJECTION, POWDER, LYOPHILIZED, FOR SOLUTION INTRAVENOUS at 22:07

## 2025-04-27 RX ADMIN — INSULIN LISPRO 1 UNITS: 100 INJECTION, SOLUTION INTRAVENOUS; SUBCUTANEOUS at 23:44

## 2025-04-27 RX ADMIN — CARVEDILOL 6.25 MG: 12.5 TABLET, FILM COATED ORAL at 06:29

## 2025-04-27 RX ADMIN — HYDRALAZINE HYDROCHLORIDE 10 MG: 20 INJECTION INTRAMUSCULAR; INTRAVENOUS at 13:47

## 2025-04-27 RX ADMIN — LISINOPRIL 10 MG: 20 TABLET ORAL at 08:26

## 2025-04-27 RX ADMIN — HYDRALAZINE HYDROCHLORIDE 10 MG: 20 INJECTION INTRAMUSCULAR; INTRAVENOUS at 18:52

## 2025-04-27 RX ADMIN — NICARDIPINE HYDROCHLORIDE 15 MG/HR: 0.2 INJECTION, SOLUTION INTRAVENOUS at 00:51

## 2025-04-27 RX ADMIN — HEPARIN SODIUM 5000 UNITS: 5000 INJECTION INTRAVENOUS; SUBCUTANEOUS at 01:17

## 2025-04-27 RX ADMIN — FUROSEMIDE 40 MG: 10 INJECTION, SOLUTION INTRAVENOUS at 11:53

## 2025-04-27 RX ADMIN — INSULIN LISPRO 1 UNITS: 100 INJECTION, SOLUTION INTRAVENOUS; SUBCUTANEOUS at 03:46

## 2025-04-27 RX ADMIN — LABETALOL HYDROCHLORIDE 10 MG: 5 INJECTION, SOLUTION INTRAVENOUS at 17:45

## 2025-04-27 RX ADMIN — HYDRALAZINE HYDROCHLORIDE 75 MG: 50 TABLET ORAL at 20:01

## 2025-04-27 RX ADMIN — PIPERACILLIN SODIUM AND TAZOBACTAM SODIUM 4.5 G: 4; .5 INJECTION, SOLUTION INTRAVENOUS at 06:08

## 2025-04-27 RX ADMIN — PIPERACILLIN SODIUM AND TAZOBACTAM SODIUM 4.5 G: 4; .5 INJECTION, SOLUTION INTRAVENOUS at 11:53

## 2025-04-27 RX ADMIN — PIPERACILLIN SODIUM AND TAZOBACTAM SODIUM 4.5 G: 4; .5 INJECTION, SOLUTION INTRAVENOUS at 17:17

## 2025-04-27 RX ADMIN — VANCOMYCIN HYDROCHLORIDE 1500 MG: 1.5 INJECTION, POWDER, LYOPHILIZED, FOR SOLUTION INTRAVENOUS at 09:55

## 2025-04-27 RX ADMIN — SENNOSIDES AND DOCUSATE SODIUM 2 TABLET: 50; 8.6 TABLET ORAL at 08:26

## 2025-04-27 ASSESSMENT — PAIN - FUNCTIONAL ASSESSMENT
PAIN_FUNCTIONAL_ASSESSMENT: CPOT (CRITICAL CARE PAIN OBSERVATION TOOL)

## 2025-04-27 NOTE — CARE PLAN
The patient's goals for the shift include      The clinical goals for the shift include Patient will remain safe and neurologically stabled throughtout the shift.      Problem: General Stroke  Goal: Establish a mutual long term goal with patient by discharge  Outcome: Progressing  Goal: Demonstrate improvement in neurological exam throughout the shift  Outcome: Progressing  Goal: Maintain BP within ordered limits throughout shift  Outcome: Progressing  Goal: Participate in treatment (ie., meds, therapy) throughout shift  Outcome: Progressing  Goal: No symptoms of aspiration throughout shift  Outcome: Progressing  Goal: No symptoms of hemorrhage throughout shift  Outcome: Progressing  Goal: Tolerate enteral feeding throughout shift  Outcome: Progressing  Goal: Decreased nausea/vomiting throughout shift  Outcome: Progressing  Goal: Controlled blood glucose throughout shift  Outcome: Progressing  Goal: Out of bed three times today  Outcome: Progressing     Problem: ICU Stroke  Goal: Maintain ICP within ordered limits throughout shift  Outcome: Progressing  Goal: Tolerate EVD clamping trial throughout shift  Outcome: Progressing  Goal: Tolerate ventilator weaning trial during shift  Outcome: Progressing  Goal: Maintain patent airway throughout shift  Outcome: Progressing  Goal: Achieve/maintain targeted sodium level throughout shift  Outcome: Progressing     Problem: Pain - Adult  Goal: Verbalizes/displays adequate comfort level or baseline comfort level  Outcome: Progressing     Problem: Safety - Adult  Goal: Free from fall injury  Outcome: Progressing     Problem: Discharge Planning  Goal: Discharge to home or other facility with appropriate resources  Outcome: Progressing     Problem: Chronic Conditions and Co-morbidities  Goal: Patient's chronic conditions and co-morbidity symptoms are monitored and maintained or improved  Outcome: Progressing     Problem: Nutrition  Goal: Nutrient intake appropriate for maintaining  nutritional needs  Outcome: Progressing     Problem: Knowledge Deficit  Goal: Patient/family/caregiver demonstrates understanding of disease process, treatment plan, medications, and discharge instructions  Outcome: Progressing     Problem: Mechanical Ventilation  Goal: Patient Will Maintain Patent Airway  Outcome: Progressing  Goal: Oral health is maintained or improved  Outcome: Progressing  Goal: Tracheostomy will be managed safely  Outcome: Progressing  Goal: ET tube will be managed safely  Outcome: Progressing  Goal: Ability to express needs and understand communication  Outcome: Progressing  Goal: Mobility/activity is maintained at optimum level for patient  Outcome: Progressing     Problem: Skin  Goal: Decreased wound size/increased tissue granulation at next dressing change  Outcome: Progressing  Goal: Participates in plan/prevention/treatment measures  Outcome: Progressing  Goal: Prevent/manage excess moisture  Outcome: Progressing  Goal: Prevent/minimize sheer/friction injuries  Outcome: Progressing  Goal: Promote/optimize nutrition  Outcome: Progressing  Goal: Promote skin healing  Outcome: Progressing

## 2025-04-27 NOTE — PROGRESS NOTES
[facility]  NEUROSCIENCE INTENSIVE CARE UNIT  DAILY PROGRESS NOTE       Patient Name: Muriel De Souza   MRN: [id number]     Admit Date: 2025     : 1955 AGE: 69 y.o. GENDER: female        Subjective    Muriel De Souza is a 69 y.o. female with h/o HTN, HLD p/w HA, arrested in CT scanner at OSH, ROSC after 7 min CPR,  CTH w diffuse SAH and L cerebellar ICH, triventriculomegally, effaced 4th vents, CTA with L cerebellar AVM w venous varices s/p RF EVD and now admitted for further management.    Per chart review, patient had received bad news at home and then had sudden onset headache and vomiting. Found to have SAH at OSH with course complicated by cardiac arrest. She was intubated and sedated and transferred from [place], [place] to [facility].    Significant Events:  -  came in with L cerebellar AVM bleed with course complicated by cardiac arrest ROSC after 7 min CPR, uppon arrival to [facility] NSU, EVD drain placed, went to OR for decompression and AVM resection    Interval Events:   NAEO. Slightly elevated temps, but had ice packs to control temperature. Stomach a bit more distended. Unclear when last bowel movement was. Now has triple flexion in bilateral lowers and cough reflex has returned as of yesterday afternoon       Objective   VITALS (24H):  Temp:  [37.1 °C (98.8 °F)-38.5 °C (101.3 °F)] 37.3 °C (99.1 °F)  Heart Rate:  [74-92] 74  Resp:  [12-26] 16  BP: (139)/(51) 139/51  Arterial Line BP 1: (120-158)/(43-48) 135/46  FiO2 (%):  [40 %-50 %] 50 %  INTAKE/OUTPUT:  Intake/Output Summary (Last 24 hours) at 2025 0714  Last data filed at 2025 0600  Gross per 24 hour   Intake 4038.13 ml   Output 2636 ml   Net 1402.13 ml     VENT SETTINGS:  Vent Mode: Volume control/assist control  FiO2 (%):  [40 %-50 %] 50 %  S RR:  [12] 12  S VT:  [400 mL] 400 mL  PEEP/CPAP (cm H2O):  [8 cm H20] 8 cm H20  MAP (cm H2O):  [13-15] 15       PHYSICAL EXAM:  NEURO: Off sedation  - Intubated  - Eyes closed  to nox stim  - pupils minimally reactive to light  - no VOR  - corneals negative  - has cough reflex  - no gag  - no spontaneous movements  - No response to pain in UEs  - triple flex bilateral lowers  - no increased tone  CV:  - RRR on telemetry, NSR  - Arterial line in place  RESP:  - intubated  :  - Brooks catheter in place  GI:  - Abdomen soft but somewhat distended  SKIN:  - Intact  - left arm swollen and tense to palpation, not pitting, non-erythematous    MEDICATIONS:  Scheduled: PRN: Continuous:   Scheduled Medications[1] PRN Medications[2] Continuous Medications[3]     IMAGING RESULTS:  CT head wo IV contrast   Final Result   * Diffuse subarachnoid hemorrhage with hydrocephalus   *Left cerebellar hematoma suggesting a likely source.        MACRO:   none        Signed by: Cruz Salgado 4/22/2025 11:59 AM   Dictation workstation:   [id number]      CT angio head and neck w and wo IV contrast   Final Result   * Diffuse subarachnoid hemorrhage with hydrocephalus as described   *Suspected vascular malformation in the left cerebellar hemisphere   with enlarged arterial and venous structures largely in the left   cerebellar hemisphere and residual partial opacification of a venous   aneurysm near the torcula. *Bilateral carotid stenosis as described        MACRO:   none        Signed by: Cruz Salgado 4/22/2025 12:07 PM   Dictation workstation:   [id number]             Assessment/Plan    Muriel De Souza is a 69 y.o. female with h/o HTN, HLD p/w HA, arrested in CT scanner at OSH, ROSC after 7 min CPR,  CTH w diffuse SAH and L cerebellar ICH, triventriculomegally, effaced 4th vents, CTA with L cerebellar AVM w venous varices s/p RF EVD as well as AVM resection on 4/22 and now admitted for further management.    Changes 04/27/25  - lisinopril started yesterday to wean cardene  - CT head today  - suppository for bowels    NEURO:  #SAH  # L cerebellar ICH  #Triventriculomegaly with effaced 4th ventricle  #L  cerebellar AVM w venous varices s/p resection on 4/22  #Intracranial hypertension  Assessment:  - First day 4/22 presented with SAH, L cerebellar ICH, and ventriculomegaly on CTH  - 4/22 CTA showing L cerebellar AVM  - 4/22 RF EVD for ICH  - 4/22 OR AVM resection  Plan:  - NSU  - Keppra  - repeat angio per NSGY timing  - EVD in place, maintained at 10  - Neuro Checks: Q1H  - Sedation: Propofol and Fentanyl  - Pain: PRN  - Nausea: ondansetron  - PT/OT/SLP    CARDIOVASCULAR:  #Cardiac arrest  #Lactic acidosis, improving  # elevated troponins, improved  Assessment:  - 7 min to ROSC  - Assess for ischemic and stress-induced cardiomyopathy   - Assess for arrhythmias following cardiac arrest   - Cards consult (4/23)  - Type 2 MI (demand ischemia), leading to troponin up-trend (4/23)  - Trop trending down, d/c'd 4/24  - ECHO 4/23:  CONCLUSIONS:   1. Left ventricular ejection fraction is normal, calculated by Adrian's biplane at 71%.   2. Spectral Doppler shows a Grade I (impaired relaxation pattern) of left ventricular diastolic filling with normal left atrial filling pressure.   3. There is normal right ventricular global systolic function.   4. The left atrium is mildly dilated.   5. There is no evidence of a patent foramen ovale.   6. The inferior vena cava appears mildly dilated, on vent.    Plan:  - Monitor on telemetry  - BP goal: SBP < 140    --> PRN: Labetalol, Hydralazine, and Nicardipine   - IV nicardipine  - PO hydralazine   - PO Carvedilol  - PO  Lisinopril  - PO Amlodipine    RESPIRATORY:  #Acute hypoxic respiratory failure  #Mechanical ventilation  Assessment:  - Pt intubated for decreased LOC  Plan:  - Continue mechanical ventilation    RENAL/:  #TIM  Assessment:  Results from last 72 hours   Lab Units 04/27/25  0527 04/26/25  1953   BUN mg/dL 30* 24*   CREATININE mg/dL 0.73 0.69     Plan:  - Monitor with daily RFP  - Maintain forbes catheter for: critically ill patient who need accurate urinary output  measurements    FEN/GI:  #TIM    0   Lab Value Date/Time     (H) 2025 0614     (H) 2025 0207     (H) 2025 2018     (H) 2025 1640     (H) 2025 1640     Plan:  - Monitor and replace electrolytes per protocol  - Sodium goal 150 - 160 per NSGY  - IVF: PRN  - Diet: Enteral feeding through NG  - Bowel Regimen: Docusate-Senna PRN and Miralax PRN    ENDOCRINE:  #TIM  Assessment:  Results from last 7 days   Lab Units 25  0527 25  0350 25  0345 25  0022 25  2350 25  1953 25  1952 25  1714 25  1656 25  1213 25  1129 25  0902 25  0749   POCT GLUCOSE mg/dL  --   --  161*  --  150*  --  163* 166*  --   --  147*  --  169*   GLUCOSE mg/dL 164*  --   --   --   --  162*  --   --  191* 166*  --    < >  --    SODIUM mmol/L 156* 154*  --  157*  --  157*  157*  --   --  158* 160*  160*  --    < >  --     < > = values in this interval not displayed.    - HbA1C 5.5 on     Plan:  - Accuchecks & ISS PRN     HEMATOLOGY:  #Anemia, likely dilutional  Assessment:  - Baseline Hgb: 12.0  - Baseline Plts: 287  Results from last 7 days   Lab Units 25  0022 25  1656 25  0207   HEMOGLOBIN g/dL 8.4* 8.5* 8.5*   HEMATOCRIT % 27.1* 25.7* 27.1*   PLATELETS AUTO x10*3/uL 250 265 231     Plan:  - Continue to monitor with daily CBC and Coag panel  - maintain active type and screen as needed    INFECTIOUS DISEASE:  #TIM  Assessment:  Results from last 7 days   Lab Units 25  0022 25  1656 25  0207   WBC AUTO x10*3/uL 10.6 10.7 11.4*    - Temp (24hrs), Av.7 °C (99.9 °F), Min:37.1 °C (98.8 °F), Max:38.5 °C (101.3 °F)  - White count trending down  - Pt afebrile  - Bcx no growth at 4 days ()  - Sputum cult  no predominant organism  - UA  unremarkable    MUSCULOSKELETAL:  - No acute issues    SKIN:  #Swollen left arm  Assessment:   - LUE with persistent swelling since  4/23  - Non-pitting edema, non-erythematous  - POD4  - SQH started (4/25)  - No DVT in LE (4/25)  - No DVT in RUE (4/25)  - Superficial thrombosis in basilic vein in LUE (4/25)    Plan:   - Turns and skin care per NSU protocol    ACCESS:  - R Femoral  - R arterial  - L PIV    PROPHYLAXIS:  - DVT Ppx: SCDs, SQH  - GI: PPI    RESTRAINTS:  Not indicated/Patient does not meet criteria for restraints    Jian Ellington MD PhD  Neuroscience Intensive Care    Fellow Addendum:  I have reviewed the information above and I agree with the assessment and plan as outlined by the resident/medical student with the following additions/and or corrections:    69 y.o. female with h/o HTN, HLD p/w HA, arrested in CT scanner at OSH, ROSC after 7 min CPR, CTH w diffuse SAH and L cerebellar ICH, triventriculomegally, effaced 4th vents, CTA with L cerebellar AVM w/ venous varices s/p RF EVD as well as AVM resection on 4/22 and now admitted for further management.      Neuro: Exam remains poor, comatose off sedation. pupils intact, no VOR, has improved cough though impaired CN reflexes off sedation. Flaccid UE. TF LE  CV:   -cardiac arrest on arrival to hospital, likely PEA in setting of profound intracranial pressure elevation.   -Sustained hypertension, goal 100-140. Now on nicardipine, coreg, PO Hydral, lisinopril. Increase as needed.  -Superficial LUE thrombus   Resp: Intubated, increase in FiO2 and PEEP overnight with mod secretions and improving cough, repeat CXR pending, on abx for now. Pulm hygiene  Renal: Monitor UOP  FEN/GI: Advance TF as tolerated. Mild elevation in AST/ALT  Endo: SSI   Heme: Hb goal >7  ID: febrile to 38.5 overnight. Resp cx 4/25 negative, bcx 4/22 negative. On vanc/zosyn since admission. Downtrending white count. Ddx includes fever from DVTs + central fever        Jace Hernandez MD - 04/27/25 at 9:18 AM  Neurocritical Care Fellow  PGY-6                 [1] atorvastatin, 10 mg, oral, Nightly  carvedilol, 6.25  mg, oral, q12h  heparin (porcine), 5,000 Units, subcutaneous, q8h  hydrALAZINE, 75 mg, oral, q6h  insulin lispro, 0-5 Units, subcutaneous, q4h  lisinopril, 10 mg, oral, Daily  pantoprazole, 40 mg, intravenous, Daily before breakfast  perflutren protein A microsphere, 0.5 mL, intravenous, Once in imaging  piperacillin-tazobactam, 4.5 g, intravenous, q6h  polyethylene glycol, 17 g, oral, Daily  sennosides-docusate sodium, 2 tablet, oral, BID  sulfur hexafluoride microsphr, 2 mL, intravenous, Once in imaging  vancomycin, 1,500 mg, intravenous, q12h     [2] PRN medications: albuterol, calcium gluconate, calcium gluconate, dextrose, dextrose, fentaNYL, glucagon, glucagon, hydrALAZINE, labetaloL, magnesium sulfate, magnesium sulfate, oxygen, potassium chloride CR **OR** potassium chloride, potassium chloride CR **OR** potassium chloride, vancomycin  [3] niCARdipine, 2.5-15 mg/hr, Last Rate: 15 mg/hr (04/27/25 0600)

## 2025-04-27 NOTE — PROCEDURES
Arterial Line Insertion    Date/Time: 4/27/2025 6:10 PM    Performed by: DEREK Krishna  Authorized by: DEREK Krishna    Consent:     Consent obtained:  Emergent situation  Universal protocol:     Site/side marked: yes      Immediately prior to procedure, a time out was called: yes      Patient identity confirmed:  Arm band  Indications:     Indications: hemodynamic monitoring    Pre-procedure details:     Skin preparation:  Chlorhexidine    Preparation: Patient was prepped and draped in sterile fashion    Sedation:     Sedation type:  None  Anesthesia:     Anesthesia method:  Local infiltration    Local anesthetic:  Lidocaine 1% w/o epi  Procedure details:     Placement technique:  Ultrasound guided    Number of attempts:  1    Transducer: waveform confirmed    Post-procedure details:     Post-procedure:  Sterile dressing applied and sutured    Procedure completion:  Tolerated  Comments:      R radial A-line stopped drawing back and had a poor waveform. Pt on Cardene, requiring a replacement.

## 2025-04-27 NOTE — PROGRESS NOTES
Occupational Therapy                 Therapy Communication Note    Patient Name: Muriel De Souza  MRN: [id number]  Department: [facility]  Room: 07/07-A  Today's Date: 4/27/2025     Discipline: Occupational Therapy    OT Missed Visit: Yes     Missed Visit Reason: Missed Visit Reason: Patient placed on medical hold (RASS -5, will follow up as medically appropriate)    Missed Time: Attempt

## 2025-04-27 NOTE — PROGRESS NOTES
"Muriel De Souza is a 69 y.o. female on day 5 of admission presenting with AVM (arteriovenous malformation) ([facility]).    Subjective   No events overnight    Objective     Physical Exam  Intubated  ECNS  Ou2min R  +cough/gag  BUE flaccid  BLE TF  EVD in place    Last Recorded Vitals  Blood pressure 139/51, pulse 76, temperature 37.2 °C (99 °F), resp. rate 12, height 1.727 m (5' 7.99\"), weight 95.9 kg (211 lb 6.7 oz), SpO2 94%.  Intake/Output last 3 Shifts:  I/O last 3 completed shifts:  In: 8044.8 (83.9 mL/kg) [I.V.:5364.8 (55.9 mL/kg); NG/GT:2180; IV Piggyback:500]  Out: 4004 (41.8 mL/kg) [Urine:3545 (1 mL/kg/hr); Drains:459]  Weight: 95.9 kg     Relevant Results    Assessment & Plan  AVM (arteriovenous malformation) ([facility])    Muriel De Souza is a 69 y.o. 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, s/p 1u Plt and DDAVP, s/p RF EVD (OP 18), 4/22 s/p SOC/C1 lami for AVM resection, CTH POC, stable vents, 4/22 TEG R low s/p 1u FFP, 4/23 TTE EF 71%, 4/23 s/p angio tentorial dural AV fistual w direct cortical venous drainage fed by b/l MMA, b/l tentorial arteries, incidental 2mm R pcomm aneurysm, 4/24 CTH slight decr vents    NSU  EVD at 10, monitor output and ICPs  Na 150-160  SBP <140  Cards recs  Final path  Vasc conf recs  Q12 CBC, RFP  Abx, cxs  PTOT  SLP    Yanna Rice MD    "

## 2025-04-28 ENCOUNTER — APPOINTMENT (OUTPATIENT)
Dept: RADIOLOGY | Facility: HOSPITAL | Age: 70
End: 2025-04-28
Payer: COMMERCIAL

## 2025-04-28 ENCOUNTER — APPOINTMENT (OUTPATIENT)
Dept: RADIOLOGY | Facility: HOSPITAL | Age: 70
DRG: 003 | End: 2025-04-28
Payer: COMMERCIAL

## 2025-04-28 LAB
ALBUMIN SERPL BCP-MCNC: 2.7 G/DL (ref 3.4–5)
ALBUMIN SERPL BCP-MCNC: 2.7 G/DL (ref 3.4–5)
ALBUMIN SERPL BCP-MCNC: 2.8 G/DL (ref 3.4–5)
ANION GAP SERPL CALC-SCNC: 11 MMOL/L (ref 10–20)
ANION GAP SERPL CALC-SCNC: 13 MMOL/L (ref 10–20)
ANION GAP SERPL CALC-SCNC: 8 MMOL/L (ref 10–20)
BASOPHILS # BLD AUTO: 0.01 X10*3/UL (ref 0–0.1)
BASOPHILS # BLD AUTO: 0.02 X10*3/UL (ref 0–0.1)
BASOPHILS NFR BLD AUTO: 0.1 %
BASOPHILS NFR BLD AUTO: 0.2 %
BUN SERPL-MCNC: 35 MG/DL (ref 6–23)
BUN SERPL-MCNC: 37 MG/DL (ref 6–23)
BUN SERPL-MCNC: 40 MG/DL (ref 6–23)
CA-I BLD-SCNC: 1.16 MMOL/L (ref 1.1–1.33)
CALCIUM SERPL-MCNC: 7.7 MG/DL (ref 8.6–10.6)
CALCIUM SERPL-MCNC: 8.2 MG/DL (ref 8.6–10.6)
CALCIUM SERPL-MCNC: 8.3 MG/DL (ref 8.6–10.6)
CHLORIDE SERPL-SCNC: 119 MMOL/L (ref 98–107)
CO2 SERPL-SCNC: 25 MMOL/L (ref 21–32)
CO2 SERPL-SCNC: 27 MMOL/L (ref 21–32)
CO2 SERPL-SCNC: 28 MMOL/L (ref 21–32)
CREAT SERPL-MCNC: 0.82 MG/DL (ref 0.5–1.05)
CREAT SERPL-MCNC: 0.83 MG/DL (ref 0.5–1.05)
CREAT SERPL-MCNC: 0.86 MG/DL (ref 0.5–1.05)
EGFRCR SERPLBLD CKD-EPI 2021: 74 ML/MIN/1.73M*2
EGFRCR SERPLBLD CKD-EPI 2021: 77 ML/MIN/1.73M*2
EGFRCR SERPLBLD CKD-EPI 2021: 78 ML/MIN/1.73M*2
EOSINOPHIL # BLD AUTO: 0.02 X10*3/UL (ref 0–0.7)
EOSINOPHIL # BLD AUTO: 0.02 X10*3/UL (ref 0–0.7)
EOSINOPHIL NFR BLD AUTO: 0.2 %
EOSINOPHIL NFR BLD AUTO: 0.2 %
ERYTHROCYTE [DISTWIDTH] IN BLOOD BY AUTOMATED COUNT: 14.4 % (ref 11.5–14.5)
ERYTHROCYTE [DISTWIDTH] IN BLOOD BY AUTOMATED COUNT: 14.7 % (ref 11.5–14.5)
GLUCOSE BLD MANUAL STRIP-MCNC: 147 MG/DL (ref 74–99)
GLUCOSE BLD MANUAL STRIP-MCNC: 159 MG/DL (ref 74–99)
GLUCOSE BLD MANUAL STRIP-MCNC: 169 MG/DL (ref 74–99)
GLUCOSE BLD MANUAL STRIP-MCNC: 170 MG/DL (ref 74–99)
GLUCOSE BLD MANUAL STRIP-MCNC: 174 MG/DL (ref 74–99)
GLUCOSE SERPL-MCNC: 168 MG/DL (ref 74–99)
GLUCOSE SERPL-MCNC: 170 MG/DL (ref 74–99)
GLUCOSE SERPL-MCNC: 170 MG/DL (ref 74–99)
HCT VFR BLD AUTO: 28.1 % (ref 36–46)
HCT VFR BLD AUTO: 28.2 % (ref 36–46)
HGB BLD-MCNC: 8.6 G/DL (ref 12–16)
HGB BLD-MCNC: 8.7 G/DL (ref 12–16)
IMM GRANULOCYTES # BLD AUTO: 0.12 X10*3/UL (ref 0–0.7)
IMM GRANULOCYTES # BLD AUTO: 0.15 X10*3/UL (ref 0–0.7)
IMM GRANULOCYTES NFR BLD AUTO: 1 % (ref 0–0.9)
IMM GRANULOCYTES NFR BLD AUTO: 1.3 % (ref 0–0.9)
LYMPHOCYTES # BLD AUTO: 0.94 X10*3/UL (ref 1.2–4.8)
LYMPHOCYTES # BLD AUTO: 1.21 X10*3/UL (ref 1.2–4.8)
LYMPHOCYTES NFR BLD AUTO: 10.1 %
LYMPHOCYTES NFR BLD AUTO: 8.1 %
MAGNESIUM SERPL-MCNC: 2.15 MG/DL (ref 1.6–2.4)
MCH RBC QN AUTO: 28.6 PG (ref 26–34)
MCH RBC QN AUTO: 29.3 PG (ref 26–34)
MCHC RBC AUTO-ENTMCNC: 30.6 G/DL (ref 32–36)
MCHC RBC AUTO-ENTMCNC: 30.9 G/DL (ref 32–36)
MCV RBC AUTO: 93 FL (ref 80–100)
MCV RBC AUTO: 96 FL (ref 80–100)
MONOCYTES # BLD AUTO: 0.79 X10*3/UL (ref 0.1–1)
MONOCYTES # BLD AUTO: 1.03 X10*3/UL (ref 0.1–1)
MONOCYTES NFR BLD AUTO: 6.8 %
MONOCYTES NFR BLD AUTO: 8.6 %
NEUTROPHILS # BLD AUTO: 9.54 X10*3/UL (ref 1.2–7.7)
NEUTROPHILS # BLD AUTO: 9.71 X10*3/UL (ref 1.2–7.7)
NEUTROPHILS NFR BLD AUTO: 80 %
NEUTROPHILS NFR BLD AUTO: 83.4 %
NRBC BLD-RTO: 0 /100 WBCS (ref 0–0)
NRBC BLD-RTO: 0 /100 WBCS (ref 0–0)
OSMOLALITY SERPL: 324 MOSM/KG (ref 280–300)
OSMOLALITY SERPL: 327 MOSM/KG (ref 280–300)
OSMOLALITY SERPL: 328 MOSM/KG (ref 280–300)
OSMOLALITY SERPL: 329 MOSM/KG (ref 280–300)
OSMOLALITY SERPL: 331 MOSM/KG (ref 280–300)
PHOSPHATE SERPL-MCNC: 3.1 MG/DL (ref 2.5–4.9)
PHOSPHATE SERPL-MCNC: 3.3 MG/DL (ref 2.5–4.9)
PHOSPHATE SERPL-MCNC: 3.5 MG/DL (ref 2.5–4.9)
PLATELET # BLD AUTO: 260 X10*3/UL (ref 150–450)
PLATELET # BLD AUTO: 270 X10*3/UL (ref 150–450)
POTASSIUM SERPL-SCNC: 2.8 MMOL/L (ref 3.5–5.3)
POTASSIUM SERPL-SCNC: 2.8 MMOL/L (ref 3.5–5.3)
POTASSIUM SERPL-SCNC: 2.9 MMOL/L (ref 3.5–5.3)
PROCALCITONIN SERPL-MCNC: 0.05 NG/ML
RBC # BLD AUTO: 2.94 X10*6/UL (ref 4–5.2)
RBC # BLD AUTO: 3.04 X10*6/UL (ref 4–5.2)
SODIUM SERPL-SCNC: 152 MMOL/L (ref 136–145)
SODIUM SERPL-SCNC: 153 MMOL/L (ref 136–145)
SODIUM SERPL-SCNC: 154 MMOL/L (ref 136–145)
VANCOMYCIN SERPL-MCNC: 17.8 UG/ML (ref 5–20)
WBC # BLD AUTO: 11.6 X10*3/UL (ref 4.4–11.3)
WBC # BLD AUTO: 11.9 X10*3/UL (ref 4.4–11.3)

## 2025-04-28 PROCEDURE — 85025 COMPLETE CBC W/AUTO DIFF WBC: CPT

## 2025-04-28 PROCEDURE — 84295 ASSAY OF SERUM SODIUM: CPT

## 2025-04-28 PROCEDURE — 2020000001 HC ICU ROOM DAILY

## 2025-04-28 PROCEDURE — 2500000001 HC RX 250 WO HCPCS SELF ADMINISTERED DRUGS (ALT 637 FOR MEDICARE OP): Performed by: STUDENT IN AN ORGANIZED HEALTH CARE EDUCATION/TRAINING PROGRAM

## 2025-04-28 PROCEDURE — 82947 ASSAY GLUCOSE BLOOD QUANT: CPT

## 2025-04-28 PROCEDURE — 2500000004 HC RX 250 GENERAL PHARMACY W/ HCPCS (ALT 636 FOR OP/ED): Mod: JZ | Performed by: STUDENT IN AN ORGANIZED HEALTH CARE EDUCATION/TRAINING PROGRAM

## 2025-04-28 PROCEDURE — 84145 PROCALCITONIN (PCT): CPT

## 2025-04-28 PROCEDURE — 83930 ASSAY OF BLOOD OSMOLALITY: CPT

## 2025-04-28 PROCEDURE — 2500000004 HC RX 250 GENERAL PHARMACY W/ HCPCS (ALT 636 FOR OP/ED): Performed by: STUDENT IN AN ORGANIZED HEALTH CARE EDUCATION/TRAINING PROGRAM

## 2025-04-28 PROCEDURE — 37799 UNLISTED PX VASCULAR SURGERY: CPT

## 2025-04-28 PROCEDURE — 70450 CT HEAD/BRAIN W/O DYE: CPT | Performed by: RADIOLOGY

## 2025-04-28 PROCEDURE — 83735 ASSAY OF MAGNESIUM: CPT

## 2025-04-28 PROCEDURE — 94669 MECHANICAL CHEST WALL OSCILL: CPT

## 2025-04-28 PROCEDURE — 2500000001 HC RX 250 WO HCPCS SELF ADMINISTERED DRUGS (ALT 637 FOR MEDICARE OP)

## 2025-04-28 PROCEDURE — 2500000005 HC RX 250 GENERAL PHARMACY W/O HCPCS: Performed by: STUDENT IN AN ORGANIZED HEALTH CARE EDUCATION/TRAINING PROGRAM

## 2025-04-28 PROCEDURE — 2500000004 HC RX 250 GENERAL PHARMACY W/ HCPCS (ALT 636 FOR OP/ED): Mod: JZ

## 2025-04-28 PROCEDURE — 71045 X-RAY EXAM CHEST 1 VIEW: CPT | Performed by: RADIOLOGY

## 2025-04-28 PROCEDURE — 80202 ASSAY OF VANCOMYCIN: CPT | Performed by: NEUROLOGICAL SURGERY

## 2025-04-28 PROCEDURE — 2500000002 HC RX 250 W HCPCS SELF ADMINISTERED DRUGS (ALT 637 FOR MEDICARE OP, ALT 636 FOR OP/ED): Performed by: REGISTERED NURSE

## 2025-04-28 PROCEDURE — 82330 ASSAY OF CALCIUM: CPT

## 2025-04-28 PROCEDURE — 80069 RENAL FUNCTION PANEL: CPT

## 2025-04-28 PROCEDURE — 70450 CT HEAD/BRAIN W/O DYE: CPT

## 2025-04-28 PROCEDURE — 94003 VENT MGMT INPAT SUBQ DAY: CPT

## 2025-04-28 PROCEDURE — 71045 X-RAY EXAM CHEST 1 VIEW: CPT

## 2025-04-28 RX ORDER — POTASSIUM CHLORIDE 14.9 MG/ML
20 INJECTION INTRAVENOUS
Status: COMPLETED | OUTPATIENT
Start: 2025-04-28 | End: 2025-04-28

## 2025-04-28 RX ORDER — POTASSIUM CHLORIDE 14.9 MG/ML
20 INJECTION INTRAVENOUS
Status: DISPENSED | OUTPATIENT
Start: 2025-04-28 | End: 2025-04-29

## 2025-04-28 RX ADMIN — ATORVASTATIN CALCIUM 10 MG: 10 TABLET, FILM COATED ORAL at 20:41

## 2025-04-28 RX ADMIN — NICARDIPINE HYDROCHLORIDE 15 MG/HR: 0.2 INJECTION, SOLUTION INTRAVENOUS at 18:12

## 2025-04-28 RX ADMIN — NICARDIPINE HYDROCHLORIDE 15 MG/HR: 0.2 INJECTION, SOLUTION INTRAVENOUS at 20:57

## 2025-04-28 RX ADMIN — NICARDIPINE HYDROCHLORIDE 15 MG/HR: 0.2 INJECTION, SOLUTION INTRAVENOUS at 03:26

## 2025-04-28 RX ADMIN — INSULIN LISPRO 1 UNITS: 100 INJECTION, SOLUTION INTRAVENOUS; SUBCUTANEOUS at 20:14

## 2025-04-28 RX ADMIN — NICARDIPINE HYDROCHLORIDE 15 MG/HR: 0.2 INJECTION, SOLUTION INTRAVENOUS at 00:46

## 2025-04-28 RX ADMIN — PIPERACILLIN SODIUM AND TAZOBACTAM SODIUM 4.5 G: 4; .5 INJECTION, SOLUTION INTRAVENOUS at 12:17

## 2025-04-28 RX ADMIN — NICARDIPINE HYDROCHLORIDE 5 MG/HR: 0.2 INJECTION, SOLUTION INTRAVENOUS at 09:01

## 2025-04-28 RX ADMIN — NICARDIPINE HYDROCHLORIDE 15 MG/HR: 0.2 INJECTION, SOLUTION INTRAVENOUS at 15:33

## 2025-04-28 RX ADMIN — LABETALOL HYDROCHLORIDE 10 MG: 5 INJECTION, SOLUTION INTRAVENOUS at 18:16

## 2025-04-28 RX ADMIN — BISACODYL 10 MG: 10 SUPPOSITORY RECTAL at 08:08

## 2025-04-28 RX ADMIN — POTASSIUM CHLORIDE 20 MEQ: 14.9 INJECTION, SOLUTION INTRAVENOUS at 19:29

## 2025-04-28 RX ADMIN — INSULIN LISPRO 1 UNITS: 100 INJECTION, SOLUTION INTRAVENOUS; SUBCUTANEOUS at 08:16

## 2025-04-28 RX ADMIN — LISINOPRIL 10 MG: 20 TABLET ORAL at 08:08

## 2025-04-28 RX ADMIN — PANTOPRAZOLE SODIUM 40 MG: 40 INJECTION, POWDER, FOR SOLUTION INTRAVENOUS at 06:00

## 2025-04-28 RX ADMIN — NICARDIPINE HYDROCHLORIDE 15 MG/HR: 0.2 INJECTION, SOLUTION INTRAVENOUS at 05:59

## 2025-04-28 RX ADMIN — POTASSIUM CHLORIDE 20 MEQ: 14.9 INJECTION, SOLUTION INTRAVENOUS at 05:54

## 2025-04-28 RX ADMIN — HEPARIN SODIUM 5000 UNITS: 5000 INJECTION INTRAVENOUS; SUBCUTANEOUS at 16:18

## 2025-04-28 RX ADMIN — INSULIN LISPRO 1 UNITS: 100 INJECTION, SOLUTION INTRAVENOUS; SUBCUTANEOUS at 12:06

## 2025-04-28 RX ADMIN — HEPARIN SODIUM 5000 UNITS: 5000 INJECTION INTRAVENOUS; SUBCUTANEOUS at 01:08

## 2025-04-28 RX ADMIN — POTASSIUM CHLORIDE 20 MEQ: 14.9 INJECTION, SOLUTION INTRAVENOUS at 22:33

## 2025-04-28 RX ADMIN — HEPARIN SODIUM 5000 UNITS: 5000 INJECTION INTRAVENOUS; SUBCUTANEOUS at 08:16

## 2025-04-28 RX ADMIN — FENTANYL CITRATE 25 MCG: 50 INJECTION INTRAMUSCULAR; INTRAVENOUS at 20:57

## 2025-04-28 RX ADMIN — HYDRALAZINE HYDROCHLORIDE 75 MG: 50 TABLET ORAL at 02:27

## 2025-04-28 RX ADMIN — POTASSIUM CHLORIDE 20 MEQ: 14.9 INJECTION, SOLUTION INTRAVENOUS at 16:18

## 2025-04-28 RX ADMIN — Medication 40 PERCENT: at 20:29

## 2025-04-28 RX ADMIN — CARVEDILOL 6.25 MG: 12.5 TABLET, FILM COATED ORAL at 19:29

## 2025-04-28 RX ADMIN — POTASSIUM CHLORIDE 20 MEQ: 14.9 INJECTION, SOLUTION INTRAVENOUS at 04:05

## 2025-04-28 RX ADMIN — PIPERACILLIN SODIUM AND TAZOBACTAM SODIUM 4.5 G: 4; .5 INJECTION, SOLUTION INTRAVENOUS at 05:59

## 2025-04-28 RX ADMIN — SODIUM CHLORIDE 1000 ML: 0.9 INJECTION, SOLUTION INTRAVENOUS at 02:27

## 2025-04-28 RX ADMIN — HYDRALAZINE HYDROCHLORIDE 75 MG: 50 TABLET ORAL at 08:08

## 2025-04-28 RX ADMIN — SENNOSIDES AND DOCUSATE SODIUM 2 TABLET: 50; 8.6 TABLET ORAL at 08:08

## 2025-04-28 RX ADMIN — HYDRALAZINE HYDROCHLORIDE 75 MG: 50 TABLET ORAL at 13:49

## 2025-04-28 RX ADMIN — NICARDIPINE HYDROCHLORIDE 12.5 MG/HR: 0.2 INJECTION, SOLUTION INTRAVENOUS at 12:19

## 2025-04-28 RX ADMIN — INSULIN LISPRO 1 UNITS: 100 INJECTION, SOLUTION INTRAVENOUS; SUBCUTANEOUS at 16:18

## 2025-04-28 RX ADMIN — HYDRALAZINE HYDROCHLORIDE 10 MG: 20 INJECTION INTRAMUSCULAR; INTRAVENOUS at 05:04

## 2025-04-28 RX ADMIN — HYDRALAZINE HYDROCHLORIDE 75 MG: 50 TABLET ORAL at 19:29

## 2025-04-28 RX ADMIN — VANCOMYCIN HYDROCHLORIDE 1500 MG: 1.5 INJECTION, POWDER, LYOPHILIZED, FOR SOLUTION INTRAVENOUS at 10:38

## 2025-04-28 RX ADMIN — PIPERACILLIN SODIUM AND TAZOBACTAM SODIUM 4.5 G: 4; .5 INJECTION, SOLUTION INTRAVENOUS at 00:21

## 2025-04-28 RX ADMIN — CARVEDILOL 6.25 MG: 12.5 TABLET, FILM COATED ORAL at 06:00

## 2025-04-28 RX ADMIN — POLYETHYLENE GLYCOL 3350 17 G: 17 POWDER, FOR SOLUTION ORAL at 08:08

## 2025-04-28 ASSESSMENT — PAIN - FUNCTIONAL ASSESSMENT
PAIN_FUNCTIONAL_ASSESSMENT: CPOT (CRITICAL CARE PAIN OBSERVATION TOOL)

## 2025-04-28 NOTE — PROGRESS NOTES
Occupational Therapy                 Therapy Communication Note    Patient Name: Muriel De Souza  MRN: [id number]  Department: [facility]  Room: 07/07-A  Today's Date: 4/28/2025     Discipline: Occupational Therapy    OT Missed Visit: Yes     Missed Visit Reason: Missed Visit Reason:  (Patient with extremly low potassium levels.  Hold OT this date.)    Missed Time: Attempt

## 2025-04-28 NOTE — CONSULTS
Vancomycin Dosing by Pharmacy- Cessation of Therapy    Consult to pharmacy for vancomycin dosing has been discontinued by the prescriber, pharmacy will sign off at this time.    Please call pharmacy if there are further questions or re-enter a consult if vancomycin is resumed.     Nabeel Klein, PharmD

## 2025-04-28 NOTE — PROGRESS NOTES
Vancomycin Dosing by Pharmacy- FOLLOW UP    Muriel De Souza is a 69 y.o. year old female who Pharmacy has been consulted for vancomycin dosing for other sepsis . Based on the patient's indication and renal status this patient is being dosed based on a goal AUC of 500-600.     Renal function is currently stable.    Current vancomycin dose: 1500 mg given every 12 hours    Estimated vancomycin AUC on current dose: 628 mg/L.hr     Visit Vitals  BP (!) 121/45 (BP Location: Right arm)   Pulse 64   Temp 34.5 °C (94.1 °F)   Resp 13        Lab Results   Component Value Date    CREATININE 0.86 2025    CREATININE 0.85 2025    CREATININE 0.73 2025    CREATININE 0.69 2025        Patient weight is as follows:   Vitals:    25 0000   Weight: 95.9 kg (211 lb 6.7 oz)       Cultures:  Susceptibility data for the encounter in last 14 days.  Collected Specimen Info Organism   25 Fluid from SPUTUM Normal throat bal        I/O last 3 completed shifts:  In: 6400.2 (66.7 mL/kg) [I.V.:2650.2 (27.6 mL/kg); NG/GT:2350; IV Piggyback:1400]  Out: 5225 (54.5 mL/kg) [Urine:4765 (1.4 mL/kg/hr); Drains:460]  Weight: 95.9 kg   I/O during current shift:  I/O this shift:  In: 348.8 [I.V.:228.8; NG/GT:120]  Out: 297 [Urine:255; Drains:42]    Temp (24hrs), Av.1 °C (96.9 °F), Min:34.5 °C (94.1 °F), Max:37.2 °C (99 °F)      Assessment/Plan    Above goal AUC. Orders placed for new vancomcyin regimen of 1250 every 12 hours to begin at 25 at 22:00.    Regimen: 1250 mg IV every 12 hours.  Start time: 22:38 on 2025  Exposure target: AUC24 (range)400-600 mg/L.hr   UOU62-10: 536 mg/L.hr  AUC24,ss: 526 mg/L.hr  Probability of AUC24 > 400: 99 %  Ctrough,ss: 15.7 mg/L  Probability of Ctrough,ss > 20: 9 %    The next level will be obtained on 25 at AM labs. May be obtained sooner if clinically indicated.   Will continue to monitor renal function daily while on vancomycin and order serum creatinine at least  every 48 hours if not already ordered.  Follow for continued vancomycin needs, clinical response, and signs/symptoms of toxicity.       Romina Chauhan, JessicaD

## 2025-04-28 NOTE — PROGRESS NOTES
"Muriel De Souza is a 69 y.o. female on day 6 of admission presenting with AVM (arteriovenous malformation) ([facility]).    Subjective   No events overnight    Objective     Physical Exam  Intubated  ECNS  Ou2min R  +cough/gag  BUE flaccid  BLE TF  EVD in place    Last Recorded Vitals  Blood pressure (!) 121/45, pulse 67, temperature 36.3 °C (97.3 °F), resp. rate 16, height 1.727 m (5' 7.99\"), weight 95.9 kg (211 lb 6.7 oz), SpO2 95%.  Intake/Output last 3 Shifts:  I/O last 3 completed shifts:  In: 5953.4 (62.1 mL/kg) [I.V.:3173.4 (33.1 mL/kg); NG/GT:2380; IV Piggyback:400]  Out: 5536 (57.7 mL/kg) [Urine:5055 (1.5 mL/kg/hr); Drains:481]  Weight: 95.9 kg     Relevant Results    Assessment & Plan  AVM (arteriovenous malformation) ([facility])    Muriel De Souza is a 69 y.o. 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, s/p 1u Plt and DDAVP, s/p RF EVD (OP 18), 4/22 s/p SOC/C1 lami for AVM resection, CTH POC, stable vents, 4/22 TEG R low s/p 1u FFP, 4/23 TTE EF 71%, 4/23 s/p angio tentorial dural AV fistual w direct cortical venous drainage fed by b/l MMA, b/l tentorial arteries, incidental 2mm R pcomm aneurysm, 4/24 CTH slight decr vents    NSU  EVD at 10, monitor output and ICPs  Na 150-160  SBP <140  Cards recs  Final path  Vasc conf recs  Q12 CBC, RFP  Abx, cxs  PTOT  SLP    Chavez Vang MD    "

## 2025-04-28 NOTE — PROGRESS NOTES
[facility] (formerly [facility])  NEUROSCIENCE INTENSIVE CARE UNIT  DAILY PROGRESS NOTE       Patient Name: Muriel De Souza   MRN: [id number]     Admit Date: 2025     : 1955 AGE: 69 y.o. GENDER: female        Subjective    Muriel De Souza is a 69 y.o. female with h/o HTN, HLD p/w HA, arrested in CT scanner at OSH, ROSC after 7 min CPR,  CTH w diffuse SAH and L cerebellar ICH, triventriculomegally, effaced 4th vents, CTA with L cerebellar AVM w venous varices s/p RF EVD and now admitted for further management.    Per chart review, patient had received bad news at home and then had sudden onset headache and vomiting. Found to have SAH at OSH with course complicated by cardiac arrest. She was intubated and sedated and transferred from [place], [place] to [facility].    Significant Events:  -  came in with L cerebellar AVM bleed with course complicated by cardiac arrest ROSC after 7 min CPR, uppon arrival to [facility] NSU, EVD drain placed, went to OR for decompression and AVM resection    Interval Events:   NAEON, Afebrile, hemodynamically stable with SBP < 140, euvolemic over past 24 hours  Pt received Lasix   (-1041.5 0700 () - 1900 (); +1731.2 1900 () - 07 ())  UOP 2.2 ml/kg/hr 0700 () - 190 ()  Pt hypokalemic overnight, K+ repleted AM        Objective   VITALS (24H):  Temp:  [36 °C (96.8 °F)-37.2 °C (99 °F)] 36 °C (96.8 °F)  Heart Rate:  [65-82] 68  Resp:  [10-] 14  BP: (121-143)/(43-45) 121/45  Arterial Line BP 1: (102-154)/(31-47) 143/44  FiO2 (%):  [50 %] 50 %  INTAKE/OUTPUT:  Intake/Output Summary (Last 24 hours) at 2025 0620  Last data filed at 2025 0600  Gross per 24 hour   Intake 3743.76 ml   Output 3769 ml   Net -25.24 ml     VENT SETTINGS:  Vent Mode: Volume control/assist control  FiO2 (%):  [50 %] 50 %  S RR:  [12] 12  S VT:  [400 mL] 400 mL  PEEP/CPAP (cm H2O):  [8 cm H20-10 cm H20] 10 cm H20  MAP (cm H2O):  [14-16] 14       PHYSICAL EXAM:  NEURO: Off  sedation  - Intubated  - Eyes closed to nox stim  - pupils minimally reactive to light  - no VOR  - corneals negative  - no cough or gag reflex  - no spontaneous movements  - No response to pain in UEs  - triple flex in LLE  - no response to pain in RLE  - no increased tone  CV:  - RRR on telemetry, NSR  - Arterial line in place  RESP:  - intubated  :  - Brooks catheter in place  GI:  - Abdomen soft but somewhat distended  SKIN:  - Intact  - left arm swollen and tense to palpation, not pitting, non-erythematous    MEDICATIONS:  Scheduled: PRN: Continuous:   Scheduled Medications[1] PRN Medications[2] Continuous Medications[3]     IMAGING RESULTS:  CT head wo IV contrast   Final Result   * Diffuse subarachnoid hemorrhage with hydrocephalus   *Left cerebellar hematoma suggesting a likely source.        MACRO:   none        Signed by: Cruz Salgado 4/22/2025 11:59 AM   Dictation workstation:   [id number]      CT angio head and neck w and wo IV contrast   Final Result   * Diffuse subarachnoid hemorrhage with hydrocephalus as described   *Suspected vascular malformation in the left cerebellar hemisphere   with enlarged arterial and venous structures largely in the left   cerebellar hemisphere and residual partial opacification of a venous   aneurysm near the torcula. *Bilateral carotid stenosis as described        MACRO:   none        Signed by: Cruz Salgado 4/22/2025 12:07 PM   Dictation workstation:   [id number]             Assessment/Plan    Muirel De Souza is a 69 y.o. female with h/o HTN, HLD p/w HA, arrested in CT scanner at OSH, ROSC after 7 min CPR,  CTH w diffuse SAH and L cerebellar ICH, triventriculomegally, effaced 4th vents, CTA with L cerebellar AVM w venous varices s/p RF EVD as well as AVM resection on 4/22 and now admitted for further management.    Changes 04/28/25  - lisinopril started yesterday to wean cardene  - CT head today  - suppository for bowels    NEURO:  #SAH  # L cerebellar  ICH  #Triventriculomegaly with effaced 4th ventricle  #L cerebellar AVM w venous varices s/p resection on 4/22  #Intracranial hypertension  Assessment:  - First day 4/22 presented with SAH, L cerebellar ICH, and ventriculomegaly on CTH  - 4/22 CTA showing L cerebellar AVM  - 4/22 RF EVD for ICH  - 4/22 OR AVM resection  - Pt is neurologically stable, plan for MRI today 4/28  Plan:  - Plan for MRI 4/28  - NSU  - Keppra  - EVD in place, maintained at 10  - BP goal: SBP < 140    --> PRN: Labetalol, Hydralazine, and Nicardipine   - IV nicardipine 0.2 mg/ml  - PO hydralazine 75 q6  - PO Carvedilol 6.25 BID  - PO  Lisinopril 10 OD   - Neuro Checks: Q1H  - Sedation: Off  - Pain: PRN  - Nausea: ondansetron  - PT/OT/SLP    CARDIOVASCULAR:  #Cardiac arrest  #Lactic acidosis, improving  # elevated troponins, improved  Assessment:  - 7 min to ROSC  - Assess for ischemic and stress-induced cardiomyopathy   - Assess for arrhythmias following cardiac arrest   - Cards consult (4/23)  - Type 2 MI (demand ischemia), leading to troponin up-trend (4/23)  - Trop trending down, d/c'd 4/24  - ECHO 4/23:  CONCLUSIONS:   1. Left ventricular ejection fraction is normal, calculated by Adrian's biplane at 71%.   2. Spectral Doppler shows a Grade I (impaired relaxation pattern) of left ventricular diastolic filling with normal left atrial filling pressure.   3. There is normal right ventricular global systolic function.   4. The left atrium is mildly dilated.   5. There is no evidence of a patent foramen ovale.   6. The inferior vena cava appears mildly dilated, on vent.    - Pt is hemodynamically stable, SBP goal < 149    Plan:  - Monitor on telemetry  - SBP goal < 140 (see above)    RESPIRATORY:  #Acute hypoxic respiratory failure  #Mechanical ventilation  Assessment:  - Pt intubated for decreased LOC  - No increased vent demands or secretions. Continue to monitor for signs of infection  Plan:  - Continue mechanical ventilation  - Monitor  for increased secretions/desaturations    RENAL/:  #TIM  Assessment:  Results from last 72 hours   Lab Units 04/28/25  0223 04/27/25  1558   BUN mg/dL 37* 34*   CREATININE mg/dL 0.86 0.85   - UOP 2.2 4/27, Lasix 4/27  - BUN trending up slightly 4/28  - Cr trending up slightly  4/28  - BUN/Cr ratio 43.02 4/28  - Pt repleted with fluids overnight 4/27-4/28    Plan:  - Monitor with daily RFP  - IVF for hypovolemia  - Maintain forbes catheter for: critically ill patient who need accurate urinary output measurements    FEN/GI:  #TIM  Results from last 72 hours   Lab Units 04/28/25  0330 04/28/25  0223 04/28/25  0024 04/27/25  1958 04/27/25  1558 04/27/25  0737 04/27/25  0527 04/27/25  0022 04/26/25  1953 04/26/25  1656   SODIUM mmol/L 154* 154* 153* 155* 156*  156*   < > 156*   < > 157*  157* 158*   POTASSIUM mmol/L  --  2.8*  --   --  3.1*  --  3.2*  --  3.2* 3.1*   PHOSPHORUS mg/dL  --  3.5  --   --  2.9  --  2.4*  --  2.2* 2.6    < > = values in this interval not displayed.   - Hypokalemia 4/28, Lasix 4/27  - Repleted with fluids overnight 4/27-4/28  - Stable phosphate 4/28  - Sodium at goal 150-160    Plan:  - Monitor and replace electrolytes per protocol  - Replete K+  - Sodium goal 150 - 160 per NSGY  - IVF: PRN  - Diet: Enteral feeding through NG  - Bowel Regimen: Docusate-Senna PRN and Miralax PRN    ENDOCRINE:  #TIM  Assessment:  Results from last 7 days   Lab Units 04/28/25  0330 04/28/25  0319 04/28/25  0223 04/28/25  0024 04/27/25  2344 04/27/25  1958 04/27/25  1956/25  1611 04/27/25  1558 04/27/25  1148 04/27/25  1144 04/27/25  0811   POCT GLUCOSE mg/dL  --  147*  --   --  172*  --  151* 158*  --   --  141* 173*   GLUCOSE mg/dL  --   --  170*  --   --   --   --   --  163*  --   --   --    SODIUM mmol/L 154*  --  154* 153*  --  155*  --   --  156*  156* 155*  --   --     - HbA1C 5.5 on 4/22    Plan:  - Accuchecks & ISS PRN     HEMATOLOGY:  #Anemia, likely dilutional  Assessment:  - Baseline Hgb:  12.0  - Baseline Plts: 287  Results from last 7 days   Lab Units 25  0024 25  1558 25  0022   HEMOGLOBIN g/dL 8.6* 8.6* 8.4*   HEMATOCRIT % 28.1* 26.8* 27.1*   PLATELETS AUTO x10*3/uL 260 283 250     Plan:  - Continue to monitor with daily CBC and Coag panel  - maintain active type and screen as needed    INFECTIOUS DISEASE:  #TIM  Assessment:  Results from last 7 days   Lab Units 25  0024 25  1558 25  0022   WBC AUTO x10*3/uL 11.9* 12.1* 10.6    - Temp (24hrs), Av.6 °C (97.9 °F), Min:36 °C (96.8 °F), Max:37.2 °C (99 °F)  - White count elevated but trending down  - Pt afebrile  - Bcx no growth at 4 days ()  - Sputum cult  no predominant organism  - UA  unremarkable  - Vanc/Zosyn started  for desaturation and increased O2 requirement overnight    MUSCULOSKELETAL:  - No acute issues    SKIN:  #Swollen left arm  Assessment:   - LUE with persistent swelling since   - Non-pitting edema, non-erythematous  - POD6  - SQH started ()  - No DVT in LE ()  - No DVT in RUE ()  - Superficial thrombosis in basilic vein in LUE ()    Plan:   - Turns and skin care per NSU protocol    ACCESS:  - R Femoral  - R arterial  - L PIV    PROPHYLAXIS:  - DVT Ppx: SCDs, SQH  - GI: PPI    RESTRAINTS:  Not indicated/Patient does not meet criteria for restraints    LUIS LLAMAS  Neuroscience Intensive Care    The patient is critically ill with acute encephalopathy, acquired hydrocephalus and acute respiratory insufficiency in the setting of ruptured cerebellar AVM  Remains in poor neurological condition  EVD management per neurosurgery  Cont keppra  Cont BP control with goal sbp<140  Wean ventilator as tolerated  Cont antibiotics for presumed pneumonia: will obtain procalcitonin level    I have seen and examined the patient.  I have reviewed the patient's laboratory, radiographic, and clinical data.  I have spent 30 minutes providing critical care for the patient.       VAMSHI Nuñez M.D.          [1] atorvastatin, 10 mg, oral, Nightly  bisacodyl, 10 mg, rectal, Daily  carvedilol, 6.25 mg, oral, q12h  heparin (porcine), 5,000 Units, subcutaneous, q8h  hydrALAZINE, 75 mg, oral, q6h  insulin lispro, 0-5 Units, subcutaneous, q4h  lisinopril, 10 mg, oral, Daily  pantoprazole, 40 mg, intravenous, Daily before breakfast  perflutren protein A microsphere, 0.5 mL, intravenous, Once in imaging  piperacillin-tazobactam, 4.5 g, intravenous, q6h  polyethylene glycol, 17 g, oral, Daily  potassium chloride, 20 mEq, intravenous, q2h  sennosides-docusate sodium, 2 tablet, oral, BID  sulfur hexafluoride microsphr, 2 mL, intravenous, Once in imaging  vancomycin, 1,500 mg, intravenous, q12h     [2] PRN medications: albuterol, calcium gluconate, calcium gluconate, dextrose, dextrose, fentaNYL, glucagon, glucagon, hydrALAZINE, labetaloL, magnesium sulfate, magnesium sulfate, oxygen, potassium chloride CR **OR** potassium chloride, potassium chloride CR **OR** potassium chloride, vancomycin  [3] niCARdipine, 2.5-15 mg/hr, Last Rate: 15 mg/hr (04/28/25 0559)

## 2025-04-29 ENCOUNTER — APPOINTMENT (OUTPATIENT)
Dept: RADIOLOGY | Facility: HOSPITAL | Age: 70
DRG: 003 | End: 2025-04-29
Payer: COMMERCIAL

## 2025-04-29 LAB
ALBUMIN SERPL BCP-MCNC: 2.4 G/DL (ref 3.4–5)
ALBUMIN SERPL BCP-MCNC: 2.5 G/DL (ref 3.4–5)
ANION GAP SERPL CALC-SCNC: 11 MMOL/L (ref 10–20)
ANION GAP SERPL CALC-SCNC: 9 MMOL/L (ref 10–20)
BASOPHILS # BLD AUTO: 0.02 X10*3/UL (ref 0–0.1)
BASOPHILS # BLD AUTO: 0.02 X10*3/UL (ref 0–0.1)
BASOPHILS NFR BLD AUTO: 0.2 %
BASOPHILS NFR BLD AUTO: 0.2 %
BUN SERPL-MCNC: 39 MG/DL (ref 6–23)
BUN SERPL-MCNC: 41 MG/DL (ref 6–23)
CA-I BLD-SCNC: 1.18 MMOL/L (ref 1.1–1.33)
CA-I BLD-SCNC: 1.21 MMOL/L (ref 1.1–1.33)
CALCIUM SERPL-MCNC: 7.8 MG/DL (ref 8.6–10.6)
CALCIUM SERPL-MCNC: 8 MG/DL (ref 8.6–10.6)
CHLORIDE SERPL-SCNC: 118 MMOL/L (ref 98–107)
CHLORIDE SERPL-SCNC: 121 MMOL/L (ref 98–107)
CO2 SERPL-SCNC: 25 MMOL/L (ref 21–32)
CO2 SERPL-SCNC: 26 MMOL/L (ref 21–32)
CREAT SERPL-MCNC: 0.68 MG/DL (ref 0.5–1.05)
CREAT SERPL-MCNC: 0.79 MG/DL (ref 0.5–1.05)
EGFRCR SERPLBLD CKD-EPI 2021: 82 ML/MIN/1.73M*2
EGFRCR SERPLBLD CKD-EPI 2021: >90 ML/MIN/1.73M*2
EOSINOPHIL # BLD AUTO: 0.02 X10*3/UL (ref 0–0.7)
EOSINOPHIL # BLD AUTO: 0.03 X10*3/UL (ref 0–0.7)
EOSINOPHIL NFR BLD AUTO: 0.2 %
EOSINOPHIL NFR BLD AUTO: 0.3 %
ERYTHROCYTE [DISTWIDTH] IN BLOOD BY AUTOMATED COUNT: 14.1 % (ref 11.5–14.5)
ERYTHROCYTE [DISTWIDTH] IN BLOOD BY AUTOMATED COUNT: 14.1 % (ref 11.5–14.5)
GLUCOSE BLD MANUAL STRIP-MCNC: 131 MG/DL (ref 74–99)
GLUCOSE BLD MANUAL STRIP-MCNC: 153 MG/DL (ref 74–99)
GLUCOSE BLD MANUAL STRIP-MCNC: 156 MG/DL (ref 74–99)
GLUCOSE BLD MANUAL STRIP-MCNC: 160 MG/DL (ref 74–99)
GLUCOSE BLD MANUAL STRIP-MCNC: 162 MG/DL (ref 74–99)
GLUCOSE BLD MANUAL STRIP-MCNC: 168 MG/DL (ref 74–99)
GLUCOSE SERPL-MCNC: 169 MG/DL (ref 74–99)
GLUCOSE SERPL-MCNC: 173 MG/DL (ref 74–99)
HCT VFR BLD AUTO: 26.6 % (ref 36–46)
HCT VFR BLD AUTO: 26.8 % (ref 36–46)
HGB BLD-MCNC: 8.2 G/DL (ref 12–16)
HGB BLD-MCNC: 8.3 G/DL (ref 12–16)
IMM GRANULOCYTES # BLD AUTO: 0.11 X10*3/UL (ref 0–0.7)
IMM GRANULOCYTES # BLD AUTO: 0.13 X10*3/UL (ref 0–0.7)
IMM GRANULOCYTES NFR BLD AUTO: 1 % (ref 0–0.9)
IMM GRANULOCYTES NFR BLD AUTO: 1.2 % (ref 0–0.9)
LYMPHOCYTES # BLD AUTO: 0.94 X10*3/UL (ref 1.2–4.8)
LYMPHOCYTES # BLD AUTO: 0.96 X10*3/UL (ref 1.2–4.8)
LYMPHOCYTES NFR BLD AUTO: 8.6 %
LYMPHOCYTES NFR BLD AUTO: 9 %
MAGNESIUM SERPL-MCNC: 1.96 MG/DL (ref 1.6–2.4)
MCH RBC QN AUTO: 29.1 PG (ref 26–34)
MCH RBC QN AUTO: 29.4 PG (ref 26–34)
MCHC RBC AUTO-ENTMCNC: 30.8 G/DL (ref 32–36)
MCHC RBC AUTO-ENTMCNC: 31 G/DL (ref 32–36)
MCV RBC AUTO: 94 FL (ref 80–100)
MCV RBC AUTO: 95 FL (ref 80–100)
MONOCYTES # BLD AUTO: 0.65 X10*3/UL (ref 0.1–1)
MONOCYTES # BLD AUTO: 0.78 X10*3/UL (ref 0.1–1)
MONOCYTES NFR BLD AUTO: 6.1 %
MONOCYTES NFR BLD AUTO: 7.1 %
NEUTROPHILS # BLD AUTO: 8.88 X10*3/UL (ref 1.2–7.7)
NEUTROPHILS # BLD AUTO: 9.06 X10*3/UL (ref 1.2–7.7)
NEUTROPHILS NFR BLD AUTO: 82.7 %
NEUTROPHILS NFR BLD AUTO: 83.4 %
NRBC BLD-RTO: 0 /100 WBCS (ref 0–0)
NRBC BLD-RTO: 0 /100 WBCS (ref 0–0)
PHOSPHATE SERPL-MCNC: 3.8 MG/DL (ref 2.5–4.9)
PHOSPHATE SERPL-MCNC: 4 MG/DL (ref 2.5–4.9)
PLATELET # BLD AUTO: 273 X10*3/UL (ref 150–450)
PLATELET # BLD AUTO: 282 X10*3/UL (ref 150–450)
POTASSIUM SERPL-SCNC: 3.4 MMOL/L (ref 3.5–5.3)
POTASSIUM SERPL-SCNC: 3.6 MMOL/L (ref 3.5–5.3)
RBC # BLD AUTO: 2.79 X10*6/UL (ref 4–5.2)
RBC # BLD AUTO: 2.85 X10*6/UL (ref 4–5.2)
SODIUM SERPL-SCNC: 151 MMOL/L (ref 136–145)
SODIUM SERPL-SCNC: 151 MMOL/L (ref 136–145)
SODIUM SERPL-SCNC: 152 MMOL/L (ref 136–145)
SODIUM SERPL-SCNC: 152 MMOL/L (ref 136–145)
SODIUM SERPL-SCNC: 153 MMOL/L (ref 136–145)
WBC # BLD AUTO: 10.7 X10*3/UL (ref 4.4–11.3)
WBC # BLD AUTO: 11 X10*3/UL (ref 4.4–11.3)

## 2025-04-29 PROCEDURE — 2500000002 HC RX 250 W HCPCS SELF ADMINISTERED DRUGS (ALT 637 FOR MEDICARE OP, ALT 636 FOR OP/ED): Performed by: REGISTERED NURSE

## 2025-04-29 PROCEDURE — 70551 MRI BRAIN STEM W/O DYE: CPT

## 2025-04-29 PROCEDURE — 2500000001 HC RX 250 WO HCPCS SELF ADMINISTERED DRUGS (ALT 637 FOR MEDICARE OP)

## 2025-04-29 PROCEDURE — 94799 UNLISTED PULMONARY SVC/PX: CPT

## 2025-04-29 PROCEDURE — 94003 VENT MGMT INPAT SUBQ DAY: CPT

## 2025-04-29 PROCEDURE — 70551 MRI BRAIN STEM W/O DYE: CPT | Performed by: RADIOLOGY

## 2025-04-29 PROCEDURE — 82330 ASSAY OF CALCIUM: CPT

## 2025-04-29 PROCEDURE — 2500000004 HC RX 250 GENERAL PHARMACY W/ HCPCS (ALT 636 FOR OP/ED): Mod: JZ

## 2025-04-29 PROCEDURE — 2500000005 HC RX 250 GENERAL PHARMACY W/O HCPCS: Performed by: STUDENT IN AN ORGANIZED HEALTH CARE EDUCATION/TRAINING PROGRAM

## 2025-04-29 PROCEDURE — 84295 ASSAY OF SERUM SODIUM: CPT

## 2025-04-29 PROCEDURE — 2020000001 HC ICU ROOM DAILY

## 2025-04-29 PROCEDURE — 82947 ASSAY GLUCOSE BLOOD QUANT: CPT

## 2025-04-29 PROCEDURE — 85025 COMPLETE CBC W/AUTO DIFF WBC: CPT

## 2025-04-29 PROCEDURE — 2500000001 HC RX 250 WO HCPCS SELF ADMINISTERED DRUGS (ALT 637 FOR MEDICARE OP): Performed by: STUDENT IN AN ORGANIZED HEALTH CARE EDUCATION/TRAINING PROGRAM

## 2025-04-29 PROCEDURE — 37799 UNLISTED PX VASCULAR SURGERY: CPT

## 2025-04-29 PROCEDURE — 2500000004 HC RX 250 GENERAL PHARMACY W/ HCPCS (ALT 636 FOR OP/ED)

## 2025-04-29 PROCEDURE — 83735 ASSAY OF MAGNESIUM: CPT

## 2025-04-29 PROCEDURE — 2500000004 HC RX 250 GENERAL PHARMACY W/ HCPCS (ALT 636 FOR OP/ED): Mod: JZ | Performed by: STUDENT IN AN ORGANIZED HEALTH CARE EDUCATION/TRAINING PROGRAM

## 2025-04-29 PROCEDURE — 2500000004 HC RX 250 GENERAL PHARMACY W/ HCPCS (ALT 636 FOR OP/ED): Mod: JW | Performed by: STUDENT IN AN ORGANIZED HEALTH CARE EDUCATION/TRAINING PROGRAM

## 2025-04-29 RX ORDER — POTASSIUM CHLORIDE 14.9 MG/ML
20 INJECTION INTRAVENOUS
Status: COMPLETED | OUTPATIENT
Start: 2025-04-29 | End: 2025-04-29

## 2025-04-29 RX ORDER — FENTANYL CITRATE-0.9 % NACL/PF 10 MCG/ML
25-200 PLASTIC BAG, INJECTION (ML) INTRAVENOUS CONTINUOUS
Status: DISCONTINUED | OUTPATIENT
Start: 2025-04-29 | End: 2025-05-03

## 2025-04-29 RX ADMIN — Medication 25 MCG/HR: at 00:44

## 2025-04-29 RX ADMIN — HYDRALAZINE HYDROCHLORIDE 75 MG: 50 TABLET ORAL at 13:54

## 2025-04-29 RX ADMIN — Medication 40 PERCENT: at 20:17

## 2025-04-29 RX ADMIN — LABETALOL HYDROCHLORIDE 10 MG: 5 INJECTION, SOLUTION INTRAVENOUS at 02:28

## 2025-04-29 RX ADMIN — Medication 75 MCG/HR: at 20:26

## 2025-04-29 RX ADMIN — INSULIN LISPRO 1 UNITS: 100 INJECTION, SOLUTION INTRAVENOUS; SUBCUTANEOUS at 23:32

## 2025-04-29 RX ADMIN — POTASSIUM CHLORIDE 20 MEQ: 14.9 INJECTION, SOLUTION INTRAVENOUS at 08:22

## 2025-04-29 RX ADMIN — CARVEDILOL 6.25 MG: 12.5 TABLET, FILM COATED ORAL at 06:12

## 2025-04-29 RX ADMIN — SENNOSIDES AND DOCUSATE SODIUM 2 TABLET: 50; 8.6 TABLET ORAL at 08:20

## 2025-04-29 RX ADMIN — HYDRALAZINE HYDROCHLORIDE 75 MG: 50 TABLET ORAL at 20:02

## 2025-04-29 RX ADMIN — PANTOPRAZOLE SODIUM 40 MG: 40 INJECTION, POWDER, FOR SOLUTION INTRAVENOUS at 06:12

## 2025-04-29 RX ADMIN — FENTANYL CITRATE 25 MCG: 50 INJECTION INTRAMUSCULAR; INTRAVENOUS at 00:10

## 2025-04-29 RX ADMIN — INSULIN LISPRO 1 UNITS: 100 INJECTION, SOLUTION INTRAVENOUS; SUBCUTANEOUS at 20:11

## 2025-04-29 RX ADMIN — NICARDIPINE HYDROCHLORIDE 15 MG/HR: 0.2 INJECTION, SOLUTION INTRAVENOUS at 17:53

## 2025-04-29 RX ADMIN — NICARDIPINE HYDROCHLORIDE 15 MG/HR: 0.2 INJECTION, SOLUTION INTRAVENOUS at 08:19

## 2025-04-29 RX ADMIN — NICARDIPINE HYDROCHLORIDE 15 MG/HR: 0.2 INJECTION, SOLUTION INTRAVENOUS at 02:23

## 2025-04-29 RX ADMIN — HYDRALAZINE HYDROCHLORIDE 75 MG: 50 TABLET ORAL at 08:20

## 2025-04-29 RX ADMIN — NICARDIPINE HYDROCHLORIDE 15 MG/HR: 0.2 INJECTION, SOLUTION INTRAVENOUS at 09:56

## 2025-04-29 RX ADMIN — NICARDIPINE HYDROCHLORIDE 15 MG/HR: 0.2 INJECTION, SOLUTION INTRAVENOUS at 12:46

## 2025-04-29 RX ADMIN — HEPARIN SODIUM 5000 UNITS: 5000 INJECTION INTRAVENOUS; SUBCUTANEOUS at 00:44

## 2025-04-29 RX ADMIN — INSULIN LISPRO 1 UNITS: 100 INJECTION, SOLUTION INTRAVENOUS; SUBCUTANEOUS at 16:16

## 2025-04-29 RX ADMIN — POTASSIUM CHLORIDE 20 MEQ: 14.9 INJECTION, SOLUTION INTRAVENOUS at 06:38

## 2025-04-29 RX ADMIN — HEPARIN SODIUM 5000 UNITS: 5000 INJECTION INTRAVENOUS; SUBCUTANEOUS at 08:20

## 2025-04-29 RX ADMIN — NICARDIPINE HYDROCHLORIDE 15 MG/HR: 0.2 INJECTION, SOLUTION INTRAVENOUS at 00:09

## 2025-04-29 RX ADMIN — NICARDIPINE HYDROCHLORIDE 15 MG/HR: 0.2 INJECTION, SOLUTION INTRAVENOUS at 15:12

## 2025-04-29 RX ADMIN — INSULIN LISPRO 1 UNITS: 100 INJECTION, SOLUTION INTRAVENOUS; SUBCUTANEOUS at 04:11

## 2025-04-29 RX ADMIN — INSULIN LISPRO 1 UNITS: 100 INJECTION, SOLUTION INTRAVENOUS; SUBCUTANEOUS at 00:18

## 2025-04-29 RX ADMIN — NICARDIPINE HYDROCHLORIDE 15 MG/HR: 0.2 INJECTION, SOLUTION INTRAVENOUS at 20:34

## 2025-04-29 RX ADMIN — Medication 75 MCG/HR: at 09:05

## 2025-04-29 RX ADMIN — LISINOPRIL 10 MG: 20 TABLET ORAL at 08:20

## 2025-04-29 RX ADMIN — CARVEDILOL 6.25 MG: 12.5 TABLET, FILM COATED ORAL at 20:02

## 2025-04-29 RX ADMIN — POLYETHYLENE GLYCOL 3350 17 G: 17 POWDER, FOR SOLUTION ORAL at 08:20

## 2025-04-29 RX ADMIN — Medication 40 PERCENT: at 04:20

## 2025-04-29 RX ADMIN — HYDRALAZINE HYDROCHLORIDE 75 MG: 50 TABLET ORAL at 02:23

## 2025-04-29 RX ADMIN — SENNOSIDES AND DOCUSATE SODIUM 2 TABLET: 50; 8.6 TABLET ORAL at 20:02

## 2025-04-29 RX ADMIN — HEPARIN SODIUM 5000 UNITS: 5000 INJECTION INTRAVENOUS; SUBCUTANEOUS at 16:16

## 2025-04-29 RX ADMIN — HYDRALAZINE HYDROCHLORIDE 10 MG: 20 INJECTION INTRAMUSCULAR; INTRAVENOUS at 05:03

## 2025-04-29 RX ADMIN — NICARDIPINE HYDROCHLORIDE 15 MG/HR: 0.2 INJECTION, SOLUTION INTRAVENOUS at 04:46

## 2025-04-29 RX ADMIN — NICARDIPINE HYDROCHLORIDE 15 MG/HR: 0.2 INJECTION, SOLUTION INTRAVENOUS at 23:31

## 2025-04-29 RX ADMIN — ATORVASTATIN CALCIUM 10 MG: 10 TABLET, FILM COATED ORAL at 20:02

## 2025-04-29 RX ADMIN — BISACODYL 10 MG: 10 SUPPOSITORY RECTAL at 09:19

## 2025-04-29 ASSESSMENT — COGNITIVE AND FUNCTIONAL STATUS - GENERAL
DRESSING REGULAR UPPER BODY CLOTHING: TOTAL
STANDING UP FROM CHAIR USING ARMS: TOTAL
MOVING TO AND FROM BED TO CHAIR: TOTAL
EATING MEALS: TOTAL
CLIMB 3 TO 5 STEPS WITH RAILING: TOTAL
DRESSING REGULAR LOWER BODY CLOTHING: TOTAL
MOBILITY SCORE: 6
TURNING FROM BACK TO SIDE WHILE IN FLAT BAD: TOTAL
MOVING FROM LYING ON BACK TO SITTING ON SIDE OF FLAT BED WITH BEDRAILS: TOTAL
PERSONAL GROOMING: TOTAL
WALKING IN HOSPITAL ROOM: TOTAL
HELP NEEDED FOR BATHING: TOTAL
TOILETING: TOTAL
DAILY ACTIVITIY SCORE: 6

## 2025-04-29 ASSESSMENT — PAIN - FUNCTIONAL ASSESSMENT
PAIN_FUNCTIONAL_ASSESSMENT: CPOT (CRITICAL CARE PAIN OBSERVATION TOOL)

## 2025-04-29 NOTE — CARE PLAN
Problem: General Stroke  Goal: Establish a mutual long term goal with patient by discharge  Outcome: Progressing  Goal: Demonstrate improvement in neurological exam throughout the shift  Outcome: Progressing  Goal: Maintain BP within ordered limits throughout shift  Outcome: Progressing  Goal: Participate in treatment (ie., meds, therapy) throughout shift  Outcome: Progressing  Goal: No symptoms of aspiration throughout shift  Outcome: Progressing  Goal: No symptoms of hemorrhage throughout shift  Outcome: Progressing  Goal: Tolerate enteral feeding throughout shift  Outcome: Progressing  Goal: Decreased nausea/vomiting throughout shift  Outcome: Progressing  Goal: Controlled blood glucose throughout shift  Outcome: Progressing  Goal: Out of bed three times today  Outcome: Progressing     Problem: ICU Stroke  Goal: Maintain ICP within ordered limits throughout shift  Outcome: Progressing  Goal: Tolerate EVD clamping trial throughout shift  Outcome: Progressing  Goal: Tolerate ventilator weaning trial during shift  Outcome: Progressing  Goal: Maintain patent airway throughout shift  Outcome: Progressing  Goal: Achieve/maintain targeted sodium level throughout shift  Outcome: Progressing     Problem: Pain - Adult  Goal: Verbalizes/displays adequate comfort level or baseline comfort level  Outcome: Progressing     Problem: Safety - Adult  Goal: Free from fall injury  Outcome: Progressing     Problem: Discharge Planning  Goal: Discharge to home or other facility with appropriate resources  Outcome: Progressing     Problem: Chronic Conditions and Co-morbidities  Goal: Patient's chronic conditions and co-morbidity symptoms are monitored and maintained or improved  Outcome: Progressing     Problem: Nutrition  Goal: Nutrient intake appropriate for maintaining nutritional needs  Outcome: Progressing     Problem: Knowledge Deficit  Goal: Patient/family/caregiver demonstrates understanding of disease process, treatment plan,  medications, and discharge instructions  Outcome: Progressing     Problem: Mechanical Ventilation  Goal: Patient Will Maintain Patent Airway  Outcome: Progressing  Goal: Oral health is maintained or improved  Outcome: Progressing  Goal: Tracheostomy will be managed safely  Outcome: Progressing  Goal: ET tube will be managed safely  Outcome: Progressing  Goal: Ability to express needs and understand communication  Outcome: Progressing  Goal: Mobility/activity is maintained at optimum level for patient  Outcome: Progressing     Problem: Skin  Goal: Decreased wound size/increased tissue granulation at next dressing change  Outcome: Progressing  Goal: Participates in plan/prevention/treatment measures  Outcome: Progressing  Goal: Prevent/manage excess moisture  Outcome: Progressing  Goal: Prevent/minimize sheer/friction injuries  Outcome: Progressing  Goal: Promote/optimize nutrition  Outcome: Progressing  Goal: Promote skin healing  Outcome: Progressing   The patient's goals for the shift include      The clinical goals for the shift include Patient will remain safe and neurologically stabled throughtout the shift.

## 2025-04-29 NOTE — PROGRESS NOTES
[facility]  NEUROSCIENCE INTENSIVE CARE UNIT  DAILY PROGRESS NOTE       Patient Name: Muriel De Souza   MRN: [id number]     Admit Date: 2025     : 1955 AGE: 69 y.o. GENDER: female        Subjective    Muriel De Souza is a 69 y.o. female with h/o HTN, HLD p/w HA, arrested in CT scanner at OSH, ROSC after 7 min CPR,  CTH w diffuse SAH and L cerebellar ICH, triventriculomegally, effaced 4th vents, CTA with L cerebellar AVM w venous varices s/p RF EVD and now admitted for further management.    Per chart review, patient had received bad news at home and then had sudden onset headache and vomiting. Found to have SAH at OSH with course complicated by cardiac arrest. She was intubated and sedated and transferred from [place], [place] to [facility].    Significant Events:  -  came in with L cerebellar AVM bleed with course complicated by cardiac arrest ROSC after 7 min CPR, uppon arrival to [facility] NSU, EVD drain placed, went to OR for decompression and AVM resection    Interval Events:   NAEON, Afebrile, hemodynamically stable with -150 with goal SBP < 140  MRI not done   SBT overnight with pt breathing on her own  +1.3 L / 24 hours  UOP 0.8 / 24 hours       Objective   VITALS (24H):  Temp:  [34.5 °C (94.1 °F)-36.5 °C (97.7 °F)] 35.8 °C (96.4 °F)  Heart Rate:  [55-76] 60  Resp:  [10-20] 12  Arterial Line BP 1: (127-197)/(31-56) 150/47  FiO2 (%):  [40 %] 40 %  INTAKE/OUTPUT:  Intake/Output Summary (Last 24 hours) at 2025 0829  Last data filed at 2025 0819  Gross per 24 hour   Intake 3641.05 ml   Output 2122 ml   Net 1519.05 ml     VENT SETTINGS:  Vent Mode: Volume control/assist control  FiO2 (%):  [40 %] 40 %  S RR:  [12] 12  S VT:  [400 mL] 400 mL  PEEP/CPAP (cm H2O):  [10 cm H20] 10 cm H20  MAP (cm H2O):  [13-15] 15       PHYSICAL EXAM:  NEURO: Off sedation  - Intubated  - Eyes closed to nox stim  - pupils minimally reactive to light  - no VOR  - corneals negative  - no gag  -  cough reflex intact  - no spontaneous movements  - No response to pain in UEs or LEs  - no increased tone  CV:  - RRR on telemetry, NSR  - Arterial line in place  RESP:  - intubated  :  - Brooks catheter in place  GI:  - Abdomen soft but somewhat distended  SKIN:  - Intact  - left arm swollen and tense to palpation, not pitting, non-erythematous    MEDICATIONS:  Scheduled: PRN: Continuous:   Scheduled Medications[1] PRN Medications[2] Continuous Medications[3]     IMAGING RESULTS:  CT head wo IV contrast   Final Result   * Diffuse subarachnoid hemorrhage with hydrocephalus   *Left cerebellar hematoma suggesting a likely source.        MACRO:   none        Signed by: Cruz Salgado 4/22/2025 11:59 AM   Dictation workstation:   [id number]      CT angio head and neck w and wo IV contrast   Final Result   * Diffuse subarachnoid hemorrhage with hydrocephalus as described   *Suspected vascular malformation in the left cerebellar hemisphere   with enlarged arterial and venous structures largely in the left   cerebellar hemisphere and residual partial opacification of a venous   aneurysm near the torcula. *Bilateral carotid stenosis as described        MACRO:   none        Signed by: Cruz Salgado 4/22/2025 12:07 PM   Dictation workstation:   [id number]             Assessment/Plan    Muriel De Souza is a 69 y.o. female with h/o HTN, HLD p/w HA, arrested in CT scanner at OSH, ROSC after 7 min CPR,  CTH w diffuse SAH and L cerebellar ICH, triventriculomegally, effaced 4th vents, CTA with L cerebellar AVM w venous varices s/p RF EVD as well as AVM resection on 4/22 and now admitted for further management.    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  #Intracranial hypertension  Assessment:  - First day 4/22 presented with SAH, L cerebellar ICH, and ventriculomegaly on CTH  - 4/22 CTA showing L cerebellar AVM  - 4/22 RF  EVD for ICH  - 4/22 OR AVM resection  - Pt is neurologically stable, plan for MRI today 4/29  - Pt has cough reflex, which is a new finding  Plan:  - Plan for MRI 4/29  - NSU  - Keppra  - EVD in place, maintained at 10  - BP goal: SBP < 140    --> PRN: Labetalol, Hydralazine, and Nicardipine   - IV nicardipine 0.2 mg/ml  - PO hydralazine 75 q6  - PO Carvedilol 6.25 BID  - PO Lisinopril 10 OD   - Neuro Checks: Q1H  - Sedation: Off  - Pain: PRN  - Nausea: ondansetron  - PT/OT/SLP    CARDIOVASCULAR:  #Cardiac arrest  #Lactic acidosis, improving  # elevated troponins, improved  Assessment:  - 7 min to ROSC  - Assess for ischemic and stress-induced cardiomyopathy   - Assess for arrhythmias following cardiac arrest   - Cards consult (4/23)  - Type 2 MI (demand ischemia), leading to troponin up-trend (4/23)  - Trop trending down, d/c'd 4/24  - ECHO 4/23:  CONCLUSIONS:   1. Left ventricular ejection fraction is normal, calculated by Adrian's biplane at 71%.   2. Spectral Doppler shows a Grade I (impaired relaxation pattern) of left ventricular diastolic filling with normal left atrial filling pressure.   3. There is normal right ventricular global systolic function.   4. The left atrium is mildly dilated.   5. There is no evidence of a patent foramen ovale.   6. The inferior vena cava appears mildly dilated, on vent.  - Pt is hemodynamically stable, SBP goal < 140    Plan:  - Monitor on telemetry  - SBP goal < 140 (see above)    RESPIRATORY:  #Acute hypoxic respiratory failure  #Mechanical ventilation  Assessment:  - Pt intubated for decreased LOC  - No increased vent demands or secretions. Continue to monitor for signs of infection  - CPAP overnight with pt breathing on her own  Plan:  - Continue mechanical ventilation for decreased LOC  - Monitor for increased secretions/desaturations    RENAL/:  #Pre-renal azotemia, resolving  Assessment:  Results from last 72 hours   Lab Units 04/29/25  0409 04/29/25  0013  04/28/25 2005 04/28/25  1626 04/28/25  1205 04/28/25  0820 04/28/25  0330 04/28/25 0223 04/27/25 1958 04/27/25  1558   CREATININE mg/dL 0.79  --   --  0.82  --   --  0.83 0.86  --  0.85   BUN mg/dL 39*  --   --  40*  --   --  35* 37*  --  34*   SODIUM mmol/L 152* 153* 152* 152*  153* 152*   < > 154*  154* 154*   < > 156*  156*    < > = values in this interval not displayed.   - UOP 0.8 / 24 hours 4/29  - BUN trending down  - Cr trending down  - Pt repleted with fluids  - Resolving pre-renal azotemia    Plan:  - Monitor with daily RFP  - IVF for hypovolemia  - Maintain forbes catheter for: critically ill patient who need accurate urinary output measurements    FEN/GI:  #TIM  Results from last 72 hours   Lab Units 04/29/25  0409 04/29/25  0013 04/28/25 2005 04/28/25 1626 04/28/25  1205 04/28/25  0820 04/28/25 0330 04/28/25 0223 04/27/25 1958 04/27/25  1558   SODIUM mmol/L 152* 153* 152* 152*  153* 152*   < > 154*  154* 154*   < > 156*  156*   POTASSIUM mmol/L 3.4*  --   --  2.9*  --   --  2.8* 2.8*  --  3.1*   PHOSPHORUS mg/dL 3.8  --   --  3.1  --   --  3.3 3.5  --  2.9    < > = values in this interval not displayed.   - Hypokalemia 4/28, Lasix 4/27  - Repleted with fluids overnight 4/27-4/28  - Improving potassium 4/29  - Stable phos 4/29  - Sodium at goal 150-160    Plan:  - Monitor and replace electrolytes per protocol  - Replete K+ as needed  - Sodium goal 150 - 160 per NSGY  - IVF: PRN  - Diet: Enteral feeding through NG  - Bowel Regimen: Docusate-Senna PRN and Miralax PRN    ENDOCRINE:  #TIM  Assessment:  Results from last 7 days   Lab Units 04/29/25  0807 04/29/25  0410 04/29/25  0409 04/29/25  0017 04/29/25  0013 04/28/25  2005 04/28/25  1626 04/28/25  1546 04/28/25  1205 04/28/25  1201 04/28/25  0820   POCT GLUCOSE mg/dL 131* 156*  --  168*  --  169*  --  159*  --  174*  --    GLUCOSE mg/dL  --   --  169*  --   --   --  168*  --   --   --   --    SODIUM mmol/L  --   --  152*  --  153* 152* 152*   153*  --  152*  --  153*    - HbA1C 5.5 on     Plan:  - Accuchecks & ISS PRN     HEMATOLOGY:  #Anemia, likely dilutional  Assessment:  - Baseline Hgb: 12.0  - Baseline Plts: 287  Results from last 7 days   Lab Units 25  0409 25  1626 25  0024   HEMOGLOBIN g/dL 8.3* 8.7* 8.6*   HEMATOCRIT % 26.8* 28.2* 28.1*   PLATELETS AUTO x10*3/uL 273 270 260     Plan:  - Continue to monitor with daily CBC and Coag panel  - maintain active type and screen as needed    INFECTIOUS DISEASE:  #TIM  Assessment:  Results from last 7 days   Lab Units 25  0409 25  1626 25  0024   WBC AUTO x10*3/uL 11.0 11.6* 11.9*    - Temp (24hrs), Av.9 °C (96.6 °F), Min:34.5 °C (94.1 °F), Max:36.5 °C (97.7 °F)  - White count trending down  - Pt afebrile  - Bcx no growth at 4 days ()  - Sputum cult  no predominant organism  - UA  unremarkable  - Negative pro calcitonin   - Vanc/Zosyn started  for desaturation and increased O2 requirement overnight  - Vanc/Zosyn stopped , low suspicion for infection given clean Bcx, no fevers, resolving leukocytosis, and negative pro calcitonin     Plan  - Continue to monitor for signs of infection     MUSCULOSKELETAL:  - No acute issues    SKIN:  #Swollen left arm  Assessment:   - LUE with persistent swelling since   - Non-pitting edema, non-erythematous  - POD6  - SQH started ()  - No DVT in LE ()  - No DVT in RUE ()  - Superficial thrombosis in basilic vein in LUE ()    Plan:   - Turns and skin care per NSU protocol    ACCESS:  - R Femoral  - R arterial  - L PIV    PROPHYLAXIS:  - DVT Ppx: SCDs, SQH  - GI: PPI    RESTRAINTS:  Not indicated/Patient does not meet criteria for restraints    LUIS LLAMAS  Neuroscience Intensive Care    I have reviewed and edited the information above and I agree with the assessment and plan as outlined by the medical student.  Yessenia Jimenez MD  Emergency Medicine, PGY-2    The patient is  critically ill with acute encephalopathy, acquired hydrocephalus and acute respiratory insufficiency in the setting of ruptured cerebellar AVM  Remains in poor neurological condition  Plan for MRI today for prognosis  EVD management per neurosurgery  Cont keppra  Cont BP control with goal sbp<140  Wean ventilator as tolerated  Anemia unchanged: Cont to trend, transfuse for Hb<7  Procalcitonin normal: antibiotics stopped     I have seen and examined the patient.  I have reviewed the patient's laboratory, radiographic, and clinical data.  I have spent 30 minutes providing critical care for the patient.       VAMSHI Nuñez M.D.        [1] atorvastatin, 10 mg, oral, Nightly  bisacodyl, 10 mg, rectal, Daily  carvedilol, 6.25 mg, oral, q12h  heparin (porcine), 5,000 Units, subcutaneous, q8h  hydrALAZINE, 75 mg, oral, q6h  insulin lispro, 0-5 Units, subcutaneous, q4h  lisinopril, 10 mg, oral, Daily  pantoprazole, 40 mg, intravenous, Daily before breakfast  perflutren protein A microsphere, 0.5 mL, intravenous, Once in imaging  polyethylene glycol, 17 g, oral, Daily  potassium chloride, 20 mEq, intravenous, q2h  sennosides-docusate sodium, 2 tablet, oral, BID  sulfur hexafluoride microsphr, 2 mL, intravenous, Once in imaging     [2] PRN medications: albuterol, calcium gluconate, calcium gluconate, dextrose, dextrose, fentaNYL, glucagon, glucagon, hydrALAZINE, labetaloL, magnesium sulfate, magnesium sulfate, oxygen, potassium chloride CR **OR** potassium chloride, potassium chloride CR **OR** potassium chloride  [3] fentaNYL,  mcg/hr, Last Rate: 75 mcg/hr (04/29/25 0400)  niCARdipine, 2.5-15 mg/hr, Last Rate: 15 mg/hr (04/29/25 7381)

## 2025-04-29 NOTE — PROGRESS NOTES
"Muriel De Souza is a 69 y.o. female on day 7 of admission presenting with AVM (arteriovenous malformation) ([facility]    Subjective   No events overnight    Objective     Physical Exam  Intubated  ECNS  Ou2min R  +cough/gag  BUE flaccid  BLE TF  EVD in place    Last Recorded Vitals  Blood pressure (!) 121/45, pulse 60, temperature 35.8 °C (96.4 °F), temperature source Temporal, resp. rate 12, height 1.727 m (5' 7.99\"), weight 95.9 kg (211 lb 6.7 oz), SpO2 93%.  Intake/Output last 3 Shifts:  I/O last 3 completed shifts:  In: 6348.8 (66.2 mL/kg) [I.V.:2598.8 (27.1 mL/kg); NG/GT:2250; IV Piggyback:1500]  Out: 3258 (34 mL/kg) [Urine:2865 (0.8 mL/kg/hr); Drains:393]  Weight: 95.9 kg     Relevant Results    Assessment & Plan  AVM (arteriovenous malformation) ([facility])    Muriel De Souza is a 69 y.o. 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, s/p 1u Plt and DDAVP, s/p RF EVD (OP 18), 4/22 s/p SOC/C1 lami for AVM resection, CTH POC, stable vents, 4/22 TEG R low s/p 1u FFP, 4/23 TTE EF 71%, 4/23 s/p angio tentorial dural AV fistual w direct cortical venous drainage fed by b/l MMA, b/l tentorial arteries, incidental 2mm R pcomm aneurysm, 4/24 CTH slight decr vents    NSU  EVD at 10, monitor output and ICPs  MRI brain wo contrast   Na 150-160  SBP <140  Cards recs  Final path  Vasc conf recs  Q12 CBC, RFP  Abx, cxs  PTOT  SLP    Mayra Dumont MD    "

## 2025-04-29 NOTE — PROGRESS NOTES
Communication Note    Patient Name: Muriel De Souza  MRN: [id number]  Today's Date: 4/29/2025   Room: 07/07-A    Discipline: Physical Therapy      PT Missed Visit: Yes  Missed Visit Reason:  (Pt pending [facility] discussion.)      04/29/25 at 9:54 AM   Reshma Reese PT   Rehab Office: [phone]

## 2025-04-30 ENCOUNTER — APPOINTMENT (OUTPATIENT)
Dept: RADIOLOGY | Facility: HOSPITAL | Age: 70
DRG: 003 | End: 2025-04-30
Payer: COMMERCIAL

## 2025-04-30 ENCOUNTER — APPOINTMENT (OUTPATIENT)
Dept: NEUROLOGY | Facility: HOSPITAL | Age: 70
DRG: 003 | End: 2025-04-30
Payer: COMMERCIAL

## 2025-04-30 LAB
ALBUMIN SERPL BCP-MCNC: 2.5 G/DL (ref 3.4–5)
ALBUMIN SERPL BCP-MCNC: 2.7 G/DL (ref 3.4–5)
ALBUMIN SERPL BCP-MCNC: 2.7 G/DL (ref 3.4–5)
ANION GAP BLDA CALCULATED.4IONS-SCNC: 6 MMO/L (ref 10–25)
ANION GAP BLDA CALCULATED.4IONS-SCNC: 8 MMO/L (ref 10–25)
ANION GAP SERPL CALC-SCNC: 11 MMOL/L (ref 10–20)
ANION GAP SERPL CALC-SCNC: 12 MMOL/L (ref 10–20)
ANION GAP SERPL CALC-SCNC: 7 MMOL/L (ref 10–20)
BASE EXCESS BLDA CALC-SCNC: 1.1 MMOL/L (ref -2–3)
BASE EXCESS BLDA CALC-SCNC: 6.1 MMOL/L (ref -2–3)
BASOPHILS # BLD AUTO: 0.01 X10*3/UL (ref 0–0.1)
BASOPHILS # BLD AUTO: 0.02 X10*3/UL (ref 0–0.1)
BASOPHILS NFR BLD AUTO: 0.1 %
BASOPHILS NFR BLD AUTO: 0.2 %
BODY TEMPERATURE: ABNORMAL
BODY TEMPERATURE: ABNORMAL
BUN SERPL-MCNC: 37 MG/DL (ref 6–23)
BUN SERPL-MCNC: 39 MG/DL (ref 6–23)
BUN SERPL-MCNC: 40 MG/DL (ref 6–23)
CA-I BLDA-SCNC: 1.13 MMOL/L (ref 1.1–1.33)
CA-I BLDA-SCNC: 1.17 MMOL/L (ref 1.1–1.33)
CALCIUM SERPL-MCNC: 8 MG/DL (ref 8.6–10.6)
CALCIUM SERPL-MCNC: 8.2 MG/DL (ref 8.6–10.6)
CALCIUM SERPL-MCNC: 8.4 MG/DL (ref 8.6–10.6)
CHLORIDE BLDA-SCNC: 113 MMOL/L (ref 98–107)
CHLORIDE BLDA-SCNC: 119 MMOL/L (ref 98–107)
CHLORIDE SERPL-SCNC: 112 MMOL/L (ref 98–107)
CHLORIDE SERPL-SCNC: 113 MMOL/L (ref 98–107)
CHLORIDE SERPL-SCNC: 117 MMOL/L (ref 98–107)
CO2 SERPL-SCNC: 27 MMOL/L (ref 21–32)
CO2 SERPL-SCNC: 29 MMOL/L (ref 21–32)
CO2 SERPL-SCNC: 34 MMOL/L (ref 21–32)
CREAT SERPL-MCNC: 0.7 MG/DL (ref 0.5–1.05)
CREAT SERPL-MCNC: 0.8 MG/DL (ref 0.5–1.05)
CREAT SERPL-MCNC: 0.92 MG/DL (ref 0.5–1.05)
EGFRCR SERPLBLD CKD-EPI 2021: 68 ML/MIN/1.73M*2
EGFRCR SERPLBLD CKD-EPI 2021: 80 ML/MIN/1.73M*2
EGFRCR SERPLBLD CKD-EPI 2021: >90 ML/MIN/1.73M*2
EOSINOPHIL # BLD AUTO: 0.13 X10*3/UL (ref 0–0.7)
EOSINOPHIL # BLD AUTO: 0.19 X10*3/UL (ref 0–0.7)
EOSINOPHIL NFR BLD AUTO: 1.2 %
EOSINOPHIL NFR BLD AUTO: 1.6 %
ERYTHROCYTE [DISTWIDTH] IN BLOOD BY AUTOMATED COUNT: 13.8 % (ref 11.5–14.5)
ERYTHROCYTE [DISTWIDTH] IN BLOOD BY AUTOMATED COUNT: 14.1 % (ref 11.5–14.5)
GLUCOSE BLD MANUAL STRIP-MCNC: 145 MG/DL (ref 74–99)
GLUCOSE BLD MANUAL STRIP-MCNC: 160 MG/DL (ref 74–99)
GLUCOSE BLD MANUAL STRIP-MCNC: 161 MG/DL (ref 74–99)
GLUCOSE BLD MANUAL STRIP-MCNC: 172 MG/DL (ref 74–99)
GLUCOSE BLD MANUAL STRIP-MCNC: 172 MG/DL (ref 74–99)
GLUCOSE BLD MANUAL STRIP-MCNC: 182 MG/DL (ref 74–99)
GLUCOSE BLDA-MCNC: 179 MG/DL (ref 74–99)
GLUCOSE BLDA-MCNC: 189 MG/DL (ref 74–99)
GLUCOSE SERPL-MCNC: 174 MG/DL (ref 74–99)
GLUCOSE SERPL-MCNC: 174 MG/DL (ref 74–99)
GLUCOSE SERPL-MCNC: 177 MG/DL (ref 74–99)
HCO3 BLDA-SCNC: 25 MMOL/L (ref 22–26)
HCO3 BLDA-SCNC: 30.6 MMOL/L (ref 22–26)
HCT VFR BLD AUTO: 27.6 % (ref 36–46)
HCT VFR BLD AUTO: 27.9 % (ref 36–46)
HCT VFR BLD EST: 27 % (ref 36–46)
HCT VFR BLD EST: 29 % (ref 36–46)
HGB BLD-MCNC: 8.5 G/DL (ref 12–16)
HGB BLD-MCNC: 9.1 G/DL (ref 12–16)
HGB BLDA-MCNC: 8.9 G/DL (ref 12–16)
HGB BLDA-MCNC: 9.5 G/DL (ref 12–16)
IMM GRANULOCYTES # BLD AUTO: 0.14 X10*3/UL (ref 0–0.7)
IMM GRANULOCYTES # BLD AUTO: 0.16 X10*3/UL (ref 0–0.7)
IMM GRANULOCYTES NFR BLD AUTO: 1.3 % (ref 0–0.9)
IMM GRANULOCYTES NFR BLD AUTO: 1.4 % (ref 0–0.9)
INHALED O2 CONCENTRATION: 60 %
INHALED O2 CONCENTRATION: 60 %
LACTATE BLDA-SCNC: 0.6 MMOL/L (ref 0.4–2)
LACTATE BLDA-SCNC: 0.9 MMOL/L (ref 0.4–2)
LYMPHOCYTES # BLD AUTO: 1.08 X10*3/UL (ref 1.2–4.8)
LYMPHOCYTES # BLD AUTO: 1.14 X10*3/UL (ref 1.2–4.8)
LYMPHOCYTES NFR BLD AUTO: 10.2 %
LYMPHOCYTES NFR BLD AUTO: 9.8 %
MAGNESIUM SERPL-MCNC: 2.02 MG/DL (ref 1.6–2.4)
MCH RBC QN AUTO: 28.7 PG (ref 26–34)
MCH RBC QN AUTO: 28.9 PG (ref 26–34)
MCHC RBC AUTO-ENTMCNC: 30.5 G/DL (ref 32–36)
MCHC RBC AUTO-ENTMCNC: 33 G/DL (ref 32–36)
MCV RBC AUTO: 87 FL (ref 80–100)
MCV RBC AUTO: 95 FL (ref 80–100)
MONOCYTES # BLD AUTO: 0.84 X10*3/UL (ref 0.1–1)
MONOCYTES # BLD AUTO: 0.91 X10*3/UL (ref 0.1–1)
MONOCYTES NFR BLD AUTO: 7.8 %
MONOCYTES NFR BLD AUTO: 7.9 %
NEUTROPHILS # BLD AUTO: 8.39 X10*3/UL (ref 1.2–7.7)
NEUTROPHILS # BLD AUTO: 9.22 X10*3/UL (ref 1.2–7.7)
NEUTROPHILS NFR BLD AUTO: 79.2 %
NEUTROPHILS NFR BLD AUTO: 79.3 %
NRBC BLD-RTO: 0 /100 WBCS (ref 0–0)
NRBC BLD-RTO: 0.2 /100 WBCS (ref 0–0)
OXYHGB MFR BLDA: 96.9 % (ref 94–98)
OXYHGB MFR BLDA: 98.1 % (ref 94–98)
PCO2 BLDA: 36 MM HG (ref 38–42)
PCO2 BLDA: 43 MM HG (ref 38–42)
PH BLDA: 7.45 PH (ref 7.38–7.42)
PH BLDA: 7.46 PH (ref 7.38–7.42)
PHOSPHATE SERPL-MCNC: 3.8 MG/DL (ref 2.5–4.9)
PHOSPHATE SERPL-MCNC: 4 MG/DL (ref 2.5–4.9)
PHOSPHATE SERPL-MCNC: 4.3 MG/DL (ref 2.5–4.9)
PLATELET # BLD AUTO: 308 X10*3/UL (ref 150–450)
PLATELET # BLD AUTO: 324 X10*3/UL (ref 150–450)
PO2 BLDA: 105 MM HG (ref 85–95)
PO2 BLDA: 87 MM HG (ref 85–95)
POTASSIUM BLDA-SCNC: 3 MMOL/L (ref 3.5–5.3)
POTASSIUM BLDA-SCNC: 3.1 MMOL/L (ref 3.5–5.3)
POTASSIUM SERPL-SCNC: 3.1 MMOL/L (ref 3.5–5.3)
POTASSIUM SERPL-SCNC: 3.4 MMOL/L (ref 3.5–5.3)
POTASSIUM SERPL-SCNC: 3.4 MMOL/L (ref 3.5–5.3)
RBC # BLD AUTO: 2.94 X10*6/UL (ref 4–5.2)
RBC # BLD AUTO: 3.17 X10*6/UL (ref 4–5.2)
SAO2 % BLDA: 98 % (ref 94–100)
SAO2 % BLDA: 99 % (ref 94–100)
SODIUM BLDA-SCNC: 147 MMOL/L (ref 136–145)
SODIUM BLDA-SCNC: 148 MMOL/L (ref 136–145)
SODIUM SERPL-SCNC: 150 MMOL/L (ref 136–145)
SODIUM SERPL-SCNC: 151 MMOL/L (ref 136–145)
SODIUM SERPL-SCNC: 152 MMOL/L (ref 136–145)
WBC # BLD AUTO: 10.6 X10*3/UL (ref 4.4–11.3)
WBC # BLD AUTO: 11.6 X10*3/UL (ref 4.4–11.3)

## 2025-04-30 PROCEDURE — 2020000001 HC ICU ROOM DAILY

## 2025-04-30 PROCEDURE — 82435 ASSAY OF BLOOD CHLORIDE: CPT

## 2025-04-30 PROCEDURE — 80069 RENAL FUNCTION PANEL: CPT

## 2025-04-30 PROCEDURE — 84295 ASSAY OF SERUM SODIUM: CPT

## 2025-04-30 PROCEDURE — 94669 MECHANICAL CHEST WALL OSCILL: CPT

## 2025-04-30 PROCEDURE — 2500000004 HC RX 250 GENERAL PHARMACY W/ HCPCS (ALT 636 FOR OP/ED): Performed by: STUDENT IN AN ORGANIZED HEALTH CARE EDUCATION/TRAINING PROGRAM

## 2025-04-30 PROCEDURE — 94003 VENT MGMT INPAT SUBQ DAY: CPT

## 2025-04-30 PROCEDURE — 2500000004 HC RX 250 GENERAL PHARMACY W/ HCPCS (ALT 636 FOR OP/ED)

## 2025-04-30 PROCEDURE — 71045 X-RAY EXAM CHEST 1 VIEW: CPT

## 2025-04-30 PROCEDURE — 37799 UNLISTED PX VASCULAR SURGERY: CPT

## 2025-04-30 PROCEDURE — 71045 X-RAY EXAM CHEST 1 VIEW: CPT | Performed by: RADIOLOGY

## 2025-04-30 PROCEDURE — 2500000001 HC RX 250 WO HCPCS SELF ADMINISTERED DRUGS (ALT 637 FOR MEDICARE OP)

## 2025-04-30 PROCEDURE — 85025 COMPLETE CBC W/AUTO DIFF WBC: CPT

## 2025-04-30 PROCEDURE — 36600 WITHDRAWAL OF ARTERIAL BLOOD: CPT

## 2025-04-30 PROCEDURE — 2500000004 HC RX 250 GENERAL PHARMACY W/ HCPCS (ALT 636 FOR OP/ED): Mod: JZ

## 2025-04-30 PROCEDURE — 2500000001 HC RX 250 WO HCPCS SELF ADMINISTERED DRUGS (ALT 637 FOR MEDICARE OP): Performed by: STUDENT IN AN ORGANIZED HEALTH CARE EDUCATION/TRAINING PROGRAM

## 2025-04-30 PROCEDURE — 02HV33Z INSERTION OF INFUSION DEVICE INTO SUPERIOR VENA CAVA, PERCUTANEOUS APPROACH: ICD-10-PCS | Performed by: NEUROLOGICAL SURGERY

## 2025-04-30 PROCEDURE — 84295 ASSAY OF SERUM SODIUM: CPT | Performed by: STUDENT IN AN ORGANIZED HEALTH CARE EDUCATION/TRAINING PROGRAM

## 2025-04-30 PROCEDURE — 2500000002 HC RX 250 W HCPCS SELF ADMINISTERED DRUGS (ALT 637 FOR MEDICARE OP, ALT 636 FOR OP/ED): Performed by: REGISTERED NURSE

## 2025-04-30 PROCEDURE — 36556 INSERT NON-TUNNEL CV CATH: CPT | Mod: GC

## 2025-04-30 PROCEDURE — 82947 ASSAY GLUCOSE BLOOD QUANT: CPT

## 2025-04-30 PROCEDURE — 85018 HEMOGLOBIN: CPT

## 2025-04-30 PROCEDURE — 2500000005 HC RX 250 GENERAL PHARMACY W/O HCPCS: Performed by: STUDENT IN AN ORGANIZED HEALTH CARE EDUCATION/TRAINING PROGRAM

## 2025-04-30 PROCEDURE — 83735 ASSAY OF MAGNESIUM: CPT

## 2025-04-30 PROCEDURE — 2500000004 HC RX 250 GENERAL PHARMACY W/ HCPCS (ALT 636 FOR OP/ED): Mod: JZ | Performed by: STUDENT IN AN ORGANIZED HEALTH CARE EDUCATION/TRAINING PROGRAM

## 2025-04-30 RX ORDER — FUROSEMIDE 10 MG/ML
40 INJECTION INTRAMUSCULAR; INTRAVENOUS ONCE
Status: COMPLETED | OUTPATIENT
Start: 2025-04-30 | End: 2025-04-30

## 2025-04-30 RX ORDER — LANOLIN ALCOHOL/MO/W.PET/CERES
400 CREAM (GRAM) TOPICAL DAILY
Status: DISCONTINUED | OUTPATIENT
Start: 2025-04-30 | End: 2025-05-06

## 2025-04-30 RX ORDER — FUROSEMIDE 10 MG/ML
80 INJECTION INTRAMUSCULAR; INTRAVENOUS ONCE
Status: COMPLETED | OUTPATIENT
Start: 2025-04-30 | End: 2025-04-30

## 2025-04-30 RX ORDER — LISINOPRIL 20 MG/1
20 TABLET ORAL DAILY
Status: DISCONTINUED | OUTPATIENT
Start: 2025-05-01 | End: 2025-05-05

## 2025-04-30 RX ORDER — LISINOPRIL 20 MG/1
10 TABLET ORAL ONCE
Status: COMPLETED | OUTPATIENT
Start: 2025-04-30 | End: 2025-04-30

## 2025-04-30 RX ORDER — POTASSIUM CHLORIDE 1.5 G/1.58G
40 POWDER, FOR SOLUTION ORAL ONCE
Status: COMPLETED | OUTPATIENT
Start: 2025-04-30 | End: 2025-04-30

## 2025-04-30 RX ADMIN — MAGNESIUM OXIDE TAB 400 MG (241.3 MG ELEMENTAL MG) 400 MG: 400 (241.3 MG) TAB at 13:54

## 2025-04-30 RX ADMIN — INSULIN LISPRO 1 UNITS: 100 INJECTION, SOLUTION INTRAVENOUS; SUBCUTANEOUS at 15:57

## 2025-04-30 RX ADMIN — HYDRALAZINE HYDROCHLORIDE 10 MG: 20 INJECTION INTRAMUSCULAR; INTRAVENOUS at 23:06

## 2025-04-30 RX ADMIN — HEPARIN SODIUM 5000 UNITS: 5000 INJECTION INTRAVENOUS; SUBCUTANEOUS at 16:34

## 2025-04-30 RX ADMIN — POLYETHYLENE GLYCOL 3350 17 G: 17 POWDER, FOR SOLUTION ORAL at 08:47

## 2025-04-30 RX ADMIN — NICARDIPINE HYDROCHLORIDE 15 MG/HR: 0.2 INJECTION, SOLUTION INTRAVENOUS at 08:46

## 2025-04-30 RX ADMIN — NICARDIPINE HYDROCHLORIDE 15 MG/HR: 0.2 INJECTION, SOLUTION INTRAVENOUS at 20:39

## 2025-04-30 RX ADMIN — FUROSEMIDE 40 MG: 10 INJECTION INTRAMUSCULAR; INTRAVENOUS at 09:58

## 2025-04-30 RX ADMIN — INSULIN LISPRO 1 UNITS: 100 INJECTION, SOLUTION INTRAVENOUS; SUBCUTANEOUS at 20:39

## 2025-04-30 RX ADMIN — HEPARIN SODIUM 5000 UNITS: 5000 INJECTION INTRAVENOUS; SUBCUTANEOUS at 01:26

## 2025-04-30 RX ADMIN — NICARDIPINE HYDROCHLORIDE 15 MG/HR: 0.2 INJECTION, SOLUTION INTRAVENOUS at 16:53

## 2025-04-30 RX ADMIN — NICARDIPINE HYDROCHLORIDE 15 MG/HR: 0.2 INJECTION, SOLUTION INTRAVENOUS at 04:44

## 2025-04-30 RX ADMIN — HYDRALAZINE HYDROCHLORIDE 75 MG: 50 TABLET ORAL at 13:54

## 2025-04-30 RX ADMIN — HYDRALAZINE HYDROCHLORIDE 75 MG: 50 TABLET ORAL at 01:26

## 2025-04-30 RX ADMIN — NICARDIPINE HYDROCHLORIDE 15 MG/HR: 0.2 INJECTION, SOLUTION INTRAVENOUS at 14:08

## 2025-04-30 RX ADMIN — CARVEDILOL 6.25 MG: 12.5 TABLET, FILM COATED ORAL at 18:43

## 2025-04-30 RX ADMIN — INSULIN LISPRO 1 UNITS: 100 INJECTION, SOLUTION INTRAVENOUS; SUBCUTANEOUS at 04:00

## 2025-04-30 RX ADMIN — PANTOPRAZOLE SODIUM 40 MG: 40 INJECTION, POWDER, FOR SOLUTION INTRAVENOUS at 06:31

## 2025-04-30 RX ADMIN — HYDRALAZINE HYDROCHLORIDE 75 MG: 50 TABLET ORAL at 20:34

## 2025-04-30 RX ADMIN — NICARDIPINE HYDROCHLORIDE 15 MG/HR: 0.2 INJECTION, SOLUTION INTRAVENOUS at 22:30

## 2025-04-30 RX ADMIN — POTASSIUM CHLORIDE 40 MEQ: 1.5 POWDER, FOR SOLUTION ORAL at 13:53

## 2025-04-30 RX ADMIN — BISACODYL 10 MG: 10 SUPPOSITORY RECTAL at 08:46

## 2025-04-30 RX ADMIN — HYDRALAZINE HYDROCHLORIDE 10 MG: 20 INJECTION INTRAMUSCULAR; INTRAVENOUS at 22:16

## 2025-04-30 RX ADMIN — ATORVASTATIN CALCIUM 10 MG: 10 TABLET, FILM COATED ORAL at 20:34

## 2025-04-30 RX ADMIN — LABETALOL HYDROCHLORIDE 10 MG: 5 INJECTION, SOLUTION INTRAVENOUS at 23:28

## 2025-04-30 RX ADMIN — FUROSEMIDE 80 MG: 10 INJECTION INTRAMUSCULAR; INTRAVENOUS at 13:54

## 2025-04-30 RX ADMIN — NICARDIPINE HYDROCHLORIDE 15 MG/HR: 0.2 INJECTION, SOLUTION INTRAVENOUS at 02:30

## 2025-04-30 RX ADMIN — LISINOPRIL 10 MG: 20 TABLET ORAL at 08:47

## 2025-04-30 RX ADMIN — INSULIN LISPRO 1 UNITS: 100 INJECTION, SOLUTION INTRAVENOUS; SUBCUTANEOUS at 12:23

## 2025-04-30 RX ADMIN — HEPARIN SODIUM 5000 UNITS: 5000 INJECTION INTRAVENOUS; SUBCUTANEOUS at 08:47

## 2025-04-30 RX ADMIN — Medication 60 PERCENT: at 08:22

## 2025-04-30 RX ADMIN — SENNOSIDES AND DOCUSATE SODIUM 2 TABLET: 50; 8.6 TABLET ORAL at 08:47

## 2025-04-30 RX ADMIN — HYDRALAZINE HYDROCHLORIDE 75 MG: 50 TABLET ORAL at 08:47

## 2025-04-30 RX ADMIN — CARVEDILOL 6.25 MG: 12.5 TABLET, FILM COATED ORAL at 06:31

## 2025-04-30 RX ADMIN — NICARDIPINE HYDROCHLORIDE 12.5 MG/HR: 0.2 INJECTION, SOLUTION INTRAVENOUS at 10:44

## 2025-04-30 RX ADMIN — SENNOSIDES AND DOCUSATE SODIUM 2 TABLET: 50; 8.6 TABLET ORAL at 20:34

## 2025-04-30 RX ADMIN — LISINOPRIL 10 MG: 20 TABLET ORAL at 10:29

## 2025-04-30 RX ADMIN — Medication 40 PERCENT: at 03:55

## 2025-04-30 ASSESSMENT — COGNITIVE AND FUNCTIONAL STATUS - GENERAL
HELP NEEDED FOR BATHING: TOTAL
EATING MEALS: TOTAL
MOVING FROM LYING ON BACK TO SITTING ON SIDE OF FLAT BED WITH BEDRAILS: TOTAL
CLIMB 3 TO 5 STEPS WITH RAILING: TOTAL
MOVING TO AND FROM BED TO CHAIR: TOTAL
EATING MEALS: TOTAL
DRESSING REGULAR LOWER BODY CLOTHING: TOTAL
DRESSING REGULAR LOWER BODY CLOTHING: TOTAL
STANDING UP FROM CHAIR USING ARMS: TOTAL
DAILY ACTIVITIY SCORE: 6
STANDING UP FROM CHAIR USING ARMS: TOTAL
TOILETING: TOTAL
STANDING UP FROM CHAIR USING ARMS: TOTAL
TOILETING: TOTAL
HELP NEEDED FOR BATHING: TOTAL
MOVING TO AND FROM BED TO CHAIR: TOTAL
PERSONAL GROOMING: TOTAL
EATING MEALS: TOTAL
TURNING FROM BACK TO SIDE WHILE IN FLAT BAD: TOTAL
EATING MEALS: TOTAL
MOBILITY SCORE: 6
MOBILITY SCORE: 6
MOVING FROM LYING ON BACK TO SITTING ON SIDE OF FLAT BED WITH BEDRAILS: TOTAL
TURNING FROM BACK TO SIDE WHILE IN FLAT BAD: TOTAL
DRESSING REGULAR LOWER BODY CLOTHING: TOTAL
WALKING IN HOSPITAL ROOM: TOTAL
DAILY ACTIVITIY SCORE: 6
CLIMB 3 TO 5 STEPS WITH RAILING: TOTAL
PERSONAL GROOMING: TOTAL
MOVING TO AND FROM BED TO CHAIR: TOTAL
PERSONAL GROOMING: TOTAL
DRESSING REGULAR UPPER BODY CLOTHING: TOTAL
MOVING TO AND FROM BED TO CHAIR: TOTAL
MOVING FROM LYING ON BACK TO SITTING ON SIDE OF FLAT BED WITH BEDRAILS: TOTAL
DAILY ACTIVITIY SCORE: 6
DRESSING REGULAR UPPER BODY CLOTHING: TOTAL
WALKING IN HOSPITAL ROOM: TOTAL
DRESSING REGULAR UPPER BODY CLOTHING: TOTAL
MOBILITY SCORE: 6
MOVING FROM LYING ON BACK TO SITTING ON SIDE OF FLAT BED WITH BEDRAILS: TOTAL
DRESSING REGULAR UPPER BODY CLOTHING: TOTAL
TURNING FROM BACK TO SIDE WHILE IN FLAT BAD: TOTAL
HELP NEEDED FOR BATHING: TOTAL
CLIMB 3 TO 5 STEPS WITH RAILING: TOTAL
PERSONAL GROOMING: TOTAL
DAILY ACTIVITIY SCORE: 6
WALKING IN HOSPITAL ROOM: TOTAL
TOILETING: TOTAL
DRESSING REGULAR LOWER BODY CLOTHING: TOTAL
MOBILITY SCORE: 6
STANDING UP FROM CHAIR USING ARMS: TOTAL
HELP NEEDED FOR BATHING: TOTAL
TOILETING: TOTAL
TURNING FROM BACK TO SIDE WHILE IN FLAT BAD: TOTAL
WALKING IN HOSPITAL ROOM: TOTAL
CLIMB 3 TO 5 STEPS WITH RAILING: TOTAL

## 2025-04-30 ASSESSMENT — PAIN - FUNCTIONAL ASSESSMENT
PAIN_FUNCTIONAL_ASSESSMENT: CPOT (CRITICAL CARE PAIN OBSERVATION TOOL)

## 2025-04-30 NOTE — PROGRESS NOTES
Social Work Note:    LISW reviewed patient's chart.  Patient still intubated.  Per MD notes, patient had an episode of desaturation which required an increase in O2.  PT/OT ordered.  PT on hold pending GOC discussion. LISW will continue to follow to assist with discharge planning needs.  ALBA Cowart, MARIVEL-S

## 2025-04-30 NOTE — PROGRESS NOTES
Communication Note    Patient Name: Muriel De Souza  MRN: [id number]  Today's Date: 4/30/2025   Room: 07/07-A    Discipline: Physical Therapy      PT Missed Visit: Yes  Missed Visit Reason:  (Pt pending [facility] discussion.)      04/30/25 at 10:05 AM   Reshma Reese, PT   Rehab Office: [phone]

## 2025-04-30 NOTE — PROGRESS NOTES
"Muriel De Souza is a 69 y.o. female on day 8 of admission presenting with AVM (arteriovenous malformation) ([facility]).    Subjective   No events overnight    Objective     Physical Exam  Intubated  ECNS  OU2R  +cough/gag  BUE flaccid  BLE TF  EVD in place    Last Recorded Vitals  Blood pressure (!) 132/48, pulse 58, temperature 36 °C (96.8 °F), resp. rate 12, height 1.727 m (5' 7.99\"), weight 107 kg (236 lb 5.3 oz), SpO2 95%.  Intake/Output last 3 Shifts:  I/O last 3 completed shifts:  In: 5423.6 (56.6 mL/kg) [I.V.:2703.6 (28.2 mL/kg); NG/GT:2220; IV Piggyback:500]  Out: 2894 (30.2 mL/kg) [Urine:2550 (0.7 mL/kg/hr); Drains:344]  Weight: 95.9 kg     Relevant Results    Assessment & Plan  AVM (arteriovenous malformation) ([facility])    Muriel De Souza is a 69 y.o. 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, s/p 1u Plt and DDAVP, s/p RF EVD (OP 18), 4/22 s/p SOC/C1 lami for AVM resection, CTH POC, stable vents, 4/22 TEG R low s/p 1u FFP, 4/23 TTE EF 71%, 4/23 s/p angio tentorial dural AV fistual w direct cortical venous drainage fed by b/l MMA, b/l tentorial arteries, incidental 2mm R pcomm aneurysm, 4/24 CTH slight decr vents  4/29 MRI midbrain and b/l cerebellar hemisphere diffusion hits    NSU  EVD at 10, monitor output and ICPs  Goal of care discussion with family   Na goal 150-160, monitor w/ q6 Na  SBP goal <140  Con't hydral, lisinopril, coreg, may increase  Wean cardene as able  Final path (in process)  SCD, SQH    Kiran Robledo MD    "

## 2025-04-30 NOTE — CARE PLAN
Interprofessional Rounds    Summary:  ICH sp decompression and resection. MRI done, GOC with family and planning for trach and PEG.     Participants: Advance Practice Provider, Occupational Therapist, Physical Therapist, Physician, and RN    Care Plan Reviewed with:  Interdisciplinary Team

## 2025-04-30 NOTE — PROCEDURES
Central Line    Date/Time: 4/30/2025 7:54 PM    Performed by: Joel Tsai MD  Authorized by: Juwan Nuñez MD    Consent:     Consent obtained:  Written    Consent given by:  Spouse    Risks, benefits, and alternatives were discussed: yes    Universal protocol:     Patient identity confirmed:  Arm band  Pre-procedure details:     Indication(s): central venous access and insufficient peripheral access      Hand hygiene: Hand hygiene performed prior to insertion      Sterile barrier technique: All elements of maximal sterile technique followed      Skin preparation:  Chlorhexidine    Skin preparation agent: Skin preparation agent completely dried prior to procedure    Sedation:     Sedation type:  Moderate sedation  Anesthesia:     Anesthesia method:  None  Procedure details:     Location:  L subclavian    Patient position:  Trendelenburg    Procedural supplies:  Triple lumen    Catheter size:  7 Fr    Catheter length:  20    Number of attempts:  1    Successful placement: yes    Post-procedure details:     Post-procedure:  Line sutured and dressing applied    Assessment:  Blood return through all ports and placement verified by x-ray    Procedure completion:  Tolerated well, no immediate complications

## 2025-04-30 NOTE — SIGNIFICANT EVENT
"Preliminary EEG Report     This vEEG is consistent with a severe diffuse encephalopathy. No epileptiform discharges or lateralizing signs are seen.    This EEG was read from 19:14 to 19:37 on 04/30/25 .     The final impression will be available tomorrow under Chart Review in the Media tab. If the plan is to discontinue the video EEG, please place a \"Discontinue Continuous VEEG\" order in the EMR; otherwise the EEG tech will not receive the notification.      Pebbles Mathew MD  Adult Epilepsy Fellow  [facility] [place]    "

## 2025-04-30 NOTE — HOSPITAL COURSE
Muriel De Souza is a 70 yo female with HTN and HLD who presented to OSH with sudden onset headache and vomiting. CTH at OSH showed SAH. She went into cardiac arrest in the scanner. ROSC achieved after 7 min CPR. She was sedated and intubated. Transferred to  NICU for further management on 4/22.    Hypertensive on arrival. IV nicardipine started. CTH and CTA 4/22 showed diffuse SAH with hydrocephalus and L cerebellar AVM. EVD placed for decompression. Taken to OR on 4/22 for decompression and AVM resection. A-line placed for BP monitoring. Aneurysm clips were placed on feeder and draining vessels. The clot was removed. She was taken back to the NICU.     She remained intubated and sedated on return to the NICU. EVD in place. Post-operative BP goal was  - 140. She was hypertensive on return on NICU.  BP was controlled with IV nicardipine and sedation with propofol and fentanyl. IV nicardipine switched to IV clevidpine and PO atenolol was added on 4/23 for better BP control. She remained on fentanyl and propofol.     Neuro exams during this time were done on sedation as patient would become hypertensive off sedation. Neuro exam was fairly consistent. She was sedated and unresponsive. Pupillary reflex was intact with absence of VOR, corneal, cough, and gag reflexes. She had a swollen L arm with non-pitting edema. DVT US showed no DVTs in UEs and LEs, only a L superficial basilic vein clot. No further management indicated.    On 4/25, she desaturated, increasing ventilator demands. Started on Vanc and Zosyn for concern for VAP in the setting of leukocytosis and increased ventilator needs. Antihypertensives were switched to IV nicardipine and PO carvedilol 6.25 mg BID.  She was taken off fentanyl and propofol on 4/25. Neuro exam was unchanged off sedation.    CXR on 4/27 demonstrated L pleural effusion. She was diuresed with Lasix. Repeat CXRs demonstrated persistence of L pleural effusion, requiring additional  diuresis.     Vanc/Zosyn stopped on 4/28 given clean blood cultures, absence of fever, resolving leukocytosis and negative procalcitonin.     MRI completed on 4/29 with demonstration of L > R cerebellar FLAIR hyper intensities. Small foci of increased diffusion signal overlying the brainstem suspicious for additional smaller subacute infarction. Limited FLAIR hyper intensities in the brainstem.     Goals of care discussion with family on 4/30.

## 2025-04-30 NOTE — CARE PLAN
Problem: General Stroke  Goal: Establish a mutual long term goal with patient by discharge  Outcome: Progressing  Goal: Demonstrate improvement in neurological exam throughout the shift  Outcome: Progressing  Goal: Maintain BP within ordered limits throughout shift  Outcome: Progressing  Goal: Participate in treatment (ie., meds, therapy) throughout shift  Outcome: Progressing  Goal: No symptoms of aspiration throughout shift  Outcome: Progressing  Goal: No symptoms of hemorrhage throughout shift  Outcome: Progressing  Goal: Tolerate enteral feeding throughout shift  Outcome: Progressing  Goal: Decreased nausea/vomiting throughout shift  Outcome: Progressing  Goal: Controlled blood glucose throughout shift  Outcome: Progressing  Goal: Out of bed three times today  Outcome: Progressing     Problem: ICU Stroke  Goal: Maintain ICP within ordered limits throughout shift  Outcome: Progressing  Goal: Tolerate EVD clamping trial throughout shift  Outcome: Progressing  Goal: Tolerate ventilator weaning trial during shift  Outcome: Progressing  Goal: Maintain patent airway throughout shift  Outcome: Progressing  Goal: Achieve/maintain targeted sodium level throughout shift  Outcome: Progressing     Problem: Pain - Adult  Goal: Verbalizes/displays adequate comfort level or baseline comfort level  Outcome: Progressing     Problem: Safety - Adult  Goal: Free from fall injury  Outcome: Progressing     Problem: Discharge Planning  Goal: Discharge to home or other facility with appropriate resources  Outcome: Progressing     Problem: Chronic Conditions and Co-morbidities  Goal: Patient's chronic conditions and co-morbidity symptoms are monitored and maintained or improved  Outcome: Progressing     Problem: Nutrition  Goal: Nutrient intake appropriate for maintaining nutritional needs  Outcome: Progressing     Problem: Knowledge Deficit  Goal: Patient/family/caregiver demonstrates understanding of disease process, treatment plan,  medications, and discharge instructions  Outcome: Progressing     Problem: Mechanical Ventilation  Goal: Patient Will Maintain Patent Airway  Outcome: Progressing  Goal: Oral health is maintained or improved  Outcome: Progressing  Goal: Tracheostomy will be managed safely  Outcome: Progressing  Goal: ET tube will be managed safely  Outcome: Progressing  Goal: Ability to express needs and understand communication  Outcome: Progressing  Goal: Mobility/activity is maintained at optimum level for patient  Outcome: Progressing     Problem: Skin  Goal: Decreased wound size/increased tissue granulation at next dressing change  Outcome: Progressing  Goal: Participates in plan/prevention/treatment measures  Outcome: Progressing  Goal: Prevent/manage excess moisture  Outcome: Progressing  Goal: Prevent/minimize sheer/friction injuries  Outcome: Progressing  Goal: Promote/optimize nutrition  Outcome: Progressing  Goal: Promote skin healing  Outcome: Progressing   The patient's goals for the shift include      The clinical goals for the shift include Pt will remain safe and free from injury during shift

## 2025-04-30 NOTE — CARE PLAN
The patient's goals for the shift include   Problem: General Stroke  Goal: Maintain BP within ordered limits throughout shift  4/30/2025 0343 by Jannette Mahmood RN  Outcome: Progressing  4/30/2025 0342 by Jannette Mahmood RN  Outcome: Progressing        The clinical goals for the shift include Patient will remain safe and neurologically stabled throughtout the shift.    Over the shift, the patient did not make progress toward the following goals. Barriers to progression include   Problem: General Stroke  Goal: No symptoms of hemorrhage throughout shift  4/30/2025 0343 by Jannette Mahmood RN  Outcome: Progressing  4/30/2025 0342 by Jannette Mahmood RN  Outcome: Progressing   . Recommendations to address these barriers include   Problem: Safety - Adult  Goal: Free from fall injury  4/30/2025 0343 by Jannette Mahmood RN  Outcome: Progressing  4/30/2025 0342 by Jannette Mahmood RN  Outcome: Progressing   .

## 2025-04-30 NOTE — CARE PLAN
The patient's goals for the shift include    Problem: General Stroke  Goal: Maintain BP within ordered limits throughout shift  Outcome: Progressing     Problem: General Stroke  Goal: No symptoms of aspiration throughout shift  Outcome: Progressing     Problem: General Stroke  Goal: No symptoms of hemorrhage throughout shift  Outcome: Progressing       The clinical goals for the shift include Patient will remain safe and neurologically stabled throughtout the shift.    Over the shift, the patient did not make progress toward the following goals. Barriers to progression include   Problem: General Stroke  Goal: Controlled blood glucose throughout shift  Outcome: Progressing   . Recommendations to address these barriers include   Problem: ICU Stroke  Goal: Maintain ICP within ordered limits throughout shift  Outcome: Progressing     Problem: General Stroke  Goal: Tolerate enteral feeding throughout shift  Outcome: Progressing   .

## 2025-04-30 NOTE — PROGRESS NOTES
[facility]  NEUROSCIENCE INTENSIVE CARE UNIT  DAILY PROGRESS NOTE       Patient Name: Muriel De Souza   MRN: [id number]     Admit Date: 2025     : 1955 AGE: 69 y.o. GENDER: female        Subjective    Muriel De Souza is a 69 y.o. female with h/o HTN, HLD p/w HA, arrested in CT scanner at OSH, ROSC after 7 min CPR,  CTH w diffuse SAH and L cerebellar ICH, triventriculomegally, effaced 4th vents, CTA with L cerebellar AVM w venous varices s/p RF EVD and now admitted for further management.    Per chart review, patient had received bad news at home and then had sudden onset headache and vomiting. Found to have SAH at OSH with course complicated by cardiac arrest. She was intubated and sedated and transferred from [place], [place] to [facility] [address].    Significant Events:  -  came in with L cerebellar AVM bleed with course complicated by cardiac arrest ROSC after 7 min CPR, uppon arrival to [facility] [address] NSU, EVD drain placed, went to OR for decompression and AVM resection    Interval Events:   NAEON, Afebrile, hemodynamically stable with -150 with goal SBP < 140  Desaturation to 88 overnight, increased FiO2 to 60  MRI done   Pt on 75 mcg/hr of fentanyl  +1.8 L / 24 hours  UOP 0.8 / 24 hours       Objective   VITALS (24H):  Temp:  [35 °C (95 °F)-36.1 °C (97 °F)] 35.9 °C (96.6 °F)  Heart Rate:  [53-64] 59  Resp:  [12-21] 12  BP: (116-141)/(46-62) 141/50  Arterial Line BP 1: (122-163)/(45-64) 147/46  FiO2 (%):  [40 %-60 %] 60 %  INTAKE/OUTPUT:  Intake/Output Summary (Last 24 hours) at 2025 0614  Last data filed at 2025 0600  Gross per 24 hour   Intake 3440 ml   Output 1598 ml   Net 1842 ml     VENT SETTINGS:  Vent Mode: Volume control/assist control  FiO2 (%):  [40 %-60 %] 60 %  S RR:  [12] 12  S VT:  [400 mL] 400 mL  PEEP/CPAP (cm H2O):  [10 cm H20] 10 cm H20  MAP (cm H2O):  [13-14] 13       PHYSICAL EXAM:  NEURO: Off sedation  - Intubated  - Eyes closed to nox stim  - pupils minimally  reactive to light  - no VOR  - corneals negative  - no gag  - no cough reflex today 4/20  - no spontaneous movements  - No response to pain in UEs or LEs  - no increased tone  CV:  - RRR on telemetry, NSR  - Arterial line in place  RESP:  - intubated  :  - Brooks catheter in place  GI:  - Abdomen soft but somewhat distended  SKIN:  - Intact  - left arm swollen and tense to palpation, not pitting, non-erythematous    MEDICATIONS:  Scheduled: PRN: Continuous:   Scheduled Medications[1] PRN Medications[2] Continuous Medications[3]     IMAGING RESULTS:  CT head wo IV contrast   Final Result   * Diffuse subarachnoid hemorrhage with hydrocephalus   *Left cerebellar hematoma suggesting a likely source.        MACRO:   none        Signed by: Cruz Salgado 4/22/2025 11:59 AM   Dictation workstation:   [id number]      CT angio head and neck w and wo IV contrast   Final Result   * Diffuse subarachnoid hemorrhage with hydrocephalus as described   *Suspected vascular malformation in the left cerebellar hemisphere   with enlarged arterial and venous structures largely in the left   cerebellar hemisphere and residual partial opacification of a venous   aneurysm near the torcula. *Bilateral carotid stenosis as described        MACRO:   none        Signed by: Cruz Salgado 4/22/2025 12:07 PM   Dictation workstation:   [id number]             Assessment/Plan    Muriel De Souza is a 69 y.o. female with h/o HTN, HLD p/w HA, arrested in CT scanner at OSH, ROSC after 7 min CPR,  CTH w diffuse SAH and L cerebellar ICH, triventriculomegally, effaced 4th vents, CTA with L cerebellar AVM w venous varices s/p RF EVD as well as AVM resection on 4/22 and now admitted for further management.    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  #Intracranial hypertension  Assessment:  - First day 4/22 presented with SAH, L cerebellar ICH, and  ventriculomegaly on CTH  - 4/22 CTA showing L cerebellar AVM  - 4/22 RF EVD for ICH  - 4/22 OR AVM resection  - MRI done 4/29   - Pt is neurologically stable  - BPs have been consistently above goal of 140  - Consider increase in dose of other antihypertensives (hydralazine, carvedilol)  Plan:  - NSU  - Keppra  - EVD in place, maintained at 10  - BP goal: SBP < 140    --> PRN: Labetalol, Hydralazine, and Nicardipine   - IV nicardipine 0.2 mg/ml  - PO hydralazine 75 q6  - PO Carvedilol 6.25 BID  - PO Lisinopril 10 OD   - Neuro Checks: Q1H  - Sedation: Off  - Pain: PRN  - Nausea: ondansetron  - PT/OT/SLP    CARDIOVASCULAR:  #Cardiac arrest  #Lactic acidosis, improving  # elevated troponins, improved  Assessment:  - 7 min to ROSC  - Assess for ischemic and stress-induced cardiomyopathy   - Assess for arrhythmias following cardiac arrest   - Cards consult (4/23)  - Type 2 MI (demand ischemia), leading to troponin up-trend (4/23)  - Trop trending down, d/c'd 4/24  - ECHO 4/23:  CONCLUSIONS:   1. Left ventricular ejection fraction is normal, calculated by Adrian's biplane at 71%.   2. Spectral Doppler shows a Grade I (impaired relaxation pattern) of left ventricular diastolic filling with normal left atrial filling pressure.   3. There is normal right ventricular global systolic function.   4. The left atrium is mildly dilated.   5. There is no evidence of a patent foramen ovale.   6. The inferior vena cava appears mildly dilated, on vent.  - Pt is hemodynamically stable, SBP goal < 140    Plan:  - Monitor on telemetry  - SBP goal < 140 (see above)    RESPIRATORY:  #Acute hypoxic respiratory failure  #Mechanical ventilation  Assessment:  - Pt intubated for decreased LOC  - Desaturation to 89 overnight. FiO2 increased to 60.  - Py hypothermic overnight, possible sign of infection      Plan:  - Continue mechanical ventilation for decreased LOC  - Monitor for increased secretions/desaturations    RENAL/:  #Pre-renal  azotemia, resolving  Assessment:  Results from last 72 hours   Lab Units 04/30/25  0407 04/29/25  2334 04/29/25 2016 04/29/25  1626 04/29/25  1248 04/29/25  0839 04/29/25  0409 04/28/25 2005 04/28/25  1626 04/28/25  0820 04/28/25  0330   CREATININE mg/dL 0.70  --   --  0.68  --   --  0.79  --  0.82  --  0.83   BUN mg/dL 40*  --   --  41*  --   --  39*  --  40*  --  35*   SODIUM mmol/L 152*  150* 151* 153* 151*  151* 153*   < > 152*   < > 152*  153*   < > 154*  154*    < > = values in this interval not displayed.   - UOP 0.8 / 24 hours 4/29  - BUN stable  - Cr stable  - Pt repleted with fluids  - Resolving pre-renal azotemia    Plan:  - Monitor with daily RFP  - IVF for hypovolemia  - Maintain forbes catheter for: critically ill patient who need accurate urinary output measurements    FEN/GI:  #TIM  Results from last 72 hours   Lab Units 04/30/25  0407 04/29/25  2334 04/29/25 2016 04/29/25  1626 04/29/25  1248 04/29/25  0839 04/29/25 0409 04/28/25 2005 04/28/25  1626 04/28/25  0820 04/28/25  0330   SODIUM mmol/L 152*  150* 151* 153* 151*  151* 153*   < > 152*   < > 152*  153*   < > 154*  154*   POTASSIUM mmol/L 3.4*  --   --  3.6  --   --  3.4*  --  2.9*  --  2.8*   PHOSPHORUS mg/dL 3.8  --   --  4.0  --   --  3.8  --  3.1  --  3.3    < > = values in this interval not displayed.   - Hypokalemia 4/28, Lasix 4/27  - Repleted with fluids overnight 4/27-4/28  - Hypokalemia 4/30, will replete   - Stable phos 4/29  - Sodium at goal 150-160    Plan:  - Monitor and replace electrolytes per protocol  - Replete K+ as needed  - Sodium goal 150 - 160 per NSGY  - IVF: PRN  - Diet: Enteral feeding through NG  - Bowel Regimen: Docusate-Senna PRN and Miralax PRN    ENDOCRINE:  #TIM  Assessment:  Results from last 7 days   Lab Units 04/30/25  0407 04/30/25  0334 04/29/25  2334 04/29/25  2322 04/29/25  2016 04/29/25  1924 04/29/25  1626 04/29/25  1535 04/29/25  1248 04/29/25  0839 04/29/25  0807 04/29/25  0410   POCT  GLUCOSE mg/dL  --  172*  --  160*  --  162*  --  153*  --   --  131* 156*   GLUCOSE mg/dL 174*  --   --   --   --   --  173*  --   --   --   --   --    SODIUM mmol/L 152*  150*  --  151*  --  153*  --  151*  151*  --  153* 152*  --   --     - HbA1C 5.5 on     Plan:  - Accuchecks & ISS PRN     HEMATOLOGY:  #Anemia, likely dilutional  Assessment:  - Baseline Hgb: 12.0  - Baseline Plts: 287  Results from last 7 days   Lab Units 25  0407 25  1626 25  0409   HEMOGLOBIN g/dL 8.5* 8.2* 8.3*   HEMATOCRIT % 27.9* 26.6* 26.8*   PLATELETS AUTO x10*3/uL 308 282 273     Plan:  - Continue to monitor with daily CBC and Coag panel  - maintain active type and screen as needed    INFECTIOUS DISEASE:  #TIM  Assessment:  Results from last 7 days   Lab Units 25  0407 25  1626 25  0409   WBC AUTO x10*3/uL 10.6 10.7 11.0    - Temp (24hrs), Av.7 °C (96.3 °F), Min:35 °C (95 °F), Max:36.1 °C (97 °F)  - White count trending down  - Pt afebrile  - Bcx no growth at 4 days ()  - Sputum cult  no predominant organism  - UA  unremarkable  - Negative pro calcitonin   - Vanc/Zosyn started  for desaturation and increased O2 requirement overnight  - Vanc/Zosyn stopped , low suspicion for infection given clean Bcx, no fevers, resolving leukocytosis, and negative pro calcitonin     Plan  - Continue to monitor for signs of infection     MUSCULOSKELETAL:  - No acute issues    SKIN:  #Swollen left arm  Assessment:   - LUE with persistent swelling since   - Non-pitting edema, non-erythematous  - POD6  - SQH started ()  - No DVT in LE ()  - No DVT in RUE ()  - Superficial thrombosis in basilic vein in LUE ()    Plan:   - Turns and skin care per NSU protocol    ACCESS:  - R Femoral  - R arterial  - L PIV    PROPHYLAXIS:  - DVT Ppx: SCDs, SQH  - GI: PPI    RESTRAINTS:  Not indicated/Patient does not meet criteria for restraints    [facility]  Intensive Care    Jian Ellington MD PhD  PGY-2 Neurology    The patient is critically ill with acute encephalopathy, acquired hydrocephalus and acute respiratory insufficiency in the setting of ruptured cerebellar AVM  Remains in poor neurological condition  EVD management per neurosurgery  Cont keppra  Cont BP control with goal sbp<140  Episode of desaturation requiring increase in oxygen: Wean ventilator as tolerated  Anemia unchanged: Cont to trend, transfuse for Hb<7     I have seen and examined the patient.  I have reviewed the patient's laboratory, radiographic, and clinical data.  I have spent 30 minutes providing critical care for the patient.       VAMSHI Nuñez M.D.        [1] atorvastatin, 10 mg, oral, Nightly  bisacodyl, 10 mg, rectal, Daily  carvedilol, 6.25 mg, oral, q12h  heparin (porcine), 5,000 Units, subcutaneous, q8h  hydrALAZINE, 75 mg, oral, q6h  insulin lispro, 0-5 Units, subcutaneous, q4h  lisinopril, 10 mg, oral, Daily  pantoprazole, 40 mg, intravenous, Daily before breakfast  perflutren protein A microsphere, 0.5 mL, intravenous, Once in imaging  polyethylene glycol, 17 g, oral, Daily  sennosides-docusate sodium, 2 tablet, oral, BID  sulfur hexafluoride microsphr, 2 mL, intravenous, Once in imaging     [2] PRN medications: albuterol, calcium gluconate, calcium gluconate, dextrose, dextrose, fentaNYL, glucagon, glucagon, hydrALAZINE, labetaloL, magnesium sulfate, magnesium sulfate, oxygen, potassium chloride CR **OR** potassium chloride, potassium chloride CR **OR** potassium chloride  [3] fentaNYL,  mcg/hr, Last Rate: 75 mcg/hr (04/29/25 2026)  niCARdipine, 2.5-15 mg/hr, Last Rate: 15 mg/hr (04/30/25 4664)

## 2025-05-01 ENCOUNTER — APPOINTMENT (OUTPATIENT)
Dept: RADIOLOGY | Facility: HOSPITAL | Age: 70
DRG: 003 | End: 2025-05-01
Payer: COMMERCIAL

## 2025-05-01 LAB
ALBUMIN SERPL BCP-MCNC: 2.4 G/DL (ref 3.4–5)
ALBUMIN SERPL BCP-MCNC: 2.6 G/DL (ref 3.4–5)
ANION GAP SERPL CALC-SCNC: 10 MMOL/L (ref 10–20)
ANION GAP SERPL CALC-SCNC: 9 MMOL/L (ref 10–20)
APPEARANCE CSF: CLEAR
BASOPHILS # BLD AUTO: 0.01 X10*3/UL (ref 0–0.1)
BASOPHILS # BLD AUTO: 0.01 X10*3/UL (ref 0–0.1)
BASOPHILS NFR BLD AUTO: 0.1 %
BASOPHILS NFR BLD AUTO: 0.1 %
BASOPHILS NFR CSF MANUAL: 0 %
BLASTS CSF MANUAL: 0 % (ref ?–0)
BUN SERPL-MCNC: 31 MG/DL (ref 6–23)
BUN SERPL-MCNC: 37 MG/DL (ref 6–23)
CALCIUM SERPL-MCNC: 7.7 MG/DL (ref 8.6–10.6)
CALCIUM SERPL-MCNC: 8.3 MG/DL (ref 8.6–10.6)
CHLORIDE SERPL-SCNC: 111 MMOL/L (ref 98–107)
CHLORIDE SERPL-SCNC: 112 MMOL/L (ref 98–107)
CO2 SERPL-SCNC: 32 MMOL/L (ref 21–32)
CO2 SERPL-SCNC: 33 MMOL/L (ref 21–32)
COLOR CSF: ABNORMAL
COLOR SPUN CSF: COLORLESS
CREAT SERPL-MCNC: 0.84 MG/DL (ref 0.5–1.05)
CREAT SERPL-MCNC: 0.85 MG/DL (ref 0.5–1.05)
EGFRCR SERPLBLD CKD-EPI 2021: 75 ML/MIN/1.73M*2
EGFRCR SERPLBLD CKD-EPI 2021: 76 ML/MIN/1.73M*2
EOSINOPHIL # BLD AUTO: 0.11 X10*3/UL (ref 0–0.7)
EOSINOPHIL # BLD AUTO: 0.11 X10*3/UL (ref 0–0.7)
EOSINOPHIL NFR BLD AUTO: 1 %
EOSINOPHIL NFR BLD AUTO: 1 %
EOSINOPHIL NFR CSF MANUAL: 0 % (ref 0–?)
ERYTHROCYTE [DISTWIDTH] IN BLOOD BY AUTOMATED COUNT: 13.8 % (ref 11.5–14.5)
ERYTHROCYTE [DISTWIDTH] IN BLOOD BY AUTOMATED COUNT: 14 % (ref 11.5–14.5)
GLUCOSE BLD MANUAL STRIP-MCNC: 146 MG/DL (ref 74–99)
GLUCOSE BLD MANUAL STRIP-MCNC: 147 MG/DL (ref 74–99)
GLUCOSE BLD MANUAL STRIP-MCNC: 166 MG/DL (ref 74–99)
GLUCOSE BLD MANUAL STRIP-MCNC: 169 MG/DL (ref 74–99)
GLUCOSE BLD MANUAL STRIP-MCNC: 171 MG/DL (ref 74–99)
GLUCOSE BLD MANUAL STRIP-MCNC: 174 MG/DL (ref 74–99)
GLUCOSE CSF-MCNC: 106 MG/DL (ref 40–70)
GLUCOSE SERPL-MCNC: 149 MG/DL (ref 74–99)
GLUCOSE SERPL-MCNC: 172 MG/DL (ref 74–99)
HCT VFR BLD AUTO: 25.2 % (ref 36–46)
HCT VFR BLD AUTO: 27 % (ref 36–46)
HGB BLD-MCNC: 8.1 G/DL (ref 12–16)
HGB BLD-MCNC: 8.6 G/DL (ref 12–16)
IMM GRANULOCYTES # BLD AUTO: 0.12 X10*3/UL (ref 0–0.7)
IMM GRANULOCYTES # BLD AUTO: 0.16 X10*3/UL (ref 0–0.7)
IMM GRANULOCYTES NFR BLD AUTO: 1.1 % (ref 0–0.9)
IMM GRANULOCYTES NFR BLD AUTO: 1.5 % (ref 0–0.9)
IMM GRANULOCYTES NFR CSF: 0 %
LAB AP ASR DISCLAIMER: NORMAL
LABORATORY COMMENT REPORT: NORMAL
LYMPHOCYTES # BLD AUTO: 0.97 X10*3/UL (ref 1.2–4.8)
LYMPHOCYTES # BLD AUTO: 1.13 X10*3/UL (ref 1.2–4.8)
LYMPHOCYTES NFR BLD AUTO: 10.7 %
LYMPHOCYTES NFR BLD AUTO: 8.8 %
LYMPHOCYTES NFR CSF MANUAL: 8 % (ref 28–96)
MCH RBC QN AUTO: 28.6 PG (ref 26–34)
MCH RBC QN AUTO: 29.6 PG (ref 26–34)
MCHC RBC AUTO-ENTMCNC: 31.9 G/DL (ref 32–36)
MCHC RBC AUTO-ENTMCNC: 32.1 G/DL (ref 32–36)
MCV RBC AUTO: 89 FL (ref 80–100)
MCV RBC AUTO: 93 FL (ref 80–100)
MONOCYTES # BLD AUTO: 0.84 X10*3/UL (ref 0.1–1)
MONOCYTES # BLD AUTO: 0.87 X10*3/UL (ref 0.1–1)
MONOCYTES NFR BLD AUTO: 7.6 %
MONOCYTES NFR BLD AUTO: 8.3 %
MONOS+MACROS NFR CSF MANUAL: 29 % (ref 16–56)
NEUTROPHILS # BLD AUTO: 8.3 X10*3/UL (ref 1.2–7.7)
NEUTROPHILS # BLD AUTO: 8.91 X10*3/UL (ref 1.2–7.7)
NEUTROPHILS NFR BLD AUTO: 78.8 %
NEUTROPHILS NFR BLD AUTO: 81 %
NEUTS SEG NFR CSF MANUAL: 63 % (ref 0–5)
NRBC BLD-RTO: 0 /100 WBCS (ref 0–0)
NRBC BLD-RTO: 0 /100 WBCS (ref 0–0)
OTHER CELLS NFR CSF MANUAL: 0 %
PATH REPORT.COMMENTS IMP SPEC: NORMAL
PATH REPORT.FINAL DX SPEC: NORMAL
PATH REPORT.GROSS SPEC: NORMAL
PATH REPORT.RELEVANT HX SPEC: NORMAL
PATH REPORT.TOTAL CANCER: NORMAL
PHOSPHATE SERPL-MCNC: 3.4 MG/DL (ref 2.5–4.9)
PHOSPHATE SERPL-MCNC: 4.1 MG/DL (ref 2.5–4.9)
PLASMA CELLS NFR CSF MICRO: 0 % (ref ?–0)
PLATELET # BLD AUTO: 298 X10*3/UL (ref 150–450)
PLATELET # BLD AUTO: 320 X10*3/UL (ref 150–450)
POTASSIUM SERPL-SCNC: 3.1 MMOL/L (ref 3.5–5.3)
POTASSIUM SERPL-SCNC: 3.1 MMOL/L (ref 3.5–5.3)
PROT CSF-MCNC: 27 MG/DL (ref 15–45)
RBC # BLD AUTO: 2.83 X10*6/UL (ref 4–5.2)
RBC # BLD AUTO: 2.91 X10*6/UL (ref 4–5.2)
RBC # CSF AUTO: 1000 /UL (ref 0–5)
SODIUM SERPL-SCNC: 150 MMOL/L (ref 136–145)
SODIUM SERPL-SCNC: 151 MMOL/L (ref 136–145)
TOTAL CELLS COUNTED CSF: 100
TUBE # CSF: ABNORMAL
WBC # BLD AUTO: 10.5 X10*3/UL (ref 4.4–11.3)
WBC # BLD AUTO: 11 X10*3/UL (ref 4.4–11.3)
WBC # CSF AUTO: 5 /UL (ref 1–5)

## 2025-05-01 PROCEDURE — 71045 X-RAY EXAM CHEST 1 VIEW: CPT

## 2025-05-01 PROCEDURE — 2500000001 HC RX 250 WO HCPCS SELF ADMINISTERED DRUGS (ALT 637 FOR MEDICARE OP)

## 2025-05-01 PROCEDURE — 2500000004 HC RX 250 GENERAL PHARMACY W/ HCPCS (ALT 636 FOR OP/ED): Mod: JZ

## 2025-05-01 PROCEDURE — 2500000001 HC RX 250 WO HCPCS SELF ADMINISTERED DRUGS (ALT 637 FOR MEDICARE OP): Performed by: STUDENT IN AN ORGANIZED HEALTH CARE EDUCATION/TRAINING PROGRAM

## 2025-05-01 PROCEDURE — 84295 ASSAY OF SERUM SODIUM: CPT

## 2025-05-01 PROCEDURE — 74018 RADEX ABDOMEN 1 VIEW: CPT

## 2025-05-01 PROCEDURE — 0B113F4 BYPASS TRACHEA TO CUTANEOUS WITH TRACHEOSTOMY DEVICE, PERCUTANEOUS APPROACH: ICD-10-PCS | Performed by: OTOLARYNGOLOGY

## 2025-05-01 PROCEDURE — 99221 1ST HOSP IP/OBS SF/LOW 40: CPT | Performed by: STUDENT IN AN ORGANIZED HEALTH CARE EDUCATION/TRAINING PROGRAM

## 2025-05-01 PROCEDURE — 94003 VENT MGMT INPAT SUBQ DAY: CPT

## 2025-05-01 PROCEDURE — 43761 REPOSITION GASTROSTOMY TUBE: CPT

## 2025-05-01 PROCEDURE — 87205 SMEAR GRAM STAIN: CPT | Performed by: STUDENT IN AN ORGANIZED HEALTH CARE EDUCATION/TRAINING PROGRAM

## 2025-05-01 PROCEDURE — 31600 PLANNED TRACHEOSTOMY: CPT | Performed by: OTOLARYNGOLOGY

## 2025-05-01 PROCEDURE — 95720 EEG PHY/QHP EA INCR W/VEEG: CPT | Performed by: STUDENT IN AN ORGANIZED HEALTH CARE EDUCATION/TRAINING PROGRAM

## 2025-05-01 PROCEDURE — 82947 ASSAY GLUCOSE BLOOD QUANT: CPT

## 2025-05-01 PROCEDURE — 80069 RENAL FUNCTION PANEL: CPT

## 2025-05-01 PROCEDURE — 84157 ASSAY OF PROTEIN OTHER: CPT | Performed by: STUDENT IN AN ORGANIZED HEALTH CARE EDUCATION/TRAINING PROGRAM

## 2025-05-01 PROCEDURE — 95716 VEEG EA 12-26HR CONT MNTR: CPT

## 2025-05-01 PROCEDURE — 2500000004 HC RX 250 GENERAL PHARMACY W/ HCPCS (ALT 636 FOR OP/ED)

## 2025-05-01 PROCEDURE — 99221 1ST HOSP IP/OBS SF/LOW 40: CPT | Performed by: OTOLARYNGOLOGY

## 2025-05-01 PROCEDURE — 2500000005 HC RX 250 GENERAL PHARMACY W/O HCPCS: Performed by: STUDENT IN AN ORGANIZED HEALTH CARE EDUCATION/TRAINING PROGRAM

## 2025-05-01 PROCEDURE — 82945 GLUCOSE OTHER FLUID: CPT | Performed by: STUDENT IN AN ORGANIZED HEALTH CARE EDUCATION/TRAINING PROGRAM

## 2025-05-01 PROCEDURE — 85025 COMPLETE CBC W/AUTO DIFF WBC: CPT

## 2025-05-01 PROCEDURE — 2500000004 HC RX 250 GENERAL PHARMACY W/ HCPCS (ALT 636 FOR OP/ED): Mod: JZ | Performed by: STUDENT IN AN ORGANIZED HEALTH CARE EDUCATION/TRAINING PROGRAM

## 2025-05-01 PROCEDURE — 37799 UNLISTED PX VASCULAR SURGERY: CPT

## 2025-05-01 PROCEDURE — 0DH63UZ INSERTION OF FEEDING DEVICE INTO STOMACH, PERCUTANEOUS APPROACH: ICD-10-PCS | Performed by: STUDENT IN AN ORGANIZED HEALTH CARE EDUCATION/TRAINING PROGRAM

## 2025-05-01 PROCEDURE — 2500000004 HC RX 250 GENERAL PHARMACY W/ HCPCS (ALT 636 FOR OP/ED): Performed by: STUDENT IN AN ORGANIZED HEALTH CARE EDUCATION/TRAINING PROGRAM

## 2025-05-01 PROCEDURE — 2500000002 HC RX 250 W HCPCS SELF ADMINISTERED DRUGS (ALT 637 FOR MEDICARE OP, ALT 636 FOR OP/ED): Performed by: REGISTERED NURSE

## 2025-05-01 PROCEDURE — 71045 X-RAY EXAM CHEST 1 VIEW: CPT | Performed by: RADIOLOGY

## 2025-05-01 PROCEDURE — 2020000001 HC ICU ROOM DAILY

## 2025-05-01 PROCEDURE — 89051 BODY FLUID CELL COUNT: CPT | Performed by: STUDENT IN AN ORGANIZED HEALTH CARE EDUCATION/TRAINING PROGRAM

## 2025-05-01 PROCEDURE — 95700 EEG CONT REC W/VID EEG TECH: CPT

## 2025-05-01 PROCEDURE — 94669 MECHANICAL CHEST WALL OSCILL: CPT

## 2025-05-01 RX ORDER — PHENYLEPHRINE HCL IN 0.9% NACL 0.4MG/10ML
SYRINGE (ML) INTRAVENOUS
Status: DISPENSED
Start: 2025-05-01 | End: 2025-05-01

## 2025-05-01 RX ORDER — ROCURONIUM BROMIDE 10 MG/ML
1.2 INJECTION, SOLUTION INTRAVENOUS ONCE
Status: COMPLETED | OUTPATIENT
Start: 2025-05-01 | End: 2025-05-01

## 2025-05-01 RX ORDER — POTASSIUM CHLORIDE 1.5 G/1.58G
20 POWDER, FOR SOLUTION ORAL ONCE
Status: COMPLETED | OUTPATIENT
Start: 2025-05-01 | End: 2025-05-01

## 2025-05-01 RX ORDER — PROPOFOL 10 MG/ML
0-50 INJECTION, EMULSION INTRAVENOUS CONTINUOUS
Status: DISCONTINUED | OUTPATIENT
Start: 2025-05-01 | End: 2025-05-03

## 2025-05-01 RX ORDER — POTASSIUM CHLORIDE 14.9 MG/ML
20 INJECTION INTRAVENOUS
Status: COMPLETED | OUTPATIENT
Start: 2025-05-01 | End: 2025-05-01

## 2025-05-01 RX ORDER — AMLODIPINE BESYLATE 5 MG/1
5 TABLET ORAL DAILY
Status: DISCONTINUED | OUTPATIENT
Start: 2025-05-01 | End: 2025-05-02

## 2025-05-01 RX ORDER — FUROSEMIDE 10 MG/ML
40 INJECTION INTRAMUSCULAR; INTRAVENOUS ONCE
Status: CANCELLED | OUTPATIENT
Start: 2025-05-01

## 2025-05-01 RX ORDER — POTASSIUM CHLORIDE 29.8 MG/ML
40 INJECTION INTRAVENOUS ONCE
Status: COMPLETED | OUTPATIENT
Start: 2025-05-01 | End: 2025-05-01

## 2025-05-01 RX ORDER — FUROSEMIDE 10 MG/ML
40 INJECTION INTRAMUSCULAR; INTRAVENOUS ONCE
Status: COMPLETED | OUTPATIENT
Start: 2025-05-01 | End: 2025-05-01

## 2025-05-01 RX ADMIN — HEPARIN SODIUM 5000 UNITS: 5000 INJECTION INTRAVENOUS; SUBCUTANEOUS at 16:53

## 2025-05-01 RX ADMIN — PROPOFOL 10 MCG/KG/MIN: 10 INJECTION, EMULSION INTRAVENOUS at 23:00

## 2025-05-01 RX ADMIN — INSULIN LISPRO 1 UNITS: 100 INJECTION, SOLUTION INTRAVENOUS; SUBCUTANEOUS at 08:36

## 2025-05-01 RX ADMIN — NICARDIPINE HYDROCHLORIDE 15 MG/HR: 0.2 INJECTION, SOLUTION INTRAVENOUS at 03:26

## 2025-05-01 RX ADMIN — MAGNESIUM OXIDE TAB 400 MG (241.3 MG ELEMENTAL MG) 400 MG: 400 (241.3 MG) TAB at 08:36

## 2025-05-01 RX ADMIN — POTASSIUM CHLORIDE 20 MEQ: 14.9 INJECTION, SOLUTION INTRAVENOUS at 08:48

## 2025-05-01 RX ADMIN — HYDRALAZINE HYDROCHLORIDE 10 MG: 20 INJECTION INTRAMUSCULAR; INTRAVENOUS at 03:26

## 2025-05-01 RX ADMIN — Medication 25 MCG/HR: at 11:02

## 2025-05-01 RX ADMIN — NICARDIPINE HYDROCHLORIDE 12.5 MG/HR: 0.2 INJECTION, SOLUTION INTRAVENOUS at 12:03

## 2025-05-01 RX ADMIN — ROCURONIUM BROMIDE 130 MG: 10 INJECTION INTRAVENOUS at 13:14

## 2025-05-01 RX ADMIN — NICARDIPINE HYDROCHLORIDE 12.5 MG/HR: 0.2 INJECTION, SOLUTION INTRAVENOUS at 21:04

## 2025-05-01 RX ADMIN — HEPARIN SODIUM 5000 UNITS: 5000 INJECTION INTRAVENOUS; SUBCUTANEOUS at 08:36

## 2025-05-01 RX ADMIN — HEPARIN SODIUM 5000 UNITS: 5000 INJECTION INTRAVENOUS; SUBCUTANEOUS at 00:54

## 2025-05-01 RX ADMIN — INSULIN LISPRO 1 UNITS: 100 INJECTION, SOLUTION INTRAVENOUS; SUBCUTANEOUS at 12:20

## 2025-05-01 RX ADMIN — HYDRALAZINE HYDROCHLORIDE 75 MG: 50 TABLET ORAL at 16:53

## 2025-05-01 RX ADMIN — HYDRALAZINE HYDROCHLORIDE 75 MG: 50 TABLET ORAL at 08:36

## 2025-05-01 RX ADMIN — NICARDIPINE HYDROCHLORIDE 15 MG/HR: 0.2 INJECTION, SOLUTION INTRAVENOUS at 09:12

## 2025-05-01 RX ADMIN — CARVEDILOL 6.25 MG: 12.5 TABLET, FILM COATED ORAL at 18:14

## 2025-05-01 RX ADMIN — POLYETHYLENE GLYCOL 3350 17 G: 17 POWDER, FOR SOLUTION ORAL at 08:35

## 2025-05-01 RX ADMIN — ATORVASTATIN CALCIUM 10 MG: 10 TABLET, FILM COATED ORAL at 20:00

## 2025-05-01 RX ADMIN — NICARDIPINE HYDROCHLORIDE 15 MG/HR: 0.2 INJECTION, SOLUTION INTRAVENOUS at 06:21

## 2025-05-01 RX ADMIN — SENNOSIDES AND DOCUSATE SODIUM 2 TABLET: 50; 8.6 TABLET ORAL at 08:35

## 2025-05-01 RX ADMIN — Medication 75 MCG/HR: at 19:57

## 2025-05-01 RX ADMIN — SENNOSIDES AND DOCUSATE SODIUM 2 TABLET: 50; 8.6 TABLET ORAL at 20:00

## 2025-05-01 RX ADMIN — NICARDIPINE HYDROCHLORIDE 15 MG/HR: 0.2 INJECTION, SOLUTION INTRAVENOUS at 00:54

## 2025-05-01 RX ADMIN — POTASSIUM CHLORIDE 40 MEQ: 29.8 INJECTION, SOLUTION INTRAVENOUS at 18:26

## 2025-05-01 RX ADMIN — FUROSEMIDE 40 MG: 10 INJECTION INTRAMUSCULAR; INTRAVENOUS at 11:02

## 2025-05-01 RX ADMIN — PROPOFOL 5 MCG/KG/MIN: 10 INJECTION, EMULSION INTRAVENOUS at 11:27

## 2025-05-01 RX ADMIN — INSULIN LISPRO 1 UNITS: 100 INJECTION, SOLUTION INTRAVENOUS; SUBCUTANEOUS at 06:20

## 2025-05-01 RX ADMIN — PANTOPRAZOLE SODIUM 40 MG: 40 INJECTION, POWDER, FOR SOLUTION INTRAVENOUS at 06:20

## 2025-05-01 RX ADMIN — LISINOPRIL 20 MG: 20 TABLET ORAL at 08:36

## 2025-05-01 RX ADMIN — POTASSIUM CHLORIDE 20 MEQ: 14.9 INJECTION, SOLUTION INTRAVENOUS at 06:44

## 2025-05-01 RX ADMIN — POTASSIUM CHLORIDE 20 MEQ: 1.5 POWDER, FOR SOLUTION ORAL at 18:26

## 2025-05-01 RX ADMIN — Medication 40 PERCENT: at 07:57

## 2025-05-01 RX ADMIN — CARVEDILOL 6.25 MG: 12.5 TABLET, FILM COATED ORAL at 06:24

## 2025-05-01 RX ADMIN — HYDRALAZINE HYDROCHLORIDE 10 MG: 20 INJECTION INTRAMUSCULAR; INTRAVENOUS at 11:10

## 2025-05-01 RX ADMIN — AMLODIPINE BESYLATE 5 MG: 5 TABLET ORAL at 12:20

## 2025-05-01 RX ADMIN — INSULIN LISPRO 1 UNITS: 100 INJECTION, SOLUTION INTRAVENOUS; SUBCUTANEOUS at 01:08

## 2025-05-01 RX ADMIN — INSULIN LISPRO 1 UNITS: 100 INJECTION, SOLUTION INTRAVENOUS; SUBCUTANEOUS at 20:15

## 2025-05-01 RX ADMIN — HYDRALAZINE HYDROCHLORIDE 75 MG: 50 TABLET ORAL at 20:00

## 2025-05-01 RX ADMIN — NICARDIPINE HYDROCHLORIDE 15 MG/HR: 0.2 INJECTION, SOLUTION INTRAVENOUS at 18:14

## 2025-05-01 RX ADMIN — HYDRALAZINE HYDROCHLORIDE 75 MG: 50 TABLET ORAL at 02:06

## 2025-05-01 RX ADMIN — BISACODYL 10 MG: 10 SUPPOSITORY RECTAL at 08:35

## 2025-05-01 ASSESSMENT — COGNITIVE AND FUNCTIONAL STATUS - GENERAL
DRESSING REGULAR UPPER BODY CLOTHING: TOTAL
WALKING IN HOSPITAL ROOM: TOTAL
CLIMB 3 TO 5 STEPS WITH RAILING: TOTAL
TOILETING: TOTAL
HELP NEEDED FOR BATHING: TOTAL
TURNING FROM BACK TO SIDE WHILE IN FLAT BAD: TOTAL
HELP NEEDED FOR BATHING: TOTAL
CLIMB 3 TO 5 STEPS WITH RAILING: TOTAL
DRESSING REGULAR LOWER BODY CLOTHING: TOTAL
TOILETING: TOTAL
DRESSING REGULAR LOWER BODY CLOTHING: TOTAL
DRESSING REGULAR UPPER BODY CLOTHING: TOTAL
TURNING FROM BACK TO SIDE WHILE IN FLAT BAD: TOTAL
TURNING FROM BACK TO SIDE WHILE IN FLAT BAD: TOTAL
TOILETING: TOTAL
STANDING UP FROM CHAIR USING ARMS: TOTAL
STANDING UP FROM CHAIR USING ARMS: TOTAL
PERSONAL GROOMING: TOTAL
HELP NEEDED FOR BATHING: TOTAL
MOBILITY SCORE: 6
MOVING FROM LYING ON BACK TO SITTING ON SIDE OF FLAT BED WITH BEDRAILS: TOTAL
MOVING TO AND FROM BED TO CHAIR: TOTAL
WALKING IN HOSPITAL ROOM: TOTAL
MOVING TO AND FROM BED TO CHAIR: TOTAL
DAILY ACTIVITIY SCORE: 6
PERSONAL GROOMING: TOTAL
MOBILITY SCORE: 6
WALKING IN HOSPITAL ROOM: TOTAL
DRESSING REGULAR LOWER BODY CLOTHING: TOTAL
DAILY ACTIVITIY SCORE: 6
MOVING FROM LYING ON BACK TO SITTING ON SIDE OF FLAT BED WITH BEDRAILS: TOTAL
MOBILITY SCORE: 6
EATING MEALS: TOTAL
DRESSING REGULAR UPPER BODY CLOTHING: TOTAL
MOVING TO AND FROM BED TO CHAIR: TOTAL
DAILY ACTIVITIY SCORE: 6
MOVING FROM LYING ON BACK TO SITTING ON SIDE OF FLAT BED WITH BEDRAILS: TOTAL
EATING MEALS: TOTAL
EATING MEALS: TOTAL
STANDING UP FROM CHAIR USING ARMS: TOTAL
CLIMB 3 TO 5 STEPS WITH RAILING: TOTAL
PERSONAL GROOMING: TOTAL

## 2025-05-01 ASSESSMENT — PAIN - FUNCTIONAL ASSESSMENT
PAIN_FUNCTIONAL_ASSESSMENT: CPOT (CRITICAL CARE PAIN OBSERVATION TOOL)

## 2025-05-01 NOTE — PROGRESS NOTES
Communication Note    Patient Name: Muriel De Souza  MRN: [id number]  Today's Date: 5/1/2025   Room: 07/07A    Discipline: Physical Therapy      PT Missed Visit: Yes  Missed Visit Reason:  (Per RN: Pt with exam change overnight, hold PT.)      05/01/25 at 8:46 AM   Reshma Reese PT   Rehab Office: [phone]

## 2025-05-01 NOTE — CONSULTS
Otolaryngology - Head and Neck Surgery Consultation Note      Reason For Consult  Tracheostomy Assessment    History Of Present Illness  Muriel De Souza is a 69 y.o. 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 She is s/p AM resection in OR on 4/22 and has subsequently undergone multiple angiograms for further evaluation. Per primary team, there is no plan for extubation at this time. Family is aware of plan for tracheostomy and amenable to it.      Date of intubation/duration: 4/22 (9 days)  Indication for trach: prolonged ventilation needs  Prior neck surgery: None reported  Anti-coagulation: SQH  INR: 1.2 (4/22)  Platelet count: 324  Pressors/CVVH: N/A    *HPI, PMH, PSH, FH, SH, and comprehensive ROS were attempted to be obtained from the patient, but the patient currently remains intubated and cannot provide this information. Therefore, the history recorded below represents information gathered from the primary team, nursing staff, EMR and family*    Past Medical History  She has no past medical history on file.  Problem List[1]    Surgical History  She has no past surgical history on file.     Social History  She has no history on file for tobacco use, alcohol use, and drug use.    Family History  Family History[2]     Allergies  Thimerosal    Review of Systems  ROS were attempted to be obtained from the patient, but the patient currently remains intubated and cannot provide this information     Physical Exam  PHYSICAL EXAMINATION:   Constitutional: well developed, well nourished  Voice:  Unable to evaluate secondary to intubation  Respiration:  Intubated, stable rate on ventilator, chest rise symmetric  Cardiovascular:  No clubbing/cyanosis/edema in hands  Eyes:  Eyelids and periorbital area without laceration or lesion, sclera normal  Neuro:  Intubated and sedated  Head and Face:  Symmetric facial features, no masses or lesions  Salivary Glands:  Parotid and submandibular  "glands normal bilaterally  Ears:  Normal external ears, EAC without gross lesions or drainage  Nose: External nose midline, anterior rhinoscopy is normal with limited visualization to the anterior aspect of the interior turbinates, no bleeding or drainage, no lesions  Oral Cavity/Oropharynx/Lips:  ETT in place, visualized portions of mucous membranes/floor of mouth/tongue/OP normal, no masses or lesions are noted  Pharynx:  Unable to visualize due to ETT  Neck/Lymph:  No LAD, no thyroid masses, trachea midline, palpable sternal notch, thyroid cartilage, and cricoid cartilage  Skin:  Neck skin is without scar or injury  Psych:  Sedated, unable to evaluate mood and affect       Last Recorded Vitals  Blood pressure (!) 143/47, pulse 64, temperature 36.7 °C (98.1 °F), temperature source Temporal, resp. rate 12, height 1.727 m (5' 7.99\"), weight 108 kg (238 lb 1.6 oz), SpO2 99%.    Medications:  Scheduled medications  Scheduled Medications[3]  Continuous medications  Continuous Medications[4]  PRN medications  PRN Medications[5]    Recent Labs:  Results for orders placed or performed during the hospital encounter of 04/22/25 (from the past 24 hours)   Sodium   Result Value Ref Range    Sodium 151 (H) 136 - 145 mmol/L   Blood Gas Arterial Full Panel   Result Value Ref Range    POCT pH, Arterial 7.45 (H) 7.38 - 7.42 pH    POCT pCO2, Arterial 36 (L) 38 - 42 mm Hg    POCT pO2, Arterial 105 (H) 85 - 95 mm Hg    POCT SO2, Arterial 99 94 - 100 %    POCT Oxy Hemoglobin, Arterial 98.1 (H) 94.0 - 98.0 %    POCT Hematocrit Calculated, Arterial 27.0 (L) 36.0 - 46.0 %    POCT Sodium, Arterial 147 (H) 136 - 145 mmol/L    POCT Potassium, Arterial 3.0 (L) 3.5 - 5.3 mmol/L    POCT Chloride, Arterial 119 (H) 98 - 107 mmol/L    POCT Ionized Calcium, Arterial 1.13 1.10 - 1.33 mmol/L    POCT Glucose, Arterial 179 (H) 74 - 99 mg/dL    POCT Lactate, Arterial 0.6 0.4 - 2.0 mmol/L    POCT Base Excess, Arterial 1.1 -2.0 - 3.0 mmol/L    POCT HCO3 " Calculated, Arterial 25.0 22.0 - 26.0 mmol/L    POCT Hemoglobin, Arterial 8.9 (L) 12.0 - 16.0 g/dL    POCT Anion Gap, Arterial 6 (L) 10 - 25 mmo/L    Patient Temperature      FiO2 60 %   POCT GLUCOSE   Result Value Ref Range    POCT Glucose 182 (H) 74 - 99 mg/dL   Sodium   Result Value Ref Range    Sodium 150 (H) 136 - 145 mmol/L   POCT GLUCOSE   Result Value Ref Range    POCT Glucose 161 (H) 74 - 99 mg/dL   Sodium   Result Value Ref Range    Sodium 150 (H) 136 - 145 mmol/L   Renal function panel   Result Value Ref Range    Glucose 177 (H) 74 - 99 mg/dL    Sodium 151 (H) 136 - 145 mmol/L    Potassium 3.4 (L) 3.5 - 5.3 mmol/L    Chloride 113 (H) 98 - 107 mmol/L    Bicarbonate 29 21 - 32 mmol/L    Anion Gap 12 10 - 20 mmol/L    Urea Nitrogen 39 (H) 6 - 23 mg/dL    Creatinine 0.80 0.50 - 1.05 mg/dL    eGFR 80 >60 mL/min/1.73m*2    Calcium 8.2 (L) 8.6 - 10.6 mg/dL    Phosphorus 4.0 2.5 - 4.9 mg/dL    Albumin 2.7 (L) 3.4 - 5.0 g/dL   CBC and Auto Differential   Result Value Ref Range    WBC 11.6 (H) 4.4 - 11.3 x10*3/uL    nRBC 0.2 (H) 0.0 - 0.0 /100 WBCs    RBC 3.17 (L) 4.00 - 5.20 x10*6/uL    Hemoglobin 9.1 (L) 12.0 - 16.0 g/dL    Hematocrit 27.6 (L) 36.0 - 46.0 %    MCV 87 80 - 100 fL    MCH 28.7 26.0 - 34.0 pg    MCHC 33.0 32.0 - 36.0 g/dL    RDW 13.8 11.5 - 14.5 %    Platelets 324 150 - 450 x10*3/uL    Neutrophils % 79.2 40.0 - 80.0 %    Immature Granulocytes %, Automated 1.4 (H) 0.0 - 0.9 %    Lymphocytes % 9.8 13.0 - 44.0 %    Monocytes % 7.8 2.0 - 10.0 %    Eosinophils % 1.6 0.0 - 6.0 %    Basophils % 0.2 0.0 - 2.0 %    Neutrophils Absolute 9.22 (H) 1.20 - 7.70 x10*3/uL    Immature Granulocytes Absolute, Automated 0.16 0.00 - 0.70 x10*3/uL    Lymphocytes Absolute 1.14 (L) 1.20 - 4.80 x10*3/uL    Monocytes Absolute 0.91 0.10 - 1.00 x10*3/uL    Eosinophils Absolute 0.19 0.00 - 0.70 x10*3/uL    Basophils Absolute 0.02 0.00 - 0.10 x10*3/uL   Magnesium   Result Value Ref Range    Magnesium 2.02 1.60 - 2.40 mg/dL    Blood Gas Arterial Full Panel   Result Value Ref Range    POCT pH, Arterial 7.46 (H) 7.38 - 7.42 pH    POCT pCO2, Arterial 43 (H) 38 - 42 mm Hg    POCT pO2, Arterial 87 85 - 95 mm Hg    POCT SO2, Arterial 98 94 - 100 %    POCT Oxy Hemoglobin, Arterial 96.9 94.0 - 98.0 %    POCT Hematocrit Calculated, Arterial 29.0 (L) 36.0 - 46.0 %    POCT Sodium, Arterial 148 (H) 136 - 145 mmol/L    POCT Potassium, Arterial 3.1 (L) 3.5 - 5.3 mmol/L    POCT Chloride, Arterial 113 (H) 98 - 107 mmol/L    POCT Ionized Calcium, Arterial 1.17 1.10 - 1.33 mmol/L    POCT Glucose, Arterial 189 (H) 74 - 99 mg/dL    POCT Lactate, Arterial 0.9 0.4 - 2.0 mmol/L    POCT Base Excess, Arterial 6.1 (H) -2.0 - 3.0 mmol/L    POCT HCO3 Calculated, Arterial 30.6 (H) 22.0 - 26.0 mmol/L    POCT Hemoglobin, Arterial 9.5 (L) 12.0 - 16.0 g/dL    POCT Anion Gap, Arterial 8 (L) 10 - 25 mmo/L    Patient Temperature      FiO2 60 %   POCT GLUCOSE   Result Value Ref Range    POCT Glucose 160 (H) 74 - 99 mg/dL   Renal function panel   Result Value Ref Range    Glucose 174 (H) 74 - 99 mg/dL    Sodium 150 (H) 136 - 145 mmol/L    Potassium 3.1 (L) 3.5 - 5.3 mmol/L    Chloride 112 (H) 98 - 107 mmol/L    Bicarbonate 34 (H) 21 - 32 mmol/L    Anion Gap 7 (L) 10 - 20 mmol/L    Urea Nitrogen 37 (H) 6 - 23 mg/dL    Creatinine 0.92 0.50 - 1.05 mg/dL    eGFR 68 >60 mL/min/1.73m*2    Calcium 8.0 (L) 8.6 - 10.6 mg/dL    Phosphorus 4.3 2.5 - 4.9 mg/dL    Albumin 2.7 (L) 3.4 - 5.0 g/dL   Sodium   Result Value Ref Range    Sodium 151 (H) 136 - 145 mmol/L   POCT GLUCOSE   Result Value Ref Range    POCT Glucose 172 (H) 74 - 99 mg/dL   Sodium   Result Value Ref Range    Sodium 150 (H) 136 - 145 mmol/L   Renal function panel   Result Value Ref Range    Glucose 172 (H) 74 - 99 mg/dL    Sodium 150 (H) 136 - 145 mmol/L    Potassium 3.1 (L) 3.5 - 5.3 mmol/L    Chloride 111 (H) 98 - 107 mmol/L    Bicarbonate 33 (H) 21 - 32 mmol/L    Anion Gap 9 (L) 10 - 20 mmol/L    Urea Nitrogen 37 (H)  6 - 23 mg/dL    Creatinine 0.85 0.50 - 1.05 mg/dL    eGFR 75 >60 mL/min/1.73m*2    Calcium 8.3 (L) 8.6 - 10.6 mg/dL    Phosphorus 4.1 2.5 - 4.9 mg/dL    Albumin 2.6 (L) 3.4 - 5.0 g/dL   CBC and Auto Differential   Result Value Ref Range    WBC 11.0 4.4 - 11.3 x10*3/uL    nRBC 0.0 0.0 - 0.0 /100 WBCs    RBC 2.91 (L) 4.00 - 5.20 x10*6/uL    Hemoglobin 8.6 (L) 12.0 - 16.0 g/dL    Hematocrit 27.0 (L) 36.0 - 46.0 %    MCV 93 80 - 100 fL    MCH 29.6 26.0 - 34.0 pg    MCHC 31.9 (L) 32.0 - 36.0 g/dL    RDW 13.8 11.5 - 14.5 %    Platelets 320 150 - 450 x10*3/uL    Neutrophils % 81.0 40.0 - 80.0 %    Immature Granulocytes %, Automated 1.5 (H) 0.0 - 0.9 %    Lymphocytes % 8.8 13.0 - 44.0 %    Monocytes % 7.6 2.0 - 10.0 %    Eosinophils % 1.0 0.0 - 6.0 %    Basophils % 0.1 0.0 - 2.0 %    Neutrophils Absolute 8.91 (H) 1.20 - 7.70 x10*3/uL    Immature Granulocytes Absolute, Automated 0.16 0.00 - 0.70 x10*3/uL    Lymphocytes Absolute 0.97 (L) 1.20 - 4.80 x10*3/uL    Monocytes Absolute 0.84 0.10 - 1.00 x10*3/uL    Eosinophils Absolute 0.11 0.00 - 0.70 x10*3/uL    Basophils Absolute 0.01 0.00 - 0.10 x10*3/uL   POCT GLUCOSE   Result Value Ref Range    POCT Glucose 171 (H) 74 - 99 mg/dL   POCT GLUCOSE   Result Value Ref Range    POCT Glucose 169 (H) 74 - 99 mg/dL       Imaging:   None relevant to the current chief concern     Assessment/Plan     - Discussion was held with  regarding the risks, benefits and indications for tracheostomy including but not limited to bleeding, infection, pneumothorax, trachea-innominate fistula and trachea-esophageal injury, medical complications, and death. Informed consent to proceed with tracheostomy was obtained. All questions were answered.  - Will plan to proceed with bronchoscopy and tracheotomy  - Discussed with team and family  - Consent obtained from spouse and placed in chart        Idris Arita MD  Dept. of Otolaryngology - Head and Neck Surgery, PGY-5  ENT Consults: [id number]  ENT  Overnight (5p-6a), and Weekends: [id number]  ENT Head and Neck Surgery Phone: [phone]  ENT Peds: [id number]  ENT Outpatient scheduling number: [phone]          [1]   Patient Active Problem List  Diagnosis    AVM (arteriovenous malformation) ([facility])   [2] No family history on file.  [3] atorvastatin, 10 mg, oral, Nightly  bisacodyl, 10 mg, rectal, Daily  carvedilol, 6.25 mg, oral, q12h  heparin (porcine), 5,000 Units, subcutaneous, q8h  hydrALAZINE, 75 mg, oral, q6h  insulin lispro, 0-5 Units, subcutaneous, q4h  lisinopril, 20 mg, oral, Daily  magnesium oxide, 400 mg, oral, Daily  pantoprazole, 40 mg, intravenous, Daily before breakfast  perflutren protein A microsphere, 0.5 mL, intravenous, Once in imaging  polyethylene glycol, 17 g, oral, Daily  potassium chloride, 20 mEq, intravenous, q2h  sennosides-docusate sodium, 2 tablet, oral, BID  sulfur hexafluoride microsphr, 2 mL, intravenous, Once in imaging  [4] fentaNYL,  mcg/hr, Last Rate: Stopped (04/30/25 1234)  niCARdipine, 2.5-15 mg/hr, Last Rate: 15 mg/hr (05/01/25 0621)  [5] PRN medications: albuterol, calcium gluconate, calcium gluconate, dextrose, dextrose, fentaNYL, glucagon, glucagon, hydrALAZINE, labetaloL, magnesium sulfate, magnesium sulfate, oxygen, potassium chloride CR **OR** potassium chloride, potassium chloride CR **OR** potassium chloride

## 2025-05-01 NOTE — CONSULTS
Inpatient consult to Cardiology  Consult performed by: Lev Barragan MD  Consult ordered by: Moy Hood MD          Consult completed on 4/23, please see previous notes

## 2025-05-01 NOTE — CONSULTS
Gastroenterology Consult Service INITIAL CONSULT Note  Department of Gastroenterology & Hepatology  [facility]    [facility]  May 1, 2025   Patient: Muriel De Souza    Medical Record: [id number]    Reason for Consult: PEG placement  Requesting Service: NSU    SUBJECTIVE     History Of Present Illness  Muriel De Souza is a 69 y.o. female with a past medical history of HTN and HLD admitted on 4/22/2025 with intracranial hemorrhage with persistent dysphagia. GI is consulted for PEG placement.     Ascites on exam/recent imaging: none  Prior abd surgery: none  Current/recent blood thinners: none  Labs/conditions with concern of coagulopathy: , INR 1.2  Current/recent infection: none  Current abx: none  Person to consent for PEG: , Oliver De Souza      Review of Systems: unable to assess    Past Medical History:  Medical History[1]    Home Medications  Prescriptions Prior to Admission[2]    Surgical History:  Surgical History[3]    Allergies:  Allergies[4]    Social History:    Social History     Socioeconomic History    Marital status:      Spouse name: Not on file    Number of children: Not on file    Years of education: Not on file    Highest education level: Not on file   Occupational History    Not on file   Tobacco Use    Smoking status: Not on file    Smokeless tobacco: Not on file   Substance and Sexual Activity    Alcohol use: Not on file    Drug use: Not on file    Sexual activity: Not on file   Other Topics Concern    Not on file   Social History Narrative    Not on file     Social Drivers of Health     Financial Resource Strain: Low Risk  (4/23/2025)    Overall Financial Resource Strain (CARDIA)     Difficulty of Paying Living Expenses: Not hard at all   Food Insecurity: Not on file   Transportation Needs: No Transportation Needs (4/23/2025)    PRAPARE - Transportation     Lack of Transportation (Medical): No     Lack of Transportation  "(Non-Medical): No   Physical Activity: Not on file   Stress: Not on file   Social Connections: Not on file   Intimate Partner Violence: Not on file   Housing Stability: Unknown (4/23/2025)    Housing Stability Vital Sign     Unable to Pay for Housing in the Last Year: No     Number of Times Moved in the Last Year: Not on file     Homeless in the Last Year: No       Family History:  Family History[5]    OBJECTIVE     Vitals:    Vitals:    05/01/25 0624 05/01/25 0700 05/01/25 0757 05/01/25 0800   BP: (!) 143/47      BP Location:       Patient Position:       Pulse:  64  63   Resp:  12  16   Temp:  36.7 °C (98.1 °F)  36.7 °C (98.1 °F)   TempSrc:    Esophageal   SpO2:  99%  98%   Weight:       Height:   1.727 m (5' 7.99\")      Failed to redirect to the Timeline version of the [employer] SmartLink.    Intake/Output Summary (Last 24 hours) at 5/1/2025 0859  Last data filed at 5/1/2025 0800  Gross per 24 hour   Intake 3085.79 ml   Output 6193 ml   Net -3107.21 ml       Physical Exam  General: ill appearing, no acute distress  HEENT: EOM intact, no scleral icterus, moist MM, EVD in place, trach in place  Respiratory: nonlabored breathing on trach  Cardiovascular: Regular rate  Abdomen: Soft, nontender, nondistended  Extremities: no edema  Neuro: responds to noxious stimuli    Medications  Current Medications[6]     Labs  Lab Results   Component Value Date    WBC 11.0 05/01/2025    HGB 8.6 (L) 05/01/2025    HCT 27.0 (L) 05/01/2025    MCV 93 05/01/2025     05/01/2025     Lab Results   Component Value Date    GLUCOSE 172 (H) 05/01/2025    CALCIUM 8.3 (L) 05/01/2025     (H) 05/01/2025     (H) 05/01/2025    K 3.1 (L) 05/01/2025    CO2 33 (H) 05/01/2025     (H) 05/01/2025    BUN 37 (H) 05/01/2025    CREATININE 0.85 05/01/2025     Lab Results   Component Value Date     (H) 04/23/2025     (H) 04/23/2025    ALKPHOS 63 04/23/2025    BILITOT 0.3 04/23/2025     Lab Results   Component Value Date    INR " 1.2 (H) 04/22/2025    INR 1.1 04/22/2025    PROTIME 13.6 (H) 04/22/2025    PROTIME 11.7 04/22/2025       Imaging:   CT HEAD WO IV CONTRAST 4/22/25  1. Interval decompression of the previously dilated supratentorial ventricular collecting system. There is residual hyperdense hemorrhage within the dependent left-greater-than-right occipital  horns.  2. Similar CT appearance of the posterior fossa structures following decompression and arteriovenous malformation resection with extensive hypodensity throughout the bilateral cerebral hemispheres and  potentially involving the brainstem with unchanged posterior fossa mass effect.    MR BRAIN WO CONTRAST 4/22/25  Postoperative changes are again identified compatible with a previous suboccipital craniectomy as well as resection of the posterior arch of the C1 vertebral body along the midline. There is extracranial fluid superficial to and surrounding the suboccipital craniotomy site extending caudally within the upper cervical region beyond the obtained field of view at the C3 level.    GI Procedures:  No results found for this or any previous visit from the past 1825 days.                   ASSESSMENT/PLAN:  Muriel De Souza is a 69 y.o. female admitted on 4/22/2025 with ICH and persistent altered mentation/ dysphagia. GI is consulted for PEG placement.    Will plan for bedside PEG placement, tentatively 5/2/25. Will obtain consent from patient's ,    Recommendations:  -Please hold TF at midnight preceding PEG  -Please order IV cefazolin 2 grams (3g if patient is > 120 kg) to be given 30 minutes prior to EGD w/ PEG placement (if patient has penicillin or cephalosporin hypersensitivity, can use vancomycin 1 gram [preferably] or clindamycin 900mg IV)     Patient was seen and discussed with Dr. Caldera.    Recommendations communicated with the primary team  at the bedside.  Thank you for the consultation.  GI will continue to follow.  Please do not hesitate to contact us  again on Epic Chat or by paging the consultation team at [phone] between the weekday hours of 7 AM - 5 PM.  If there is an urgent concern during the weekend, after-hours, or holidays; then please page the on-call GI fellow at [phone]. Thank you.      Siobhan Wright MD  Gastroenterology and Hepatology Fellow  [facility]         [1] No past medical history on file.  [2]   Medications Prior to Admission   Medication Sig Dispense Refill Last Dose/Taking    escitalopram (Lexapro) 20 mg tablet Take 0.5 tablets (10 mg) by mouth once daily.   Taking    atenolol (Tenormin) 25 mg tablet Take 1 tablet (25 mg) by mouth once daily.       atorvastatin (Lipitor) 10 mg tablet Take 1 tablet (10 mg) by mouth once daily.       CALCIUM ORAL Take 1 tablet by mouth 2 times a day.      [3] No past surgical history on file.  [4]   Allergies  Allergen Reactions    Thimerosal Unknown   [5] No family history on file.  [6]   Current Facility-Administered Medications:     albuterol 2.5 mg /3 mL (0.083 %) nebulizer solution 2.5 mg, 2.5 mg, nebulization, q4h PRN, Jace Hernandez MD, 2.5 mg at 04/25/25 0914    atorvastatin (Lipitor) tablet 10 mg, 10 mg, oral, Nightly, Mayra Dumont MD, 10 mg at 04/30/25 2034    bisacodyl (Dulcolax) suppository 10 mg, 10 mg, rectal, Daily, Jian Ellington MD PhD, 10 mg at 05/01/25 0835    calcium gluconate 1 g in sodium chloride (iso) IV 50 mL, 1 g, intravenous, q6h PRN, Sachaarioeugenia James, APRN-CNP    calcium gluconate 2 g in sodium chloride (iso)  mL, 2 g, intravenous, q6h PRN, El James, APRN-CNP    carvedilol (Coreg) tablet 6.25 mg, 6.25 mg, oral, q12h, Yessenia Jimenez MD, 6.25 mg at 05/01/25 0624    dextrose 50 % injection 12.5 g, 12.5 g, intravenous, q15 min PRN, El James APRN-CNP    dextrose 50 % injection 25 g, 25 g, intravenous, q15 min PRN, DEREK Ribeiro    fentanyl (Sublimaze) 10 mcg/mL in sodium chloride 0.9% infusion,  mcg/hr, intravenous, Continuous,  Mayra Dumont MD, Stopped at 04/30/25 1234    fentaNYL PF (Sublimaze) injection 25 mcg, 25 mcg, intravenous, q30 min PRN, Jace Hernandez MD, 25 mcg at 04/29/25 0010    glucagon (Glucagen) injection 1 mg, 1 mg, intramuscular, q15 min PRN, Nektarios Kriaris, APRN-CNP    glucagon (Glucagen) injection 1 mg, 1 mg, intramuscular, q15 min PRN, Nektarios Kriaris, APRN-CNP    heparin (porcine) injection 5,000 Units, 5,000 Units, subcutaneous, q8h, Joel Tsai MD, 5,000 Units at 05/01/25 0836    hydrALAZINE (Apresoline) injection 10 mg, 10 mg, intravenous, q20 min PRN, Rudolph Álvarez MD, 10 mg at 05/01/25 0326    hydrALAZINE (Apresoline) tablet 75 mg, 75 mg, oral, q6h, Jace Hernandez MD, 75 mg at 05/01/25 0836    insulin lispro injection 0-5 Units, 0-5 Units, subcutaneous, q4h, El James APRN-CNP, 1 Units at 05/01/25 0836    labetaloL (Normodyne,Trandate) injection 10 mg, 10 mg, intravenous, q10 min PRN, Rudolph Álvarez MD, 10 mg at 04/30/25 2328    lisinopril tablet 20 mg, 20 mg, oral, Daily, Jian Ellington MD PhD, 20 mg at 05/01/25 0836    magnesium oxide (Mag-Ox) tablet 400 mg, 400 mg, oral, Daily, Jian Ellington MD PhD, 400 mg at 05/01/25 0836    magnesium sulfate 2 g in sterile water for injection 50 mL, 2 g, intravenous, q6h PRN, Sachaarios Manjinderis, APRN-CNP    magnesium sulfate 4 g in sterile water for injection 100 mL, 4 g, intravenous, q6h PRN, Nektarios Kriaris, APRN-CNP    niCARdipine (Cardene) 40 mg in sodium chloride 200 mL (0.2 mg/mL) infusion (premix), 2.5-15 mg/hr, intravenous, Continuous, Yessenia Sarsour, MD, Last Rate: 75 mL/hr at 05/01/25 0621, 15 mg/hr at 05/01/25 0621    oxygen (O2) therapy, , inhalation, Continuous PRN - O2/gases, Jace Hernandez MD, 40 percent at 05/01/25 0757    pantoprazole (Protonix) injection 40 mg, 40 mg, intravenous, Daily before breakfast, Yessenia Jimenez MD, 40 mg at 05/01/25 0620    perflutren protein A microsphere (Optison) injection 0.5 mL, 0.5 mL, intravenous, Once in  imaging, Jace Hernandez MD    polyethylene glycol (Glycolax, Miralax) packet 17 g, 17 g, oral, Daily, Rudolph Álvarez MD, 17 g at 05/01/25 0835    potassium chloride CR (Klor-Con M20) ER tablet 20 mEq, 20 mEq, oral, q6h PRN **OR** potassium chloride (Klor-Con) packet 20 mEq, 20 mEq, orogastric tube, q6h PRN, Nektarios Kriaris, APRN-CNP    potassium chloride CR (Klor-Con M20) ER tablet 40 mEq, 40 mEq, oral, q6h PRN **OR** potassium chloride (Klor-Con) packet 40 mEq, 40 mEq, orogastric tube, q6h PRN, Nektarios Kriaris, APRN-CNP    sennosides-docusate sodium (Maribel-Colace) 8.6-50 mg per tablet 2 tablet, 2 tablet, oral, BID, Rudolph Álvarez MD, 2 tablet at 05/01/25 0835    sulfur hexafluoride microsphr (Lumason) injection 24.28 mg, 2 mL, intravenous, Once in imaging, Jace Hernandez MD

## 2025-05-01 NOTE — PROGRESS NOTES
[facility]  NEUROSCIENCE INTENSIVE CARE UNIT  DAILY PROGRESS NOTE       Patient Name: Muriel De Souza   MRN: [id number]     Admit Date: 2025     : 1955 AGE: 69 y.o. GENDER: female        Subjective    Muriel De Souza is a 69 y.o. female with h/o HTN, HLD p/w HA, arrested in CT scanner at OSH, ROSC after 7 min CPR,  CTH w diffuse SAH and L cerebellar ICH, triventriculomegally, effaced 4th vents, CTA with L cerebellar AVM w venous varices s/p RF EVD and now admitted for further management.    Per chart review, patient had received bad news at home and then had sudden onset headache and vomiting. Found to have SAH at OSH with course complicated by cardiac arrest. She was intubated and sedated and transferred from [place], [place] to [facility].    Significant Events:  -  came in with L cerebellar AVM bleed with course complicated by cardiac arrest ROSC after 7 min CPR, uppon arrival to [facility] NSU, EVD drain placed, went to OR for decompression and AVM resection    Interval Events:   NAEON, Afebrile, hemodynamically stable with SBP 140s/150s with goal SBP < 140  NSGY BP goal still <140  Diuresed with Lasix 40 and 80 on  for worsening pleural effusions bilaterally.  -3.0 L / 24 hours  UOP 2.3 / 24 hours  Lisinopril 10 mg increased to 20 mg yesterday  Carvedilol 6.25 BID  Hydralazine PO 75mg q6  Nicardipine 0.25 mg/min  PRN labetalol and hydralazine  No desaturations overnight, FiO2 60 PEEP 12  R femoral line removed, replaced with subclavian central line  MRI done   Pt off fentanyl  Goals of care discussions with family ongoing        Objective   VITALS (24H):  Temp:  [35.5 °C (95.9 °F)-37.3 °C (99.1 °F)] 36.9 °C (98.4 °F)  Heart Rate:  [57-73] 62  Resp:  [12-17] 12  BP: (136-153)/(42-51) 144/44  Arterial Line BP 1: (126-152)/(33-51) 140/44  FiO2 (%):  [60 %] 60 %  INTAKE/OUTPUT:  Intake/Output Summary (Last 24 hours) at 2025 0609  Last data filed at 2025 0600  Gross per 24 hour    Intake 3113.29 ml   Output 6027 ml   Net -2913.71 ml     VENT SETTINGS:  Vent Mode: Volume control/assist control  FiO2 (%):  [60 %] 60 %  S RR:  [12-14] 12  S VT:  [400 mL] 400 mL  PEEP/CPAP (cm H2O):  [12 cm H20] 12 cm H20  MAP (cm H2O):  [15-16] 15       PHYSICAL EXAM:  NEURO: Off sedation  - Intubated  - Eyes closed to nox stim  - pupils minimally reactive to light  - no VOR  - corneals negative  - no gag  - no cough reflex  - No response to pain in UE  - spontaneously flexes toes with light touch, not in response to commands  - Triple flexion in RLE to nox stim  - Ankle dorsiflexion in LLE to nox stim  - no increased tone  CV:  - RRR on telemetry, NSR  - Arterial line in place  RESP:  - intubated  :  - Brooks catheter in place  GI:  - Abdomen soft but somewhat distended  SKIN:  - Intact  - left arm swollen and tense to palpation, not pitting, non-erythematous    MEDICATIONS:  Scheduled: PRN: Continuous:   Scheduled Medications[1] PRN Medications[2] Continuous Medications[3]     IMAGING RESULTS:  CT head wo IV contrast   Final Result   * Diffuse subarachnoid hemorrhage with hydrocephalus   *Left cerebellar hematoma suggesting a likely source.        MACRO:   none        Signed by: Cruz Salgado 4/22/2025 11:59 AM   Dictation workstation:   [id number]      CT angio head and neck w and wo IV contrast   Final Result   * Diffuse subarachnoid hemorrhage with hydrocephalus as described   *Suspected vascular malformation in the left cerebellar hemisphere   with enlarged arterial and venous structures largely in the left   cerebellar hemisphere and residual partial opacification of a venous   aneurysm near the torcula. *Bilateral carotid stenosis as described        MACRO:   none        Signed by: Cruz Salgado 4/22/2025 12:07 PM   Dictation workstation:   [id number]             Assessment/Plan    Muriel De Souza is a 69 y.o. female with h/o HTN, HLD p/w HA, arrested in CT scanner at OSH, ROSC after 7 min  CPR,  CTH w diffuse SAH and L cerebellar ICH, triventriculomegally, effaced 4th vents, CTA with L cerebellar AVM w venous varices s/p RF EVD as well as AVM resection on 4/22 and now admitted for further management.    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  #Intracranial hypertension  Assessment:  - First day 4/22 presented with SAH, L cerebellar ICH, and ventriculomegaly on CTH  - 4/22 CTA showing L cerebellar AVM  - 4/22 RF EVD for ICH  - 4/22 OR AVM resection  - MRI done 4/29   - Pt is neurologically stable  - BPs have been consistently above goal of 140  - Lisinopril 10 mg increased to 20 4/30  - Diuresed with 120 mg Lasix with -3.0 L net negative 4/30  - Consider increase in dose of hydralazine to 100 mg q6 for BP control    Plan:  - NSU  - Keppra  - EVD in place, maintained at 10  - BP goal: SBP < 140    --> PRN: Labetalol, Hydralazine, and Nicardipine   - IV nicardipine 0.25 mg/min  - PO hydralazine 100 q6  - PO Carvedilol 6.25 BID  - PO Lisinopril 20 OD   - Discuss with family the possibility of  shunt and fistula treatment  - Consult for tracheostomy and PEG placement  - Neuro Checks: Q1H  - Sedation: Off  - Pain: PRN  - Nausea: ondansetron  - PT/OT/SLP    CARDIOVASCULAR:  #Cardiac arrest  #Lactic acidosis, improving  # elevated troponins, improved  Assessment:  - 7 min to ROSC  - Assess for ischemic and stress-induced cardiomyopathy   - Assess for arrhythmias following cardiac arrest   - Cards consult (4/23)  - Type 2 MI (demand ischemia), leading to troponin up-trend (4/23)  - Trop trending down, d/c'd 4/24  - ECHO 4/23:  CONCLUSIONS:   1. Left ventricular ejection fraction is normal, calculated by Adrian's biplane at 71%.   2. Spectral Doppler shows a Grade I (impaired relaxation pattern) of left ventricular diastolic filling with normal left atrial filling pressure.   3. There is normal right  ventricular global systolic function.   4. The left atrium is mildly dilated.   5. There is no evidence of a patent foramen ovale.   6. The inferior vena cava appears mildly dilated, on vent.  - Pt is hemodynamically stable, SBP goal < 140    Plan:  - Monitor on telemetry  - SBP goal < 140 (see above)    RESPIRATORY:  #Acute hypoxic respiratory failure  #Mechanical ventilation  #Bilateral plural effusions  #Pulmonary edema    Results from last 7 days   Lab Units 05/01/25  0330 04/30/25 2311 04/30/25 2131 04/30/25  1556 04/30/25  1227 04/30/25  0806 04/30/25 0407 04/29/25 2016 04/29/25  1626   SODIUM mmol/L 150*  150* 151* 150* 151*  150* 150*   < > 152*  150*   < > 151*  151*   POTASSIUM mmol/L 3.1*  --  3.1* 3.4*  --   --  3.4*  --  3.6   CREATININE mg/dL 0.85  --  0.92 0.80  --   --  0.70  --  0.68   BUN mg/dL 37*  --  37* 39*  --   --  40*  --  41*    < > = values in this interval not displayed.     Assessment:  - Pt intubated for decreased LOC  - FiO2 increased from 40 to 60 on 4/30 AM  - CXR 4/30 demonstrated bilateral pleural effusions  - Diuresed with 120 mg Lasix with -3.0L net negative  - Repeat CXR 4/30 with persistent pleural effusions  - Continue to diurese 5/1    Plan:  - Continue mechanical ventilation for decreased LOC  - Monitor for increased secretions/desaturations  - Diurese with Lasix 5/1  - PiCCO line placement 5/1    RENAL/:  #Pre-renal azotemia, resolving  Assessment:  Results from last 72 hours   Lab Units 05/01/25  0330 04/30/25 2311 04/30/25 2131 04/30/25  1556 04/30/25 1227 04/30/25  0806 04/30/25  0407 04/29/25 2016 04/29/25  1626   CREATININE mg/dL 0.85  --  0.92 0.80  --   --  0.70  --  0.68   BUN mg/dL 37*  --  37* 39*  --   --  40*  --  41*   SODIUM mmol/L 150*  150* 151* 150* 151*  150* 150*   < > 152*  150*   < > 151*  151*    < > = values in this interval not displayed.   - UOP 2.3 / 24 hours 5/1  - BUN stable 5/1  - Cr stable 5/1  - Pt repleted with fluids  -  Resolving pre-renal azotemia    Plan:  - Monitor with daily RFP  - IVF for hypovolemia  - Maintain forbes catheter for: critically ill patient who need accurate urinary output measurements    FEN/GI:  #TIM  Results from last 72 hours   Lab Units 05/01/25  0330 04/30/25  2311 04/30/25  2131 04/30/25  1556 04/30/25  1227 04/30/25  0806 04/30/25  0407 04/29/25  2016 04/29/25  1626   SODIUM mmol/L 150*  150* 151* 150* 151*  150* 150*   < > 152*  150*   < > 151*  151*   POTASSIUM mmol/L 3.1*  --  3.1* 3.4*  --   --  3.4*  --  3.6   PHOSPHORUS mg/dL 4.1  --  4.3 4.0  --   --  3.8  --  4.0    < > = values in this interval not displayed.   - Hypokalemia 5/1 de to 120 mg Lasix 4/30  - Sodium at goal 150-160  - Stable phos 5/1  - Will repeat Lasix 80 mg 5/1    Plan:  - Monitor and replace electrolytes per protocol  - Replete K+ as needed  - Sodium goal normo natremic per NSGY  - IVF: PRN  - Diet: Enteral feeding through NG  - Bowel Regimen: Docusate-Senna PRN and Miralax PRN    ENDOCRINE:  #TIM  Assessment:  Results from last 7 days   Lab Units 05/01/25  0435 05/01/25  0330 04/30/25  2325 04/30/25  2311 04/30/25  2131 04/30/25  2002 04/30/25  1556 04/30/25  1521 04/30/25  1227 04/30/25  1133 04/30/25  0806 04/30/25  0733   POCT GLUCOSE mg/dL 171*  --  172*  --   --  160*  --  161*  --  182*  --  145*   GLUCOSE mg/dL  --  172*  --   --  174*  --  177*  --   --   --   --   --    SODIUM mmol/L  --  150*  150*  --  151* 150*  --  151*  150*  --  150*  --  151*  --     - HbA1C 5.5 on 4/22    Plan:  - Accuchecks & ISS PRN     HEMATOLOGY:  #Anemia, likely dilutional  Assessment:  - Baseline Hgb: 12.0  - Baseline Plts: 287  Results from last 7 days   Lab Units 05/01/25  0330 04/30/25  1556 04/30/25  0407   HEMOGLOBIN g/dL 8.6* 9.1* 8.5*   HEMATOCRIT % 27.0* 27.6* 27.9*   PLATELETS AUTO x10*3/uL 320 324 308     Plan:  - Continue to monitor with daily CBC and Coag panel  - maintain active type and screen as needed    INFECTIOUS  DISEASE:  #TIM  Assessment:  Results from last 7 days   Lab Units 25  0330 25  1556 25  0407   WBC AUTO x10*3/uL 11.0 11.6* 10.6    - Temp (24hrs), Av.9 °C (98.5 °F), Min:35.5 °C (95.9 °F), Max:37.3 °C (99.1 °F)  - White count stable   - Pt afebrile  - Bcx no growth at 4 days ()  - Sputum cult  no predominant organism  - UA  unremarkable  - Negative pro calcitonin   - Vanc/Zosyn started  for desaturation and increased O2 requirement overnight  - Vanc/Zosyn stopped , low suspicion for infection given clean Bcx, no fevers, resolving leukocytosis, and negative pro calcitonin     Plan  - Continue to monitor for signs of infection     MUSCULOSKELETAL:  - No acute issues    SKIN:  #Swollen left arm  Assessment:   - LUE with persistent swelling since   - Non-pitting edema, non-erythematous  - POD6  - SQH started ()  - No DVT in LE ()  - No DVT in RUE ()  - Superficial thrombosis in basilic vein in LUE ()    Plan:   - Turns and skin care per NSU protocol    ACCESS:  - R Femoral  - R arterial  - L PIV    PROPHYLAXIS:  - DVT Ppx: SCDs, SQH  - GI: PPI    RESTRAINTS:  Not indicated/Patient does not meet criteria for restraints    LUIS LLAMAS  Neuroscience Intensive Care    I have reviewed and edited the information above and I agree with the assessment and plan as outlined by the medical student.  Yessenia Jimenez MD  Emergency Medicine, PGY-2    The patient is critically ill with acute encephalopathy, acquired hydrocephalus and acute respiratory insufficiency in the setting of ruptured cerebellar AVM  Remains in poor neurological condition  EVD management per neurosurgery  Cont keppra  Cont BP control with goal sbp<140  Wean ventilator as tolerated  Anemia unchanged: Cont to trend, transfuse for Hb<7     I have seen and examined the patient.  I have reviewed the patient's laboratory, radiographic, and clinical data.  I have spent 30 minutes providing  critical care for the patient.       VAMSHI Nuñez M.D.        [1] atorvastatin, 10 mg, oral, Nightly  bisacodyl, 10 mg, rectal, Daily  carvedilol, 6.25 mg, oral, q12h  heparin (porcine), 5,000 Units, subcutaneous, q8h  hydrALAZINE, 75 mg, oral, q6h  insulin lispro, 0-5 Units, subcutaneous, q4h  lisinopril, 20 mg, oral, Daily  magnesium oxide, 400 mg, oral, Daily  pantoprazole, 40 mg, intravenous, Daily before breakfast  perflutren protein A microsphere, 0.5 mL, intravenous, Once in imaging  polyethylene glycol, 17 g, oral, Daily  sennosides-docusate sodium, 2 tablet, oral, BID  sulfur hexafluoride microsphr, 2 mL, intravenous, Once in imaging     [2] PRN medications: albuterol, calcium gluconate, calcium gluconate, dextrose, dextrose, fentaNYL, glucagon, glucagon, hydrALAZINE, labetaloL, magnesium sulfate, magnesium sulfate, oxygen, potassium chloride CR **OR** potassium chloride, potassium chloride CR **OR** potassium chloride  [3] fentaNYL,  mcg/hr, Last Rate: Stopped (04/30/25 1234)  niCARdipine, 2.5-15 mg/hr, Last Rate: 15 mg/hr (05/01/25 5926)

## 2025-05-01 NOTE — PROGRESS NOTES
"Muriel De Souza is a 69 y.o. female on day 9 of admission presenting with AVM (arteriovenous malformation) ([facility]).    Subjective   No events overnight    Objective     Physical Exam  Intubated  ECNS  OU2R  -cough/gag  BUE flaccid  BLE TF  EVD in place    Last Recorded Vitals  Blood pressure (!) 144/44, pulse 65, temperature 37 °C (98.6 °F), resp. rate 12, height 1.727 m (5' 7.99\"), weight 107 kg (236 lb 5.3 oz), SpO2 100%.  Intake/Output last 3 Shifts:  I/O last 3 completed shifts:  In: 5513.3 (51.4 mL/kg) [I.V.:2863.3 (26.7 mL/kg); NG/GT:2450; IV Piggyback:200]  Out: 5814 (54.2 mL/kg) [Urine:5470 (1.4 mL/kg/hr); Drains:344]  Weight: 107.2 kg     Relevant Results    Assessment & Plan  AVM (arteriovenous malformation) ([facility])    Muriel De Souza is a 69 y.o. 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, s/p 1u Plt and DDAVP, s/p RF EVD (OP 18), 4/22 s/p SOC/C1 lami for AVM resection, CTH POC, stable vents, 4/22 TEG R low s/p 1u FFP, 4/23 TTE EF 71%, 4/23 s/p angio tentorial dural AV fistual w direct cortical venous drainage fed by b/l MMA, b/l tentorial arteries, incidental 2mm R pcomm aneurysm, 4/24 CTH slight decr vents  4/29 MRI midbrain and b/l cerebellar hemisphere diffusion hits    NSU  EVD at 10, monitor output and ICPs  Cont GOC - fam OK with trach, peg, will consult ENT/surg endo today  Na goal 150-160, monitor w/ q6 Na  SBP goal <140  Con't hydral, lisinopril, coreg, may increase  Wean cardene as able  Final path (in process)  SCD, SQH    Thomas Corona MD    "

## 2025-05-01 NOTE — PROCEDURES
[facility]  NEUROSCIENCE INTENSIVE CARE UNIT  PROCEDURE NOTE:   SMALL BORE FEEDING TUBE PLACEMENT       Patient Name: Muriel De Souza   MRN: [id number]   Admit Date: 2025   : 1955 AGE: 69 y.o.     Primary Diagnosis: AVM (arteriovenous malformation) ([facility])      Patient with dysphagia 2/2 AVM (arteriovenous malformation) ([facility]), requiring small bore feeding tube placement for medication and nutrition administration.  All labs and images examined for appropriateness of tube placement. Procedure explained to the patient and/or family.    Patient positioned and small bore feeding tubing prepped per device protocol.  Tubing inserted into the left nare and, using [employer] electromagnetic sensing device and tubing, was advanced per imaging guidance into gastric antrum and advanced post-pyloric. Tube is secured with bridle at 75 cm at the nares. KUB ordered to confirm placement.

## 2025-05-01 NOTE — CONSULTS
Otolaryngology - Head and Neck Surgery Consultation Note      Reason For Consult  Tracheostomy Assessment    History Of Present Illness  Muriel De Souza is a 69 y.o. 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 She is s/p AM resection in OR on 4/22 and has subsequently undergone multiple angiograms for further evaluation. Per primary team, there is no plan for extubation at this time. Family is aware of plan for tracheostomy and amenable to it.      Date of intubation/duration: 4/22 (9 days)  Indication for trach: prolonged ventilation needs  Prior neck surgery: None reported  Anti-coagulation: SQH  INR: 1.2 (4/22)  Platelet count: 324  Pressors/CVVH: N/A    *HPI, PMH, PSH, FH, SH, and comprehensive ROS were attempted to be obtained from the patient, but the patient currently remains intubated and cannot provide this information. Therefore, the history recorded below represents information gathered from the primary team, nursing staff, EMR and family*    Past Medical History  She has no past medical history on file.  Problem List[1]    Surgical History  She has no past surgical history on file.     Social History  She has no history on file for tobacco use, alcohol use, and drug use.    Family History  Family History[2]     Allergies  Thimerosal    Review of Systems  ROS were attempted to be obtained from the patient, but the patient currently remains intubated and cannot provide this information     Physical Exam  PHYSICAL EXAMINATION:   Constitutional: well developed, well nourished  Voice:  Unable to evaluate secondary to intubation  Respiration:  Intubated, stable rate on ventilator, chest rise symmetric  Cardiovascular:  No clubbing/cyanosis/edema in hands  Eyes:  Eyelids and periorbital area without laceration or lesion, sclera normal  Neuro:  Intubated and sedated  Head and Face:  Symmetric facial features, no masses or lesions  Salivary Glands:  Parotid and submandibular  "glands normal bilaterally  Ears:  Normal external ears, EAC without gross lesions or drainage  Nose: External nose midline, anterior rhinoscopy is normal with limited visualization to the anterior aspect of the interior turbinates, no bleeding or drainage, no lesions  Oral Cavity/Oropharynx/Lips:  ETT in place, visualized portions of mucous membranes/floor of mouth/tongue/OP normal, no masses or lesions are noted  Pharynx:  Unable to visualize due to ETT  Neck/Lymph:  No LAD, no thyroid masses, trachea midline, palpable sternal notch, thyroid cartilage, and cricoid cartilage  Skin:  Neck skin is without scar or injury  Psych:  Sedated, unable to evaluate mood and affect       Last Recorded Vitals  Blood pressure 138/50, pulse 66, temperature 37.1 °C (98.8 °F), temperature source Esophageal, resp. rate 12, height 1.727 m (5' 7.99\"), weight 108 kg (238 lb 1.6 oz), SpO2 99%.    Medications:  Scheduled medications  Scheduled Medications[3]  Continuous medications  Continuous Medications[4]  PRN medications  PRN Medications[5]    Recent Labs:  Results for orders placed or performed during the hospital encounter of 04/22/25 (from the past 24 hours)   POCT GLUCOSE   Result Value Ref Range    POCT Glucose 161 (H) 74 - 99 mg/dL   Sodium   Result Value Ref Range    Sodium 150 (H) 136 - 145 mmol/L   Renal function panel   Result Value Ref Range    Glucose 177 (H) 74 - 99 mg/dL    Sodium 151 (H) 136 - 145 mmol/L    Potassium 3.4 (L) 3.5 - 5.3 mmol/L    Chloride 113 (H) 98 - 107 mmol/L    Bicarbonate 29 21 - 32 mmol/L    Anion Gap 12 10 - 20 mmol/L    Urea Nitrogen 39 (H) 6 - 23 mg/dL    Creatinine 0.80 0.50 - 1.05 mg/dL    eGFR 80 >60 mL/min/1.73m*2    Calcium 8.2 (L) 8.6 - 10.6 mg/dL    Phosphorus 4.0 2.5 - 4.9 mg/dL    Albumin 2.7 (L) 3.4 - 5.0 g/dL   CBC and Auto Differential   Result Value Ref Range    WBC 11.6 (H) 4.4 - 11.3 x10*3/uL    nRBC 0.2 (H) 0.0 - 0.0 /100 WBCs    RBC 3.17 (L) 4.00 - 5.20 x10*6/uL    Hemoglobin 9.1 " (L) 12.0 - 16.0 g/dL    Hematocrit 27.6 (L) 36.0 - 46.0 %    MCV 87 80 - 100 fL    MCH 28.7 26.0 - 34.0 pg    MCHC 33.0 32.0 - 36.0 g/dL    RDW 13.8 11.5 - 14.5 %    Platelets 324 150 - 450 x10*3/uL    Neutrophils % 79.2 40.0 - 80.0 %    Immature Granulocytes %, Automated 1.4 (H) 0.0 - 0.9 %    Lymphocytes % 9.8 13.0 - 44.0 %    Monocytes % 7.8 2.0 - 10.0 %    Eosinophils % 1.6 0.0 - 6.0 %    Basophils % 0.2 0.0 - 2.0 %    Neutrophils Absolute 9.22 (H) 1.20 - 7.70 x10*3/uL    Immature Granulocytes Absolute, Automated 0.16 0.00 - 0.70 x10*3/uL    Lymphocytes Absolute 1.14 (L) 1.20 - 4.80 x10*3/uL    Monocytes Absolute 0.91 0.10 - 1.00 x10*3/uL    Eosinophils Absolute 0.19 0.00 - 0.70 x10*3/uL    Basophils Absolute 0.02 0.00 - 0.10 x10*3/uL   Magnesium   Result Value Ref Range    Magnesium 2.02 1.60 - 2.40 mg/dL   Blood Gas Arterial Full Panel   Result Value Ref Range    POCT pH, Arterial 7.46 (H) 7.38 - 7.42 pH    POCT pCO2, Arterial 43 (H) 38 - 42 mm Hg    POCT pO2, Arterial 87 85 - 95 mm Hg    POCT SO2, Arterial 98 94 - 100 %    POCT Oxy Hemoglobin, Arterial 96.9 94.0 - 98.0 %    POCT Hematocrit Calculated, Arterial 29.0 (L) 36.0 - 46.0 %    POCT Sodium, Arterial 148 (H) 136 - 145 mmol/L    POCT Potassium, Arterial 3.1 (L) 3.5 - 5.3 mmol/L    POCT Chloride, Arterial 113 (H) 98 - 107 mmol/L    POCT Ionized Calcium, Arterial 1.17 1.10 - 1.33 mmol/L    POCT Glucose, Arterial 189 (H) 74 - 99 mg/dL    POCT Lactate, Arterial 0.9 0.4 - 2.0 mmol/L    POCT Base Excess, Arterial 6.1 (H) -2.0 - 3.0 mmol/L    POCT HCO3 Calculated, Arterial 30.6 (H) 22.0 - 26.0 mmol/L    POCT Hemoglobin, Arterial 9.5 (L) 12.0 - 16.0 g/dL    POCT Anion Gap, Arterial 8 (L) 10 - 25 mmo/L    Patient Temperature      FiO2 60 %   POCT GLUCOSE   Result Value Ref Range    POCT Glucose 160 (H) 74 - 99 mg/dL   Renal function panel   Result Value Ref Range    Glucose 174 (H) 74 - 99 mg/dL    Sodium 150 (H) 136 - 145 mmol/L    Potassium 3.1 (L) 3.5 - 5.3  mmol/L    Chloride 112 (H) 98 - 107 mmol/L    Bicarbonate 34 (H) 21 - 32 mmol/L    Anion Gap 7 (L) 10 - 20 mmol/L    Urea Nitrogen 37 (H) 6 - 23 mg/dL    Creatinine 0.92 0.50 - 1.05 mg/dL    eGFR 68 >60 mL/min/1.73m*2    Calcium 8.0 (L) 8.6 - 10.6 mg/dL    Phosphorus 4.3 2.5 - 4.9 mg/dL    Albumin 2.7 (L) 3.4 - 5.0 g/dL   Sodium   Result Value Ref Range    Sodium 151 (H) 136 - 145 mmol/L   POCT GLUCOSE   Result Value Ref Range    POCT Glucose 172 (H) 74 - 99 mg/dL   Sodium   Result Value Ref Range    Sodium 150 (H) 136 - 145 mmol/L   Renal function panel   Result Value Ref Range    Glucose 172 (H) 74 - 99 mg/dL    Sodium 150 (H) 136 - 145 mmol/L    Potassium 3.1 (L) 3.5 - 5.3 mmol/L    Chloride 111 (H) 98 - 107 mmol/L    Bicarbonate 33 (H) 21 - 32 mmol/L    Anion Gap 9 (L) 10 - 20 mmol/L    Urea Nitrogen 37 (H) 6 - 23 mg/dL    Creatinine 0.85 0.50 - 1.05 mg/dL    eGFR 75 >60 mL/min/1.73m*2    Calcium 8.3 (L) 8.6 - 10.6 mg/dL    Phosphorus 4.1 2.5 - 4.9 mg/dL    Albumin 2.6 (L) 3.4 - 5.0 g/dL   CBC and Auto Differential   Result Value Ref Range    WBC 11.0 4.4 - 11.3 x10*3/uL    nRBC 0.0 0.0 - 0.0 /100 WBCs    RBC 2.91 (L) 4.00 - 5.20 x10*6/uL    Hemoglobin 8.6 (L) 12.0 - 16.0 g/dL    Hematocrit 27.0 (L) 36.0 - 46.0 %    MCV 93 80 - 100 fL    MCH 29.6 26.0 - 34.0 pg    MCHC 31.9 (L) 32.0 - 36.0 g/dL    RDW 13.8 11.5 - 14.5 %    Platelets 320 150 - 450 x10*3/uL    Neutrophils % 81.0 40.0 - 80.0 %    Immature Granulocytes %, Automated 1.5 (H) 0.0 - 0.9 %    Lymphocytes % 8.8 13.0 - 44.0 %    Monocytes % 7.6 2.0 - 10.0 %    Eosinophils % 1.0 0.0 - 6.0 %    Basophils % 0.1 0.0 - 2.0 %    Neutrophils Absolute 8.91 (H) 1.20 - 7.70 x10*3/uL    Immature Granulocytes Absolute, Automated 0.16 0.00 - 0.70 x10*3/uL    Lymphocytes Absolute 0.97 (L) 1.20 - 4.80 x10*3/uL    Monocytes Absolute 0.84 0.10 - 1.00 x10*3/uL    Eosinophils Absolute 0.11 0.00 - 0.70 x10*3/uL    Basophils Absolute 0.01 0.00 - 0.10 x10*3/uL   POCT GLUCOSE    Result Value Ref Range    POCT Glucose 171 (H) 74 - 99 mg/dL   POCT GLUCOSE   Result Value Ref Range    POCT Glucose 169 (H) 74 - 99 mg/dL   Sodium   Result Value Ref Range    Sodium 151 (H) 136 - 145 mmol/L   POCT GLUCOSE   Result Value Ref Range    POCT Glucose 174 (H) 74 - 99 mg/dL       Imaging:   None relevant to the current chief concern     Procedure Note: Percutaneous Tracheostomy, Bronchoscopy  The patient was seen and evaluated in the NSU. Consent was obtained from the patient's family. The ENT team, ICU fellow, respiratory therapy, and nursing staff were all present for the entirety of procedure. The patient was sedated and laid back in supine position. A shoulder roll was placed to hyperextend the neck. Following proper time-out, a bronchoscope was inserted into the patient's endotracheal tube. The anterior neck was prepped and draped in the usual sterile fashion. A small 2 cm vertical incision was made along the patient's anterior neck just inferior to the cricoid. The endotracheal tube was then slowly withdrawn to allow for visualization of the trachea with the bronchoscope. A hemostat was used to palpate the tracheal rings to confirm the appropriate placement. A needle with an overlying catheter was then carefully inserted into the patient's trachea under direct visualization. After adequate positioning was confirmed, the Seldinger technique was used to dilate the puncture site in serial fashion. Upon placement of the dilator, a 6-0 cuffed Shiley tracheostomy tube was inserted into the patient's trachea with minimal resistance. The bronchoscope was then removed from the patient's endotracheal tube and placed in the tracheostomy to confirm appropriate positioning. The cuff of the tracheostomy tube was then inflated, and the tracheostomy was sutured to the skin using 2-0 silk. The tracheostomy collar was fitted. The patient tolerated the procedure well. There were no  complications.      Assessment/Plan   Muriel De Souza is a 69 y.o. female with prolonged ventilation 2/2 AVM rupture    - Discussion was held with  regarding the risks, benefits and indications for tracheostomy including but not limited to bleeding, infection, pneumothorax, trachea-innominate fistula and trachea-esophageal injury, medical complications, and death. Informed consent to proceed with tracheostomy was obtained. All questions were answered.  - Discussed with team and family  - Consent obtained from spouse and placed in chart  - bronchoscopy with tracheotomy performed  - 6-0 cuffed shiley  - will cut sutures POD5  - earliest trach change if off of PPV is POD5        Denisse Jiménez MD - PGY2  Otolaryngology - Head and Neck Surgery    ENT Head & Neck phone: [phone]  ENT Consults pager: [phone]   ENT Pediatrics  pager: [phone]  ENT Subspecialty (otology, rhinology, laryngology, plastics, sleep):   M-F 6am-5pm- EpicChat or page the resident who wrote the daily note   Nights & weekends- [phone]  ENT Outpatient [phone]     I am the day resident. I can only be contacted 6am-5pm. Please contact the teams listed above with any questions or concerns outside of these hours.            [1]   Patient Active Problem List  Diagnosis    AVM (arteriovenous malformation) ([facility])   [2] No family history on file.  [3] amLODIPine, 5 mg, oral, Daily  atorvastatin, 10 mg, oral, Nightly  bisacodyl, 10 mg, rectal, Daily  carvedilol, 6.25 mg, oral, q12h  heparin (porcine), 5,000 Units, subcutaneous, q8h  hydrALAZINE, 75 mg, oral, q6h  insulin lispro, 0-5 Units, subcutaneous, q4h  lisinopril, 20 mg, oral, Daily  magnesium oxide, 400 mg, oral, Daily  pantoprazole, 40 mg, intravenous, Daily before breakfast  perflutren protein A microsphere, 0.5 mL, intravenous, Once in imaging  phenylephrine in NS, , ,   polyethylene glycol, 17 g, oral, Daily  rocuronium, 1.2 mg/kg, intravenous, Once  sennosides-docusate sodium, 2 tablet,  oral, BID  sulfur hexafluoride microsphr, 2 mL, intravenous, Once in imaging     [4] fentaNYL,  mcg/hr, Last Rate: 25 mcg/hr (05/01/25 1200)  niCARdipine, 2.5-15 mg/hr, Last Rate: Stopped (05/01/25 1316)  propofol, 0-50 mcg/kg/min, Last Rate: 10 mcg/kg/min (05/01/25 1309)     [5] PRN medications: albuterol, calcium gluconate, calcium gluconate, dextrose, dextrose, fentaNYL, glucagon, glucagon, hydrALAZINE, labetaloL, magnesium sulfate, magnesium sulfate, oxygen, phenylephrine in NS, potassium chloride CR **OR** potassium chloride, potassium chloride CR **OR** potassium chloride, propofol

## 2025-05-02 ENCOUNTER — APPOINTMENT (OUTPATIENT)
Dept: GASTROENTEROLOGY | Facility: HOSPITAL | Age: 70
End: 2025-05-02
Payer: COMMERCIAL

## 2025-05-02 ENCOUNTER — APPOINTMENT (OUTPATIENT)
Dept: RADIOLOGY | Facility: HOSPITAL | Age: 70
DRG: 003 | End: 2025-05-02
Payer: COMMERCIAL

## 2025-05-02 LAB
ALBUMIN SERPL BCP-MCNC: 2.4 G/DL (ref 3.4–5)
ALBUMIN SERPL BCP-MCNC: 2.8 G/DL (ref 3.4–5)
ANION GAP SERPL CALC-SCNC: 10 MMOL/L (ref 10–20)
ANION GAP SERPL CALC-SCNC: 8 MMOL/L (ref 10–20)
BASOPHILS # BLD AUTO: 0.02 X10*3/UL (ref 0–0.1)
BASOPHILS # BLD AUTO: 0.02 X10*3/UL (ref 0–0.1)
BASOPHILS NFR BLD AUTO: 0.1 %
BASOPHILS NFR BLD AUTO: 0.2 %
BUN SERPL-MCNC: 30 MG/DL (ref 6–23)
BUN SERPL-MCNC: 33 MG/DL (ref 6–23)
CA-I BLD-SCNC: 1.19 MMOL/L (ref 1.1–1.33)
CALCIUM SERPL-MCNC: 7.9 MG/DL (ref 8.6–10.6)
CALCIUM SERPL-MCNC: 8.5 MG/DL (ref 8.6–10.6)
CHLORIDE SERPL-SCNC: 109 MMOL/L (ref 98–107)
CHLORIDE SERPL-SCNC: 114 MMOL/L (ref 98–107)
CO2 SERPL-SCNC: 31 MMOL/L (ref 21–32)
CO2 SERPL-SCNC: 34 MMOL/L (ref 21–32)
CREAT SERPL-MCNC: 0.74 MG/DL (ref 0.5–1.05)
CREAT SERPL-MCNC: 0.81 MG/DL (ref 0.5–1.05)
EGFRCR SERPLBLD CKD-EPI 2021: 79 ML/MIN/1.73M*2
EGFRCR SERPLBLD CKD-EPI 2021: 88 ML/MIN/1.73M*2
EOSINOPHIL # BLD AUTO: 0.19 X10*3/UL (ref 0–0.7)
EOSINOPHIL # BLD AUTO: 0.22 X10*3/UL (ref 0–0.7)
EOSINOPHIL NFR BLD AUTO: 1.4 %
EOSINOPHIL NFR BLD AUTO: 1.8 %
ERYTHROCYTE [DISTWIDTH] IN BLOOD BY AUTOMATED COUNT: 14 % (ref 11.5–14.5)
ERYTHROCYTE [DISTWIDTH] IN BLOOD BY AUTOMATED COUNT: 14.3 % (ref 11.5–14.5)
GLUCOSE BLD MANUAL STRIP-MCNC: 125 MG/DL (ref 74–99)
GLUCOSE BLD MANUAL STRIP-MCNC: 126 MG/DL (ref 74–99)
GLUCOSE BLD MANUAL STRIP-MCNC: 134 MG/DL (ref 74–99)
GLUCOSE BLD MANUAL STRIP-MCNC: 152 MG/DL (ref 74–99)
GLUCOSE BLD MANUAL STRIP-MCNC: 152 MG/DL (ref 74–99)
GLUCOSE SERPL-MCNC: 108 MG/DL (ref 74–99)
GLUCOSE SERPL-MCNC: 153 MG/DL (ref 74–99)
HCT VFR BLD AUTO: 24.8 % (ref 36–46)
HCT VFR BLD AUTO: 27.3 % (ref 36–46)
HGB BLD-MCNC: 7.8 G/DL (ref 12–16)
HGB BLD-MCNC: 8.9 G/DL (ref 12–16)
IMM GRANULOCYTES # BLD AUTO: 0.11 X10*3/UL (ref 0–0.7)
IMM GRANULOCYTES # BLD AUTO: 0.14 X10*3/UL (ref 0–0.7)
IMM GRANULOCYTES NFR BLD AUTO: 0.9 % (ref 0–0.9)
IMM GRANULOCYTES NFR BLD AUTO: 1.1 % (ref 0–0.9)
LYMPHOCYTES # BLD AUTO: 1 X10*3/UL (ref 1.2–4.8)
LYMPHOCYTES # BLD AUTO: 1.31 X10*3/UL (ref 1.2–4.8)
LYMPHOCYTES NFR BLD AUTO: 12.6 %
LYMPHOCYTES NFR BLD AUTO: 6.6 %
MAGNESIUM SERPL-MCNC: 2.14 MG/DL (ref 1.6–2.4)
MCH RBC QN AUTO: 29.8 PG (ref 26–34)
MCH RBC QN AUTO: 30.2 PG (ref 26–34)
MCHC RBC AUTO-ENTMCNC: 31.5 G/DL (ref 32–36)
MCHC RBC AUTO-ENTMCNC: 32.6 G/DL (ref 32–36)
MCV RBC AUTO: 93 FL (ref 80–100)
MCV RBC AUTO: 95 FL (ref 80–100)
MONOCYTES # BLD AUTO: 0.75 X10*3/UL (ref 0.1–1)
MONOCYTES # BLD AUTO: 0.84 X10*3/UL (ref 0.1–1)
MONOCYTES NFR BLD AUTO: 4.9 %
MONOCYTES NFR BLD AUTO: 8.1 %
NEUTROPHILS # BLD AUTO: 13.12 X10*3/UL (ref 1.2–7.7)
NEUTROPHILS # BLD AUTO: 7.94 X10*3/UL (ref 1.2–7.7)
NEUTROPHILS NFR BLD AUTO: 76.2 %
NEUTROPHILS NFR BLD AUTO: 86.1 %
NRBC BLD-RTO: 0 /100 WBCS (ref 0–0)
NRBC BLD-RTO: 0 /100 WBCS (ref 0–0)
PHOSPHATE SERPL-MCNC: 2.9 MG/DL (ref 2.5–4.9)
PHOSPHATE SERPL-MCNC: 3.8 MG/DL (ref 2.5–4.9)
PLATELET # BLD AUTO: 269 X10*3/UL (ref 150–450)
PLATELET # BLD AUTO: 290 X10*3/UL (ref 150–450)
POTASSIUM SERPL-SCNC: 3.3 MMOL/L (ref 3.5–5.3)
POTASSIUM SERPL-SCNC: 3.4 MMOL/L (ref 3.5–5.3)
RBC # BLD AUTO: 2.62 X10*6/UL (ref 4–5.2)
RBC # BLD AUTO: 2.95 X10*6/UL (ref 4–5.2)
SODIUM SERPL-SCNC: 150 MMOL/L (ref 136–145)
WBC # BLD AUTO: 10.4 X10*3/UL (ref 4.4–11.3)
WBC # BLD AUTO: 15.3 X10*3/UL (ref 4.4–11.3)

## 2025-05-02 PROCEDURE — 2500000001 HC RX 250 WO HCPCS SELF ADMINISTERED DRUGS (ALT 637 FOR MEDICARE OP)

## 2025-05-02 PROCEDURE — 43246 EGD PLACE GASTROSTOMY TUBE: CPT | Performed by: STUDENT IN AN ORGANIZED HEALTH CARE EDUCATION/TRAINING PROGRAM

## 2025-05-02 PROCEDURE — 95716 VEEG EA 12-26HR CONT MNTR: CPT

## 2025-05-02 PROCEDURE — 99232 SBSQ HOSP IP/OBS MODERATE 35: CPT | Performed by: STUDENT IN AN ORGANIZED HEALTH CARE EDUCATION/TRAINING PROGRAM

## 2025-05-02 PROCEDURE — 2500000001 HC RX 250 WO HCPCS SELF ADMINISTERED DRUGS (ALT 637 FOR MEDICARE OP): Performed by: STUDENT IN AN ORGANIZED HEALTH CARE EDUCATION/TRAINING PROGRAM

## 2025-05-02 PROCEDURE — 84295 ASSAY OF SERUM SODIUM: CPT

## 2025-05-02 PROCEDURE — 2020000001 HC ICU ROOM DAILY

## 2025-05-02 PROCEDURE — 85025 COMPLETE CBC W/AUTO DIFF WBC: CPT

## 2025-05-02 PROCEDURE — 95720 EEG PHY/QHP EA INCR W/VEEG: CPT | Performed by: STUDENT IN AN ORGANIZED HEALTH CARE EDUCATION/TRAINING PROGRAM

## 2025-05-02 PROCEDURE — 37799 UNLISTED PX VASCULAR SURGERY: CPT

## 2025-05-02 PROCEDURE — 2500000004 HC RX 250 GENERAL PHARMACY W/ HCPCS (ALT 636 FOR OP/ED)

## 2025-05-02 PROCEDURE — 71045 X-RAY EXAM CHEST 1 VIEW: CPT | Performed by: RADIOLOGY

## 2025-05-02 PROCEDURE — 2500000005 HC RX 250 GENERAL PHARMACY W/O HCPCS: Performed by: STUDENT IN AN ORGANIZED HEALTH CARE EDUCATION/TRAINING PROGRAM

## 2025-05-02 PROCEDURE — 83735 ASSAY OF MAGNESIUM: CPT

## 2025-05-02 PROCEDURE — 2500000004 HC RX 250 GENERAL PHARMACY W/ HCPCS (ALT 636 FOR OP/ED): Mod: JZ

## 2025-05-02 PROCEDURE — 82330 ASSAY OF CALCIUM: CPT

## 2025-05-02 PROCEDURE — 94003 VENT MGMT INPAT SUBQ DAY: CPT

## 2025-05-02 PROCEDURE — 2500000004 HC RX 250 GENERAL PHARMACY W/ HCPCS (ALT 636 FOR OP/ED): Mod: JZ | Performed by: STUDENT IN AN ORGANIZED HEALTH CARE EDUCATION/TRAINING PROGRAM

## 2025-05-02 PROCEDURE — 2500000004 HC RX 250 GENERAL PHARMACY W/ HCPCS (ALT 636 FOR OP/ED): Mod: JZ | Performed by: REGISTERED NURSE

## 2025-05-02 PROCEDURE — 2500000002 HC RX 250 W HCPCS SELF ADMINISTERED DRUGS (ALT 637 FOR MEDICARE OP, ALT 636 FOR OP/ED): Performed by: REGISTERED NURSE

## 2025-05-02 PROCEDURE — 71045 X-RAY EXAM CHEST 1 VIEW: CPT

## 2025-05-02 PROCEDURE — 2500000004 HC RX 250 GENERAL PHARMACY W/ HCPCS (ALT 636 FOR OP/ED): Performed by: STUDENT IN AN ORGANIZED HEALTH CARE EDUCATION/TRAINING PROGRAM

## 2025-05-02 PROCEDURE — 2500000001 HC RX 250 WO HCPCS SELF ADMINISTERED DRUGS (ALT 637 FOR MEDICARE OP): Performed by: REGISTERED NURSE

## 2025-05-02 PROCEDURE — 82947 ASSAY GLUCOSE BLOOD QUANT: CPT

## 2025-05-02 RX ORDER — HYDRALAZINE HYDROCHLORIDE 20 MG/ML
20 INJECTION INTRAMUSCULAR; INTRAVENOUS EVERY 30 MIN PRN
Status: DISCONTINUED | OUTPATIENT
Start: 2025-05-02 | End: 2025-05-11 | Stop reason: HOSPADM

## 2025-05-02 RX ORDER — HEPARIN SODIUM 5000 [USP'U]/ML
5000 INJECTION, SOLUTION INTRAVENOUS; SUBCUTANEOUS EVERY 8 HOURS
Status: DISCONTINUED | OUTPATIENT
Start: 2025-05-03 | End: 2025-05-04

## 2025-05-02 RX ORDER — LABETALOL HYDROCHLORIDE 5 MG/ML
10 INJECTION, SOLUTION INTRAVENOUS EVERY 10 MIN PRN
Status: DISCONTINUED | OUTPATIENT
Start: 2025-05-02 | End: 2025-05-11 | Stop reason: HOSPADM

## 2025-05-02 RX ORDER — POTASSIUM CHLORIDE 1.5 G/1.58G
40 POWDER, FOR SOLUTION ORAL ONCE
Status: COMPLETED | OUTPATIENT
Start: 2025-05-02 | End: 2025-05-02

## 2025-05-02 RX ORDER — CEFAZOLIN SODIUM 2 G/100ML
2 INJECTION, SOLUTION INTRAVENOUS EVERY 8 HOURS
Status: DISCONTINUED | OUTPATIENT
Start: 2025-05-02 | End: 2025-05-03

## 2025-05-02 RX ORDER — AMLODIPINE BESYLATE 5 MG/1
5 TABLET ORAL ONCE
Status: COMPLETED | OUTPATIENT
Start: 2025-05-02 | End: 2025-05-02

## 2025-05-02 RX ORDER — AMLODIPINE BESYLATE 10 MG/1
10 TABLET ORAL DAILY
Status: DISCONTINUED | OUTPATIENT
Start: 2025-05-03 | End: 2025-05-06

## 2025-05-02 RX ORDER — POTASSIUM CHLORIDE 14.9 MG/ML
20 INJECTION INTRAVENOUS
Status: COMPLETED | OUTPATIENT
Start: 2025-05-02 | End: 2025-05-03

## 2025-05-02 RX ORDER — FUROSEMIDE 10 MG/ML
40 INJECTION INTRAMUSCULAR; INTRAVENOUS ONCE
Status: COMPLETED | OUTPATIENT
Start: 2025-05-02 | End: 2025-05-02

## 2025-05-02 RX ORDER — POTASSIUM CHLORIDE 14.9 MG/ML
20 INJECTION INTRAVENOUS
Status: COMPLETED | OUTPATIENT
Start: 2025-05-02 | End: 2025-05-02

## 2025-05-02 RX ORDER — NICARDIPINE HYDROCHLORIDE 0.2 MG/ML
2.5-15 INJECTION INTRAVENOUS CONTINUOUS
Status: DISCONTINUED | OUTPATIENT
Start: 2025-05-02 | End: 2025-05-03

## 2025-05-02 RX ADMIN — LISINOPRIL 20 MG: 20 TABLET ORAL at 08:20

## 2025-05-02 RX ADMIN — HYDRALAZINE HYDROCHLORIDE 75 MG: 50 TABLET ORAL at 01:59

## 2025-05-02 RX ADMIN — PANTOPRAZOLE SODIUM 40 MG: 40 INJECTION, POWDER, FOR SOLUTION INTRAVENOUS at 06:01

## 2025-05-02 RX ADMIN — HYDRALAZINE HYDROCHLORIDE 75 MG: 50 TABLET ORAL at 17:26

## 2025-05-02 RX ADMIN — NICARDIPINE HYDROCHLORIDE 7.5 MG/HR: 0.2 INJECTION, SOLUTION INTRAVENOUS at 01:05

## 2025-05-02 RX ADMIN — CARVEDILOL 6.25 MG: 12.5 TABLET, FILM COATED ORAL at 18:36

## 2025-05-02 RX ADMIN — HYDRALAZINE HYDROCHLORIDE 75 MG: 50 TABLET ORAL at 08:20

## 2025-05-02 RX ADMIN — POTASSIUM CHLORIDE 40 MEQ: 1.5 POWDER, FOR SOLUTION ORAL at 02:00

## 2025-05-02 RX ADMIN — Medication 75 MCG/HR: at 21:37

## 2025-05-02 RX ADMIN — HYDRALAZINE HYDROCHLORIDE 75 MG: 50 TABLET ORAL at 19:47

## 2025-05-02 RX ADMIN — NICARDIPINE HYDROCHLORIDE 10 MG/HR: 0.2 INJECTION, SOLUTION INTRAVENOUS at 06:25

## 2025-05-02 RX ADMIN — POTASSIUM CHLORIDE 20 MEQ: 14.9 INJECTION, SOLUTION INTRAVENOUS at 22:05

## 2025-05-02 RX ADMIN — AMLODIPINE BESYLATE 5 MG: 5 TABLET ORAL at 08:20

## 2025-05-02 RX ADMIN — INSULIN LISPRO 1 UNITS: 100 INJECTION, SOLUTION INTRAVENOUS; SUBCUTANEOUS at 11:28

## 2025-05-02 RX ADMIN — MAGNESIUM OXIDE TAB 400 MG (241.3 MG ELEMENTAL MG) 400 MG: 400 (241.3 MG) TAB at 08:20

## 2025-05-02 RX ADMIN — HYDRALAZINE HYDROCHLORIDE 20 MG: 20 INJECTION INTRAMUSCULAR; INTRAVENOUS at 21:47

## 2025-05-02 RX ADMIN — POTASSIUM CHLORIDE 20 MEQ: 14.9 INJECTION, SOLUTION INTRAVENOUS at 19:53

## 2025-05-02 RX ADMIN — NICARDIPINE HYDROCHLORIDE 12.5 MG/HR: 0.2 INJECTION, SOLUTION INTRAVENOUS at 10:30

## 2025-05-02 RX ADMIN — FUROSEMIDE 40 MG: 10 INJECTION INTRAMUSCULAR; INTRAVENOUS at 13:23

## 2025-05-02 RX ADMIN — CARVEDILOL 6.25 MG: 12.5 TABLET, FILM COATED ORAL at 06:00

## 2025-05-02 RX ADMIN — POLYETHYLENE GLYCOL 3350 17 G: 17 POWDER, FOR SOLUTION ORAL at 08:20

## 2025-05-02 RX ADMIN — CEFAZOLIN SODIUM 2 G: 2 INJECTION, SOLUTION INTRAVENOUS at 18:36

## 2025-05-02 RX ADMIN — SENNOSIDES AND DOCUSATE SODIUM 2 TABLET: 50; 8.6 TABLET ORAL at 20:02

## 2025-05-02 RX ADMIN — SENNOSIDES AND DOCUSATE SODIUM 2 TABLET: 50; 8.6 TABLET ORAL at 08:20

## 2025-05-02 RX ADMIN — BISACODYL 10 MG: 10 SUPPOSITORY RECTAL at 08:20

## 2025-05-02 RX ADMIN — ATORVASTATIN CALCIUM 10 MG: 10 TABLET, FILM COATED ORAL at 20:02

## 2025-05-02 RX ADMIN — POTASSIUM CHLORIDE 20 MEQ: 14.9 INJECTION, SOLUTION INTRAVENOUS at 11:28

## 2025-05-02 RX ADMIN — CEFAZOLIN SODIUM 2 G: 2 INJECTION, SOLUTION INTRAVENOUS at 10:16

## 2025-05-02 RX ADMIN — Medication 40 PERCENT: at 19:38

## 2025-05-02 RX ADMIN — AMLODIPINE BESYLATE 5 MG: 5 TABLET ORAL at 17:26

## 2025-05-02 RX ADMIN — Medication 75 MCG/HR: at 10:09

## 2025-05-02 RX ADMIN — POTASSIUM CHLORIDE 20 MEQ: 14.9 INJECTION, SOLUTION INTRAVENOUS at 12:35

## 2025-05-02 ASSESSMENT — COGNITIVE AND FUNCTIONAL STATUS - GENERAL
MOVING FROM LYING ON BACK TO SITTING ON SIDE OF FLAT BED WITH BEDRAILS: TOTAL
MOBILITY SCORE: 6
WALKING IN HOSPITAL ROOM: TOTAL
MOVING TO AND FROM BED TO CHAIR: TOTAL
TURNING FROM BACK TO SIDE WHILE IN FLAT BAD: TOTAL
CLIMB 3 TO 5 STEPS WITH RAILING: TOTAL
TOILETING: TOTAL
HELP NEEDED FOR BATHING: TOTAL
STANDING UP FROM CHAIR USING ARMS: TOTAL
DRESSING REGULAR UPPER BODY CLOTHING: TOTAL
PERSONAL GROOMING: TOTAL
DAILY ACTIVITIY SCORE: 6
EATING MEALS: TOTAL
DRESSING REGULAR LOWER BODY CLOTHING: TOTAL

## 2025-05-02 ASSESSMENT — PAIN - FUNCTIONAL ASSESSMENT

## 2025-05-02 ASSESSMENT — PAIN SCALES - GENERAL: PAINLEVEL_OUTOF10: 0 - NO PAIN

## 2025-05-02 NOTE — PROGRESS NOTES
ENT DAILY PROGRESS NOTE  Name: Muriel De Souza  MRN: [id number]  : 1955    Identification Statement  The patient is a 69 y.o. 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 Patient underwent resection in OR on 25. ENT consulted for tracheostomy evaluation secondary to prolonged ventilation needs. Patient underwent percutaneous tracheotomy and bronchoscopy with Dr. Dawson on 25; a 6-0 cuffed shiley trach was plated.     Subjective  Patient seen and examined. No acute events overnight. No concerns reported with trach. Patient remains in ICU on vent, Fio2 40% PEEP 10. Trach sutures remain in place, velcro ties appropriately tight.     Objective  Temp:  [36 °C (96.8 °F)-37.1 °C (98.8 °F)] 36 °C (96.8 °F)  Heart Rate:  [61-80] 64  Resp:  [0-15] 12  BP: (132-158)/(45-59) 143/58  Arterial Line BP 1: (110-159)/(42-54) 138/52  FiO2 (%):  [40 %-100 %] 40 %  Gen: ill appearing female patient, no acute distress, laying in bed  Resp: trach to vent, Fio2 40% PEEP 10  Head and Face: no masses or lesions  Oral Cavity: MMM,  Ears: Normal external ears   Nose: External nose midline, NGT  Neck: 6-0 cuffed shiley trach, balloon is inflated, trach sutures present, velcro ties appropriately tight. No bleeding surrounding trach. No palpable crepitus.         Intake/Output Summary (Last 24 hours) at 2025 1022  Last data filed at 2025 0800  Gross per 24 hour   Intake 1881.25 ml   Output 4172 ml   Net -2290.75 ml       Labs  Results for orders placed or performed during the hospital encounter of 25 (from the past 24 hours)   POCT GLUCOSE   Result Value Ref Range    POCT Glucose 174 (H) 74 - 99 mg/dL   Sodium   Result Value Ref Range    Sodium 151 (H) 136 - 145 mmol/L   POCT GLUCOSE   Result Value Ref Range    POCT Glucose 146 (H) 74 - 99 mg/dL   Sodium   Result Value Ref Range    Sodium 150 (H) 136 - 145 mmol/L   Renal function panel   Result  Value Ref Range    Glucose 149 (H) 74 - 99 mg/dL    Sodium 151 (H) 136 - 145 mmol/L    Potassium 3.1 (L) 3.5 - 5.3 mmol/L    Chloride 112 (H) 98 - 107 mmol/L    Bicarbonate 32 21 - 32 mmol/L    Anion Gap 10 10 - 20 mmol/L    Urea Nitrogen 31 (H) 6 - 23 mg/dL    Creatinine 0.84 0.50 - 1.05 mg/dL    eGFR 76 >60 mL/min/1.73m*2    Calcium 7.7 (L) 8.6 - 10.6 mg/dL    Phosphorus 3.4 2.5 - 4.9 mg/dL    Albumin 2.4 (L) 3.4 - 5.0 g/dL   CBC and Auto Differential   Result Value Ref Range    WBC 10.5 4.4 - 11.3 x10*3/uL    nRBC 0.0 0.0 - 0.0 /100 WBCs    RBC 2.83 (L) 4.00 - 5.20 x10*6/uL    Hemoglobin 8.1 (L) 12.0 - 16.0 g/dL    Hematocrit 25.2 (L) 36.0 - 46.0 %    MCV 89 80 - 100 fL    MCH 28.6 26.0 - 34.0 pg    MCHC 32.1 32.0 - 36.0 g/dL    RDW 14.0 11.5 - 14.5 %    Platelets 298 150 - 450 x10*3/uL    Neutrophils % 78.8 40.0 - 80.0 %    Immature Granulocytes %, Automated 1.1 (H) 0.0 - 0.9 %    Lymphocytes % 10.7 13.0 - 44.0 %    Monocytes % 8.3 2.0 - 10.0 %    Eosinophils % 1.0 0.0 - 6.0 %    Basophils % 0.1 0.0 - 2.0 %    Neutrophils Absolute 8.30 (H) 1.20 - 7.70 x10*3/uL    Immature Granulocytes Absolute, Automated 0.12 0.00 - 0.70 x10*3/uL    Lymphocytes Absolute 1.13 (L) 1.20 - 4.80 x10*3/uL    Monocytes Absolute 0.87 0.10 - 1.00 x10*3/uL    Eosinophils Absolute 0.11 0.00 - 0.70 x10*3/uL    Basophils Absolute 0.01 0.00 - 0.10 x10*3/uL   Glucose, CSF   Result Value Ref Range    Glucose,  (H) 40 - 70 mg/dL   Protein, CSF   Result Value Ref Range    Total Protein, CSF 27 15 - 45 mg/dL   CSF Culture/Smear    Specimen: Ventricular; Cerebrospinal Fluid   Result Value Ref Range    Gram Stain (1+) Rare Polymorphonuclear leukocytes     Gram Stain No organisms seen    CSF Cell Count   Result Value Ref Range    Tube Number, CSF Unspecified       Color, CSF Pink (A) Colorless    Clarity, CSF Clear Clear    Color, Supernatant CSF Colorless      WBC, CSF 5 1 - 5 /uL    RBC, CSF 1,000 (H) 0 - 5 /uL   CSF Differential   Result  Value Ref Range    Neutrophils %, Manual, CSF 63 (H) 0 - 5 %    Lymphocytes %, Manual, CSF 8 (L) 28 - 96 %    Mono/Macrophages %, Manual, CSF 29 16 - 56 %    Eosinophils %, Manual, CSF 0 Rare %    Basophils %, Manual, CSF 0 Not Established %    Immature Granulocytes %, Manual, CSF 0 Not Established %    Blasts %, Manual, CSF 0 Not Established %    Unclassified Cells %, Manual, CSF 0 Not Established %    Plasma Cells %, Manual, CSF 0 Not Established %    Total Cells Counted,     POCT GLUCOSE   Result Value Ref Range    POCT Glucose 166 (H) 74 - 99 mg/dL   Sodium   Result Value Ref Range    Sodium 151 (H) 136 - 145 mmol/L   POCT GLUCOSE   Result Value Ref Range    POCT Glucose 147 (H) 74 - 99 mg/dL   Sodium   Result Value Ref Range    Sodium 150 (H) 136 - 145 mmol/L   Renal function panel   Result Value Ref Range    Glucose 153 (H) 74 - 99 mg/dL    Sodium 150 (H) 136 - 145 mmol/L    Potassium 3.3 (L) 3.5 - 5.3 mmol/L    Chloride 114 (H) 98 - 107 mmol/L    Bicarbonate 31 21 - 32 mmol/L    Anion Gap 8 (L) 10 - 20 mmol/L    Urea Nitrogen 33 (H) 6 - 23 mg/dL    Creatinine 0.81 0.50 - 1.05 mg/dL    eGFR 79 >60 mL/min/1.73m*2    Calcium 7.9 (L) 8.6 - 10.6 mg/dL    Phosphorus 2.9 2.5 - 4.9 mg/dL    Albumin 2.4 (L) 3.4 - 5.0 g/dL   CBC and Auto Differential   Result Value Ref Range    WBC 10.4 4.4 - 11.3 x10*3/uL    nRBC 0.0 0.0 - 0.0 /100 WBCs    RBC 2.62 (L) 4.00 - 5.20 x10*6/uL    Hemoglobin 7.8 (L) 12.0 - 16.0 g/dL    Hematocrit 24.8 (L) 36.0 - 46.0 %    MCV 95 80 - 100 fL    MCH 29.8 26.0 - 34.0 pg    MCHC 31.5 (L) 32.0 - 36.0 g/dL    RDW 14.3 11.5 - 14.5 %    Platelets 269 150 - 450 x10*3/uL    Neutrophils % 76.2 40.0 - 80.0 %    Immature Granulocytes %, Automated 1.1 (H) 0.0 - 0.9 %    Lymphocytes % 12.6 13.0 - 44.0 %    Monocytes % 8.1 2.0 - 10.0 %    Eosinophils % 1.8 0.0 - 6.0 %    Basophils % 0.2 0.0 - 2.0 %    Neutrophils Absolute 7.94 (H) 1.20 - 7.70 x10*3/uL    Immature Granulocytes Absolute, Automated  0.11 0.00 - 0.70 x10*3/uL    Lymphocytes Absolute 1.31 1.20 - 4.80 x10*3/uL    Monocytes Absolute 0.84 0.10 - 1.00 x10*3/uL    Eosinophils Absolute 0.19 0.00 - 0.70 x10*3/uL    Basophils Absolute 0.02 0.00 - 0.10 x10*3/uL   Sodium   Result Value Ref Range    Sodium 150 (H) 136 - 145 mmol/L   POCT GLUCOSE   Result Value Ref Range    POCT Glucose 152 (H) 74 - 99 mg/dL       Assessment  Muriel De Souza is a 69 y.o. female who underwent percutaneous tracheotomy and bronchoscopy with Dr. Dawson on 5/1/25; a 6-0 cuffed shiley trach was plated. Patient seen and examined on POD 1 from [facility] trach and recovering well. No concerns reported post-operatively. She remains on vent, Fio2 40% PEEP 10.     Recommendations:  -continued care per primary team   -routine trach care per nursing  -extra trach and red bag at bedside in case of emergency  - ENT to cut sutures 5/6/25  - Okay to restart DVT PPx and AC if medically indicated   - Please page with any questions or concerns     Patient and plan discussed with staff attending, Dr Samir Ortiz PA-C  Otolaryngology- Head & Neck Surgery    ENT Consult pager: [phone]  Please page if urgent

## 2025-05-02 NOTE — CONSULTS
"Nutrition Follow Up Assessment:   Nutrition Assessment    Reason for Assessment: Provider consult order (Reconsult post-PEG placement)    5/1: trach placed  5/2: PEG placed    Trach currently to vent. Receiving Propofol @ 6.5ml/hr (provides ~171kcals in 24hrs)      Nutrition History:  Energy Intake: Fair 50-75 %  Food and Nutrient History: Prior to holding TF for PEG placement today, pt was receiving Pivot 1.5 @ goal of 30ml/hr; however, Pro Stat appears to have not been ordered.       Anthropometrics:  Height: 172.7 cm (5' 7.99\")   Weight: 108 kg (238 lb 1.6 oz)   BMI (Calculated): 36.21  IBW/kg (Dietitian Calculated): 63.6 kg    Weight Change %:  Significant Weight Gain: Fluid related    Nutrition Significant Labs:  CBC Trend:   Results from last 7 days   Lab Units 05/02/25 0002 05/01/25  1653 05/01/25  0330 04/30/25  1556   WBC AUTO x10*3/uL 10.4 10.5 11.0 11.6*   RBC AUTO x10*6/uL 2.62* 2.83* 2.91* 3.17*   HEMOGLOBIN g/dL 7.8* 8.1* 8.6* 9.1*   HEMATOCRIT % 24.8* 25.2* 27.0* 27.6*   MCV fL 95 89 93 87   PLATELETS AUTO x10*3/uL 269 298 320 324    , BMP Trend:   Results from last 7 days   Lab Units 05/02/25  0405 05/02/25  0002 05/01/25 2005 05/01/25  1653 05/01/25  0843 05/01/25  0330 04/30/25  2311 04/30/25  2131   GLUCOSE mg/dL  --  153*  --  149*  --  172*  --  174*   CALCIUM mg/dL  --  7.9*  --  7.7*  --  8.3*  --  8.0*   SODIUM mmol/L 150* 150*  150*   < > 151*  150*   < > 150*  150*   < > 150*   POTASSIUM mmol/L  --  3.3*  --  3.1*  --  3.1*  --  3.1*   CO2 mmol/L  --  31  --  32  --  33*  --  34*   CHLORIDE mmol/L  --  114*  --  112*  --  111*  --  112*   BUN mg/dL  --  33*  --  31*  --  37*  --  37*   CREATININE mg/dL  --  0.81  --  0.84  --  0.85  --  0.92    < > = values in this interval not displayed.    , BG POCT trend:   Results from last 7 days   Lab Units 05/02/25  1116 05/02/25  0418 05/01/25  2319 05/01/25  1954 05/01/25  1614   POCT GLUCOSE mg/dL 152* 152* 147* 166* 146*    , Renal Lab " Trend:   Results from last 7 days   Lab Units 05/02/25  0405 05/02/25  0002 05/01/25 2005 05/01/25  1653 05/01/25  0843 05/01/25 0330 04/30/25  2311 04/30/25  2131 04/30/25  1556   POTASSIUM mmol/L  --  3.3*  --  3.1*  --  3.1*  --  3.1* 3.4*   PHOSPHORUS mg/dL  --  2.9  --  3.4  --  4.1  --  4.3 4.0   SODIUM mmol/L 150* 150*  150*   < > 151*  150*   < > 150*  150*   < > 150* 151*  150*   MAGNESIUM mg/dL  --   --   --   --   --   --   --   --  2.02   EGFR mL/min/1.73m*2  --  79  --  76  --  75  --  68 80   BUN mg/dL  --  33*  --  31*  --  37*  --  37* 39*   CREATININE mg/dL  --  0.81  --  0.84  --  0.85  --  0.92 0.80    < > = values in this interval not displayed.        Nutrition Specific Medications:  Scheduled medications  Scheduled Medications[1]  Continuous medications  Continuous Medications[2]    I/O:   Last BM Date: 05/01/25; Stool Appearance: Unable to assess (05/01/25 1600)    Dietary Orders (From admission, onward)       Start     Ordered    05/02/25 0001  NPO Diet; Effective midnight  Diet effective midnight         05/01/25 1832                     Estimated Needs:   Total Energy Estimated Needs in 24 hours (kCal):  (9201-1687)  Method for Estimating Needs: comparing kcal/kg vs [place] as pt now >1 week and s/p trach/PEG  Total Protein Estimated Needs in 24 Hours (g): 100 g  Method for Estimating 24 Hour Protein Needs: ~1.5g/kg per adj wt  Total Fluid Estimated Needs in 24 Hours (mL):  (per team)        Nutrition Diagnosis   Malnutrition Diagnosis  Patient has Malnutrition Diagnosis: No    Nutrition Diagnosis  Patient has Nutrition Diagnosis: Yes  Diagnosis Status (1): Active  Nutrition Diagnosis 1: Increased nutrient needs  Related to (1): increased metabolic demand  As Evidenced by (1): s/p resection       Nutrition Interventions/Recommendations   Nutrition prescription for enteral nutrition    Nutrition Recommendations:  At this time it would be appropriate to change tube feed regimen to  standard formula:   Start Isosource 1.5 @ 20ml/hr x 12hrs, then increase  to goal of 40ml/hr.   Provide 1 packet Pro Stat AWC BID.   Additional water flushes per team   TF provides 733mls free H2O.    Nutrition Interventions/Goals:   Enteral Intake: Management of delivery rate of enteral nutrition  Goal: Isosource 1.5 @ goal + Pro Stat = 1640kcals and 99g pro       Nutrition Monitoring and Evaluation   Food/Nutrient Related History Monitoring  Monitoring and Evaluation Plan: Enteral and parenteral nutrition intake determination  Enteral and Parenteral Nutrition Intake Determination: Enteral nutrition intake - Tolerate TF at goal rate    Biochemical Data, Medical Tests and Procedures  Monitoring and Evaluation Plan: Electrolyte/renal panel, Glucose/endocrine profile  Electrolyte and Renal Panel: Electrolytes within normal limits  Glucose/Endocrine Profile: Glucose within normal limits - ICU (140-180 mg/dL)    Goal Status: New goal(s) identified    Time Spent (min): 30 minutes              [1] [START ON 5/3/2025] amLODIPine, 10 mg, oral, Daily  amLODIPine, 5 mg, nasogastric tube, Once  atorvastatin, 10 mg, oral, Nightly  bisacodyl, 10 mg, rectal, Daily  carvedilol, 6.25 mg, oral, q12h  ceFAZolin, 2 g, intravenous, q8h  [Held by provider] heparin (porcine), 5,000 Units, subcutaneous, q8h  hydrALAZINE, 75 mg, oral, q6h  insulin lispro, 0-5 Units, subcutaneous, q4h  lisinopril, 20 mg, oral, Daily  magnesium oxide, 400 mg, oral, Daily  pantoprazole, 40 mg, intravenous, Daily before breakfast  perflutren protein A microsphere, 0.5 mL, intravenous, Once in imaging  polyethylene glycol, 17 g, oral, Daily  sennosides-docusate sodium, 2 tablet, oral, BID  sulfur hexafluoride microsphr, 2 mL, intravenous, Once in imaging     [2] fentaNYL,  mcg/hr, Last Rate: 75 mcg/hr (05/02/25 1009)  niCARdipine, 2.5-15 mg/hr  propofol, 0-50 mcg/kg/min, Last Rate: 10 mcg/kg/min (05/01/25 2300)

## 2025-05-02 NOTE — PROGRESS NOTES
[facility]  NEUROSCIENCE INTENSIVE CARE UNIT  DAILY PROGRESS NOTE       Patient Name: Muriel De Souza   MRN: [id number]     Admit Date: 2025     : 1955 AGE: 69 y.o. GENDER: female        Subjective    Muriel De Souza is a 69 y.o. female with h/o HTN, HLD p/w HA, arrested in CT scanner at OSH, ROSC after 7 min CPR,  CTH w diffuse SAH and L cerebellar ICH, triventriculomegally, effaced 4th vents, CTA with L cerebellar AVM w venous varices s/p RF EVD and now admitted for further management.    Per chart review, patient had received bad news at home and then had sudden onset headache and vomiting. Found to have SAH at OSH with course complicated by cardiac arrest. She was intubated and sedated and transferred from [place], [place] to [facility].    Significant Events:  -  came in with L cerebellar AVM bleed with course complicated by cardiac arrest ROSC after 7 min CPR, uppon arrival to [facility] NSU, EVD drain placed, went to OR for decompression and AVM resection    Interval Events:   NAEON, Afebrile, hemodynamically stable with SBP 140s/150s with goal SBP < 140  NSGY BP goal still <140  Diuresed with Lasix 40 and 80 on  for worsening pleural effusions bilaterally.  -3.0 L / 24 hours  UOP 2.3 / 24 hours  Lisinopril 10 mg increased to 20 mg yesterday  Carvedilol 6.25 BID  Hydralazine PO 75mg q6  Nicardipine 0.25 mg/min  PRN labetalol and hydralazine  No desaturations overnight, FiO2 60 PEEP 12  R femoral line removed, replaced with subclavian central line  MRI done   Pt off fentanyl  Goals of care discussions with family ongoing        Objective   VITALS (24H):  Temp:  [36.2 °C (97.2 °F)-37.1 °C (98.8 °F)] 36.4 °C (97.5 °F)  Heart Rate:  [61-80] 64  Resp:  [0-16] 12  BP: (132-158)/(45-59) 143/58  Arterial Line BP 1: (110-159)/(42-54) 138/52  FiO2 (%):  [40 %-100 %] 40 %  INTAKE/OUTPUT:  Intake/Output Summary (Last 24 hours) at 2025 0638  Last data filed at 2025 0600  Gross per 24  hour   Intake 2085 ml   Output 4617 ml   Net -2532 ml     VENT SETTINGS:  Vent Mode: Volume control/assist control  FiO2 (%):  [40 %-100 %] 40 %  S RR:  [12] 12  S VT:  [400 mL] 400 mL  PEEP/CPAP (cm H2O):  [10 cm H20-12 cm H20] 10 cm H20  MAP (cm H2O):  [13-15] 14       PHYSICAL EXAM:  NEURO: Off sedation  - Eyes closed to nox stim  - pupils minimally reactive to light  - corneals negative  - no gag  - no cough reflex  - No response to pain in UE  - spontaneously flexes toes with light touch, not in response to commands  - Triple flexion in RLE to nox stim  - Ankle dorsiflexion in LLE to nox stim  - no increased tone  CV:  - RRR on telemetry, NSR  - Arterial line in place  RESP:  - tach/vent  :  - Brooks catheter in place  GI:  - Abdomen soft but somewhat distended  SKIN:  - Intact  - left arm swollen and tense to palpation, not pitting, non-erythematous    MEDICATIONS:  Scheduled: PRN: Continuous:   Scheduled Medications[1] PRN Medications[2] Continuous Medications[3]     IMAGING RESULTS:  CT head wo IV contrast   Final Result   * Diffuse subarachnoid hemorrhage with hydrocephalus   *Left cerebellar hematoma suggesting a likely source.        MACRO:   none        Signed by: Cruz Salgado 4/22/2025 11:59 AM   Dictation workstation:   [id number]      CT angio head and neck w and wo IV contrast   Final Result   * Diffuse subarachnoid hemorrhage with hydrocephalus as described   *Suspected vascular malformation in the left cerebellar hemisphere   with enlarged arterial and venous structures largely in the left   cerebellar hemisphere and residual partial opacification of a venous   aneurysm near the torcula. *Bilateral carotid stenosis as described        MACRO:   none        Signed by: Cruz Salgado 4/22/2025 12:07 PM   Dictation workstation:   [id number]             Assessment/Plan    Muriel De Souza is a 69 y.o. female with h/o HTN, HLD p/w HA, arrested in CT scanner at OSH, ROSC after 7 min CPR,  [facility] w  diffuse SAH and L cerebellar ICH, triventriculomegally, effaced 4th vents, CTA with L cerebellar AVM w venous varices s/p RF EVD as well as AVM resection on 4/22 and now admitted for further management.    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  #Intracranial hypertension  Assessment:  - First day 4/22 presented with SAH, L cerebellar ICH, and ventriculomegaly on CTH  - 4/22 CTA showing L cerebellar AVM  - 4/22 RF EVD for ICH  - 4/22 OR AVM resection  - MRI done 4/29   - Pt is neurologically stable  - Lisinopril 10 mg increased to 20 4/30  - Diuresed with 120 mg Lasix with -3.0 L net negative 4/30  - increase in dose of hydralazine to 75 mg q6    Plan:  - NSU  - Keppra  - EVD in place, maintained at 10  - BP goal: SBP < 140    --> PRN: Labetalol, Hydralazine, and Nicardipine   - IV nicardipine 0.25 mg/min  - PO hydralazine 75 q6  -amlodipine 5mg oral   - PO Carvedilol 6.25 BID  - PO Lisinopril 20 OD   - Discuss with family the possibility of  shunt and fistula treatment  - Consult for tracheostomy and PEG placement  - Neuro Checks: Q1H  - Sedation: Off  - Pain: PRN  - Nausea: ondansetron  - PT/OT/SLP    CARDIOVASCULAR:  #Cardiac arrest  #Lactic acidosis, improving  # elevated troponins, improved  Assessment:  - 7 min to ROSC  - Assess for ischemic and stress-induced cardiomyopathy   - Assess for arrhythmias following cardiac arrest   - Cards consult (4/23)  - Type 2 MI (demand ischemia), leading to troponin up-trend (4/23)  - Trop trending down, d/c'd 4/24  - ECHO 4/23:  CONCLUSIONS:   1. Left ventricular ejection fraction is normal, calculated by Adrian's biplane at 71%.   2. Spectral Doppler shows a Grade I (impaired relaxation pattern) of left ventricular diastolic filling with normal left atrial filling pressure.   3. There is normal right ventricular global systolic function.   4. The left atrium is mildly  dilated.   5. There is no evidence of a patent foramen ovale.   6. The inferior vena cava appears mildly dilated, on vent.  - Pt is hemodynamically stable, SBP goal < 140    Plan:  - Monitor on telemetry  - SBP goal < 140 (see above)    RESPIRATORY:  #Acute hypoxic respiratory failure  #Mechanical ventilation  #Bilateral plural effusions  #Pulmonary edema    Results from last 7 days   Lab Units 05/02/25 0405 05/02/25  0002 05/01/25 2005 05/01/25 1653 05/01/25 1223 05/01/25 0843 05/01/25 0330 04/30/25 2311 04/30/25 2131 04/30/25  1556   SODIUM mmol/L 150* 150*  150* 151* 151*  150* 151*   < > 150*  150*   < > 150* 151*  150*   POTASSIUM mmol/L  --  3.3*  --  3.1*  --   --  3.1*  --  3.1* 3.4*   CREATININE mg/dL  --  0.81  --  0.84  --   --  0.85  --  0.92 0.80   BUN mg/dL  --  33*  --  31*  --   --  37*  --  37* 39*    < > = values in this interval not displayed.     Assessment:  - Pt intubated for decreased LOC  - FiO2 increased from 40 to 60 on 4/30 AM  - CXR 4/30 demonstrated bilateral pleural effusions  - Diuresed with 120 mg Lasix with -3.0L net negative  - Repeat CXR 4/30 with persistent pleural effusions  - Continue to diurese 5/1    Plan:  - Continue mechanical ventilation for decreased LOC  - Monitor for increased secretions/desaturations  - Diurese with Lasix 5/1  - PiCCO line placement 5/1    RENAL/:  #Pre-renal azotemia, resolving  Assessment:  Results from last 72 hours   Lab Units 05/02/25 0405 05/02/25  0002 05/01/25 2005 05/01/25 1653 05/01/25 1223 05/01/25 0843 05/01/25 0330 04/30/25 2311 04/30/25 2131 04/30/25  1556   CREATININE mg/dL  --  0.81  --  0.84  --   --  0.85  --  0.92 0.80   BUN mg/dL  --  33*  --  31*  --   --  37*  --  37* 39*   SODIUM mmol/L 150* 150*  150* 151* 151*  150* 151*   < > 150*  150*   < > 150* 151*  150*    < > = values in this interval not displayed.   - UOP 2.3 / 24 hours 5/1  - BUN stable 5/1  - Cr stable 5/1  - Pt repleted with fluids  -  Resolving pre-renal azotemia    Plan:  - Monitor with daily RFP  - Maintain forbes catheter for: critically ill patient who need accurate urinary output measurements    FEN/GI:  #TIM  Results from last 72 hours   Lab Units 05/02/25 0405 05/02/25  0002 05/01/25 2005 05/01/25  1653 05/01/25  1223 05/01/25  0843 05/01/25  0330 04/30/25  2311 04/30/25  2131 04/30/25  1556   SODIUM mmol/L 150* 150*  150* 151* 151*  150* 151*   < > 150*  150*   < > 150* 151*  150*   POTASSIUM mmol/L  --  3.3*  --  3.1*  --   --  3.1*  --  3.1* 3.4*   PHOSPHORUS mg/dL  --  2.9  --  3.4  --   --  4.1  --  4.3 4.0    < > = values in this interval not displayed.   - Hypokalemia 5/1 de to 120 mg Lasix 4/30  - Sodium at goal 150-160  - Stable phos 5/1  - Will repeat Lasix 80 mg 5/1    Plan:  - Monitor and replace electrolytes per protocol  - Replete K+ as needed  - Sodium goal normo natremic per NSGY  - IVF: PRN  - Diet: Enteral feeding through NG  - Bowel Regimen: Docusate-Senna PRN and Miralax PRN    ENDOCRINE:  #TIM  Assessment:  Results from last 7 days   Lab Units 05/02/25 0418 05/02/25 0405 05/02/25 0002 05/01/25 2319 05/01/25 2005 05/01/25 1954 05/01/25 1653 05/01/25  1614 05/01/25  1223 05/01/25  1211 05/01/25  0843 05/01/25  0731   POCT GLUCOSE mg/dL 152*  --   --  147*  --  166*  --  146*  --  174*  --  169*   GLUCOSE mg/dL  --   --  153*  --   --   --  149*  --   --   --   --   --    SODIUM mmol/L  --  150* 150*  150*  --  151*  --  151*  150*  --  151*  --  151*  --     - HbA1C 5.5 on 4/22    Plan:  - Accuchecks & ISS PRN     HEMATOLOGY:  #Anemia, likely dilutional  Assessment:  - Baseline Hgb: 12.0  - Baseline Plts: 287  Results from last 7 days   Lab Units 05/02/25  0002 05/01/25  1653 05/01/25  0330   HEMOGLOBIN g/dL 7.8* 8.1* 8.6*   HEMATOCRIT % 24.8* 25.2* 27.0*   PLATELETS AUTO x10*3/uL 269 298 320     Plan:  - Continue to monitor with daily CBC and Coag panel  - maintain active type and screen as  needed    INFECTIOUS DISEASE:  #TIM  Assessment:  Results from last 7 days   Lab Units 25  0002 25  1653 25  0330   WBC AUTO x10*3/uL 10.4 10.5 11.0    - Temp (24hrs), Av.8 °C (98.2 °F), Min:36.2 °C (97.2 °F), Max:37.1 °C (98.8 °F)  - White count stable   - Pt afebrile  - Bcx no growth at 4 days ()  - Sputum cult  no predominant organism  - UA  unremarkable  - Negative pro calcitonin   - Vanc/Zosyn started  for desaturation and increased O2 requirement overnight  - Vanc/Zosyn stopped , low suspicion for infection given clean Bcx, no fevers, resolving leukocytosis, and negative pro calcitonin     Plan  - Continue to monitor for signs of infection     MUSCULOSKELETAL:  - No acute issues    SKIN:  #Swollen left arm  Assessment:   - LUE with persistent swelling since   - Non-pitting edema, non-erythematous  - POD6  - SQH started ()  - No DVT in LE ()  - No DVT in RUE ()  - Superficial thrombosis in basilic vein in LUE ()    Plan:   - Turns and skin care per NSU protocol    ACCESS:  - R Femoral  - R arterial  - L PIV    PROPHYLAXIS:  - DVT Ppx: SCDs, SQH  - GI: PPI    RESTRAINTS:  Not indicated/Patient does not meet criteria for restraints    Yessenia Jimenez MD  Neuroscience Intensive Care    I have reviewed and edited the information above and I agree with the assessment and plan as outlined by the medical student.  Yessenia Jimenez MD  Emergency Medicine, PGY-2    The patient is critically ill with acute encephalopathy, acquired hydrocephalus and acute respiratory insufficiency in the setting of ruptured cerebellar AVM  Remains in poor neurological condition  EVD management per neurosurgery  Cont keppra  Cont BP control with goal sbp<140  Tracheostomy in place, pulmonary edema improved: wean ventilator as tolerated  Anemia stable: Cont to trend, transfuse for Hb<7  Replete free water  Plan for PEG today     I have seen and examined the patient.  I  have reviewed the patient's laboratory, radiographic, and clinical data.  I have spent 30 minutes providing critical care for the patient.       VAMSHI Nuñez M.D.          [1] amLODIPine, 5 mg, oral, Daily  atorvastatin, 10 mg, oral, Nightly  bisacodyl, 10 mg, rectal, Daily  carvedilol, 6.25 mg, oral, q12h  [Held by provider] heparin (porcine), 5,000 Units, subcutaneous, q8h  hydrALAZINE, 75 mg, oral, q6h  insulin lispro, 0-5 Units, subcutaneous, q4h  lisinopril, 20 mg, oral, Daily  magnesium oxide, 400 mg, oral, Daily  pantoprazole, 40 mg, intravenous, Daily before breakfast  perflutren protein A microsphere, 0.5 mL, intravenous, Once in imaging  polyethylene glycol, 17 g, oral, Daily  sennosides-docusate sodium, 2 tablet, oral, BID  sulfur hexafluoride microsphr, 2 mL, intravenous, Once in imaging     [2] PRN medications: albuterol, calcium gluconate, calcium gluconate, dextrose, dextrose, fentaNYL, glucagon, glucagon, hydrALAZINE, labetaloL, magnesium sulfate, magnesium sulfate, oxygen, potassium chloride CR **OR** potassium chloride, potassium chloride CR **OR** potassium chloride, propofol  [3] fentaNYL,  mcg/hr, Last Rate: 75 mcg/hr (05/01/25 1957)  niCARdipine, 2.5-15 mg/hr, Last Rate: 10 mg/hr (05/02/25 0625)  propofol, 0-50 mcg/kg/min, Last Rate: 10 mcg/kg/min (05/01/25 2300)

## 2025-05-02 NOTE — PROGRESS NOTES
Occupational Therapy                 Therapy Communication Note    Patient Name: Muriel De Souza  MRN: [id number]  Department: [facility]  Room: 07/07-A  Today's Date: 5/2/2025     Discipline: Occupational Therapy    OT Missed Visit: Yes     Missed Visit Reason: Missed Visit Reason:  (Pt does not meet criteria for skilled OT intervention. Will d/c OT orders at this time. To be re-consulted if statuc improves.)    Missed Time: Attempt 0851    Comment:

## 2025-05-02 NOTE — PROGRESS NOTES
Communication Note    Patient Name: Muriel De Souza  MRN: [id number]  Today's Date: 5/2/2025   Room: 07/07-A    Discipline: Physical Therapy      PT Missed Visit:  (No)  Missed Visit Reason:  (Pt does not meet criteria for skilled PT intervention. Per RN report and primary team notes: Off sedation, pt not following commands, pupils minimally reactive to light, corneals negative, no gag reflex. Please re-consult if PT needs arise in the future.)      05/02/25 at 8:56 AM   Reshma Reese, PT   Rehab Office: [phone]

## 2025-05-02 NOTE — PROGRESS NOTES
"Muriel De Souza is a 69 y.o. female on day 10 of admission presenting with AVM (arteriovenous malformation) ([facility]).    Subjective   No events overnight    Objective     Physical Exam  Trach vented   ECNS  OU2R  -cough/gag  BUE flaccid  BLE TF  EVD in place    Last Recorded Vitals  Blood pressure 141/59, pulse 63, temperature 36.4 °C (97.5 °F), temperature source Temporal, resp. rate 12, height 1.727 m (5' 7.99\"), weight 108 kg (238 lb 1.6 oz), SpO2 97%.  Intake/Output last 3 Shifts:  I/O last 3 completed shifts:  In: 5159.3 (47.8 mL/kg) [I.V.:2579.3 (23.9 mL/kg); NG/GT:2380; IV Piggyback:200]  Out: 9473 (87.7 mL/kg) [Urine:9125 (2.3 mL/kg/hr); Drains:348]  Weight: 108 kg     Relevant Results  Results from last 72 hours   Lab Units 05/02/25  0002 05/01/25  2005 05/01/25  1653 04/30/25  2131 04/30/25  1556 04/29/25  1248 04/29/25  0839   GLUCOSE mg/dL 153*  --  149*   < > 177*   < >  --    SODIUM mmol/L 150*  150* 151* 151*  150*   < > 151*  150*   < > 152*   POTASSIUM mmol/L 3.3*  --  3.1*   < > 3.4*   < >  --    CHLORIDE mmol/L 114*  --  112*   < > 113*   < >  --    CO2 mmol/L 31  --  32   < > 29   < >  --    ANION GAP mmol/L 8*  --  10   < > 12   < >  --    BUN mg/dL 33*  --  31*   < > 39*   < >  --    CREATININE mg/dL 0.81  --  0.84   < > 0.80   < >  --    EGFR mL/min/1.73m*2 79  --  76   < > 80   < >  --    CALCIUM mg/dL 7.9*  --  7.7*   < > 8.2*   < >  --    PHOSPHORUS mg/dL 2.9  --  3.4   < > 4.0   < >  --    ALBUMIN g/dL 2.4*  --  2.4*   < > 2.7*   < >  --    MAGNESIUM mg/dL  --   --   --   --  2.02  --   --    POCT CALCIUM IONIZED (MMOL/L) IN BLOOD mmol/L  --   --   --   --   --   --  1.18    < > = values in this interval not displayed.      Results from last 72 hours   Lab Units 05/02/25  0002 05/01/25  1653   WBC AUTO x10*3/uL 10.4 10.5   NRBC AUTO /100 WBCs 0.0 0.0   RBC AUTO x10*6/uL 2.62* 2.83*   HEMOGLOBIN g/dL 7.8* 8.1*   HEMATOCRIT % 24.8* 25.2*   MCV fL 95 89   MCH pg 29.8 28.6   MCHC g/dL " 31.5* 32.1   RDW % 14.3 14.0   PLATELETS AUTO x10*3/uL 269 298        Assessment & Plan  AVM (arteriovenous malformation) ([facility])    Muriel De Souza is a 69 y.o. 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, s/p 1u Plt and DDAVP, s/p RF EVD (OP 18), 4/22 s/p SOC/C1 lami for AVM resection, CTH POC, stable vents, 4/22 TEG R low s/p 1u FFP, 4/23 TTE EF 71%, 4/23 s/p angio tentorial dural AV fistual w direct cortical venous drainage fed by b/l MMA, b/l tentorial arteries, incidental 2mm R pcomm aneurysm, 4/24 CTH slight decr vents  4/29 MRI midbrain and b/l cerebellar hemisphere diffusion hits    NSU  EVD at 10, monitor output and ICPs  Cont GOC re: shunt and dAVF treatment  PEG today w GI  Na goal 150-160, monitor w/ q6 Na  SBP goal <140  Con't hydral, lisinopril, coreg, may increase  Wean cardene as able  Final path (in process)  SCD, SQH    Mayra Dumont MD

## 2025-05-02 NOTE — PROGRESS NOTES
"[facility]  [facility]  CONSULT FOLLOW-UP  May 2, 2025     Reason For Consult: PEG placement    History Of Present Illness  Muriel De Souza is a 69 y.o. female with a past medical history of HTN and HLD admitted on 4/22/2025 with intracranial hemorrhage with persistent dysphagia. GI is consulted for PEG placement.     SUBJECTIVE  PEG placed this morning as below. On evening check, bumper loosened to 5.5 cm.    OBJECTIVE  Last Recorded Vitals  Blood pressure 133/52, pulse 58, temperature 36.2 °C (97.2 °F), temperature source Temporal, resp. rate 12, height 1.727 m (5' 7.99\"), weight 108 kg (238 lb 1.6 oz), SpO2 93%.      Intake/Output Summary (Last 24 hours) at 5/2/2025 1225  Last data filed at 5/2/2025 0800  Gross per 24 hour   Intake 1597.05 ml   Output 3153 ml   Net -1555.95 ml     Physical Exam  General: ill appearing  HEENT: moist MM  Respiratory: nonlabored breathing on trach  Cardiovascular: Regular rate and rhythm  Abdomen: Obese, soft, PEG in place, bumper at 5.5 cm, loosely touching skin and rotates freely  Extremities: no edema  Neuro: sedated    Medications  Current Medications[1]    Labs  Lab Results   Component Value Date    WBC 10.4 05/02/2025    HGB 7.8 (L) 05/02/2025    HCT 24.8 (L) 05/02/2025    MCV 95 05/02/2025     05/02/2025     Lab Results   Component Value Date    GLUCOSE 153 (H) 05/02/2025    CALCIUM 7.9 (L) 05/02/2025     (H) 05/02/2025    K 3.3 (L) 05/02/2025    CO2 31 05/02/2025     (H) 05/02/2025    BUN 33 (H) 05/02/2025    CREATININE 0.81 05/02/2025     Lab Results   Component Value Date     (H) 04/23/2025     (H) 04/23/2025    ALKPHOS 63 04/23/2025    BILITOT 0.3 04/23/2025     Lab Results   Component Value Date    INR 1.2 (H) 04/22/2025    INR 1.1 04/22/2025    PROTIME 13.6 (H) 04/22/2025    PROTIME 11.7 04/22/2025     Imaging: no recent abdominal imaging available for review    GI " Procedures:  Esophagogastroduodenoscopy (EGD) w PEG Tube Placement 05/02/2025 (Preliminary)  The esophagus appeared normal.  Food debris in the body of the stomach  The duodenum appeared normal.  A [employer] PEG tube measuring 20 Fr was successfully placed in the body of the stomach via the pull technique after the site was identified via transillumination and needle passed through abdominal wall; distance from external bolster to external end of tube: 5 cm; scope reinserted and tube rotated freely to confirm placement. Spinal needle was needed given large body habitus.    ASSESSMENT/PLAN:  Muriel De Souza is a 69 y.o. female presenting with ICH due to AVM. GI consulted for PEG placement.  PEG tube placed successfully with bumper at 5 cm.    Recommendations:  - Do not use tube for 3 hours after placement.  - Okay to use the tube for everything (water, medications and tube feeds) 3 hours after placement.  - Change dressing once per day, dry dressing only, and change dressing on top of bumper only (not between bumper and skin).  - Check site for bleeding q 4 hrs   Clean site with soap and water daily and dry thoroughly.  - Make sure to flush the PEG with water BID to keep it patent  - If the PEG tube is accidentally removed at any time the patient should present immediately to the emergency department  - Contact the team if there are any post-placement concerns.    Patient was seen and discussed with Dr. Roberts.    Thank you for this consult. Gastroenterology will sign off.  -During weekday hours of 7am-5pm please do not hesitate to contact me on [employer] Chat or page [phone] if there are any further questions between the weekday hours of 7 AM - 5 PM.   -After hours, on weekends, and on holidays, please [place] [phone]. Thank you.      Siobhan Wright MD  Gastroenterology and Hepatology Fellow  [facility]          [1]   Current Facility-Administered Medications:     albuterol  2.5 mg /3 mL (0.083 %) nebulizer solution 2.5 mg, 2.5 mg, nebulization, q4h PRN, Jace Hernandez MD, 2.5 mg at 04/25/25 0914    [START ON 5/3/2025] amLODIPine (Norvasc) tablet 10 mg, 10 mg, oral, Daily, Yessenia Jimenez MD    amLODIPine (Norvasc) tablet 5 mg, 5 mg, nasogastric tube, Once, Dagoberto Wolfe MD    atorvastatin (Lipitor) tablet 10 mg, 10 mg, oral, Nightly, Mayra Dumont MD, 10 mg at 05/01/25 2000    bisacodyl (Dulcolax) suppository 10 mg, 10 mg, rectal, Daily, Jian Ellington MD PhD, 10 mg at 05/02/25 0820    calcium gluconate 1 g in sodium chloride (iso) IV 50 mL, 1 g, intravenous, q6h PRN, El James APRN-CNP    calcium gluconate 2 g in sodium chloride (iso)  mL, 2 g, intravenous, q6h PRN, El James APRN-CNP    carvedilol (Coreg) tablet 6.25 mg, 6.25 mg, oral, q12h, Yessenia Jimenez MD, 6.25 mg at 05/02/25 0600    ceFAZolin (Ancef) 2 g in dextrose (iso)  mL, 2 g, intravenous, q8h, Yessenia Jimenez MD, Stopped at 05/02/25 1115    dextrose 50 % injection 12.5 g, 12.5 g, intravenous, q15 min PRN, El Dunbaris APRN-CNP    dextrose 50 % injection 25 g, 25 g, intravenous, q15 min PRN, RASHEED Ribeiro-CNP    fentanyl (Sublimaze) 10 mcg/mL in sodium chloride 0.9% infusion,  mcg/hr, intravenous, Continuous, Mayra Dumont MD, Last Rate: 7.5 mL/hr at 05/02/25 1009, 75 mcg/hr at 05/02/25 1009    fentaNYL bolus from bag 25 mcg, 25 mcg, intravenous, q10 min PRN, Yessenia Jimenez MD    glucagon (Glucagen) injection 1 mg, 1 mg, intramuscular, q15 min PRN, DEREK Ribeiro    glucagon (Glucagen) injection 1 mg, 1 mg, intramuscular, q15 min PRN, DEREK Ribeiro    [Held by provider] heparin (porcine) injection 5,000 Units, 5,000 Units, subcutaneous, q8h, Joel Tsai MD, 5,000 Units at 05/01/25 1653    hydrALAZINE (Apresoline) injection 10 mg, 10 mg, intravenous, q20 min PRN, Rudolph Álvarez MD, 10 mg at 05/01/25 1110    hydrALAZINE (Apresoline) tablet 75  mg, 75 mg, oral, q6h, Jace Hernandez MD, 75 mg at 05/02/25 0820    insulin lispro injection 0-5 Units, 0-5 Units, subcutaneous, q4h, DEREK Ribeiro, 1 Units at 05/02/25 1128    labetaloL (Normodyne,Trandate) injection 10 mg, 10 mg, intravenous, q10 min PRN, Rudolph Álvarez MD, 10 mg at 04/30/25 2328    lisinopril tablet 20 mg, 20 mg, oral, Daily, Jian Ellington MD PhD, 20 mg at 05/02/25 0820    magnesium oxide (Mag-Ox) tablet 400 mg, 400 mg, oral, Daily, Jian Ellington MD PhD, 400 mg at 05/02/25 0820    magnesium sulfate 2 g in sterile water for injection 50 mL, 2 g, intravenous, q6h PRN, RASHEED Ribeiro-CNP    magnesium sulfate 4 g in sterile water for injection 100 mL, 4 g, intravenous, q6h PRN, RASHEED Ribeiro-CNP    niCARdipine (Cardene) 40 mg in sodium chloride 200 mL (0.2 mg/mL) infusion (premix), 2.5-15 mg/hr, intravenous, Continuous, Yessenia Jimenez MD    oxygen (O2) therapy, , inhalation, Continuous PRN - O2/gases, Jace Hernandez MD, 40 percent at 05/01/25 0757    pantoprazole (Protonix) injection 40 mg, 40 mg, intravenous, Daily before breakfast, Yessenia Jimenez MD, 40 mg at 05/02/25 0601    perflutren protein A microsphere (Optison) injection 0.5 mL, 0.5 mL, intravenous, Once in imaging, Jace Hernandez MD    polyethylene glycol (Glycolax, Miralax) packet 17 g, 17 g, oral, Daily, Rudolph Álvarez MD, 17 g at 05/02/25 0820    potassium chloride CR (Klor-Con M20) ER tablet 20 mEq, 20 mEq, oral, q6h PRN **OR** potassium chloride (Klor-Con) packet 20 mEq, 20 mEq, orogastric tube, q6h PRN, El James, APRN-CNP    potassium chloride CR (Klor-Con M20) ER tablet 40 mEq, 40 mEq, oral, q6h PRN **OR** potassium chloride (Klor-Con) packet 40 mEq, 40 mEq, orogastric tube, q6h PRN, Nektarios Manjinderis, APRN-CNP    propofol (Diprivan) bolus from bag 20 mg, 20 mg, intravenous, Once PRN, Yessenia Jimenez MD    propofol (Diprivan) infusion, 0-50 mcg/kg/min, intravenous, Continuous, Yessenia  MD Tony, Last Rate: 6.48 mL/hr at 05/01/25 2300, 10 mcg/kg/min at 05/01/25 2300    sennosides-docusate sodium (Maribel-Colace) 8.6-50 mg per tablet 2 tablet, 2 tablet, oral, BID, Rudolph Álvarez MD, 2 tablet at 05/02/25 0820    sulfur hexafluoride microsphr (Lumason) injection 24.28 mg, 2 mL, intravenous, Once in imaging, aJce Hernandez MD

## 2025-05-03 PROBLEM — I60.9 SUBARACHNOID HEMORRHAGE (MULTI): Status: ACTIVE | Noted: 2025-04-22

## 2025-05-03 LAB
ALBUMIN SERPL BCP-MCNC: 2.7 G/DL (ref 3.4–5)
ALBUMIN SERPL BCP-MCNC: 2.8 G/DL (ref 3.4–5)
ANION GAP BLDA CALCULATED.4IONS-SCNC: 6 MMO/L (ref 10–25)
ANION GAP SERPL CALC-SCNC: 11 MMOL/L (ref 10–20)
ANION GAP SERPL CALC-SCNC: 12 MMOL/L (ref 10–20)
BASE EXCESS BLDA CALC-SCNC: 11.1 MMOL/L (ref -2–3)
BASOPHILS # BLD AUTO: 0.03 X10*3/UL (ref 0–0.1)
BASOPHILS # BLD AUTO: 0.03 X10*3/UL (ref 0–0.1)
BASOPHILS NFR BLD AUTO: 0.2 %
BASOPHILS NFR BLD AUTO: 0.2 %
BODY TEMPERATURE: ABNORMAL
BUN SERPL-MCNC: 27 MG/DL (ref 6–23)
BUN SERPL-MCNC: 28 MG/DL (ref 6–23)
CA-I BLDA-SCNC: 1.12 MMOL/L (ref 1.1–1.33)
CALCIUM SERPL-MCNC: 8.2 MG/DL (ref 8.6–10.6)
CALCIUM SERPL-MCNC: 8.5 MG/DL (ref 8.6–10.6)
CHLORIDE BLDA-SCNC: 105 MMOL/L (ref 98–107)
CHLORIDE SERPL-SCNC: 101 MMOL/L (ref 98–107)
CHLORIDE SERPL-SCNC: 105 MMOL/L (ref 98–107)
CO2 SERPL-SCNC: 34 MMOL/L (ref 21–32)
CO2 SERPL-SCNC: 35 MMOL/L (ref 21–32)
CREAT SERPL-MCNC: 0.82 MG/DL (ref 0.5–1.05)
CREAT SERPL-MCNC: 0.88 MG/DL (ref 0.5–1.05)
EGFRCR SERPLBLD CKD-EPI 2021: 72 ML/MIN/1.73M*2
EGFRCR SERPLBLD CKD-EPI 2021: 78 ML/MIN/1.73M*2
EOSINOPHIL # BLD AUTO: 0.16 X10*3/UL (ref 0–0.7)
EOSINOPHIL # BLD AUTO: 0.17 X10*3/UL (ref 0–0.7)
EOSINOPHIL NFR BLD AUTO: 1 %
EOSINOPHIL NFR BLD AUTO: 1.1 %
ERYTHROCYTE [DISTWIDTH] IN BLOOD BY AUTOMATED COUNT: 14 % (ref 11.5–14.5)
ERYTHROCYTE [DISTWIDTH] IN BLOOD BY AUTOMATED COUNT: 14.3 % (ref 11.5–14.5)
GLUCOSE BLD MANUAL STRIP-MCNC: 144 MG/DL (ref 74–99)
GLUCOSE BLD MANUAL STRIP-MCNC: 148 MG/DL (ref 74–99)
GLUCOSE BLD MANUAL STRIP-MCNC: 149 MG/DL (ref 74–99)
GLUCOSE BLD MANUAL STRIP-MCNC: 159 MG/DL (ref 74–99)
GLUCOSE BLD MANUAL STRIP-MCNC: 159 MG/DL (ref 74–99)
GLUCOSE BLD MANUAL STRIP-MCNC: 178 MG/DL (ref 74–99)
GLUCOSE BLDA-MCNC: 151 MG/DL (ref 74–99)
GLUCOSE SERPL-MCNC: 157 MG/DL (ref 74–99)
GLUCOSE SERPL-MCNC: 179 MG/DL (ref 74–99)
HCO3 BLDA-SCNC: 35.1 MMOL/L (ref 22–26)
HCT VFR BLD AUTO: 23.3 % (ref 36–46)
HCT VFR BLD AUTO: 28.5 % (ref 36–46)
HCT VFR BLD EST: 34 % (ref 36–46)
HGB BLD-MCNC: 7.4 G/DL (ref 12–16)
HGB BLD-MCNC: 9 G/DL (ref 12–16)
HGB BLDA-MCNC: 11.2 G/DL (ref 12–16)
IMM GRANULOCYTES # BLD AUTO: 0.18 X10*3/UL (ref 0–0.7)
IMM GRANULOCYTES # BLD AUTO: 0.2 X10*3/UL (ref 0–0.7)
IMM GRANULOCYTES NFR BLD AUTO: 1.2 % (ref 0–0.9)
IMM GRANULOCYTES NFR BLD AUTO: 1.2 % (ref 0–0.9)
INHALED O2 CONCENTRATION: 30 %
LACTATE BLDA-SCNC: 0.4 MMOL/L (ref 0.4–2)
LYMPHOCYTES # BLD AUTO: 0.96 X10*3/UL (ref 1.2–4.8)
LYMPHOCYTES # BLD AUTO: 1.19 X10*3/UL (ref 1.2–4.8)
LYMPHOCYTES NFR BLD AUTO: 6.4 %
LYMPHOCYTES NFR BLD AUTO: 7.4 %
MCH RBC QN AUTO: 29.3 PG (ref 26–34)
MCH RBC QN AUTO: 29.8 PG (ref 26–34)
MCHC RBC AUTO-ENTMCNC: 31.6 G/DL (ref 32–36)
MCHC RBC AUTO-ENTMCNC: 31.8 G/DL (ref 32–36)
MCV RBC AUTO: 93 FL (ref 80–100)
MCV RBC AUTO: 94 FL (ref 80–100)
MONOCYTES # BLD AUTO: 0.74 X10*3/UL (ref 0.1–1)
MONOCYTES # BLD AUTO: 0.8 X10*3/UL (ref 0.1–1)
MONOCYTES NFR BLD AUTO: 4.6 %
MONOCYTES NFR BLD AUTO: 5.4 %
NEUTROPHILS # BLD AUTO: 12.8 X10*3/UL (ref 1.2–7.7)
NEUTROPHILS # BLD AUTO: 13.75 X10*3/UL (ref 1.2–7.7)
NEUTROPHILS NFR BLD AUTO: 85.6 %
NEUTROPHILS NFR BLD AUTO: 85.7 %
NRBC BLD-RTO: 0 /100 WBCS (ref 0–0)
NRBC BLD-RTO: 0 /100 WBCS (ref 0–0)
OXYHGB MFR BLDA: 97.1 % (ref 94–98)
PCO2 BLDA: 43 MM HG (ref 38–42)
PH BLDA: 7.52 PH (ref 7.38–7.42)
PHOSPHATE SERPL-MCNC: 3.9 MG/DL (ref 2.5–4.9)
PHOSPHATE SERPL-MCNC: 4.1 MG/DL (ref 2.5–4.9)
PLATELET # BLD AUTO: 308 X10*3/UL (ref 150–450)
PLATELET # BLD AUTO: 326 X10*3/UL (ref 150–450)
PO2 BLDA: 98 MM HG (ref 85–95)
POTASSIUM BLDA-SCNC: 3.5 MMOL/L (ref 3.5–5.3)
POTASSIUM SERPL-SCNC: 3.5 MMOL/L (ref 3.5–5.3)
POTASSIUM SERPL-SCNC: 3.9 MMOL/L (ref 3.5–5.3)
RBC # BLD AUTO: 2.48 X10*6/UL (ref 4–5.2)
RBC # BLD AUTO: 3.07 X10*6/UL (ref 4–5.2)
SAO2 % BLDA: 99 % (ref 94–100)
SODIUM BLDA-SCNC: 143 MMOL/L (ref 136–145)
SODIUM SERPL-SCNC: 144 MMOL/L (ref 136–145)
SODIUM SERPL-SCNC: 146 MMOL/L (ref 136–145)
WBC # BLD AUTO: 14.9 X10*3/UL (ref 4.4–11.3)
WBC # BLD AUTO: 16.1 X10*3/UL (ref 4.4–11.3)

## 2025-05-03 PROCEDURE — 2500000002 HC RX 250 W HCPCS SELF ADMINISTERED DRUGS (ALT 637 FOR MEDICARE OP, ALT 636 FOR OP/ED): Performed by: REGISTERED NURSE

## 2025-05-03 PROCEDURE — 2500000001 HC RX 250 WO HCPCS SELF ADMINISTERED DRUGS (ALT 637 FOR MEDICARE OP): Performed by: STUDENT IN AN ORGANIZED HEALTH CARE EDUCATION/TRAINING PROGRAM

## 2025-05-03 PROCEDURE — 2500000004 HC RX 250 GENERAL PHARMACY W/ HCPCS (ALT 636 FOR OP/ED): Performed by: STUDENT IN AN ORGANIZED HEALTH CARE EDUCATION/TRAINING PROGRAM

## 2025-05-03 PROCEDURE — 2500000004 HC RX 250 GENERAL PHARMACY W/ HCPCS (ALT 636 FOR OP/ED): Mod: JZ

## 2025-05-03 PROCEDURE — 2500000004 HC RX 250 GENERAL PHARMACY W/ HCPCS (ALT 636 FOR OP/ED)

## 2025-05-03 PROCEDURE — 82947 ASSAY GLUCOSE BLOOD QUANT: CPT

## 2025-05-03 PROCEDURE — 84295 ASSAY OF SERUM SODIUM: CPT

## 2025-05-03 PROCEDURE — 95716 VEEG EA 12-26HR CONT MNTR: CPT

## 2025-05-03 PROCEDURE — 2500000001 HC RX 250 WO HCPCS SELF ADMINISTERED DRUGS (ALT 637 FOR MEDICARE OP)

## 2025-05-03 PROCEDURE — 85025 COMPLETE CBC W/AUTO DIFF WBC: CPT

## 2025-05-03 PROCEDURE — 95720 EEG PHY/QHP EA INCR W/VEEG: CPT | Performed by: STUDENT IN AN ORGANIZED HEALTH CARE EDUCATION/TRAINING PROGRAM

## 2025-05-03 PROCEDURE — 84295 ASSAY OF SERUM SODIUM: CPT | Performed by: STUDENT IN AN ORGANIZED HEALTH CARE EDUCATION/TRAINING PROGRAM

## 2025-05-03 PROCEDURE — 94003 VENT MGMT INPAT SUBQ DAY: CPT

## 2025-05-03 PROCEDURE — 2500000004 HC RX 250 GENERAL PHARMACY W/ HCPCS (ALT 636 FOR OP/ED): Mod: JZ | Performed by: REGISTERED NURSE

## 2025-05-03 PROCEDURE — 84100 ASSAY OF PHOSPHORUS: CPT

## 2025-05-03 PROCEDURE — 37799 UNLISTED PX VASCULAR SURGERY: CPT

## 2025-05-03 PROCEDURE — 2020000001 HC ICU ROOM DAILY

## 2025-05-03 RX ORDER — POTASSIUM CHLORIDE 14.9 MG/ML
20 INJECTION INTRAVENOUS
Status: COMPLETED | OUTPATIENT
Start: 2025-05-03 | End: 2025-05-03

## 2025-05-03 RX ORDER — HYDROMORPHONE HYDROCHLORIDE 1 MG/ML
0.2 INJECTION, SOLUTION INTRAMUSCULAR; INTRAVENOUS; SUBCUTANEOUS EVERY 4 HOURS PRN
Status: DISCONTINUED | OUTPATIENT
Start: 2025-05-03 | End: 2025-05-11 | Stop reason: HOSPADM

## 2025-05-03 RX ORDER — FUROSEMIDE 10 MG/ML
40 INJECTION INTRAMUSCULAR; INTRAVENOUS ONCE
Status: COMPLETED | OUTPATIENT
Start: 2025-05-03 | End: 2025-05-03

## 2025-05-03 RX ORDER — FUROSEMIDE 10 MG/ML
INJECTION INTRAMUSCULAR; INTRAVENOUS
Status: COMPLETED
Start: 2025-05-03 | End: 2025-05-03

## 2025-05-03 RX ORDER — FUROSEMIDE 10 MG/ML
20 INJECTION INTRAMUSCULAR; INTRAVENOUS ONCE
Status: DISCONTINUED | OUTPATIENT
Start: 2025-05-03 | End: 2025-05-03

## 2025-05-03 RX ORDER — OXYCODONE HYDROCHLORIDE 5 MG/1
5 TABLET ORAL EVERY 8 HOURS
Refills: 0 | Status: DISCONTINUED | OUTPATIENT
Start: 2025-05-03 | End: 2025-05-06

## 2025-05-03 RX ADMIN — CARVEDILOL 6.25 MG: 12.5 TABLET, FILM COATED ORAL at 19:06

## 2025-05-03 RX ADMIN — HYDRALAZINE HYDROCHLORIDE 75 MG: 50 TABLET ORAL at 19:58

## 2025-05-03 RX ADMIN — AMLODIPINE BESYLATE 10 MG: 10 TABLET ORAL at 09:18

## 2025-05-03 RX ADMIN — HEPARIN SODIUM 5000 UNITS: 5000 INJECTION, SOLUTION INTRAVENOUS; SUBCUTANEOUS at 09:17

## 2025-05-03 RX ADMIN — FUROSEMIDE 40 MG: 10 INJECTION, SOLUTION INTRAMUSCULAR; INTRAVENOUS at 14:07

## 2025-05-03 RX ADMIN — MAGNESIUM OXIDE TAB 400 MG (241.3 MG ELEMENTAL MG) 400 MG: 400 (241.3 MG) TAB at 09:17

## 2025-05-03 RX ADMIN — HEPARIN SODIUM 5000 UNITS: 5000 INJECTION, SOLUTION INTRAVENOUS; SUBCUTANEOUS at 01:23

## 2025-05-03 RX ADMIN — ATORVASTATIN CALCIUM 10 MG: 10 TABLET, FILM COATED ORAL at 20:00

## 2025-05-03 RX ADMIN — HYDRALAZINE HYDROCHLORIDE 20 MG: 20 INJECTION INTRAMUSCULAR; INTRAVENOUS at 05:45

## 2025-05-03 RX ADMIN — PANTOPRAZOLE SODIUM 40 MG: 40 INJECTION, POWDER, FOR SOLUTION INTRAVENOUS at 06:03

## 2025-05-03 RX ADMIN — FUROSEMIDE 40 MG: 10 INJECTION INTRAMUSCULAR; INTRAVENOUS at 14:07

## 2025-05-03 RX ADMIN — SENNOSIDES AND DOCUSATE SODIUM 2 TABLET: 50; 8.6 TABLET ORAL at 20:00

## 2025-05-03 RX ADMIN — HYDRALAZINE HYDROCHLORIDE 75 MG: 50 TABLET ORAL at 09:18

## 2025-05-03 RX ADMIN — LABETALOL HYDROCHLORIDE 10 MG: 5 INJECTION, SOLUTION INTRAVENOUS at 11:11

## 2025-05-03 RX ADMIN — SENNOSIDES AND DOCUSATE SODIUM 2 TABLET: 50; 8.6 TABLET ORAL at 09:17

## 2025-05-03 RX ADMIN — HYDRALAZINE HYDROCHLORIDE 75 MG: 50 TABLET ORAL at 14:06

## 2025-05-03 RX ADMIN — CEFAZOLIN SODIUM 2 G: 2 INJECTION, SOLUTION INTRAVENOUS at 01:23

## 2025-05-03 RX ADMIN — POLYETHYLENE GLYCOL 3350 17 G: 17 POWDER, FOR SOLUTION ORAL at 09:19

## 2025-05-03 RX ADMIN — OXYCODONE HYDROCHLORIDE 5 MG: 5 TABLET ORAL at 17:43

## 2025-05-03 RX ADMIN — FUROSEMIDE 40 MG: 10 INJECTION INTRAMUSCULAR; INTRAVENOUS at 01:23

## 2025-05-03 RX ADMIN — OXYCODONE HYDROCHLORIDE 5 MG: 5 TABLET ORAL at 09:18

## 2025-05-03 RX ADMIN — HEPARIN SODIUM 5000 UNITS: 5000 INJECTION, SOLUTION INTRAVENOUS; SUBCUTANEOUS at 16:26

## 2025-05-03 RX ADMIN — POTASSIUM CHLORIDE 20 MEQ: 14.9 INJECTION, SOLUTION INTRAVENOUS at 18:02

## 2025-05-03 RX ADMIN — INSULIN LISPRO 1 UNITS: 100 INJECTION, SOLUTION INTRAVENOUS; SUBCUTANEOUS at 19:59

## 2025-05-03 RX ADMIN — CARVEDILOL 6.25 MG: 12.5 TABLET, FILM COATED ORAL at 06:03

## 2025-05-03 RX ADMIN — LISINOPRIL 20 MG: 20 TABLET ORAL at 09:17

## 2025-05-03 RX ADMIN — POTASSIUM CHLORIDE 20 MEQ: 14.9 INJECTION, SOLUTION INTRAVENOUS at 16:26

## 2025-05-03 RX ADMIN — HYDRALAZINE HYDROCHLORIDE 75 MG: 50 TABLET ORAL at 01:23

## 2025-05-03 RX ADMIN — BISACODYL 10 MG: 10 SUPPOSITORY RECTAL at 09:19

## 2025-05-03 ASSESSMENT — PAIN - FUNCTIONAL ASSESSMENT

## 2025-05-03 ASSESSMENT — PAIN SCALES - GENERAL
PAINLEVEL_OUTOF10: 0 - NO PAIN
PAINLEVEL_OUTOF10: 0 - NO PAIN

## 2025-05-03 NOTE — CARE PLAN
The patient's goals for the shift include      The clinical goals for the shift include pt. to remian free from neurological decline throughout shift      Problem: General Stroke  Goal: Establish a mutual long term goal with patient by discharge  Outcome: Progressing  Goal: Demonstrate improvement in neurological exam throughout the shift  Outcome: Progressing  Goal: Maintain BP within ordered limits throughout shift  Outcome: Progressing  Goal: Participate in treatment (ie., meds, therapy) throughout shift  Outcome: Progressing  Goal: No symptoms of aspiration throughout shift  Outcome: Progressing  Goal: No symptoms of hemorrhage throughout shift  Outcome: Progressing  Goal: Tolerate enteral feeding throughout shift  Outcome: Progressing  Goal: Decreased nausea/vomiting throughout shift  Outcome: Progressing  Goal: Controlled blood glucose throughout shift  Outcome: Progressing  Goal: Out of bed three times today  Outcome: Progressing     Problem: ICU Stroke  Goal: Maintain ICP within ordered limits throughout shift  Outcome: Progressing  Goal: Tolerate EVD clamping trial throughout shift  Outcome: Progressing  Goal: Tolerate ventilator weaning trial during shift  Outcome: Progressing  Goal: Maintain patent airway throughout shift  Outcome: Progressing  Goal: Achieve/maintain targeted sodium level throughout shift  Outcome: Progressing     Problem: Pain - Adult  Goal: Verbalizes/displays adequate comfort level or baseline comfort level  Outcome: Progressing     Problem: Safety - Adult  Goal: Free from fall injury  Outcome: Progressing     Problem: Discharge Planning  Goal: Discharge to home or other facility with appropriate resources  Outcome: Progressing     Problem: Chronic Conditions and Co-morbidities  Goal: Patient's chronic conditions and co-morbidity symptoms are monitored and maintained or improved  Outcome: Progressing     Problem: Nutrition  Goal: Nutrient intake appropriate for maintaining nutritional  needs  Outcome: Progressing     Problem: Knowledge Deficit  Goal: Patient/family/caregiver demonstrates understanding of disease process, treatment plan, medications, and discharge instructions  Outcome: Progressing     Problem: Mechanical Ventilation  Goal: Patient Will Maintain Patent Airway  Outcome: Progressing  Goal: Oral health is maintained or improved  Outcome: Progressing  Goal: Tracheostomy will be managed safely  Outcome: Progressing  Goal: ET tube will be managed safely  Outcome: Progressing  Goal: Ability to express needs and understand communication  Outcome: Progressing  Goal: Mobility/activity is maintained at optimum level for patient  Outcome: Progressing     Problem: Skin  Goal: Decreased wound size/increased tissue granulation at next dressing change  Outcome: Progressing  Goal: Participates in plan/prevention/treatment measures  Outcome: Progressing  Goal: Prevent/manage excess moisture  Outcome: Progressing  Goal: Prevent/minimize sheer/friction injuries  Outcome: Progressing  Goal: Promote/optimize nutrition  Outcome: Progressing  Goal: Promote skin healing  Outcome: Progressing

## 2025-05-03 NOTE — PROGRESS NOTES
"Muriel De Souza is a 69 y.o. female on day 11 of admission presenting with AVM (arteriovenous malformation) ([facility]).    Subjective   No events overnight    Objective     Physical Exam  Trach vented   ECNS  OU2R  -cough/gag  BUE flaccid  BLE TF  EVD in place    Last Recorded Vitals  Blood pressure 163/57, pulse 75, temperature 36.5 °C (97.7 °F), temperature source Temporal, resp. rate 12, height 1.727 m (5' 7.99\"), weight 107 kg (235 lb 7.2 oz), SpO2 99%.  Intake/Output last 3 Shifts:  I/O last 3 completed shifts:  In: 4003.6 (37.1 mL/kg) [I.V.:2143.6 (19.8 mL/kg); NG/GT:1260; IV Piggyback:600]  Out: 8527 (79 mL/kg) [Urine:8295 (2.1 mL/kg/hr); Drains:232]  Weight: 108 kg     Relevant Results  Results from last 72 hours   Lab Units 05/02/25  1736 05/02/25  1116 05/02/25  0405 05/02/25  0002 04/30/25  2131 04/30/25  1556   GLUCOSE mg/dL 108*  --   --  153*   < > 177*   SODIUM mmol/L 150*  --  150* 150*  150*   < > 151*  150*   POTASSIUM mmol/L 3.4*  --   --  3.3*   < > 3.4*   CHLORIDE mmol/L 109*  --   --  114*   < > 113*   CO2 mmol/L 34*  --   --  31   < > 29   ANION GAP mmol/L 10  --   --  8*   < > 12   BUN mg/dL 30*  --   --  33*   < > 39*   CREATININE mg/dL 0.74  --   --  0.81   < > 0.80   EGFR mL/min/1.73m*2 88  --   --  79   < > 80   CALCIUM mg/dL 8.5*  --   --  7.9*   < > 8.2*   PHOSPHORUS mg/dL 3.8  --   --  2.9   < > 4.0   ALBUMIN g/dL 2.8*  --   --  2.4*   < > 2.7*   MAGNESIUM mg/dL  --  2.14  --   --   --  2.02   POCT CALCIUM IONIZED (MMOL/L) IN BLOOD mmol/L  --  1.19  --   --   --   --     < > = values in this interval not displayed.      Results from last 72 hours   Lab Units 05/02/25  1736 05/02/25  0002   WBC AUTO x10*3/uL 15.3* 10.4   NRBC AUTO /100 WBCs 0.0 0.0   RBC AUTO x10*6/uL 2.95* 2.62*   HEMOGLOBIN g/dL 8.9* 7.8*   HEMATOCRIT % 27.3* 24.8*   MCV fL 93 95   MCH pg 30.2 29.8   MCHC g/dL 32.6 31.5*   RDW % 14.0 14.3   PLATELETS AUTO x10*3/uL 290 269        Assessment & Plan  AVM (arteriovenous " malformation) ([facility])    Muriel De Souza is a 69 y.o. 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, s/p 1u Plt and DDAVP, s/p RF EVD (OP 18), 4/22 s/p SOC/C1 lami for AVM resection, CTH POC, stable vents, 4/22 TEG R low s/p 1u FFP, 4/23 TTE EF 71%, 4/23 s/p angio tentorial dural AV fistual w direct cortical venous drainage fed by b/l MMA, b/l tentorial arteries, incidental 2mm R pcomm aneurysm, 4/24 CTH slight decr vents  4/29 MRI midbrain and b/l cerebellar hemisphere diffusion hits, 5/1 s/p trach, 5/2 s/p PEG    NSU  EVD at 10, monitor output and ICPs  Cont GOC re: shunt and dAVF treatment  Goal riki Na, q12 Na  SBP goal <180  Con't hydral, lisinopril, coreg, may increase  Final path - blood clot and normal vessels  SCD, SQH    Chavez Vang MD

## 2025-05-03 NOTE — PROGRESS NOTES
[facility]  NEUROSCIENCE INTENSIVE CARE UNIT  DAILY PROGRESS NOTE       Patient Name: Muriel De Souza   MRN: [id number]     Admit Date: 2025     : 1955 AGE: 69 y.o. GENDER: female        Subjective    Muriel De Souza is a 69 y.o. female with h/o HTN, HLD p/w HA, arrested in CT scanner at OSH, ROSC after 7 min CPR,  CTH w diffuse SAH and L cerebellar ICH, triventriculomegally, effaced 4th vents, CTA with L cerebellar AVM w venous varices s/p RF EVD and now admitted for further management.    Per chart review, patient had received bad news at home and then had sudden onset headache and vomiting. Found to have SAH at OSH with course complicated by cardiac arrest. She was intubated and sedated and transferred from [place], [place] to [facility].    Significant Events:  -  came in with L cerebellar AVM bleed with course complicated by cardiac arrest ROSC after 7 min CPR, uppon arrival to [facility] NSU, EVD drain placed, went to OR for decompression and AVM resection    Interval Events:   NAEON, Afebrile, hemodynamically stable with goal SBP < 180 per NSGY  No desaturations overnight, FiO2 40 PEEP 10  Fentanyl 75 continued   Amlodipine 5 mg OD  Carvedilol 6.25 BID  Hydralazine 75 q6  Lisinopril 20 mg OD  Nicardipine  PEG tube placed  Diuresed with Lasix 40 5/2 for bilateral pleural effusions  -1.4 L / 24 hours  UOP 3.8 / 24 hours       Objective   VITALS (24H):  Temp:  [36 °C (96.8 °F)-36.5 °C (97.7 °F)] 36.2 °C (97.2 °F)  Heart Rate:  [58-77] 76  Resp:  [10-] 12  BP: (133-175)/(52-65) 147/55  Arterial Line BP 1: (122-190)/(48-64) 176/58  FiO2 (%):  [40 %] 40 %  INTAKE/OUTPUT:  Intake/Output Summary (Last 24 hours) at 5/3/2025 0714  Last data filed at 5/3/2025 0600  Gross per 24 hour   Intake 7735.6 ml   Output 9129 ml   Net -1393.4 ml     VENT SETTINGS:  Vent Mode: Volume control/assist control  FiO2 (%):  [40 %] 40 %  S RR:  [12] 12  S VT:  [400 mL] 400 mL  PEEP/CPAP (cm H2O):  [10 cm H20] 10 cm  H20  MAP (cm H2O):  [13-14] 13       PHYSICAL EXAM:  NEURO: Off sedation  - Eyes closed to nox stim  - pupils minimally reactive to light  - corneals negative  - cough reflex intact  - gag reflex not tested as patient is not on vent (pt on trach)  - No response to pain in UEs  - Triple flexion in LLE to nox stim  - No response to pain in RLE  - no increased tone  CV:  - RRR on telemetry, NSR  - Arterial line in place  RESP:  - trach  - mild bilateral end-inspiratory wheezing  :  - Brooks catheter in place  GI:  - Abdominal binder post-PEG   SKIN:  - Intact  - left arm swollen and tense to palpation, not pitting, non-erythematous    MEDICATIONS:  Scheduled: PRN: Continuous:   Scheduled Medications[1] PRN Medications[2] Continuous Medications[3]     IMAGING RESULTS:  CT head wo IV contrast   Final Result   * Diffuse subarachnoid hemorrhage with hydrocephalus   *Left cerebellar hematoma suggesting a likely source.        MACRO:   none        Signed by: Cruz Salgado 4/22/2025 11:59 AM   Dictation workstation:   [id number]      CT angio head and neck w and wo IV contrast   Final Result   * Diffuse subarachnoid hemorrhage with hydrocephalus as described   *Suspected vascular malformation in the left cerebellar hemisphere   with enlarged arterial and venous structures largely in the left   cerebellar hemisphere and residual partial opacification of a venous   aneurysm near the torcula. *Bilateral carotid stenosis as described        MACRO:   none        Signed by: Cruz Salgado 4/22/2025 12:07 PM   Dictation workstation:   [id number]             Assessment/Plan    Muriel De Souza is a 69 y.o. female with h/o HTN, HLD p/w HA, arrested in CT scanner at OSH, ROSC after 7 min CPR,  CTH w diffuse SAH and L cerebellar ICH, triventriculomegally, effaced 4th vents, CTA with L cerebellar AVM w venous varices s/p RF EVD as well as AVM resection on 4/22 and now admitted for further management.    NEURO:  #SAH  # L  cerebellar ICH  #Triventriculomegaly with effaced 4th ventricle  #L cerebellar AVM w venous varices s/p resection on 4/22  #Intracranial hypertension  Assessment:  - First day 4/22 presented with SAH, L cerebellar ICH, and ventriculomegaly on CTH  - 4/22 CTA showing L cerebellar AVM  - 4/22 RF EVD for ICH  - 4/22 OR AVM resection  - MRI done 4/29   - LP 5/1 (WBC 5, RBC ^ 1000, Glucose ^ 106, protein 27, CSF culture NGTD 5/3, negative Gram stain)  - Pt is neurologically stable  - No seizure activity on EEG  - Maintained SBP < 180  - Normonatremic goal, sodium down trending without any D5W  - PEG and trach placed  - Fentanyl 75 for pain post-PEG and trach and BP control    Plan:  - NSU  - Keppra  - EVD in place, maintained at 10  - BP goal: SBP < 180    --> PRN: Labetalol, Hydralazine, and Nicardipine   - IV nicardipine 0.25 mg/min  - PO hydralazine 75 q6  - Amlodipine 10mg oral   - PO Carvedilol 6.25 BID  - PO Lisinopril 20 OD   - Normonatremic goal  - Plan for  shunt next week  - Wean off fentanyl as tolerated  - Discontinue EEG  - F/U CSF cultures  - Neuro Checks: Q1H  - Sedation: Off  - Pain: PRN  - Nausea: ondansetron  - PT/OT/SLP    CARDIOVASCULAR:  #Cardiac arrest  #Lactic acidosis, improving  # elevated troponins, improved  Assessment:  - 7 min to ROSC  - Assess for ischemic and stress-induced cardiomyopathy   - Assess for arrhythmias following cardiac arrest   - Cards consult (4/23)  - Type 2 MI (demand ischemia), leading to troponin up-trend (4/23)  - Trop trending down, d/c'd 4/24  - ECHO 4/23:  CONCLUSIONS:   1. Left ventricular ejection fraction is normal, calculated by Adrian's biplane at 71%.   2. Spectral Doppler shows a Grade I (impaired relaxation pattern) of left ventricular diastolic filling with normal left atrial filling pressure.   3. There is normal right ventricular global systolic function.   4. The left atrium is mildly dilated.   5. There is no evidence of a patent foramen ovale.   6.  The inferior vena cava appears mildly dilated, on vent.  - Pt is hemodynamically stable, SBP goal < 180    Plan:  - Monitor on telemetry  - SBP goal < 180 (see above)    RESPIRATORY:  #Acute hypoxic respiratory failure  #Mechanical ventilation  #Bilateral plural effusions  #Pulmonary edema    Results from last 7 days   Lab Units 05/03/25  0453 05/02/25  1736 05/02/25  0405 05/02/25  0002 05/01/25 2005 05/01/25  1653 05/01/25  0843 05/01/25  0330   SODIUM mmol/L 146* 150* 150* 150*  150* 151* 151*  150*   < > 150*  150*   POTASSIUM mmol/L 3.5 3.4*  --  3.3*  --  3.1*  --  3.1*   CREATININE mg/dL 0.88 0.74  --  0.81  --  0.84  --  0.85   BUN mg/dL 27* 30*  --  33*  --  31*  --  37*    < > = values in this interval not displayed.     Assessment:  - Vent requirements stable, FiO2 40 PEEP 10  - CXR 5/2, persistent bilateral pleural effusions  - Diuresed with 40 mg Lasix with -1.4 L net negative UOP 3.8 5/2  - Continue to diurese, replete potassium as needed  - Mild bilateral end-inspiratory wheezing, likely due to pulmonary edema    Plan:  - Continue mechanical ventilation for decreased LOC  - Monitor for increased secretions/desaturations  - Diurese with Lasix 5/3    RENAL/:  #Pre-renal azotemia, resolving  Assessment:  Results from last 72 hours   Lab Units 05/03/25  0453 05/02/25 1736 05/02/25 0405 05/02/25 0002 05/01/25 2005 05/01/25  1653 05/01/25  0843 05/01/25  0330   CREATININE mg/dL 0.88 0.74  --  0.81  --  0.84  --  0.85   BUN mg/dL 27* 30*  --  33*  --  31*  --  37*   SODIUM mmol/L 146* 150* 150* 150*  150* 151* 151*  150*   < > 150*  150*    < > = values in this interval not displayed.   - UOP 3.8 / 24 hours 5/3  - BUN stable 5/3  - Cr stable 5/3  - Resolving pre-renal azotemia    Plan:  - Monitor with daily RFP  - Maintain forbes catheter for: critically ill patient who need accurate urinary output measurements    FEN/GI:  #TIM  Results from last 72 hours   Lab Units 05/03/25  0453 05/02/25  1734  05/02/25  0405 05/02/25  0002 05/01/25 2005 05/01/25  1653 05/01/25  0843 05/01/25  0330   SODIUM mmol/L 146* 150* 150* 150*  150* 151* 151*  150*   < > 150*  150*   POTASSIUM mmol/L 3.5 3.4*  --  3.3*  --  3.1*  --  3.1*   PHOSPHORUS mg/dL 3.9 3.8  --  2.9  --  3.4  --  4.1    < > = values in this interval not displayed.   - Hypokalemia due to ongoing diuresis with Lasix  - Normonatremic sodium goal 5/3  - Sodium down trending without D5W, 5/3  - Stable phos 5/3  - Will repeat Lasix 5/3 for persistent bilateral pleural effusions  - PEG tube placed 5/2, started trickle feeds    Plan:  - Monitor and replace electrolytes per protocol  - Replete K+ as needed  - Sodium goal normo natremic per NSGY  - IVF: PRN  - Diet: Trickle feeds through PEG, pending nutrition recs  - Bowel Regimen: Docusate-Senna PRN and Miralax PRN    ENDOCRINE:  #TIM  Assessment:  Results from last 7 days   Lab Units 05/03/25  0453 05/03/25  0345 05/02/25  2333 05/02/25 2006 05/02/25  1736 05/02/25  1517 05/02/25  1116 05/02/25  0418 05/02/25  0405 05/02/25 0002 05/01/25  2319 05/01/25 2005 05/01/25  1954 05/01/25  1653   POCT GLUCOSE mg/dL  --  144* 134* 126*  --  125* 152* 152*  --   --    < >  --    < >  --    GLUCOSE mg/dL 157*  --   --   --  108*  --   --   --   --  153*  --   --   --  149*   SODIUM mmol/L 146*  --   --   --  150*  --   --   --  150* 150*  150*  --  151*  --  151*  150*    < > = values in this interval not displayed.    - HbA1C 5.5 on 4/22    Plan:  - Accuchecks & ISS PRN     HEMATOLOGY:  #Anemia, likely dilutional  Assessment:  - Baseline Hgb: 12.0  - Baseline Plts: 287  Results from last 7 days   Lab Units 05/03/25  0453 05/02/25  1736 05/02/25  0002   HEMOGLOBIN g/dL 7.4* 8.9* 7.8*   HEMATOCRIT % 23.3* 27.3* 24.8*   PLATELETS AUTO x10*3/uL 326 290 269     Plan:  - Continue to monitor with daily CBC and Coag panel  - maintain active type and screen as needed  - Transfuse at Hb < 7    INFECTIOUS  DISEASE:  #TIM  Assessment:  Results from last 7 days   Lab Units 25  0453 25  1736 25  0002   WBC AUTO x10*3/uL 16.1* 15.3* 10.4    - Temp (24hrs), Av.2 °C (97.1 °F), Min:36 °C (96.8 °F), Max:36.5 °C (97.7 °F)  - White count elevated 5/3, likely reactive due to post-trach and PEG  - Pt afebrile  - CSF gram stain negative, NGTD 5/3  - Bcx no growth at 4 days ()  - Sputum cult  no predominant organism  - UA  unremarkable  - Negative pro calcitonin   - Vanc/Zosyn started  for desaturation and increased O2 requirement overnight  - Vanc/Zosyn stopped , low suspicion for infection given clean Bcx, no fevers, resolving leukocytosis, and negative pro calcitonin     Plan  - Continue to monitor for signs of infection     MUSCULOSKELETAL:  - No acute issues    SKIN:  #Swollen left arm  Assessment:   - LUE with persistent swelling since   - Non-pitting edema, non-erythematous  - POD6  - SQH started ()  - No DVT in LE ()  - No DVT in RUE ()  - Superficial thrombosis in basilic vein in LUE ()    Plan:   - Turns and skin care per NSU protocol    ACCESS:  - R Femoral  - R arterial  - L PIV    PROPHYLAXIS:  - DVT Ppx: SCDs, SQH  - GI: PPI    RESTRAINTS:  Not indicated/Patient does not meet criteria for restraints    LUIS LLAMAS  Neuroscience Intensive Care    I have reviewed and edited the information above and I agree with the assessment and plan as outlined by the medical student.    Yessenia Jimenez MD  Emergency Medicine, PGY-2    The patient is critically ill with acute encephalopathy, acquired hydrocephalus and acute respiratory insufficiency in the setting of ruptured cerebellar AVM  Remains in poor neurological condition  EVD management per neurosurgery  Cont keppra  Cont BP control with goal sbp<180  Wean ventilator as tolerated  Anemia stable: Cont to trend, transfuse for Hb<7  PEG in place     I have seen and examined the patient.  I have reviewed  the patient's laboratory, radiographic, and clinical data.  I have spent 30 minutes providing critical care for the patient.       VAMSHI Nuñez M.D.        [1] amLODIPine, 10 mg, oral, Daily  atorvastatin, 10 mg, oral, Nightly  bisacodyl, 10 mg, rectal, Daily  carvedilol, 6.25 mg, oral, q12h  ceFAZolin, 2 g, intravenous, q8h  heparin (porcine), 5,000 Units, subcutaneous, q8h  hydrALAZINE, 75 mg, oral, q6h  insulin lispro, 0-5 Units, subcutaneous, q4h  lisinopril, 20 mg, oral, Daily  magnesium oxide, 400 mg, oral, Daily  pantoprazole, 40 mg, intravenous, Daily before breakfast  perflutren protein A microsphere, 0.5 mL, intravenous, Once in imaging  polyethylene glycol, 17 g, oral, Daily  sennosides-docusate sodium, 2 tablet, oral, BID  sulfur hexafluoride microsphr, 2 mL, intravenous, Once in imaging     [2] PRN medications: albuterol, calcium gluconate, calcium gluconate, dextrose, dextrose, fentaNYL, glucagon, glucagon, hydrALAZINE, labetaloL, magnesium sulfate, magnesium sulfate, oxygen, potassium chloride CR **OR** potassium chloride, potassium chloride CR **OR** potassium chloride, propofol  [3] fentaNYL,  mcg/hr, Last Rate: 75 mcg/hr (05/03/25 0600)  niCARdipine, 2.5-15 mg/hr, Last Rate: Stopped (05/02/25 1500)  propofol, 0-50 mcg/kg/min, Last Rate: Stopped (05/02/25 1500)

## 2025-05-03 NOTE — CARE PLAN
Boots and repositioning  Problem: Skin  Goal: Decreased wound size/increased tissue granulation at next dressing change  Outcome: Progressing  Flowsheets (Taken 5/3/2025 8362)  Decreased wound size/increased tissue granulation at next dressing change: Promote sleep for wound healing    are effective at preventing injury and helping current wound heal. Pt. Resting at this time.

## 2025-05-04 ENCOUNTER — ANESTHESIA EVENT (OUTPATIENT)
Dept: OPERATING ROOM | Facility: HOSPITAL | Age: 70
End: 2025-05-04
Payer: COMMERCIAL

## 2025-05-04 ENCOUNTER — APPOINTMENT (OUTPATIENT)
Dept: RADIOLOGY | Facility: HOSPITAL | Age: 70
DRG: 003 | End: 2025-05-04
Payer: COMMERCIAL

## 2025-05-04 VITALS
BODY MASS INDEX: 33.51 KG/M2 | DIASTOLIC BLOOD PRESSURE: 55 MMHG | WEIGHT: 221.12 LBS | HEART RATE: 77 BPM | OXYGEN SATURATION: 97 % | SYSTOLIC BLOOD PRESSURE: 162 MMHG | HEIGHT: 68 IN | RESPIRATION RATE: 12 BRPM | TEMPERATURE: 97 F

## 2025-05-04 LAB
ABO GROUP (TYPE) IN BLOOD: NORMAL
ALBUMIN SERPL BCP-MCNC: 2.6 G/DL (ref 3.4–5)
ALBUMIN SERPL BCP-MCNC: 2.8 G/DL (ref 3.4–5)
ANION GAP SERPL CALC-SCNC: 10 MMOL/L (ref 10–20)
ANION GAP SERPL CALC-SCNC: 11 MMOL/L (ref 10–20)
ANTIBODY SCREEN: NORMAL
APTT PPP: 29 SECONDS (ref 26–36)
BACTERIA CSF CULT: NORMAL
BASOPHILS # BLD AUTO: 0.03 X10*3/UL (ref 0–0.1)
BASOPHILS # BLD AUTO: 0.03 X10*3/UL (ref 0–0.1)
BASOPHILS NFR BLD AUTO: 0.2 %
BASOPHILS NFR BLD AUTO: 0.2 %
BLOOD EXPIRATION DATE: NORMAL
BLOOD EXPIRATION DATE: NORMAL
BUN SERPL-MCNC: 27 MG/DL (ref 6–23)
BUN SERPL-MCNC: 29 MG/DL (ref 6–23)
CA-I BLD-SCNC: 1.16 MMOL/L (ref 1.1–1.33)
CALCIUM SERPL-MCNC: 7.8 MG/DL (ref 8.6–10.6)
CALCIUM SERPL-MCNC: 8.5 MG/DL (ref 8.6–10.6)
CHLORIDE SERPL-SCNC: 101 MMOL/L (ref 98–107)
CHLORIDE SERPL-SCNC: 104 MMOL/L (ref 98–107)
CO2 SERPL-SCNC: 35 MMOL/L (ref 21–32)
CO2 SERPL-SCNC: 35 MMOL/L (ref 21–32)
CREAT SERPL-MCNC: 0.77 MG/DL (ref 0.5–1.05)
CREAT SERPL-MCNC: 0.8 MG/DL (ref 0.5–1.05)
DISPENSE STATUS: NORMAL
DISPENSE STATUS: NORMAL
EGFRCR SERPLBLD CKD-EPI 2021: 80 ML/MIN/1.73M*2
EGFRCR SERPLBLD CKD-EPI 2021: 84 ML/MIN/1.73M*2
EOSINOPHIL # BLD AUTO: 0.13 X10*3/UL (ref 0–0.7)
EOSINOPHIL # BLD AUTO: 0.13 X10*3/UL (ref 0–0.7)
EOSINOPHIL NFR BLD AUTO: 0.9 %
EOSINOPHIL NFR BLD AUTO: 1 %
ERYTHROCYTE [DISTWIDTH] IN BLOOD BY AUTOMATED COUNT: 14.4 % (ref 11.5–14.5)
ERYTHROCYTE [DISTWIDTH] IN BLOOD BY AUTOMATED COUNT: 14.5 % (ref 11.5–14.5)
ERYTHROCYTE [DISTWIDTH] IN BLOOD BY AUTOMATED COUNT: 14.5 % (ref 11.5–14.5)
ERYTHROCYTE [DISTWIDTH] IN BLOOD BY AUTOMATED COUNT: 14.6 % (ref 11.5–14.5)
GLUCOSE BLD MANUAL STRIP-MCNC: 150 MG/DL (ref 74–99)
GLUCOSE BLD MANUAL STRIP-MCNC: 153 MG/DL (ref 74–99)
GLUCOSE BLD MANUAL STRIP-MCNC: 156 MG/DL (ref 74–99)
GLUCOSE BLD MANUAL STRIP-MCNC: 156 MG/DL (ref 74–99)
GLUCOSE BLD MANUAL STRIP-MCNC: 157 MG/DL (ref 74–99)
GLUCOSE BLD MANUAL STRIP-MCNC: 162 MG/DL (ref 74–99)
GLUCOSE SERPL-MCNC: 173 MG/DL (ref 74–99)
GLUCOSE SERPL-MCNC: 176 MG/DL (ref 74–99)
GRAM STN SPEC: NORMAL
GRAM STN SPEC: NORMAL
HCT VFR BLD AUTO: 25 % (ref 36–46)
HCT VFR BLD AUTO: 25.2 % (ref 36–46)
HCT VFR BLD AUTO: 27 % (ref 36–46)
HCT VFR BLD AUTO: 27.6 % (ref 36–46)
HGB BLD-MCNC: 7.9 G/DL (ref 12–16)
HGB BLD-MCNC: 7.9 G/DL (ref 12–16)
HGB BLD-MCNC: 8.4 G/DL (ref 12–16)
HGB BLD-MCNC: 8.7 G/DL (ref 12–16)
IMM GRANULOCYTES # BLD AUTO: 0.1 X10*3/UL (ref 0–0.7)
IMM GRANULOCYTES # BLD AUTO: 0.11 X10*3/UL (ref 0–0.7)
IMM GRANULOCYTES NFR BLD AUTO: 0.8 % (ref 0–0.9)
IMM GRANULOCYTES NFR BLD AUTO: 0.8 % (ref 0–0.9)
INR PPP: 1.1 (ref 0.9–1.1)
LYMPHOCYTES # BLD AUTO: 1.12 X10*3/UL (ref 1.2–4.8)
LYMPHOCYTES # BLD AUTO: 1.18 X10*3/UL (ref 1.2–4.8)
LYMPHOCYTES NFR BLD AUTO: 8.6 %
LYMPHOCYTES NFR BLD AUTO: 8.7 %
MAGNESIUM SERPL-MCNC: 2.37 MG/DL (ref 1.6–2.4)
MCH RBC QN AUTO: 29.2 PG (ref 26–34)
MCH RBC QN AUTO: 29.3 PG (ref 26–34)
MCH RBC QN AUTO: 29.5 PG (ref 26–34)
MCH RBC QN AUTO: 29.6 PG (ref 26–34)
MCHC RBC AUTO-ENTMCNC: 31.1 G/DL (ref 32–36)
MCHC RBC AUTO-ENTMCNC: 31.3 G/DL (ref 32–36)
MCHC RBC AUTO-ENTMCNC: 31.5 G/DL (ref 32–36)
MCHC RBC AUTO-ENTMCNC: 31.6 G/DL (ref 32–36)
MCV RBC AUTO: 93 FL (ref 80–100)
MCV RBC AUTO: 93 FL (ref 80–100)
MCV RBC AUTO: 94 FL (ref 80–100)
MCV RBC AUTO: 95 FL (ref 80–100)
MONOCYTES # BLD AUTO: 0.77 X10*3/UL (ref 0.1–1)
MONOCYTES # BLD AUTO: 0.78 X10*3/UL (ref 0.1–1)
MONOCYTES NFR BLD AUTO: 5.6 %
MONOCYTES NFR BLD AUTO: 6.1 %
NEUTROPHILS # BLD AUTO: 10.65 X10*3/UL (ref 1.2–7.7)
NEUTROPHILS # BLD AUTO: 11.57 X10*3/UL (ref 1.2–7.7)
NEUTROPHILS NFR BLD AUTO: 83.2 %
NEUTROPHILS NFR BLD AUTO: 83.9 %
NRBC BLD-RTO: 0 /100 WBCS (ref 0–0)
PHOSPHATE SERPL-MCNC: 3.2 MG/DL (ref 2.5–4.9)
PHOSPHATE SERPL-MCNC: 3.3 MG/DL (ref 2.5–4.9)
PLATELET # BLD AUTO: 298 X10*3/UL (ref 150–450)
PLATELET # BLD AUTO: 304 X10*3/UL (ref 150–450)
PLATELET # BLD AUTO: 341 X10*3/UL (ref 150–450)
PLATELET # BLD AUTO: 344 X10*3/UL (ref 150–450)
POTASSIUM SERPL-SCNC: 3.7 MMOL/L (ref 3.5–5.3)
POTASSIUM SERPL-SCNC: 3.9 MMOL/L (ref 3.5–5.3)
PRODUCT BLOOD TYPE: 6200
PRODUCT BLOOD TYPE: 6200
PRODUCT CODE: NORMAL
PRODUCT CODE: NORMAL
PROTHROMBIN TIME: 11.9 SECONDS (ref 9.8–12.4)
RBC # BLD AUTO: 2.67 X10*6/UL (ref 4–5.2)
RBC # BLD AUTO: 2.71 X10*6/UL (ref 4–5.2)
RBC # BLD AUTO: 2.85 X10*6/UL (ref 4–5.2)
RBC # BLD AUTO: 2.97 X10*6/UL (ref 4–5.2)
RH FACTOR (ANTIGEN D): NORMAL
SODIUM SERPL-SCNC: 143 MMOL/L (ref 136–145)
SODIUM SERPL-SCNC: 145 MMOL/L (ref 136–145)
UNIT ABO: NORMAL
UNIT ABO: NORMAL
UNIT NUMBER: NORMAL
UNIT NUMBER: NORMAL
UNIT RH: NORMAL
UNIT RH: NORMAL
UNIT VOLUME: 350
UNIT VOLUME: 350
WBC # BLD AUTO: 12.7 X10*3/UL (ref 4.4–11.3)
WBC # BLD AUTO: 12.8 X10*3/UL (ref 4.4–11.3)
WBC # BLD AUTO: 13.5 X10*3/UL (ref 4.4–11.3)
WBC # BLD AUTO: 13.8 X10*3/UL (ref 4.4–11.3)
XM INTEP: NORMAL
XM INTEP: NORMAL

## 2025-05-04 PROCEDURE — 2500000001 HC RX 250 WO HCPCS SELF ADMINISTERED DRUGS (ALT 637 FOR MEDICARE OP): Performed by: STUDENT IN AN ORGANIZED HEALTH CARE EDUCATION/TRAINING PROGRAM

## 2025-05-04 PROCEDURE — 74174 CTA ABD&PLVS W/CONTRAST: CPT

## 2025-05-04 PROCEDURE — 80069 RENAL FUNCTION PANEL: CPT

## 2025-05-04 PROCEDURE — 83735 ASSAY OF MAGNESIUM: CPT | Performed by: REGISTERED NURSE

## 2025-05-04 PROCEDURE — 2500000002 HC RX 250 W HCPCS SELF ADMINISTERED DRUGS (ALT 637 FOR MEDICARE OP, ALT 636 FOR OP/ED): Performed by: REGISTERED NURSE

## 2025-05-04 PROCEDURE — 85025 COMPLETE CBC W/AUTO DIFF WBC: CPT

## 2025-05-04 PROCEDURE — 2500000001 HC RX 250 WO HCPCS SELF ADMINISTERED DRUGS (ALT 637 FOR MEDICARE OP)

## 2025-05-04 PROCEDURE — 2500000005 HC RX 250 GENERAL PHARMACY W/O HCPCS: Performed by: STUDENT IN AN ORGANIZED HEALTH CARE EDUCATION/TRAINING PROGRAM

## 2025-05-04 PROCEDURE — 2500000004 HC RX 250 GENERAL PHARMACY W/ HCPCS (ALT 636 FOR OP/ED): Performed by: STUDENT IN AN ORGANIZED HEALTH CARE EDUCATION/TRAINING PROGRAM

## 2025-05-04 PROCEDURE — 86900 BLOOD TYPING SEROLOGIC ABO: CPT | Performed by: STUDENT IN AN ORGANIZED HEALTH CARE EDUCATION/TRAINING PROGRAM

## 2025-05-04 PROCEDURE — 2500000001 HC RX 250 WO HCPCS SELF ADMINISTERED DRUGS (ALT 637 FOR MEDICARE OP): Performed by: REGISTERED NURSE

## 2025-05-04 PROCEDURE — 70450 CT HEAD/BRAIN W/O DYE: CPT | Performed by: RADIOLOGY

## 2025-05-04 PROCEDURE — 37799 UNLISTED PX VASCULAR SURGERY: CPT | Performed by: REGISTERED NURSE

## 2025-05-04 PROCEDURE — 86923 COMPATIBILITY TEST ELECTRIC: CPT

## 2025-05-04 PROCEDURE — 82947 ASSAY GLUCOSE BLOOD QUANT: CPT

## 2025-05-04 PROCEDURE — 85027 COMPLETE CBC AUTOMATED: CPT

## 2025-05-04 PROCEDURE — 2500000004 HC RX 250 GENERAL PHARMACY W/ HCPCS (ALT 636 FOR OP/ED): Mod: JZ

## 2025-05-04 PROCEDURE — 70450 CT HEAD/BRAIN W/O DYE: CPT

## 2025-05-04 PROCEDURE — 2550000001 HC RX 255 CONTRASTS: Performed by: NEUROLOGICAL SURGERY

## 2025-05-04 PROCEDURE — 82330 ASSAY OF CALCIUM: CPT | Performed by: REGISTERED NURSE

## 2025-05-04 PROCEDURE — 74174 CTA ABD&PLVS W/CONTRAST: CPT | Performed by: RADIOLOGY

## 2025-05-04 PROCEDURE — 85610 PROTHROMBIN TIME: CPT | Performed by: REGISTERED NURSE

## 2025-05-04 PROCEDURE — 94003 VENT MGMT INPAT SUBQ DAY: CPT

## 2025-05-04 PROCEDURE — 2020000001 HC ICU ROOM DAILY

## 2025-05-04 RX ORDER — HEPARIN SODIUM 5000 [USP'U]/ML
5000 INJECTION, SOLUTION INTRAVENOUS; SUBCUTANEOUS ONCE
Status: COMPLETED | OUTPATIENT
Start: 2025-05-04 | End: 2025-05-04

## 2025-05-04 RX ORDER — FUROSEMIDE 10 MG/ML
40 INJECTION INTRAMUSCULAR; INTRAVENOUS ONCE
Status: COMPLETED | OUTPATIENT
Start: 2025-05-04 | End: 2025-05-04

## 2025-05-04 RX ORDER — SODIUM CHLORIDE 9 MG/ML
75 INJECTION, SOLUTION INTRAVENOUS CONTINUOUS
Status: ACTIVE | OUTPATIENT
Start: 2025-05-05 | End: 2025-05-06

## 2025-05-04 RX ADMIN — SENNOSIDES AND DOCUSATE SODIUM 2 TABLET: 50; 8.6 TABLET ORAL at 08:43

## 2025-05-04 RX ADMIN — HYDRALAZINE HYDROCHLORIDE 75 MG: 50 TABLET ORAL at 20:36

## 2025-05-04 RX ADMIN — OXYCODONE HYDROCHLORIDE 5 MG: 5 TABLET ORAL at 01:59

## 2025-05-04 RX ADMIN — AMLODIPINE BESYLATE 10 MG: 10 TABLET ORAL at 08:43

## 2025-05-04 RX ADMIN — INSULIN LISPRO 1 UNITS: 100 INJECTION, SOLUTION INTRAVENOUS; SUBCUTANEOUS at 04:10

## 2025-05-04 RX ADMIN — INSULIN LISPRO 1 UNITS: 100 INJECTION, SOLUTION INTRAVENOUS; SUBCUTANEOUS at 00:04

## 2025-05-04 RX ADMIN — FUROSEMIDE 40 MG: 10 INJECTION INTRAMUSCULAR; INTRAVENOUS at 10:35

## 2025-05-04 RX ADMIN — IOHEXOL 90 ML: 350 INJECTION, SOLUTION INTRAVENOUS at 09:39

## 2025-05-04 RX ADMIN — HEPARIN SODIUM 5000 UNITS: 5000 INJECTION, SOLUTION INTRAVENOUS; SUBCUTANEOUS at 16:16

## 2025-05-04 RX ADMIN — HEPARIN SODIUM 5000 UNITS: 5000 INJECTION, SOLUTION INTRAVENOUS; SUBCUTANEOUS at 00:04

## 2025-05-04 RX ADMIN — CARVEDILOL 6.25 MG: 12.5 TABLET, FILM COATED ORAL at 06:12

## 2025-05-04 RX ADMIN — PANTOPRAZOLE SODIUM 40 MG: 40 INJECTION, POWDER, FOR SOLUTION INTRAVENOUS at 06:12

## 2025-05-04 RX ADMIN — HYDRALAZINE HYDROCHLORIDE 75 MG: 50 TABLET ORAL at 08:43

## 2025-05-04 RX ADMIN — INSULIN LISPRO 1 UNITS: 100 INJECTION, SOLUTION INTRAVENOUS; SUBCUTANEOUS at 11:58

## 2025-05-04 RX ADMIN — BISACODYL 10 MG: 10 SUPPOSITORY RECTAL at 08:43

## 2025-05-04 RX ADMIN — OXYCODONE HYDROCHLORIDE 5 MG: 5 TABLET ORAL at 08:43

## 2025-05-04 RX ADMIN — LISINOPRIL 20 MG: 20 TABLET ORAL at 08:43

## 2025-05-04 RX ADMIN — MAGNESIUM OXIDE TAB 400 MG (241.3 MG ELEMENTAL MG) 400 MG: 400 (241.3 MG) TAB at 08:43

## 2025-05-04 RX ADMIN — SENNOSIDES AND DOCUSATE SODIUM 2 TABLET: 50; 8.6 TABLET ORAL at 20:36

## 2025-05-04 RX ADMIN — CARVEDILOL 6.25 MG: 12.5 TABLET, FILM COATED ORAL at 19:19

## 2025-05-04 RX ADMIN — HYDRALAZINE HYDROCHLORIDE 75 MG: 50 TABLET ORAL at 14:56

## 2025-05-04 RX ADMIN — POTASSIUM CHLORIDE 20 MEQ: 1.5 POWDER, FOR SOLUTION ORAL at 06:12

## 2025-05-04 RX ADMIN — POLYETHYLENE GLYCOL 3350 17 G: 17 POWDER, FOR SOLUTION ORAL at 08:43

## 2025-05-04 RX ADMIN — INSULIN LISPRO 1 UNITS: 100 INJECTION, SOLUTION INTRAVENOUS; SUBCUTANEOUS at 16:16

## 2025-05-04 RX ADMIN — OXYCODONE HYDROCHLORIDE 5 MG: 5 TABLET ORAL at 17:55

## 2025-05-04 RX ADMIN — INSULIN LISPRO 1 UNITS: 100 INJECTION, SOLUTION INTRAVENOUS; SUBCUTANEOUS at 08:22

## 2025-05-04 RX ADMIN — HYDRALAZINE HYDROCHLORIDE 75 MG: 50 TABLET ORAL at 01:59

## 2025-05-04 RX ADMIN — Medication 30 PERCENT: at 07:30

## 2025-05-04 RX ADMIN — ATORVASTATIN CALCIUM 10 MG: 10 TABLET, FILM COATED ORAL at 20:36

## 2025-05-04 RX ADMIN — HEPARIN SODIUM 5000 UNITS: 5000 INJECTION, SOLUTION INTRAVENOUS; SUBCUTANEOUS at 08:43

## 2025-05-04 ASSESSMENT — PAIN - FUNCTIONAL ASSESSMENT

## 2025-05-04 NOTE — PROGRESS NOTES
[facility]  NEUROSCIENCE INTENSIVE CARE UNIT  DAILY PROGRESS NOTE       Patient Name: Muriel De Souza   MRN: [id number]     Admit Date: 2025     : 1955 AGE: 69 y.o. GENDER: female        Subjective    Muriel De Souza is a 69 y.o. female with h/o HTN, HLD p/w HA, arrested in CT scanner at OSH, ROSC after 7 min CPR,  CTH w diffuse SAH and L cerebellar ICH, triventriculomegally, effaced 4th vents, CTA with L cerebellar AVM w venous varices s/p RF EVD and now admitted for further management.    Per chart review, patient had received bad news at home and then had sudden onset headache and vomiting. Found to have SAH at OSH with course complicated by cardiac arrest. She was intubated and sedated and transferred from [place], [place] to [facility].    Significant Events:  -  came in with L cerebellar AVM bleed with course complicated by cardiac arrest ROSC after 7 min CPR, uppon arrival to [facility] NSU, EVD drain placed, went to OR for decompression and AVM resection    Interval Events:   - during cough assist, clifford down to 20s, order was d/c'd  - PEG tube bleeding, saturated 2x2, abdominal binder, gown. Paged  GI on call fellow, they think perhaps some capillaries are bleeding and would like abdominal imaging today       Objective   VITALS (24H):  Temp:  [36 °C (96.8 °F)-36.9 °C (98.4 °F)] 36.1 °C (97 °F)  Heart Rate:  [69-84] 73  Resp:  [12-19] 19  BP: (132-171)/(50-65) 155/62  Arterial Line BP 1: (163-214)/(52-72) 201/65  FiO2 (%):  [40 %] 40 %  INTAKE/OUTPUT:  Intake/Output Summary (Last 24 hours) at 2025 0713  Last data filed at 2025 0600  Gross per 24 hour   Intake 2225 ml   Output 4368 ml   Net -2143 ml     VENT SETTINGS:  Vent Mode: Volume control/assist control  FiO2 (%):  [40 %] 40 %  S RR:  [12] 12  S VT:  [400 mL] 400 mL  PEEP/CPAP (cm H2O):  [8 cm H20-10 cm H20] 8 cm H20  MAP (cm H2O):  [11-13] 11       PHYSICAL EXAM:  NEURO: Off sedation  - Eyes closed to nox stim  - pupils  minimally reactive to light  - corneals negative  - cough reflex intact  - gag reflex not tested as patient is not on vent (pt on trach)  - No response to pain in UEs  - Triple flexion in LLE to nox stim  - No response to pain in RLE  CV:  - RRR on telemetry, NSR  - Arterial line in place  RESP:  - trach  - transmitted upper airway sounds on auscultation  :  - Brooks catheter in place  GI:  - Abdominal binder post-PEG, bleeding  SKIN:  - Intact  - left arm swollen and tense to palpation, not pitting, non-erythematous    MEDICATIONS:  Scheduled: PRN: Continuous:   Scheduled Medications[1] PRN Medications[2] Continuous Medications[3]     IMAGING RESULTS:  CT head wo IV contrast   Final Result   * Diffuse subarachnoid hemorrhage with hydrocephalus   *Left cerebellar hematoma suggesting a likely source.        MACRO:   none        Signed by: Cruz Salgado 4/22/2025 11:59 AM   Dictation workstation:   [id number]      CT angio head and neck w and wo IV contrast   Final Result   * Diffuse subarachnoid hemorrhage with hydrocephalus as described   *Suspected vascular malformation in the left cerebellar hemisphere   with enlarged arterial and venous structures largely in the left   cerebellar hemisphere and residual partial opacification of a venous   aneurysm near the torcula. *Bilateral carotid stenosis as described        MACRO:   none        Signed by: Cruz Salgado 4/22/2025 12:07 PM   Dictation workstation:   [id number]             Assessment/Plan    Muriel De Souza is a 69 y.o. female with h/o HTN, HLD p/w HA, arrested in CT scanner at OSH, ROSC after 7 min CPR,  CTH w diffuse SAH and L cerebellar ICH, triventriculomegally, effaced 4th vents, CTA with L cerebellar AVM w venous varices s/p RF EVD as well as AVM resection on 4/22 and now admitted for further management.    Changes 05/04/25  - awaiting  shunt tomorrow  - NPO midnight  - closely monitoring PEG site for bleed  - follow up abdominal imaging and  GI recs for PEG bleed  -  shunt next week,     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  #Intracranial hypertension  Assessment:  - First day 4/22 presented with SAH, L cerebellar ICH, and ventriculomegaly on CTH  - 4/22 CTA showing L cerebellar AVM  - 4/22 RF EVD for ICH  - 4/22 OR AVM resection  - MRI done 4/29   - LP 5/1 (WBC 5, RBC ^ 1000, Glucose ^ 106, protein 27, CSF culture NGTD 5/3, negative Gram stain)  - Pt is neurologically stable  - No seizure activity on EEG  - Maintained SBP < 180  - Normonatremic goal, sodium down trending without any D5W  - PEG and trach placed    Plan:  - NSU  - Keppra  - EVD in place, maintained at 10  - BP goal: SBP < 180    --> PRN: Labetalol, Hydralazine, and Nicardipine   - IV nicardipine 0.25 mg/min  - PO hydralazine 75 q6  - Amlodipine 10mg oral   - PO Carvedilol 6.25 BID  - PO Lisinopril 20 OD   - Normonatremic goal  - Plan for  shunt next week  - Wean off fentanyl as tolerated  - F/U CSF cultures  - Neuro Checks: Q1H  - Sedation: Off  - Pain: PRN  - Nausea: ondansetron  - PT/OT/SLP    CARDIOVASCULAR:  #Cardiac arrest  #Lactic acidosis, improving  # elevated troponins, improved  Assessment:  - 7 min to ROSC  - Assess for ischemic and stress-induced cardiomyopathy   - Assess for arrhythmias following cardiac arrest   - Cards consult (4/23)  - Type 2 MI (demand ischemia), leading to troponin up-trend (4/23)  - Trop trending down, d/c'd 4/24  - ECHO 4/23:  CONCLUSIONS:   1. Left ventricular ejection fraction is normal, calculated by Adrian's biplane at 71%.   2. Spectral Doppler shows a Grade I (impaired relaxation pattern) of left ventricular diastolic filling with normal left atrial filling pressure.   3. There is normal right ventricular global systolic function.   4. The left atrium is mildly dilated.   5. There is no evidence of a patent foramen ovale.   6. The inferior  vena cava appears mildly dilated, on vent.  - Pt is hemodynamically stable, SBP goal < 180    Plan:  - Monitor on telemetry  - SBP goal < 180 (see above)    RESPIRATORY:  #Acute hypoxic respiratory failure  #Mechanical ventilation  #Bilateral plural effusions  #Pulmonary edema    Results from last 7 days   Lab Units 05/04/25  0123 05/03/25  1747 05/03/25  0453 05/02/25 1736 05/02/25 0405 05/02/25  0002   SODIUM mmol/L 145 144 146* 150* 150* 150*  150*   POTASSIUM mmol/L 3.9 3.9 3.5 3.4*  --  3.3*   CREATININE mg/dL 0.80 0.82 0.88 0.74  --  0.81   BUN mg/dL 29* 28* 27* 30*  --  33*     Assessment:  - Vent requirements stable, FiO2 40 PEEP 10  - CXR 5/2, persistent bilateral pleural effusions  - Diuresed with 40 mg Lasix with -1.4 L net negative UOP 3.8 5/2  - Continue to diurese, replete potassium as needed  - Mild bilateral end-inspiratory wheezing, likely due to pulmonary edema    Plan:  - Continue mechanical ventilation for decreased LOC  - Monitor for increased secretions/desaturations  - Diurese with Lasix 5/3    RENAL/:  #Pre-renal azotemia, resolving  Assessment:  Results from last 72 hours   Lab Units 05/04/25 0123 05/03/25 1747 05/03/25 0453 05/02/25 1736 05/02/25 0405 05/02/25  0002   CREATININE mg/dL 0.80 0.82 0.88 0.74  --  0.81   BUN mg/dL 29* 28* 27* 30*  --  33*   SODIUM mmol/L 145 144 146* 150* 150* 150*  150*   - UOP 3.8 / 24 hours 5/3  - BUN stable 5/3  - Cr stable 5/3  - Resolving pre-renal azotemia    Plan:  - Monitor with daily RFP  - Maintain forbes catheter for: critically ill patient who need accurate urinary output measurements    FEN/GI:  #TIM  Results from last 72 hours   Lab Units 05/04/25  0123 05/03/25  1747 05/03/25  0453 05/02/25  1736 05/02/25  0405 05/02/25  0002   SODIUM mmol/L 145 144 146* 150* 150* 150*  150*   POTASSIUM mmol/L 3.9 3.9 3.5 3.4*  --  3.3*   PHOSPHORUS mg/dL 3.2 4.1 3.9 3.8  --  2.9   - Hypokalemia due to ongoing diuresis with Lasix  - Normonatremic  sodium goal 5/3  - Sodium down trending without D5W, 5/3  - Stable phos 5/3  - Will repeat Lasix 5/3 for persistent bilateral pleural effusions  - PEG tube placed , started trickle feeds    Plan:  - Monitor and replace electrolytes per protocol  - Replete K+ as needed  - Sodium goal normo natremic per NSGY  - IVF: PRN  - Diet: Trickle feeds through PEG, pending nutrition recs  - Bowel Regimen: Docusate-Senna PRN and Miralax PRN    ENDOCRINE:  #TIM  Assessment:  Results from last 7 days   Lab Units 25  0329 25  0123 25  2333 25  1924 25  1747 25  1538 25  1207 25  0829 25  0453 25  2006 25  1736 25  0418 25  0405 25  0002   POCT GLUCOSE mg/dL 153*  --  159* 178*  --  159* 148* 149*  --    < >  --    < >  --   --    GLUCOSE mg/dL  --  173*  --   --  179*  --   --   --  157*  --  108*  --   --  153*   SODIUM mmol/L  --  145  --   --  144  --   --   --  146*  --  150*  --  150* 150*  150*    < > = values in this interval not displayed.    - HbA1C 5.5 on     Plan:  - Accuchecks & ISS PRN     HEMATOLOGY:  #Anemia, likely dilutional  Assessment:  - Baseline Hgb: 12.0  - Baseline Plts: 287  Results from last 7 days   Lab Units 25  0123 25  1747 25  0453   HEMOGLOBIN g/dL 7.9* 9.0* 7.4*   HEMATOCRIT % 25.2* 28.5* 23.3*   PLATELETS AUTO x10*3/uL 298 308 326     Plan:  - Continue to monitor with daily CBC and Coag panel  - maintain active type and screen as needed  - Transfuse at Hb < 7    INFECTIOUS DISEASE:  #TIM  Assessment:  Results from last 7 days   Lab Units 25  0123 25  1747 25  0453   WBC AUTO x10*3/uL 12.8* 14.9* 16.1*    - Temp (24hrs), Av.6 °C (97.8 °F), Min:36 °C (96.8 °F), Max:36.9 °C (98.4 °F)  - White count elevated 5/3, likely reactive due to post-trach and PEG  - Pt afebrile  - CSF gram stain negative, NGTD 5/3  - Bcx no growth at 4 days ()  - Sputum cult  no predominant  organism  - UA 4/25 unremarkable  - Negative pro calcitonin 4/28  - Vanc/Zosyn started 4/25 for desaturation and increased O2 requirement overnight  - Vanc/Zosyn stopped 4/28, low suspicion for infection given clean Bcx, no fevers, resolving leukocytosis, and negative pro calcitonin 4/28    Plan  - Continue to monitor for signs of infection     MUSCULOSKELETAL:  - No acute issues    SKIN:  #Swollen left arm  Assessment:   - LUE with persistent swelling since 4/23  - Non-pitting edema, non-erythematous  - POD6  - SQH started (4/25)  - No DVT in LE (4/25)  - No DVT in RUE (4/25)  - Superficial thrombosis in basilic vein in LUE (4/25)    Plan:   - Turns and skin care per NSU protocol    ACCESS:  - R Femoral  - R arterial  - L PIV    PROPHYLAXIS:  - DVT Ppx: SCDs, SQH  - GI: PPI    RESTRAINTS:  Not indicated/Patient does not meet criteria for restraints    Jian Ellington MD PhD  Neuroscience Intensive Care    The patient is critically ill with acute encephalopathy, acquired hydrocephalus and acute respiratory insufficiency in the setting of ruptured cerebellar AVM  Remains in poor neurological condition  EVD management per neurosurgery: plan for shunt 5/5  Cont keppra  Cont BP control with goal sbp<180  Wean ventilator as tolerated  Anemia stable: Cont to trend, transfuse for Hb<7  PEG in place     I have seen and examined the patient.  I have reviewed the patient's laboratory, radiographic, and clinical data.  I have spent 30 minutes providing critical care for the patient.       VAMSHI Nuñez M.D.       [1] amLODIPine, 10 mg, oral, Daily  atorvastatin, 10 mg, oral, Nightly  bisacodyl, 10 mg, rectal, Daily  carvedilol, 6.25 mg, oral, q12h  heparin (porcine), 5,000 Units, subcutaneous, q8h  hydrALAZINE, 75 mg, oral, q6h  insulin lispro, 0-5 Units, subcutaneous, q4h  lisinopril, 20 mg, oral, Daily  magnesium oxide, 400 mg, oral, Daily  oxyCODONE, 5 mg, g-tube, q8h  pantoprazole, 40 mg, intravenous, Daily before  breakfast  perflutren protein A microsphere, 0.5 mL, intravenous, Once in imaging  polyethylene glycol, 17 g, oral, Daily  sennosides-docusate sodium, 2 tablet, oral, BID  sulfur hexafluoride microsphr, 2 mL, intravenous, Once in imaging     [2] PRN medications: albuterol, calcium gluconate, calcium gluconate, dextrose, dextrose, fentaNYL, glucagon, glucagon, hydrALAZINE, HYDROmorphone, labetaloL, magnesium sulfate, magnesium sulfate, oxygen, potassium chloride CR **OR** potassium chloride, potassium chloride CR **OR** potassium chloride, propofol  [3]

## 2025-05-04 NOTE — PROGRESS NOTES
"Muriel De Souza is a 69 y.o. female on day 12 of admission presenting with AVM (arteriovenous malformation) ([facility]).    Subjective   No events overnight    Objective     Physical Exam  Trach vented   ECNS  OU2R  -cough/gag  BUE flaccid  BLE TF  EVD in place    Last Recorded Vitals  Blood pressure 141/59, pulse 66, temperature 36.1 °C (97 °F), temperature source Temporal, resp. rate 12, height 1.727 m (5' 7.99\"), weight 100 kg (221 lb 1.9 oz), SpO2 98%.  Intake/Output last 3 Shifts:  I/O last 3 completed shifts:  In: 8049.5 (80.3 mL/kg) [I.V.:259.5 (2.6 mL/kg); NG/GT:2590; IV Piggyback:5200]  Out: 9465 (94.4 mL/kg) [Urine:9045 (2.5 mL/kg/hr); Drains:420]  Weight: 100.3 kg     Relevant Results  Results from last 72 hours   Lab Units 05/04/25  0123 05/03/25  1747 05/02/25  1736 05/02/25  1116   GLUCOSE mg/dL 173* 179*   < >  --    SODIUM mmol/L 145 144   < >  --    POTASSIUM mmol/L 3.9 3.9   < >  --    CHLORIDE mmol/L 104 101   < >  --    CO2 mmol/L 35* 35*   < >  --    ANION GAP mmol/L 10 12   < >  --    BUN mg/dL 29* 28*   < >  --    CREATININE mg/dL 0.80 0.82   < >  --    EGFR mL/min/1.73m*2 80 78   < >  --    CALCIUM mg/dL 7.8* 8.2*   < >  --    PHOSPHORUS mg/dL 3.2 4.1   < >  --    ALBUMIN g/dL 2.6* 2.7*   < >  --    MAGNESIUM mg/dL 2.37  --   --  2.14   POCT CALCIUM IONIZED (MMOL/L) IN BLOOD mmol/L 1.16  --   --  1.19    < > = values in this interval not displayed.      Results from last 72 hours   Lab Units 05/04/25  0637 05/04/25  0123   WBC AUTO x10*3/uL 12.7* 12.8*   NRBC AUTO /100 WBCs 0.0 0.0   RBC AUTO x10*6/uL 2.67* 2.71*   HEMOGLOBIN g/dL 7.9* 7.9*   HEMATOCRIT % 25.0* 25.2*   MCV fL 94 93   MCH pg 29.6 29.2   MCHC g/dL 31.6* 31.3*   RDW % 14.4 14.5   PLATELETS AUTO x10*3/uL 304 298        Assessment & Plan  AVM (arteriovenous malformation) ([facility])    Muriel Alcantaraumber is a 69 y.o. w/ h/o HTN, HLD p/w HA, arrested in CT scanner at OSH, ROSC after 7 min CPR, CTH w diffuse SAH and L cerebellar ICH, " triventriculomegally, effaced 4th vents, CTA H/N L cerebellar AVM w venous varices, s/p 1u Plt and DDAVP, s/p RF EVD (OP 18), 4/22 s/p SOC/C1 lami for AVM resection, CTH POC, stable vents, 4/22 TEG R low s/p 1u FFP, 4/23 TTE EF 71%, 4/23 s/p angio tentorial dural AV fistual w direct cortical venous drainage fed by b/l MMA, b/l tentorial arteries, incidental 2mm R pcomm aneurysm, 4/24 CTH slight decr vents  4/29 MRI midbrain and b/l cerebellar hemisphere diffusion hits, 5/1 s/p trach, 5/2 s/p PEG    NSU  EVD at 10, monitor output and ICPs  OR planning for  shunt Mon 5/5   Volumetric CTH for OR planning   Goal riki Na, q12 Na  SBP goal <180  Con't hydral, lisinopril, coreg, may increase  Final path - blood clot and normal vessels  GI recs re: PEG tube  CT angio abd/pelvis  Q8 CBC to trend hgb   SCD, SQH    Kiran Robledo MD

## 2025-05-05 ENCOUNTER — APPOINTMENT (OUTPATIENT)
Dept: RADIOLOGY | Facility: HOSPITAL | Age: 70
DRG: 003 | End: 2025-05-05
Payer: COMMERCIAL

## 2025-05-05 ENCOUNTER — ANESTHESIA (OUTPATIENT)
Dept: OPERATING ROOM | Facility: HOSPITAL | Age: 70
End: 2025-05-05
Payer: COMMERCIAL

## 2025-05-05 ENCOUNTER — SURGERY (OUTPATIENT)
Age: 70
End: 2025-05-05
Payer: COMMERCIAL

## 2025-05-05 LAB
ALBUMIN SERPL BCP-MCNC: 2.7 G/DL (ref 3.4–5)
ALBUMIN SERPL BCP-MCNC: 2.8 G/DL (ref 3.4–5)
ANION GAP SERPL CALC-SCNC: 11 MMOL/L (ref 10–20)
ANION GAP SERPL CALC-SCNC: 9 MMOL/L (ref 10–20)
APTT PPP: 29 SECONDS (ref 26–36)
BASOPHILS # BLD AUTO: 0.02 X10*3/UL (ref 0–0.1)
BASOPHILS # BLD AUTO: 0.03 X10*3/UL (ref 0–0.1)
BASOPHILS NFR BLD AUTO: 0.2 %
BASOPHILS NFR BLD AUTO: 0.2 %
BUN SERPL-MCNC: 24 MG/DL (ref 6–23)
BUN SERPL-MCNC: 26 MG/DL (ref 6–23)
CA-I BLD-SCNC: 1.18 MMOL/L (ref 1.1–1.33)
CALCIUM SERPL-MCNC: 8.1 MG/DL (ref 8.6–10.6)
CALCIUM SERPL-MCNC: 8.1 MG/DL (ref 8.6–10.6)
CHLORIDE SERPL-SCNC: 103 MMOL/L (ref 98–107)
CHLORIDE SERPL-SCNC: 104 MMOL/L (ref 98–107)
CO2 SERPL-SCNC: 30 MMOL/L (ref 21–32)
CO2 SERPL-SCNC: 35 MMOL/L (ref 21–32)
CREAT SERPL-MCNC: 0.61 MG/DL (ref 0.5–1.05)
CREAT SERPL-MCNC: 0.69 MG/DL (ref 0.5–1.05)
EGFRCR SERPLBLD CKD-EPI 2021: >90 ML/MIN/1.73M*2
EGFRCR SERPLBLD CKD-EPI 2021: >90 ML/MIN/1.73M*2
EOSINOPHIL # BLD AUTO: 0.08 X10*3/UL (ref 0–0.7)
EOSINOPHIL # BLD AUTO: 0.16 X10*3/UL (ref 0–0.7)
EOSINOPHIL NFR BLD AUTO: 0.6 %
EOSINOPHIL NFR BLD AUTO: 1.3 %
ERYTHROCYTE [DISTWIDTH] IN BLOOD BY AUTOMATED COUNT: 14.4 % (ref 11.5–14.5)
ERYTHROCYTE [DISTWIDTH] IN BLOOD BY AUTOMATED COUNT: 14.5 % (ref 11.5–14.5)
GLUCOSE BLD MANUAL STRIP-MCNC: 116 MG/DL (ref 74–99)
GLUCOSE BLD MANUAL STRIP-MCNC: 133 MG/DL (ref 74–99)
GLUCOSE BLD MANUAL STRIP-MCNC: 145 MG/DL (ref 74–99)
GLUCOSE BLD MANUAL STRIP-MCNC: 148 MG/DL (ref 74–99)
GLUCOSE BLD MANUAL STRIP-MCNC: 156 MG/DL (ref 74–99)
GLUCOSE SERPL-MCNC: 125 MG/DL (ref 74–99)
GLUCOSE SERPL-MCNC: 148 MG/DL (ref 74–99)
HCT VFR BLD AUTO: 25.1 % (ref 36–46)
HCT VFR BLD AUTO: 26.5 % (ref 36–46)
HGB BLD-MCNC: 7.9 G/DL (ref 12–16)
HGB BLD-MCNC: 8.4 G/DL (ref 12–16)
IMM GRANULOCYTES # BLD AUTO: 0.09 X10*3/UL (ref 0–0.7)
IMM GRANULOCYTES # BLD AUTO: 0.11 X10*3/UL (ref 0–0.7)
IMM GRANULOCYTES NFR BLD AUTO: 0.7 % (ref 0–0.9)
IMM GRANULOCYTES NFR BLD AUTO: 0.8 % (ref 0–0.9)
INR PPP: 1.1 (ref 0.9–1.1)
LYMPHOCYTES # BLD AUTO: 0.97 X10*3/UL (ref 1.2–4.8)
LYMPHOCYTES # BLD AUTO: 1.18 X10*3/UL (ref 1.2–4.8)
LYMPHOCYTES NFR BLD AUTO: 7.2 %
LYMPHOCYTES NFR BLD AUTO: 9.6 %
MAGNESIUM SERPL-MCNC: 2.16 MG/DL (ref 1.6–2.4)
MCH RBC QN AUTO: 29.2 PG (ref 26–34)
MCH RBC QN AUTO: 29.4 PG (ref 26–34)
MCHC RBC AUTO-ENTMCNC: 31.5 G/DL (ref 32–36)
MCHC RBC AUTO-ENTMCNC: 31.7 G/DL (ref 32–36)
MCV RBC AUTO: 92 FL (ref 80–100)
MCV RBC AUTO: 93 FL (ref 80–100)
MONOCYTES # BLD AUTO: 0.9 X10*3/UL (ref 0.1–1)
MONOCYTES # BLD AUTO: 0.95 X10*3/UL (ref 0.1–1)
MONOCYTES NFR BLD AUTO: 6.7 %
MONOCYTES NFR BLD AUTO: 7.8 %
NEUTROPHILS # BLD AUTO: 11.32 X10*3/UL (ref 1.2–7.7)
NEUTROPHILS # BLD AUTO: 9.85 X10*3/UL (ref 1.2–7.7)
NEUTROPHILS NFR BLD AUTO: 80.4 %
NEUTROPHILS NFR BLD AUTO: 84.5 %
NRBC BLD-RTO: 0 /100 WBCS (ref 0–0)
NRBC BLD-RTO: 0 /100 WBCS (ref 0–0)
PHOSPHATE SERPL-MCNC: 3 MG/DL (ref 2.5–4.9)
PHOSPHATE SERPL-MCNC: 3.2 MG/DL (ref 2.5–4.9)
PLATELET # BLD AUTO: 307 X10*3/UL (ref 150–450)
PLATELET # BLD AUTO: 313 X10*3/UL (ref 150–450)
POTASSIUM SERPL-SCNC: 3.7 MMOL/L (ref 3.5–5.3)
POTASSIUM SERPL-SCNC: 3.9 MMOL/L (ref 3.5–5.3)
PROTHROMBIN TIME: 11.7 SECONDS (ref 9.8–12.4)
RBC # BLD AUTO: 2.69 X10*6/UL (ref 4–5.2)
RBC # BLD AUTO: 2.88 X10*6/UL (ref 4–5.2)
SODIUM SERPL-SCNC: 141 MMOL/L (ref 136–145)
SODIUM SERPL-SCNC: 143 MMOL/L (ref 136–145)
WBC # BLD AUTO: 12.3 X10*3/UL (ref 4.4–11.3)
WBC # BLD AUTO: 13.4 X10*3/UL (ref 4.4–11.3)

## 2025-05-05 PROCEDURE — 3600000004 HC OR TIME - INITIAL BASE CHARGE - PROCEDURE LEVEL FOUR: Performed by: NEUROLOGICAL SURGERY

## 2025-05-05 PROCEDURE — 83735 ASSAY OF MAGNESIUM: CPT

## 2025-05-05 PROCEDURE — 2500000005 HC RX 250 GENERAL PHARMACY W/O HCPCS: Performed by: STUDENT IN AN ORGANIZED HEALTH CARE EDUCATION/TRAINING PROGRAM

## 2025-05-05 PROCEDURE — 2500000001 HC RX 250 WO HCPCS SELF ADMINISTERED DRUGS (ALT 637 FOR MEDICARE OP): Performed by: STUDENT IN AN ORGANIZED HEALTH CARE EDUCATION/TRAINING PROGRAM

## 2025-05-05 PROCEDURE — 70450 CT HEAD/BRAIN W/O DYE: CPT

## 2025-05-05 PROCEDURE — 2500000002 HC RX 250 W HCPCS SELF ADMINISTERED DRUGS (ALT 637 FOR MEDICARE OP, ALT 636 FOR OP/ED): Performed by: STUDENT IN AN ORGANIZED HEALTH CARE EDUCATION/TRAINING PROGRAM

## 2025-05-05 PROCEDURE — 94003 VENT MGMT INPAT SUBQ DAY: CPT

## 2025-05-05 PROCEDURE — 3600000009 HC OR TIME - EACH INCREMENTAL 1 MINUTE - PROCEDURE LEVEL FOUR: Performed by: NEUROLOGICAL SURGERY

## 2025-05-05 PROCEDURE — 0WJG4ZZ INSPECTION OF PERITONEAL CAVITY, PERCUTANEOUS ENDOSCOPIC APPROACH: ICD-10-PCS | Performed by: NEUROLOGICAL SURGERY

## 2025-05-05 PROCEDURE — C1894 INTRO/SHEATH, NON-LASER: HCPCS | Performed by: NEUROLOGICAL SURGERY

## 2025-05-05 PROCEDURE — 2500000004 HC RX 250 GENERAL PHARMACY W/ HCPCS (ALT 636 FOR OP/ED): Mod: JZ | Performed by: STUDENT IN AN ORGANIZED HEALTH CARE EDUCATION/TRAINING PROGRAM

## 2025-05-05 PROCEDURE — 2500000001 HC RX 250 WO HCPCS SELF ADMINISTERED DRUGS (ALT 637 FOR MEDICARE OP)

## 2025-05-05 PROCEDURE — 2500000004 HC RX 250 GENERAL PHARMACY W/ HCPCS (ALT 636 FOR OP/ED): Mod: JW | Performed by: ANESTHESIOLOGIST ASSISTANT

## 2025-05-05 PROCEDURE — A62223 PR CREATE SHUNT:VENTRIC-PERITONEAL: Performed by: ANESTHESIOLOGIST ASSISTANT

## 2025-05-05 PROCEDURE — 00163J6 BYPASS CEREBRAL VENTRICLE TO PERITONEAL CAVITY WITH SYNTHETIC SUBSTITUTE, PERCUTANEOUS APPROACH: ICD-10-PCS | Performed by: NEUROLOGICAL SURGERY

## 2025-05-05 PROCEDURE — 82330 ASSAY OF CALCIUM: CPT

## 2025-05-05 PROCEDURE — 2500000002 HC RX 250 W HCPCS SELF ADMINISTERED DRUGS (ALT 637 FOR MEDICARE OP, ALT 636 FOR OP/ED): Performed by: REGISTERED NURSE

## 2025-05-05 PROCEDURE — 85610 PROTHROMBIN TIME: CPT

## 2025-05-05 PROCEDURE — A62223 PR CREATE SHUNT:VENTRIC-PERITONEAL: Performed by: ANESTHESIOLOGY

## 2025-05-05 PROCEDURE — 2780000003 HC OR 278 NO HCPCS: Performed by: NEUROLOGICAL SURGERY

## 2025-05-05 PROCEDURE — 2500000004 HC RX 250 GENERAL PHARMACY W/ HCPCS (ALT 636 FOR OP/ED)

## 2025-05-05 PROCEDURE — 2500000005 HC RX 250 GENERAL PHARMACY W/O HCPCS: Mod: JZ | Performed by: NEUROLOGICAL SURGERY

## 2025-05-05 PROCEDURE — 61781 SCAN PROC CRANIAL INTRA: CPT | Performed by: NEUROLOGICAL SURGERY

## 2025-05-05 PROCEDURE — 2720000007 HC OR 272 NO HCPCS: Performed by: NEUROLOGICAL SURGERY

## 2025-05-05 PROCEDURE — 70450 CT HEAD/BRAIN W/O DYE: CPT | Performed by: RADIOLOGY

## 2025-05-05 PROCEDURE — 82947 ASSAY GLUCOSE BLOOD QUANT: CPT

## 2025-05-05 PROCEDURE — 8E09XBZ COMPUTER ASSISTED PROCEDURE OF HEAD AND NECK REGION: ICD-10-PCS | Performed by: NEUROLOGICAL SURGERY

## 2025-05-05 PROCEDURE — C1889 IMPLANT/INSERT DEVICE, NOC: HCPCS | Performed by: NEUROLOGICAL SURGERY

## 2025-05-05 PROCEDURE — 80069 RENAL FUNCTION PANEL: CPT | Performed by: STUDENT IN AN ORGANIZED HEALTH CARE EDUCATION/TRAINING PROGRAM

## 2025-05-05 PROCEDURE — 85025 COMPLETE CBC W/AUTO DIFF WBC: CPT

## 2025-05-05 PROCEDURE — 2500000004 HC RX 250 GENERAL PHARMACY W/ HCPCS (ALT 636 FOR OP/ED): Performed by: NEUROLOGICAL SURGERY

## 2025-05-05 PROCEDURE — 37799 UNLISTED PX VASCULAR SURGERY: CPT

## 2025-05-05 PROCEDURE — C1729 CATH, DRAINAGE: HCPCS | Performed by: NEUROLOGICAL SURGERY

## 2025-05-05 PROCEDURE — 62223 ESTABLISH BRAIN CAVITY SHUNT: CPT | Performed by: STUDENT IN AN ORGANIZED HEALTH CARE EDUCATION/TRAINING PROGRAM

## 2025-05-05 PROCEDURE — 2020000001 HC ICU ROOM DAILY

## 2025-05-05 PROCEDURE — 62223 ESTABLISH BRAIN CAVITY SHUNT: CPT | Performed by: NEUROLOGICAL SURGERY

## 2025-05-05 PROCEDURE — 99291 CRITICAL CARE FIRST HOUR: CPT

## 2025-05-05 PROCEDURE — 94799 UNLISTED PULMONARY SVC/PX: CPT

## 2025-05-05 PROCEDURE — 2500000004 HC RX 250 GENERAL PHARMACY W/ HCPCS (ALT 636 FOR OP/ED): Performed by: STUDENT IN AN ORGANIZED HEALTH CARE EDUCATION/TRAINING PROGRAM

## 2025-05-05 PROCEDURE — 85025 COMPLETE CBC W/AUTO DIFF WBC: CPT | Performed by: STUDENT IN AN ORGANIZED HEALTH CARE EDUCATION/TRAINING PROGRAM

## 2025-05-05 PROCEDURE — 2500000004 HC RX 250 GENERAL PHARMACY W/ HCPCS (ALT 636 FOR OP/ED): Mod: JZ

## 2025-05-05 PROCEDURE — 3700000001 HC GENERAL ANESTHESIA TIME - INITIAL BASE CHARGE: Performed by: NEUROLOGICAL SURGERY

## 2025-05-05 PROCEDURE — 3700000002 HC GENERAL ANESTHESIA TIME - EACH INCREMENTAL 1 MINUTE: Performed by: NEUROLOGICAL SURGERY

## 2025-05-05 PROCEDURE — 80069 RENAL FUNCTION PANEL: CPT

## 2025-05-05 DEVICE — RESERVOIR, RICKHAM, STANDARD: Type: IMPLANTABLE DEVICE | Site: BRAIN | Status: FUNCTIONAL

## 2025-05-05 DEVICE — CODMAN BACTISEAL DISTAL CATHETER KIT WITH BACTISEAL SHUNT SYSTEM CONTENTS: DISTAL CATHETER 120CM (1), INFORMATION MANUAL (1)
Type: IMPLANTABLE DEVICE | Site: BRAIN | Status: FUNCTIONAL
Brand: CODMAN BACTISEAL

## 2025-05-05 DEVICE — VALVE, CERTAS PLUS, W/O SIPHONGUARD: Type: IMPLANTABLE DEVICE | Site: BRAIN | Status: FUNCTIONAL

## 2025-05-05 RX ORDER — ESMOLOL HYDROCHLORIDE 10 MG/ML
INJECTION INTRAVENOUS AS NEEDED
Status: DISCONTINUED | OUTPATIENT
Start: 2025-05-05 | End: 2025-05-05

## 2025-05-05 RX ORDER — LIDOCAINE HYDROCHLORIDE AND EPINEPHRINE 5; 5 MG/ML; UG/ML
INJECTION, SOLUTION INFILTRATION; PERINEURAL AS NEEDED
Status: DISCONTINUED | OUTPATIENT
Start: 2025-05-05 | End: 2025-05-05 | Stop reason: HOSPADM

## 2025-05-05 RX ORDER — PHENYLEPHRINE 10 MG/250 ML(40 MCG/ML)IN 0.9 % SOD.CHLORIDE INTRAVENOUS
CONTINUOUS PRN
Status: DISCONTINUED | OUTPATIENT
Start: 2025-05-05 | End: 2025-05-05

## 2025-05-05 RX ORDER — PHENYLEPHRINE HCL IN 0.9% NACL 0.4MG/10ML
SYRINGE (ML) INTRAVENOUS AS NEEDED
Status: DISCONTINUED | OUTPATIENT
Start: 2025-05-05 | End: 2025-05-05

## 2025-05-05 RX ORDER — PROPOFOL 10 MG/ML
INJECTION, EMULSION INTRAVENOUS AS NEEDED
Status: DISCONTINUED | OUTPATIENT
Start: 2025-05-05 | End: 2025-05-05

## 2025-05-05 RX ORDER — LISINOPRIL 20 MG/1
20 TABLET ORAL DAILY
Status: DISCONTINUED | OUTPATIENT
Start: 2025-05-05 | End: 2025-05-06

## 2025-05-05 RX ORDER — POLYMYXIN B 500000 [USP'U]/1
INJECTION, POWDER, LYOPHILIZED, FOR SOLUTION INTRAMUSCULAR; INTRATHECAL; INTRAVENOUS; OPHTHALMIC AS NEEDED
Status: DISCONTINUED | OUTPATIENT
Start: 2025-05-05 | End: 2025-05-05 | Stop reason: HOSPADM

## 2025-05-05 RX ORDER — FENTANYL CITRATE 50 UG/ML
INJECTION, SOLUTION INTRAMUSCULAR; INTRAVENOUS AS NEEDED
Status: DISCONTINUED | OUTPATIENT
Start: 2025-05-05 | End: 2025-05-05

## 2025-05-05 RX ORDER — BACITRACIN 500 [USP'U]/G
OINTMENT TOPICAL AS NEEDED
Status: DISCONTINUED | OUTPATIENT
Start: 2025-05-05 | End: 2025-05-05 | Stop reason: HOSPADM

## 2025-05-05 RX ORDER — ROCURONIUM BROMIDE 10 MG/ML
INJECTION, SOLUTION INTRAVENOUS AS NEEDED
Status: DISCONTINUED | OUTPATIENT
Start: 2025-05-05 | End: 2025-05-05

## 2025-05-05 RX ORDER — VANCOMYCIN HYDROCHLORIDE 1 G/20ML
INJECTION, POWDER, LYOPHILIZED, FOR SOLUTION INTRAVENOUS AS NEEDED
Status: DISCONTINUED | OUTPATIENT
Start: 2025-05-05 | End: 2025-05-05

## 2025-05-05 RX ORDER — CEFAZOLIN 1 G/1
INJECTION, POWDER, FOR SOLUTION INTRAVENOUS AS NEEDED
Status: DISCONTINUED | OUTPATIENT
Start: 2025-05-05 | End: 2025-05-05

## 2025-05-05 RX ORDER — FUROSEMIDE 10 MG/ML
40 INJECTION INTRAMUSCULAR; INTRAVENOUS ONCE
Status: DISCONTINUED | OUTPATIENT
Start: 2025-05-05 | End: 2025-05-05

## 2025-05-05 RX ADMIN — INSULIN LISPRO 1 UNITS: 100 INJECTION, SOLUTION INTRAVENOUS; SUBCUTANEOUS at 12:23

## 2025-05-05 RX ADMIN — INSULIN LISPRO 1 UNITS: 100 INJECTION, SOLUTION INTRAVENOUS; SUBCUTANEOUS at 00:12

## 2025-05-05 RX ADMIN — ATORVASTATIN CALCIUM 10 MG: 10 TABLET, FILM COATED ORAL at 21:44

## 2025-05-05 RX ADMIN — ESMOLOL HYDROCHLORIDE 20 MG: 100 INJECTION, SOLUTION INTRAVENOUS at 10:26

## 2025-05-05 RX ADMIN — CARVEDILOL 6.25 MG: 12.5 TABLET, FILM COATED ORAL at 06:19

## 2025-05-05 RX ADMIN — HYDRALAZINE HYDROCHLORIDE 75 MG: 50 TABLET ORAL at 13:28

## 2025-05-05 RX ADMIN — PROPOFOL 20 MG: 10 INJECTION, EMULSION INTRAVENOUS at 10:26

## 2025-05-05 RX ADMIN — HYDRALAZINE HYDROCHLORIDE 75 MG: 50 TABLET ORAL at 08:21

## 2025-05-05 RX ADMIN — SUGAMMADEX 200 MG: 100 INJECTION, SOLUTION INTRAVENOUS at 11:20

## 2025-05-05 RX ADMIN — AMLODIPINE BESYLATE 10 MG: 10 TABLET ORAL at 08:20

## 2025-05-05 RX ADMIN — MAGNESIUM OXIDE TAB 400 MG (241.3 MG ELEMENTAL MG) 400 MG: 400 (241.3 MG) TAB at 08:20

## 2025-05-05 RX ADMIN — ROCURONIUM BROMIDE 40 MG: 10 INJECTION INTRAVENOUS at 09:27

## 2025-05-05 RX ADMIN — SODIUM CHLORIDE 75 ML/HR: 0.9 INJECTION, SOLUTION INTRAVENOUS at 00:12

## 2025-05-05 RX ADMIN — HYDRALAZINE HYDROCHLORIDE 75 MG: 50 TABLET ORAL at 21:44

## 2025-05-05 RX ADMIN — FENTANYL CITRATE 25 MCG: 50 INJECTION, SOLUTION INTRAMUSCULAR; INTRAVENOUS at 10:53

## 2025-05-05 RX ADMIN — SENNOSIDES AND DOCUSATE SODIUM 2 TABLET: 50; 8.6 TABLET ORAL at 21:44

## 2025-05-05 RX ADMIN — HYDRALAZINE HYDROCHLORIDE 75 MG: 50 TABLET ORAL at 01:57

## 2025-05-05 RX ADMIN — CARVEDILOL 6.25 MG: 12.5 TABLET, FILM COATED ORAL at 18:42

## 2025-05-05 RX ADMIN — OXYCODONE HYDROCHLORIDE 5 MG: 5 TABLET ORAL at 01:58

## 2025-05-05 RX ADMIN — PROPOFOL 30 MG: 10 INJECTION, EMULSION INTRAVENOUS at 10:57

## 2025-05-05 RX ADMIN — ROCURONIUM BROMIDE 10 MG: 10 INJECTION INTRAVENOUS at 10:10

## 2025-05-05 RX ADMIN — Medication 120 MCG: at 09:31

## 2025-05-05 RX ADMIN — Medication 30 PERCENT: at 08:11

## 2025-05-05 RX ADMIN — BACITRACIN 1 APPLICATION: 500 OINTMENT TOPICAL at 11:12

## 2025-05-05 RX ADMIN — LIDOCAINE HYDROCHLORIDE AND EPINEPHRINE 9 ML: 5; 5 INJECTION, SOLUTION INFILTRATION; PERINEURAL at 10:41

## 2025-05-05 RX ADMIN — LABETALOL HYDROCHLORIDE 10 MG: 5 INJECTION, SOLUTION INTRAVENOUS at 14:12

## 2025-05-05 RX ADMIN — PANTOPRAZOLE SODIUM 40 MG: 40 INJECTION, POWDER, FOR SOLUTION INTRAVENOUS at 06:19

## 2025-05-05 RX ADMIN — PHENYLEPHRINE-NACL IV SOLUTION 10 MG/250ML-0.9% 0.4 MCG/KG/MIN: 10-0.9/25 SOLUTION at 09:32

## 2025-05-05 RX ADMIN — Medication 80 MCG: at 09:27

## 2025-05-05 RX ADMIN — CEFAZOLIN 2 G: 1 INJECTION, POWDER, FOR SOLUTION INTRAMUSCULAR; INTRAVENOUS at 09:24

## 2025-05-05 RX ADMIN — GENTAMICIN SULFATE 80 MG: 40 INJECTION, SOLUTION INTRAMUSCULAR; INTRAVENOUS at 09:33

## 2025-05-05 RX ADMIN — FENTANYL CITRATE 25 MCG: 50 INJECTION, SOLUTION INTRAMUSCULAR; INTRAVENOUS at 10:19

## 2025-05-05 RX ADMIN — SODIUM CHLORIDE: 0.9 INJECTION, SOLUTION INTRAVENOUS at 10:59

## 2025-05-05 RX ADMIN — VANCOMYCIN HYDROCHLORIDE 1.5 G: 1025 INJECTION, POWDER, FOR SOLUTION INTRAVENOUS; ORAL at 09:25

## 2025-05-05 RX ADMIN — OXYCODONE HYDROCHLORIDE 5 MG: 5 TABLET ORAL at 08:45

## 2025-05-05 RX ADMIN — LISINOPRIL 20 MG: 20 TABLET ORAL at 18:42

## 2025-05-05 RX ADMIN — OXYCODONE HYDROCHLORIDE 5 MG: 5 TABLET ORAL at 16:46

## 2025-05-05 RX ADMIN — LABETALOL HYDROCHLORIDE 10 MG: 5 INJECTION, SOLUTION INTRAVENOUS at 16:05

## 2025-05-05 RX ADMIN — POLYMYXIN B SULFATE 500000 UNITS: 500000 INJECTION, POWDER, LYOPHILIZED, FOR SOLUTION INTRAMUSCULAR; INTRATHECAL; INTRAVENOUS; OPHTHALMIC at 09:32

## 2025-05-05 RX ADMIN — PROPOFOL 10 MG: 10 INJECTION, EMULSION INTRAVENOUS at 11:02

## 2025-05-05 SDOH — HEALTH STABILITY: MENTAL HEALTH: CURRENT SMOKER: 0

## 2025-05-05 ASSESSMENT — PAIN - FUNCTIONAL ASSESSMENT

## 2025-05-05 ASSESSMENT — PAIN SCALES - GENERAL
PAINLEVEL_OUTOF10: 0 - NO PAIN
PAIN_LEVEL: 0

## 2025-05-05 NOTE — PROGRESS NOTES
[facility]  NEUROSCIENCE INTENSIVE CARE UNIT  DAILY PROGRESS NOTE       Patient Name: Mureil De Souza   MRN: [id number]     Admit Date: 2025     : 1955 AGE: 69 y.o. GENDER: female        Subjective    Muriel De Souza is a 69 y.o. female with h/o HTN, HLD p/w HA, arrested in CT scanner at OSH, ROSC after 7 min CPR,  CTH w diffuse SAH and L cerebellar ICH, triventriculomegally, effaced 4th vents, CTA with L cerebellar AVM w venous varices s/p RF EVD and now admitted for further management.    Per chart review, patient had received bad news at home and then had sudden onset headache and vomiting. Found to have SAH at OSH with course complicated by cardiac arrest. She was intubated and sedated and transferred from [place], [place] to [facility].    Significant Events:  -  came in with L cerebellar AVM bleed with course complicated by cardiac arrest ROSC after 7 min CPR, uppon arrival to [facility] NSU, EVD drain placed, went to OR for decompression and AVM resection  - : - during cough assist, clifford down to 20s, order was d/c'd; PEG tube bleeding, saturated 2x2, abdominal binder, gown. Paged  GI on call fellow, they think perhaps some capillaries are bleeding and would like abdominal imaging today    Interval Events:   Reportedly having some spontaneous movements of bilateral upper extremities       Objective   VITALS (24H):  Temp:  [36.1 °C (97 °F)-36.5 °C (97.7 °F)] 36.5 °C (97.7 °F)  Heart Rate:  [66-80] 74  Resp:  [12-14] 12  BP: (137-167)/(55-70) 137/57  Arterial Line BP 1: (171-212)/(56-73) 174/62  FiO2 (%):  [30 %] 30 %  INTAKE/OUTPUT:  Intake/Output Summary (Last 24 hours) at 2025 0717  Last data filed at 2025 0700  Gross per 24 hour   Intake 1600 ml   Output 4945 ml   Net -3345 ml     VENT SETTINGS:  Vent Mode: Volume control/assist control  FiO2 (%):  [30 %] 30 %  S RR:  [12] 12  S VT:  [400 mL] 400 mL  PEEP/CPAP (cm H2O):  [8 cm H20] 8 cm H20  MAP (cm H2O):  [11] 11        PHYSICAL EXAM:  NEURO:  - Eyes closed to nox stim  - pupils minimally reactive to light  - corneals negative  - cough reflex intact  - gag reflex not tested as patient is not on vent (pt on trach)  - No response to pain in UEs  - Triple flexion in LLE to nox stim  - No response to pain in RLE  CV:  - RRR on telemetry, NSR  - Arterial line in place  RESP:  - trach  - transmitted upper airway sounds on auscultation  :  - Brooks catheter in place  GI:  - PEG in place  SKIN:  - Intact  - left arm swollen and tense to palpation, not pitting, non-erythematous    MEDICATIONS:  Scheduled: PRN: Continuous:   Scheduled Medications[1] PRN Medications[2] Continuous Medications[3]     IMAGING RESULTS:  CT head wo IV contrast   Final Result   * Diffuse subarachnoid hemorrhage with hydrocephalus   *Left cerebellar hematoma suggesting a likely source.        MACRO:   none        Signed by: Cruz Salgado 4/22/2025 11:59 AM   Dictation workstation:   [id number]      CT angio head and neck w and wo IV contrast   Final Result   * Diffuse subarachnoid hemorrhage with hydrocephalus as described   *Suspected vascular malformation in the left cerebellar hemisphere   with enlarged arterial and venous structures largely in the left   cerebellar hemisphere and residual partial opacification of a venous   aneurysm near the torcula. *Bilateral carotid stenosis as described        MACRO:   none        Signed by: Cruz Salgado 4/22/2025 12:07 PM   Dictation workstation:   [id number]             Assessment/Plan    Muriel De Souza is a 69 y.o. female with h/o HTN, HLD p/w HA, arrested in CT scanner at OSH, ROSC after 7 min CPR,  CTH w diffuse SAH and L cerebellar ICH, triventriculomegally, effaced 4th vents, CTA with L cerebellar AVM w venous varices s/p RF EVD as well as AVM resection on 4/22 and now admitted for further management.    Changes 05/05/25  -  shunt today    NEURO:  #SAH  # L cerebellar ICH  #Triventriculomegaly  with effaced 4th ventricle  #L cerebellar AVM w venous varices s/p resection on 4/22  #Intracranial hypertension  Assessment:  - First day 4/22 presented with SAH, L cerebellar ICH, and ventriculomegaly on CTH  - 4/22 CTA showing L cerebellar AVM  - 4/22 RF EVD for ICH  - 4/22 OR AVM resection  - MRI done 4/29   - LP 5/1 (WBC 5, RBC ^ 1000, Glucose ^ 106, protein 27, CSF culture NGTD 5/3, negative Gram stain)  - Pt is neurologically stable  - No seizure activity on EEG  - Maintained SBP < 180  - Normonatremic goal, sodium down trending without any D5W  - PEG and trach placed    Plan:  - NSU  - Keppra  - EVD in place, maintained at 10  - BP goal: SBP < 180    --> PRN: Labetalol, Hydralazine, and Nicardipine   - IV nicardipine 0.25 mg/min  - PO hydralazine 75 q6  - Amlodipine 10mg oral   - PO Carvedilol 6.25 BID  - PO Lisinopril 20 OD   - Normonatremic goal  - Plan for  shunt next week  - Wean off fentanyl as tolerated  - F/U CSF cultures  - Neuro Checks: Q1H  - Sedation: Off  - Pain: PRN  - Nausea: ondansetron  - PT/OT/SLP    CARDIOVASCULAR:  #Cardiac arrest  #Lactic acidosis, improving  # elevated troponins, improved  Assessment:  - 7 min to ROSC  - Assess for ischemic and stress-induced cardiomyopathy   - Assess for arrhythmias following cardiac arrest   - Cards consult (4/23)  - Type 2 MI (demand ischemia), leading to troponin up-trend (4/23)  - Trop trending down, d/c'd 4/24  - ECHO 4/23:  CONCLUSIONS:   1. Left ventricular ejection fraction is normal, calculated by Adrian's biplane at 71%.   2. Spectral Doppler shows a Grade I (impaired relaxation pattern) of left ventricular diastolic filling with normal left atrial filling pressure.   3. There is normal right ventricular global systolic function.   4. The left atrium is mildly dilated.   5. There is no evidence of a patent foramen ovale.   6. The inferior vena cava appears mildly dilated, on vent.  - Pt is hemodynamically stable, SBP goal < 180    Plan:  -  Monitor on telemetry  - SBP goal < 180 (see above)    RESPIRATORY:  #Acute hypoxic respiratory failure  #Mechanical ventilation  #Bilateral plural effusions  #Pulmonary edema    Results from last 7 days   Lab Units 05/05/25  0210 05/04/25  1702 05/04/25  0123 05/03/25  1747 05/03/25  0453   SODIUM mmol/L 143 143 145 144 146*   POTASSIUM mmol/L 3.9 3.7 3.9 3.9 3.5   CREATININE mg/dL 0.69 0.77 0.80 0.82 0.88   BUN mg/dL 26* 27* 29* 28* 27*     Assessment:  - Vent requirements stable, FiO2 40 PEEP 10  - CXR 5/2, persistent bilateral pleural effusions  - Diuresed with 40 mg Lasix with -1.4 L net negative UOP 3.8 5/2  - Continue to diurese, replete potassium as needed  - Mild bilateral end-inspiratory wheezing, likely due to pulmonary edema    Plan:  - Continue mechanical ventilation for decreased LOC  - Monitor for increased secretions/desaturations    RENAL/:  #Pre-renal azotemia, resolving  Assessment:  Results from last 72 hours   Lab Units 05/05/25  0210 05/04/25 1702 05/04/25 0123 05/03/25 1747 05/03/25  0453   CREATININE mg/dL 0.69 0.77 0.80 0.82 0.88   BUN mg/dL 26* 27* 29* 28* 27*   SODIUM mmol/L 143 143 145 144 146*   - UOP 3.8 / 24 hours 5/3  - BUN stable 5/3  - Cr stable 5/3    Plan:  - Monitor with daily RFP  - Maintain forbes catheter for: critically ill patient who need accurate urinary output measurements    FEN/GI:  #TIM  Results from last 72 hours   Lab Units 05/05/25  0210 05/04/25  1702 05/04/25 0123 05/03/25 1747 05/03/25  0453   SODIUM mmol/L 143 143 145 144 146*   POTASSIUM mmol/L 3.9 3.7 3.9 3.9 3.5   PHOSPHORUS mg/dL 3.2 3.3 3.2 4.1 3.9   - Hypokalemia due to ongoing diuresis with Lasix  - Normonatremic sodium goal 5/3  - Sodium down trending without D5W, 5/3  - Stable phos 5/3  - Will repeat Lasix 5/3 for persistent bilateral pleural effusions  - PEG tube placed 5/2, started trickle feeds    Plan:  - Monitor and replace electrolytes per protocol  - Replete K+ as needed  - Sodium goal normo  natremic per NSGY  - IVF: PRN  - Diet: Trickle feeds through PEG, pending nutrition recs  - Bowel Regimen: Docusate-Senna PRN and Miralax PRN    ENDOCRINE:  #TIM  Assessment:  Results from last 7 days   Lab Units 25  0501 25  0210 25  2325 25  1702 25  1554 25  1138 25  0822 25  0329 25  0123 25  1924 25  1747 25  0829 25  0453 25  1736   POCT GLUCOSE mg/dL 145*  --  156* 150*  --  156* 157* 162*   < >  --    < >  --    < >  --    < >  --    GLUCOSE mg/dL  --  148*  --   --  176*  --   --   --   --  173*  --  179*  --  157*  --  108*   SODIUM mmol/L  --  143  --   --  143  --   --   --   --  145  --  144  --  146*  --  150*    < > = values in this interval not displayed.    - HbA1C 5.5 on     Plan:  - Accuchecks & ISS PRN     HEMATOLOGY:  #Anemia, likely dilutional  Assessment:  - Baseline Hgb: 12.0  - Baseline Plts: 287  Results from last 7 days   Lab Units 25  02125  1702   HEMOGLOBIN g/dL 7.9* 8.4* 8.7*   HEMATOCRIT % 25.1* 27.0* 27.6*   PLATELETS AUTO x10*3/uL 313 344 341     Plan:  - Continue to monitor with daily CBC and Coag panel  - maintain active type and screen as needed  - Transfuse at Hb < 7    INFECTIOUS DISEASE:  #TIM  Assessment:  Results from last 7 days   Lab Units 25  02125  1702   WBC AUTO x10*3/uL 12.3* 13.5* 13.8*    - Temp (24hrs), Av.3 °C (97.4 °F), Min:36.1 °C (97 °F), Max:36.5 °C (97.7 °F)  - White count elevated 5/3, likely reactive due to post-trach and PEG  - Pt afebrile  - CSF gram stain negative, NGTD 5/3  - Bcx no growth at 4 days ()  - Sputum cult  no predominant organism  - UA  unremarkable  - Negative pro calcitonin   - Vanc/Zosyn started  for desaturation and increased O2 requirement overnight  - Vanc/Zosyn stopped , low suspicion for infection given clean Bcx, no fevers,  resolving leukocytosis, and negative pro calcitonin 4/28    Plan  - Continue to monitor for signs of infection     MUSCULOSKELETAL:  - No acute issues    SKIN:  #Swollen left arm  Assessment:   - LUE with persistent swelling since 4/23  - Non-pitting edema, non-erythematous  - POD6  - SQH started (4/25)  - No DVT in LE (4/25)  - No DVT in RUE (4/25)  - Superficial thrombosis in basilic vein in LUE (4/25)    Plan:   - Turns and skin care per NSU protocol    ACCESS:  - R Femoral  - R arterial  - L PIV    PROPHYLAXIS:  - DVT Ppx: SCDs, SQH held for VPS  - GI: PPI    RESTRAINTS:  Not indicated/Patient does not meet criteria for restraints    Jian Ellington MD PhD  Neuroscience Intensive Care       [1] amLODIPine, 10 mg, oral, Daily  atorvastatin, 10 mg, oral, Nightly  bisacodyl, 10 mg, rectal, Daily  carvedilol, 6.25 mg, oral, q12h  hydrALAZINE, 75 mg, oral, q6h  insulin lispro, 0-5 Units, subcutaneous, q4h  [Held by provider] lisinopril, 20 mg, oral, Daily  magnesium oxide, 400 mg, oral, Daily  oxyCODONE, 5 mg, g-tube, q8h  pantoprazole, 40 mg, intravenous, Daily before breakfast  perflutren protein A microsphere, 0.5 mL, intravenous, Once in imaging  polyethylene glycol, 17 g, oral, Daily  sennosides-docusate sodium, 2 tablet, oral, BID  sulfur hexafluoride microsphr, 2 mL, intravenous, Once in imaging     [2] PRN medications: albuterol, calcium gluconate, calcium gluconate, dextrose, dextrose, fentaNYL, glucagon, glucagon, hydrALAZINE, HYDROmorphone, labetaloL, magnesium sulfate, magnesium sulfate, oxygen, potassium chloride CR **OR** potassium chloride, potassium chloride CR **OR** potassium chloride, propofol  [3] sodium chloride 0.9%, 75 mL/hr, Last Rate: 75 mL/hr (05/05/25 0400)

## 2025-05-05 NOTE — PROGRESS NOTES
Team shared patient was having shunt placement today and then would be ready for discharge. SW spoke with the patient's  and sister and they selected [facility] in [place] and referral was placed in [facility]. Will continue to follow with planning for the patient's transfer to LTACH at discharge.   LIN Kennedy  5/5/25@14:00

## 2025-05-05 NOTE — OP NOTE
INSERTION, SHUNT, VENTRICULOPERITONEAL (R) Operative Note     Date: 2025  OR Location: [facility] OR    Name: Muriel De Souza, : 1955, Age: 69 y.o., MRN: [id number], Sex: female    Diagnosis  Pre-op Diagnosis      * Subarachnoid hemorrhage (Multi) [I60.9] Post-op Diagnosis     * Subarachnoid hemorrhage (Multi) [I60.9]     Procedures  INSERTION, SHUNT, VENTRICULOPERITONEAL  33615 - CT CRTJ SHUNT [id number] TERMINUS      Surgeons      * Moy Hood - Primary    Resident/Fellow/Other Assistant:  Surgeons and Role:     * Kristy Solares MD - Resident - Assisting     * Muriel More MD - Resident - Assisting     * Joselito Esparza MD - Fellow    Staff:   Circulator: Syl  Circulator: Mary  Scrub Person: Damaris Huntub Person: Vickie Campos Scrub: Isela    Anesthesia Staff: Anesthesiologist: Aspen Wolff MD  C-AA: SLIME Cardona Capp  BISHNU: Destinee Baker    Procedure Summary  Anesthesia: General  ASA: IV  Estimated Blood Loss: 5mL  Intra-op Medications:   Administrations occurring from 0830 to 1215 on 25:   Medication Name Total Dose   polymyxin B injection 500,000 Units   lidocaine-epinephrine (Xylocaine W/EPI) injection 9 mL   bacitracin ointment 1 Application   gentamicin (Garamycin) 80 mg in sodium chloride 0.9 % 1,000 mL irrigation 80 mg   albuterol 2.5 mg /3 mL (0.083 %) nebulizer solution 2.5 mg Cannot be calculated   amLODIPine (Norvasc) tablet 10 mg Cannot be calculated   atorvastatin (Lipitor) tablet 10 mg Cannot be calculated   bisacodyl (Dulcolax) suppository 10 mg Cannot be calculated   calcium gluconate 1 g in sodium chloride (iso) IV 50 mL Cannot be calculated   calcium gluconate 2 g in sodium chloride (iso)  mL Cannot be calculated   carvedilol (Coreg) tablet 6.25 mg Cannot be calculated   dextrose 50 % injection 12.5 g Cannot be calculated   dextrose 50 % injection 25 g Cannot be calculated   fentaNYL bolus from bag 25 mcg Cannot be calculated    glucagon (Glucagen) injection 1 mg Cannot be calculated   glucagon (Glucagen) injection 1 mg Cannot be calculated   hydrALAZINE (Apresoline) injection 20 mg Cannot be calculated   hydrALAZINE (Apresoline) tablet 75 mg Cannot be calculated   HYDROmorphone (Dilaudid) injection 0.2 mg Cannot be calculated   insulin lispro injection 0-5 Units Cannot be calculated   labetaloL (Normodyne,Trandate) injection 10 mg Cannot be calculated   magnesium oxide (Mag-Ox) tablet 400 mg Cannot be calculated   magnesium sulfate 2 g in sterile water for injection 50 mL Cannot be calculated   magnesium sulfate 4 g in sterile water for injection 100 mL Cannot be calculated   oxyCODONE (Roxicodone) immediate release tablet 5 mg 5 mg   pantoprazole (Protonix) injection 40 mg Cannot be calculated   perflutren protein A microsphere (Optison) injection 0.5 mL Cannot be calculated   polyethylene glycol (Glycolax, Miralax) packet 17 g Cannot be calculated   propofol (Diprivan) bolus from bag 20 mg Cannot be calculated   sennosides-docusate sodium (Maribel-Colace) 8.6-50 mg per tablet 2 tablet Cannot be calculated   sodium chloride 0.9% infusion 480 mL   sulfur hexafluoride microsphr (Lumason) injection 24.28 mg Cannot be calculated   ceFAZolin (Ancef) vial 1 g 2 g   esmolol (Brevibloc) injection 20 mg   fentaNYL (Sublimaze) injection 50 mcg/mL 50 mcg   phenylephrine (Robert-Synephrine) 10 mg in sodium chloride 0.9% 250 mL (0.04 mg/mL) infusion (premix) 5.47 mg   phenylephrine 40 mcg/mL syringe 10 mL 200 mcg   propofol (Diprivan) injection 10 mg/mL 60 mg   rocuronium (ZeMuron) 50 mg/5 mL injection 50 mg   sugammadex (Bridion) 200 mg/2 mL injection 200 mg   vancomycin 1 g 1.5 g              Anesthesia Record               Intraprocedure I/O Totals          Intake    Phenylephrine Drip 133.15 mL    The total shown is the total volume documented since Anesthesia Start was filed.    sodium chloride 0.9% infusion 540.00 mL    Total Intake 673.15 mL        Output    Urine 120 mL    Est. Blood Loss 50 mL    Total Output 170 mL       Net    Net Volume 503.15 mL          Specimen: No specimens collected              Drains and/or Catheters:   Gastrostomy/Enterostomy Percutaneous endoscopic gastrostomy (PEG) LLQ (Active)   Present on Admission to Healthcare Facility N 05/05/25 0800   Surrounding Skin Dry;Intact 05/05/25 0800   Drain Status Clamped 05/05/25 0800   Drainage Appearance None 05/05/25 0800   Catheter Position (cm marking) 5 cm 05/05/25 0400   Site Description Healing 05/05/25 0800   Dressing Type Dry dressing 05/05/25 0800   Dressing Status Clean;Dry;Occlusive 05/05/25 0800   Dressing Intervention Dressing reinforced 05/05/25 0800   Tube Feeding Frequency Continuous 05/05/25 0800   Tube Feeding Isosource 1.5 05/05/25 0800   Tube Feeding Strength Full strength 05/05/25 0800   Tube Feeding Method Continuous per pump 05/05/25 0800   Tube Feeding Rate (ml/hr) 40 mL/hr 05/05/25 0800   Tube Feeding Bag Changed No 05/04/25 2000   Feeding Tube Flushed With Tap water 05/04/25 1200   Intake (mL) 0 mL 05/05/25 0100   Intake - Flush (mL) 60 mL 05/05/25 0800       Urethral Catheter (Active)   Present on Admission to Healthcare Facility N 05/04/25 1200   Site Assessment Clean;Skin intact 05/05/25 0800   Collection Container Standard drainage bag 05/05/25 0800   Securement Method Securing device (Describe) 05/05/25 0800   Reason for Continuing Urinary Catheterization accurate hourly measurement of urine volume in a critically ill patient that cannot be assessed by other volumes and urine collection strategies 05/05/25 0800   Output (mL) 80 mL 05/05/25 0900       Intracranial Pressure/Ventriculostomy Ventricular drainage catheter with ICP transducer Right Frontal region (Active)   Present on Admission to Healthcare Facility N 05/03/25 0800   Drain Status Open 05/05/25 0900   Level 10 cm;Above external auditory meatus 05/05/25 0900   Site Assessment Clean;Dry 05/05/25 0900    Drainage No drainage 05/05/25 0900   Output (mL) 13 mL 05/05/25 0900   CSF Color Serosanguineous 05/05/25 0900   Dressing Status Other (Comment) 05/05/25 0700   Ventric/ICP Waveform Appropriate 05/05/25 0900   Ventric/ICP Interventions Leveled 05/05/25 0000   Site Care Performed 05/04/25 2000   Dressing Intervention Other (Comment) 04/23/25 2000   Dressing Change Due 04/23/25 04/22/25 1322       Tourniquet Times:         Implants:  Implants       Type Name Action Serial No.      Neuro Interventional Implant VALVE, CERTAS PLUS, W/O SIPHONGUARD - SNA - [id number] Implanted NA     Neuro Interventional Implant CATHETER, BACTISEAL PERITONEAL 120CM - [id number] Implanted      Neuro Interventional Implant RESERVOIR, RICKHAM, STANDARD - SNA - [id number] Implanted NA     Neuro Interventional Implant VALVE, CERTAS PLUS, W/O SIPHONGUARD - SNA - [id number] Implanted NA              Findings: Successful peritoneal placement of distal catheter    Indications: Muriel De Souza is an 69 y.o. female who is having surgery for Subarachnoid hemorrhage (Multi) [I60.9]. She had refractory hydrocephalus and required ventriculoperitoneal shunt placement.     The patient was seen in the preoperative area. The risks, benefits, complications, treatment options, non-operative alternatives, expected recovery and outcomes were discussed with the patient. The possibilities of reaction to medication, pulmonary aspiration, injury to surrounding structures, bleeding, recurrent infection, the need for additional procedures, failure to diagnose a condition, and creating a complication requiring transfusion or operation were discussed with the patient. The patient concurred with the proposed plan, giving informed consent.  The site of surgery was properly noted/marked if necessary per policy. The patient has been actively warmed in preoperative area. Preoperative antibiotics are not indicated. Venous thrombosis prophylaxis have been ordered including  bilateral sequential compression devices    Procedure Details: A small right upper quadrant incision was made using a 15-blade. Abdominal access was made with laparoscopic trochar; abdomen was insufflated and intraperitoneal placement was confirmed with laparoscopy. A second abdominal site was accessed; Guide wire and peel-away sheath was placed in second incision with intraperitoneal placement again confirmed.     Shunt tunneler was passed from postauricular incision to abdominal incision; trochar was removed and tubing was passed. This tubing was brought from  postauricular incision to C-shaped scalp incision. Once connected to the valve and proximal catheter, flow into distal tubing was confirmed by pumping shunt reservoir.     Distal A-tubing was then passed through peel-away sheath and sheath was removed after tubing was intra-abdominal.     All incisions were copiously irrigated with antibiotic impregnated normal saline. All incisions were closed with 2-0 vicryls for the deeper layers followed by interrupted subcuticular 4-0 monocryl for the skin.      The rest of the surgery was performed by Dr. Hood's team.     Evidence of Infection: No   Complications:  None; patient tolerated the procedure well.    Disposition: ICU - extubated and stable.  Condition: stable     Additional Details: none    Attending Attestation: I was present and scrubbed for the entire procedure.    Joselito Esparza  Phone Number: [phone]

## 2025-05-05 NOTE — PROGRESS NOTES
"Muriel De Souza is a 69 y.o. female on day 13 of admission presenting with AVM (arteriovenous malformation) ([facility]).    Subjective   No events overnight    Objective     Physical Exam  Trach vented   ECNS  OU2R  -cough/gag  BUE flaccid  BLE TF  EVD in place    Last Recorded Vitals  Blood pressure 155/60, pulse 77, temperature 36.3 °C (97.3 °F), temperature source Temporal, resp. rate 13, height 1.727 m (5' 7.99\"), weight 100 kg (221 lb 1.9 oz), SpO2 98%.  Intake/Output last 3 Shifts:  I/O last 3 completed shifts:  In: 2962.5 (29.5 mL/kg) [I.V.:32.5 (0.3 mL/kg); NG/GT:2730; IV Piggyback:200]  Out: 7825 (78 mL/kg) [Urine:7440 (2.1 mL/kg/hr); Drains:385]  Weight: 100.3 kg     Relevant Results  Results from last 72 hours   Lab Units 05/04/25  1702 05/04/25  0123 05/02/25  1736 05/02/25  1116   GLUCOSE mg/dL 176* 173*   < >  --    SODIUM mmol/L 143 145   < >  --    POTASSIUM mmol/L 3.7 3.9   < >  --    CHLORIDE mmol/L 101 104   < >  --    CO2 mmol/L 35* 35*   < >  --    ANION GAP mmol/L 11 10   < >  --    BUN mg/dL 27* 29*   < >  --    CREATININE mg/dL 0.77 0.80   < >  --    EGFR mL/min/1.73m*2 84 80   < >  --    CALCIUM mg/dL 8.5* 7.8*   < >  --    PHOSPHORUS mg/dL 3.3 3.2   < >  --    ALBUMIN g/dL 2.8* 2.6*   < >  --    MAGNESIUM mg/dL  --  2.37  --  2.14   POCT CALCIUM IONIZED (MMOL/L) IN BLOOD mmol/L  --  1.16  --  1.19    < > = values in this interval not displayed.      Results from last 72 hours   Lab Units 05/04/25  2213 05/04/25  1702   WBC AUTO x10*3/uL 13.5* 13.8*   NRBC AUTO /100 WBCs 0.0 0.0   RBC AUTO x10*6/uL 2.85* 2.97*   HEMOGLOBIN g/dL 8.4* 8.7*   HEMATOCRIT % 27.0* 27.6*   MCV fL 95 93   MCH pg 29.5 29.3   MCHC g/dL 31.1* 31.5*   RDW % 14.5 14.6*   PLATELETS AUTO x10*3/uL 344 341        Assessment & Plan  AVM (arteriovenous malformation) ([facility])    Muriel Alcantaraumber is a 69 y.o. w/ h/o HTN, HLD p/w HA, arrested in CT scanner at OSH, ROSC after 7 min CPR, CTH w diffuse SAH and L cerebellar ICH, " triventriculomegally, effaced 4th vents, CTA H/N L cerebellar AVM w venous varices, s/p 1u Plt and DDAVP, s/p RF EVD (OP 18), 4/22 s/p SOC/C1 lami for AVM resection, CTH POC, stable vents, 4/22 TEG R low s/p 1u FFP, 4/23 TTE EF 71%, 4/23 s/p angio tentorial dural AV fistual w direct cortical venous drainage fed by b/l MMA, b/l tentorial arteries, incidental 2mm R pcomm aneurysm, 4/24 CTH slight decr vents  4/29 MRI midbrain and b/l cerebellar hemisphere diffusion hits, 5/1 s/p trach, 5/2 s/p PEG    NSU  EVD at 10, monitor output and ICPs  OR planning for  shunt Mon 5/5    Goal riki Na, q12 Na  SBP goal <180  Con't hydral, lisinopril, coreg, may increase  Final path - blood clot and normal vessels  GI recs re: PEG tube  Q8 CBC to trend hgb   SCD, SQH    Chavez Vang MD

## 2025-05-05 NOTE — ANESTHESIA POSTPROCEDURE EVALUATION
Patient: Muriel De Souza    Procedure Summary       Date: 05/05/25 Room / Location: [facility] OR 24 / Virtual [facility] OR    Anesthesia Start: 0904 Anesthesia Stop: 1141    Procedure: INSERTION, SHUNT, VENTRICULOPERITONEAL (Right) Diagnosis:       Subarachnoid hemorrhage (Multi)      (Subarachnoid hemorrhage (Multi) [I60.9])    Surgeons: Moy Hood MD Responsible Provider: Aspen Wolff MD    Anesthesia Type: general ASA Status: 4            Anesthesia Type: general    Vitals Value Taken Time   /78 05/05/25 11:46   Temp 36.1 05/05/25 11:46   Pulse 78 05/05/25 11:46   Resp 11 05/05/25 11:44   SpO2 100 05/05/25 11:46       Anesthesia Post Evaluation    Patient location during evaluation: ICU  Patient participation: complete - patient cannot participate  Level of consciousness: awake and alert  Pain score: 0  Pain management: adequate  Multimodal analgesia pain management approach  Airway patency: patent  Two or more strategies used to mitigate risk of obstructive sleep apnea  Cardiovascular status: acceptable, blood pressure returned to baseline and hemodynamically stable  Respiratory status: acceptable and ventilator  Hydration status: acceptable  Postoperative Nausea and Vomiting: none        There were no known notable events for this encounter.

## 2025-05-05 NOTE — OP NOTE
INSERTION, SHUNT, VENTRICULOPERITONEAL (R) Operative Note     Date: 2025  OR Location: [facility] OR    Name: Muriel De Souza, : 1955, Age: 69 y.o., MRN: [id number], Sex: female    Diagnosis  Pre-op Diagnosis      * Subarachnoid hemorrhage (Multi) [I60.9] Post-op Diagnosis     * Subarachnoid hemorrhage (Multi) [I60.9]     Procedures  Removal of right frontal external ventricular drain  Implantation of right frontal ventriculoperitoneal shunt  Distal laparoscopic access by Dr. Esparza  Intraoperative stereotaxy    Surgeons      * Moy Hood - Primary    Resident/Fellow/Other Assistant:  Surgeons and Role:     * Kristy Solares MD - Resident - Assisting     * Joselito Esparza MD - Fellow    Staff:   Circulator: Syl  Circulator: Mary  Scrub Person: Damaris Huntub Person: Vickie Campos Scrub: Isela    Anesthesia Staff: Anesthesiologist: Aspen Wolff MD  C-AA: SLIME Cardona Capp  BISHNU: Destinee Baker    Procedure Summary  Anesthesia: General  ASA: IV  Estimated Blood Loss: 50 mL  Intra-op Medications:   Administrations occurring from 0830 to 1215 on 25:   Medication Name Total Dose   polymyxin B injection 500,000 Units   lidocaine-epinephrine (Xylocaine W/EPI) injection 9 mL   bacitracin ointment 1 Application   gentamicin (Garamycin) 80 mg in sodium chloride 0.9 % 1,000 mL irrigation 80 mg   albuterol 2.5 mg /3 mL (0.083 %) nebulizer solution 2.5 mg Cannot be calculated   amLODIPine (Norvasc) tablet 10 mg Cannot be calculated   atorvastatin (Lipitor) tablet 10 mg Cannot be calculated   bisacodyl (Dulcolax) suppository 10 mg Cannot be calculated   calcium gluconate 1 g in sodium chloride (iso) IV 50 mL Cannot be calculated   calcium gluconate 2 g in sodium chloride (iso)  mL Cannot be calculated   carvedilol (Coreg) tablet 6.25 mg Cannot be calculated   dextrose 50 % injection 12.5 g Cannot be calculated   dextrose 50 % injection 25 g Cannot be calculated   fentaNYL bolus  from bag 25 mcg Cannot be calculated   glucagon (Glucagen) injection 1 mg Cannot be calculated   glucagon (Glucagen) injection 1 mg Cannot be calculated   hydrALAZINE (Apresoline) injection 20 mg Cannot be calculated   hydrALAZINE (Apresoline) tablet 75 mg Cannot be calculated   HYDROmorphone (Dilaudid) injection 0.2 mg Cannot be calculated   insulin lispro injection 0-5 Units Cannot be calculated   labetaloL (Normodyne,Trandate) injection 10 mg Cannot be calculated   magnesium oxide (Mag-Ox) tablet 400 mg Cannot be calculated   magnesium sulfate 2 g in sterile water for injection 50 mL Cannot be calculated   magnesium sulfate 4 g in sterile water for injection 100 mL Cannot be calculated   oxyCODONE (Roxicodone) immediate release tablet 5 mg 5 mg   pantoprazole (Protonix) injection 40 mg Cannot be calculated   perflutren protein A microsphere (Optison) injection 0.5 mL Cannot be calculated   polyethylene glycol (Glycolax, Miralax) packet 17 g Cannot be calculated   propofol (Diprivan) bolus from bag 20 mg Cannot be calculated   sennosides-docusate sodium (Maribel-Colace) 8.6-50 mg per tablet 2 tablet Cannot be calculated   sodium chloride 0.9% infusion 480 mL   sulfur hexafluoride microsphr (Lumason) injection 24.28 mg Cannot be calculated   ceFAZolin (Ancef) vial 1 g 2 g   esmolol (Brevibloc) injection 20 mg   fentaNYL (Sublimaze) injection 50 mcg/mL 50 mcg   phenylephrine (Robert-Synephrine) 10 mg in sodium chloride 0.9% 250 mL (0.04 mg/mL) infusion (premix) 5.47 mg   phenylephrine 40 mcg/mL syringe 10 mL 200 mcg   propofol (Diprivan) injection 10 mg/mL 60 mg   rocuronium (ZeMuron) 50 mg/5 mL injection 50 mg   sugammadex (Bridion) 200 mg/2 mL injection 200 mg   vancomycin 1 g 1.5 g              Anesthesia Record               Intraprocedure I/O Totals          Intake    Phenylephrine Drip 133.15 mL    The total shown is the total volume documented since Anesthesia Start was filed.    sodium chloride 0.9% infusion  540.00 mL    Total Intake 673.15 mL       Output    Urine 120 mL    Est. Blood Loss 50 mL    Total Output 170 mL       Net    Net Volume 503.15 mL          Specimen: No specimens collected              Drains and/or Catheters:   Gastrostomy/Enterostomy Percutaneous endoscopic gastrostomy (PEG) LLQ (Active)   Present on Admission to Healthcare Facility N 05/05/25 0800   Surrounding Skin Dry;Intact 05/05/25 0800   Drain Status Clamped 05/05/25 0800   Drainage Appearance None 05/05/25 0800   Catheter Position (cm marking) 5 cm 05/05/25 0400   Site Description Healing 05/05/25 0800   Dressing Type Dry dressing 05/05/25 0800   Dressing Status Clean;Dry;Occlusive 05/05/25 0800   Dressing Intervention Dressing reinforced 05/05/25 0800   Tube Feeding Frequency Continuous 05/05/25 0800   Tube Feeding Isosource 1.5 05/05/25 0800   Tube Feeding Strength Full strength 05/05/25 0800   Tube Feeding Method Continuous per pump 05/05/25 0800   Tube Feeding Rate (ml/hr) 40 mL/hr 05/05/25 0800   Tube Feeding Bag Changed No 05/04/25 2000   Feeding Tube Flushed With Tap water 05/04/25 1200   Intake (mL) 0 mL 05/05/25 0100   Intake - Flush (mL) 60 mL 05/05/25 0800       Urethral Catheter (Active)   Present on Admission to Healthcare Facility N 05/04/25 1200   Site Assessment Clean;Skin intact 05/05/25 0800   Collection Container Standard drainage bag 05/05/25 0800   Securement Method Securing device (Describe) 05/05/25 0800   Reason for Continuing Urinary Catheterization accurate hourly measurement of urine volume in a critically ill patient that cannot be assessed by other volumes and urine collection strategies 05/05/25 0800   Output (mL) 80 mL 05/05/25 0900       Intracranial Pressure/Ventriculostomy Ventricular drainage catheter with ICP transducer Right Frontal region (Active)   Present on Admission to Healthcare Facility N 05/03/25 0800   Drain Status Open 05/05/25 0900   Level 10 cm;Above external auditory meatus 05/05/25 0900    Site Assessment Clean;Dry 05/05/25 0900   Drainage No drainage 05/05/25 0900   Output (mL) 13 mL 05/05/25 0900   CSF Color Serosanguineous 05/05/25 0900   Dressing Status Other (Comment) 05/05/25 0700   Ventric/ICP Waveform Appropriate 05/05/25 0900   Ventric/ICP Interventions Leveled 05/05/25 0000   Site Care Performed 05/04/25 2000   Dressing Intervention Other (Comment) 04/23/25 2000   Dressing Change Due 04/23/25 04/22/25 1322       Tourniquet Times:         Implants:  Implants       Type Name Action Serial No.      Neuro Interventional Implant VALVE, CERTAS PLUS, W/O SIPHONGUARD - SNA - [id number] Implanted NA     Neuro Interventional Implant CATHETER, BACTISEAL PERITONEAL 120CM - [id number] Implanted      Neuro Interventional Implant RESERVOIR, RICKHAM, STANDARD - SNA - [id number] Implanted NA     Neuro Interventional Implant VALVE, CERTAS PLUS, W/O SIPHONGUARD - SNA - [id number] Implanted NA              Findings: Good proximal flow and distal runoff    Indications: Muriel De Souza is an 69 y.o. female who is having surgery for Subarachnoid hemorrhage (Multi) [I60.9].     The patient was seen in the preoperative area. The risks, benefits, complications, treatment options, non-operative alternatives, expected recovery and outcomes were discussed with the patient. The possibilities of reaction to medication, pulmonary aspiration, injury to surrounding structures, bleeding, recurrent infection, the need for additional procedures, failure to diagnose a condition, and creating a complication requiring transfusion or operation were discussed with the patient. The patient concurred with the proposed plan, giving informed consent.  The site of surgery was properly noted/marked if necessary per policy. The patient has been actively warmed in preoperative area. Preoperative antibiotics have been ordered and given within 1 hours of incision. Venous thrombosis prophylaxis have been ordered including bilateral  sequential compression devices    Procedure Details:   Patient was identified in the ICU and brought back to the operating room.  A safety huddle was performed and patient identifiers, operative site, medication allergies were reviewed.  Patient was then transferred to the operating table and all pressure points were meticulously padded.  Since patient was already trached and under sedation we proceeded with positioning and the procedure.  The EVD incision was localized and a new shunt incision was designed incorporating the pre-existing EVD incision.  Neuronavigation was set up with good accuracy and a trajectory was saved.  Hair was clipped and scalp was cleansed in sterile fashion.  The right frontal EVD was then sterilely removed and the exercise was close.   Both scalp and neck and abdomen were prepped and draped in usual sterile fashion.    A second preincision huddle was performed.  Local anesthetic was used to infiltrate the skin over the incision.  Using combination of 15 blade and Metzenbaum scissors the prior EVD incision was opened and extended inferiorly and laterally.  Skin edges were coagulated and a scalp flap was reflected and secured in place with a 2-0 Vicryl suture.  A new burrhole in the center of the flap was made with a small acorn bit. The dura was coagulated with bipolar electrocautery. A pocket was made under the scalp for the shunt valve and a relay incision was made just behind the ear.    The laparoscopic abdominal access was obtained by Dr. Esparza (see separate op report).    Shunt tunneler was passed from postauricular incision to abdominal incision; trochar was removed and tubing was passed through the retained tunneling sheath. This tubing was brought from  postauricular incision to cranial scalp incision. Certas valve (set at 5) was attached to this A-tubing.    Then, a cruciate incision was made in the dura, and the dural leaflets were coagulated. Ventricular catheter was  passed into ventricle with good return of CSF. Rickham reservoir was connected to proximal catheter and valve attached to rickham reservoir. Attachment sites were secured with silk ties; flow into distal tubing was confirmed by pumping shunt reservoir.    Distal A-tubing was then passed through peel-away sheath and sheath was removed after tubing was intra-abdominal.    All incisions were copiously irrigated with antibiotic impregnated normal saline. All incisions were closed with 2-0 vicryls for the deeper layers followed by running 4-0 monocryl for the skin for the cranial incisions and a subcuticular 4-0 monocryl followed by skin glue for the abdomen. All counts were correct at end of case and patient was turned over to anesthesia for extubation.    Dr. Hood was present for all critical portions of the case    Evidence of Infection: No   Complications:  None; patient tolerated the procedure well.    Disposition: ICU - intubated and critically ill.  Condition: stable       Additional Details: none    05/05/25 at 12:05 PM - Kristy Solares MD     Attending Attestation: I was present for the critical portions of the procedure.    Moy Hood  Phone Number: [phone]

## 2025-05-05 NOTE — ANESTHESIA PREPROCEDURE EVALUATION
Patient: Muriel De Souza    Procedure Information       Date/Time: 05/05/25 0830    Procedure: INSERTION, SHUNT, VENTRICULOPERITONEAL (Right)    Location: [facility] OR 24 / [facility] OR    Surgeons: Moy Hood MD          Muriel De Souza is a 69 y.o. female on day 13 of admission presenting with AVM (arteriovenous malformation) ([facility]).     Muriel De Souza is a 69 y.o. 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 Authorizing Provider   escitalopram (Lexapro) 20 mg tablet Take 0.5 tablets (10 mg) by mouth once daily. 1/29/25  Yes Historical Provider, MD   atenolol (Tenormin) 25 mg tablet Take 1 tablet (25 mg) by mouth once daily.    Historical Provider, MD   atorvastatin (Lipitor) 10 mg tablet Take 1 tablet (10 mg) by mouth once daily.    Historical Provider, MD   CALCIUM ORAL Take 1 tablet by mouth 2 times a day.    Historical Provider, MD     RX Allergies[4]  Social History     Tobacco Use    Smoking status: Not on file    Smokeless tobacco: Not on file   Substance Use Topics    Alcohol use: Not on file         Chemistry    Lab  Results   Component Value Date/Time     05/05/2025 0210    K 3.9 05/05/2025 0210     05/05/2025 0210    CO2 35 (H) 05/05/2025 0210    BUN 26 (H) 05/05/2025 0210    CREATININE 0.69 05/05/2025 0210    Lab Results   Component Value Date/Time    CALCIUM 8.1 (L) 05/05/2025 0210    ALKPHOS 63 04/23/2025 0412     (H) 04/23/2025 0412     (H) 04/23/2025 0412    BILITOT 0.3 04/23/2025 0412          Lab Results   Component Value Date/Time    WBC 12.3 (H) 05/05/2025 0210    HGB 7.9 (L) 05/05/2025 0210    HCT 25.1 (L) 05/05/2025 0210     05/05/2025 0210     Lab Results   Component Value Date/Time    PROTIME 11.9 05/04/2025 0429    INR 1.1 05/04/2025 0429     Encounter Date: 04/22/25   ECG 12 lead   Result Value    Ventricular Rate 65    Atrial Rate 65    GA Interval 216    QRS Duration 156    QT Interval 564    QTC Calculation(Bazett) 586    P Axis 57    R Axis 95    T Axis 51    QRS Count 11    Q Onset 213    P Onset 105    P Offset 158    T Offset 495    QTC Fredericia 579    Narrative    Sinus rhythm with 1st degree AV block  Right bundle branch block  Abnormal ECG  When compared with ECG of 22-APR-2025 12:04,  No significant change was found  Confirmed by Hawk Lugo ([id number]) on 4/23/2025 4:18:11 PM     No results found for this or any previous visit from the past 1095 days.      Clinical information reviewed:    Allergies  Meds               NPO Detail:  No data recorded     Physical Exam    Airway  Mallampati: unable to assess     Cardiovascular    Dental    Pulmonary    Abdominal      Other findings: Trach dependent         Anesthesia Plan    History of general anesthesia?: yes  History of complications of general anesthesia?: no    ASA 4     general     The patient is not a current smoker.  Patient did not smoke on day of procedure.    intravenous induction   Postoperative pain plan includes opioids.  Anesthetic plan and risks discussed with spouse.  Use of blood products discussed with  spouse who consented to blood products.    Plan discussed with CAA and attending.           [1] No past surgical history on file.  [2] No past medical history on file.  [3]   Current Facility-Administered Medications:     albuterol 2.5 mg /3 mL (0.083 %) nebulizer solution 2.5 mg, 2.5 mg, nebulization, q4h PRN, Jace Hernandez MD, 2.5 mg at 04/25/25 0914    amLODIPine (Norvasc) tablet 10 mg, 10 mg, oral, Daily, Yessenia Jimenez MD, 10 mg at 05/04/25 0843    atorvastatin (Lipitor) tablet 10 mg, 10 mg, oral, Nightly, Mayra Dumont MD, 10 mg at 05/04/25 2036    bisacodyl (Dulcolax) suppository 10 mg, 10 mg, rectal, Daily, Jian Ellington MD PhD, 10 mg at 05/04/25 0843    calcium gluconate 1 g in sodium chloride (iso) IV 50 mL, 1 g, intravenous, q6h PRN, El James APRN-CNP    calcium gluconate 2 g in sodium chloride (iso)  mL, 2 g, intravenous, q6h PRN, El James, APRN-CNP    carvedilol (Coreg) tablet 6.25 mg, 6.25 mg, oral, q12h, Yessenia Jimenez MD, 6.25 mg at 05/05/25 0619    dextrose 50 % injection 12.5 g, 12.5 g, intravenous, q15 min PRN, El James, APRN-CNP    dextrose 50 % injection 25 g, 25 g, intravenous, q15 min PRN, El James, APRN-CNP    fentaNYL bolus from bag 25 mcg, 25 mcg, intravenous, q10 min PRN, Yessenia Jimenez MD, 25 mcg at 05/02/25 1038    glucagon (Glucagen) injection 1 mg, 1 mg, intramuscular, q15 min PRN, El James, APRN-CNP    glucagon (Glucagen) injection 1 mg, 1 mg, intramuscular, q15 min PRN, El James APRN-CNP    hydrALAZINE (Apresoline) injection 20 mg, 20 mg, intravenous, q30 min PRN, El James APRN-CNP, 20 mg at 05/03/25 0545    hydrALAZINE (Apresoline) tablet 75 mg, 75 mg, oral, q6h, Jace Hernandez MD, 75 mg at 05/05/25 0157    HYDROmorphone (Dilaudid) injection 0.2 mg, 0.2 mg, intravenous, q4h PRN, Yessenia Jimenez MD    insulin lispro injection 0-5 Units, 0-5 Units, subcutaneous, q4h, RASHEED Ribeiro-CNP, 1 Units at  05/05/25 0012    labetaloL (Normodyne,Trandate) injection 10 mg, 10 mg, intravenous, q10 min PRN, El James, APRN-CNP, 10 mg at 05/03/25 1111    [Held by provider] lisinopril tablet 20 mg, 20 mg, oral, Daily, Jian Ellington MD PhD, 20 mg at 05/04/25 0843    magnesium oxide (Mag-Ox) tablet 400 mg, 400 mg, oral, Daily, Jian Ellington MD PhD, 400 mg at 05/04/25 0843    magnesium sulfate 2 g in sterile water for injection 50 mL, 2 g, intravenous, q6h PRN, Sachaarioeugenia Dunbaris, APRN-CNP    magnesium sulfate 4 g in sterile water for injection 100 mL, 4 g, intravenous, q6h PRN, El Dunbaris, APRN-CNP    oxyCODONE (Roxicodone) immediate release tablet 5 mg, 5 mg, g-tube, q8h, Yessenia Jimenez MD, 5 mg at 05/05/25 0158    oxygen (O2) therapy, , inhalation, Continuous PRN - O2/gases, Dagoberto Wolfe MD, 30 percent at 05/04/25 0730    pantoprazole (Protonix) injection 40 mg, 40 mg, intravenous, Daily before breakfast, Yessenia Jimenez MD, 40 mg at 05/05/25 0619    perflutren protein A microsphere (Optison) injection 0.5 mL, 0.5 mL, intravenous, Once in imaging, Jace Hernandez MD    polyethylene glycol (Glycolax, Miralax) packet 17 g, 17 g, oral, Daily, Rudolph Álvarez MD, 17 g at 05/04/25 0843    potassium chloride CR (Klor-Con M20) ER tablet 20 mEq, 20 mEq, oral, q6h PRN **OR** potassium chloride (Klor-Con) packet 20 mEq, 20 mEq, orogastric tube, q6h PRN, Sachaarioeugenia Dunbaris, APRN-CNP, 20 mEq at 05/04/25 0612    potassium chloride CR (Klor-Con M20) ER tablet 40 mEq, 40 mEq, oral, q6h PRN **OR** potassium chloride (Klor-Con) packet 40 mEq, 40 mEq, orogastric tube, q6h PRN, RASHEED Ribeiro-CNP    propofol (Diprivan) bolus from bag 20 mg, 20 mg, intravenous, Once PRN, Yessenia Jimenez MD    sennosides-docusate sodium (Maribel-Colace) 8.6-50 mg per tablet 2 tablet, 2 tablet, oral, BID, Rudolph Álvarez MD, 2 tablet at 05/04/25 2036    sodium chloride 0.9% infusion, 75 mL/hr, intravenous, Continuous, Chavez Vang MD,  Last Rate: 75 mL/hr at 05/05/25 0400, 75 mL/hr at 05/05/25 0400    sulfur hexafluoride microsphr (Lumason) injection 24.28 mg, 2 mL, intravenous, Once in imaging, Jace Hernandez MD  [4]   Allergies  Allergen Reactions    Thimerosal Unknown

## 2025-05-06 LAB
ALBUMIN SERPL BCP-MCNC: 2.7 G/DL (ref 3.4–5)
ALBUMIN SERPL BCP-MCNC: 2.9 G/DL (ref 3.4–5)
ANION GAP SERPL CALC-SCNC: 12 MMOL/L (ref 10–20)
ANION GAP SERPL CALC-SCNC: 14 MMOL/L (ref 10–20)
BASOPHILS # BLD AUTO: 0.03 X10*3/UL (ref 0–0.1)
BASOPHILS # BLD AUTO: 0.03 X10*3/UL (ref 0–0.1)
BASOPHILS NFR BLD AUTO: 0.2 %
BASOPHILS NFR BLD AUTO: 0.3 %
BUN SERPL-MCNC: 25 MG/DL (ref 6–23)
BUN SERPL-MCNC: 25 MG/DL (ref 6–23)
CA-I BLD-SCNC: 1.14 MMOL/L (ref 1.1–1.33)
CALCIUM SERPL-MCNC: 8.2 MG/DL (ref 8.6–10.6)
CALCIUM SERPL-MCNC: 8.3 MG/DL (ref 8.6–10.6)
CHLORIDE SERPL-SCNC: 104 MMOL/L (ref 98–107)
CHLORIDE SERPL-SCNC: 107 MMOL/L (ref 98–107)
CO2 SERPL-SCNC: 26 MMOL/L (ref 21–32)
CO2 SERPL-SCNC: 27 MMOL/L (ref 21–32)
CREAT SERPL-MCNC: 0.56 MG/DL (ref 0.5–1.05)
CREAT SERPL-MCNC: 0.62 MG/DL (ref 0.5–1.05)
EGFRCR SERPLBLD CKD-EPI 2021: >90 ML/MIN/1.73M*2
EGFRCR SERPLBLD CKD-EPI 2021: >90 ML/MIN/1.73M*2
EOSINOPHIL # BLD AUTO: 0.08 X10*3/UL (ref 0–0.7)
EOSINOPHIL # BLD AUTO: 0.11 X10*3/UL (ref 0–0.7)
EOSINOPHIL NFR BLD AUTO: 0.6 %
EOSINOPHIL NFR BLD AUTO: 0.9 %
ERYTHROCYTE [DISTWIDTH] IN BLOOD BY AUTOMATED COUNT: 14.8 % (ref 11.5–14.5)
ERYTHROCYTE [DISTWIDTH] IN BLOOD BY AUTOMATED COUNT: 15.2 % (ref 11.5–14.5)
GLUCOSE BLD MANUAL STRIP-MCNC: 113 MG/DL (ref 74–99)
GLUCOSE BLD MANUAL STRIP-MCNC: 125 MG/DL (ref 74–99)
GLUCOSE BLD MANUAL STRIP-MCNC: 136 MG/DL (ref 74–99)
GLUCOSE BLD MANUAL STRIP-MCNC: 139 MG/DL (ref 74–99)
GLUCOSE BLD MANUAL STRIP-MCNC: 144 MG/DL (ref 74–99)
GLUCOSE BLD MANUAL STRIP-MCNC: 147 MG/DL (ref 74–99)
GLUCOSE BLD MANUAL STRIP-MCNC: 152 MG/DL (ref 74–99)
GLUCOSE SERPL-MCNC: 124 MG/DL (ref 74–99)
GLUCOSE SERPL-MCNC: 140 MG/DL (ref 74–99)
HCT VFR BLD AUTO: 23.6 % (ref 36–46)
HCT VFR BLD AUTO: 27.2 % (ref 36–46)
HGB BLD-MCNC: 7.9 G/DL (ref 12–16)
HGB BLD-MCNC: 8.6 G/DL (ref 12–16)
IMM GRANULOCYTES # BLD AUTO: 0.07 X10*3/UL (ref 0–0.7)
IMM GRANULOCYTES # BLD AUTO: 0.08 X10*3/UL (ref 0–0.7)
IMM GRANULOCYTES NFR BLD AUTO: 0.6 % (ref 0–0.9)
IMM GRANULOCYTES NFR BLD AUTO: 0.6 % (ref 0–0.9)
LYMPHOCYTES # BLD AUTO: 1.06 X10*3/UL (ref 1.2–4.8)
LYMPHOCYTES # BLD AUTO: 1.12 X10*3/UL (ref 1.2–4.8)
LYMPHOCYTES NFR BLD AUTO: 8.1 %
LYMPHOCYTES NFR BLD AUTO: 8.9 %
MAGNESIUM SERPL-MCNC: 2.07 MG/DL (ref 1.6–2.4)
MCH RBC QN AUTO: 29.5 PG (ref 26–34)
MCH RBC QN AUTO: 29.7 PG (ref 26–34)
MCHC RBC AUTO-ENTMCNC: 31.6 G/DL (ref 32–36)
MCHC RBC AUTO-ENTMCNC: 33.5 G/DL (ref 32–36)
MCV RBC AUTO: 89 FL (ref 80–100)
MCV RBC AUTO: 93 FL (ref 80–100)
MONOCYTES # BLD AUTO: 0.81 X10*3/UL (ref 0.1–1)
MONOCYTES # BLD AUTO: 0.91 X10*3/UL (ref 0.1–1)
MONOCYTES NFR BLD AUTO: 5.8 %
MONOCYTES NFR BLD AUTO: 7.7 %
NEUTROPHILS # BLD AUTO: 11.78 X10*3/UL (ref 1.2–7.7)
NEUTROPHILS # BLD AUTO: 9.71 X10*3/UL (ref 1.2–7.7)
NEUTROPHILS NFR BLD AUTO: 81.6 %
NEUTROPHILS NFR BLD AUTO: 84.7 %
NRBC BLD-RTO: 0 /100 WBCS (ref 0–0)
NRBC BLD-RTO: 0 /100 WBCS (ref 0–0)
PHOSPHATE SERPL-MCNC: 2.9 MG/DL (ref 2.5–4.9)
PHOSPHATE SERPL-MCNC: 3.1 MG/DL (ref 2.5–4.9)
PLATELET # BLD AUTO: 338 X10*3/UL (ref 150–450)
PLATELET # BLD AUTO: 344 X10*3/UL (ref 150–450)
POTASSIUM SERPL-SCNC: 3.6 MMOL/L (ref 3.5–5.3)
POTASSIUM SERPL-SCNC: 4 MMOL/L (ref 3.5–5.3)
RBC # BLD AUTO: 2.66 X10*6/UL (ref 4–5.2)
RBC # BLD AUTO: 2.92 X10*6/UL (ref 4–5.2)
SODIUM SERPL-SCNC: 140 MMOL/L (ref 136–145)
SODIUM SERPL-SCNC: 142 MMOL/L (ref 136–145)
WBC # BLD AUTO: 11.9 X10*3/UL (ref 4.4–11.3)
WBC # BLD AUTO: 13.9 X10*3/UL (ref 4.4–11.3)

## 2025-05-06 PROCEDURE — 94640 AIRWAY INHALATION TREATMENT: CPT

## 2025-05-06 PROCEDURE — 2500000004 HC RX 250 GENERAL PHARMACY W/ HCPCS (ALT 636 FOR OP/ED): Performed by: STUDENT IN AN ORGANIZED HEALTH CARE EDUCATION/TRAINING PROGRAM

## 2025-05-06 PROCEDURE — 2500000002 HC RX 250 W HCPCS SELF ADMINISTERED DRUGS (ALT 637 FOR MEDICARE OP, ALT 636 FOR OP/ED): Performed by: STUDENT IN AN ORGANIZED HEALTH CARE EDUCATION/TRAINING PROGRAM

## 2025-05-06 PROCEDURE — 83735 ASSAY OF MAGNESIUM: CPT | Performed by: STUDENT IN AN ORGANIZED HEALTH CARE EDUCATION/TRAINING PROGRAM

## 2025-05-06 PROCEDURE — 2500000005 HC RX 250 GENERAL PHARMACY W/O HCPCS: Performed by: STUDENT IN AN ORGANIZED HEALTH CARE EDUCATION/TRAINING PROGRAM

## 2025-05-06 PROCEDURE — 2020000001 HC ICU ROOM DAILY

## 2025-05-06 PROCEDURE — 85025 COMPLETE CBC W/AUTO DIFF WBC: CPT | Performed by: STUDENT IN AN ORGANIZED HEALTH CARE EDUCATION/TRAINING PROGRAM

## 2025-05-06 PROCEDURE — 2500000001 HC RX 250 WO HCPCS SELF ADMINISTERED DRUGS (ALT 637 FOR MEDICARE OP)

## 2025-05-06 PROCEDURE — 94003 VENT MGMT INPAT SUBQ DAY: CPT

## 2025-05-06 PROCEDURE — 2500000001 HC RX 250 WO HCPCS SELF ADMINISTERED DRUGS (ALT 637 FOR MEDICARE OP): Performed by: STUDENT IN AN ORGANIZED HEALTH CARE EDUCATION/TRAINING PROGRAM

## 2025-05-06 PROCEDURE — 37799 UNLISTED PX VASCULAR SURGERY: CPT | Performed by: STUDENT IN AN ORGANIZED HEALTH CARE EDUCATION/TRAINING PROGRAM

## 2025-05-06 PROCEDURE — 80069 RENAL FUNCTION PANEL: CPT | Performed by: STUDENT IN AN ORGANIZED HEALTH CARE EDUCATION/TRAINING PROGRAM

## 2025-05-06 PROCEDURE — 2500000004 HC RX 250 GENERAL PHARMACY W/ HCPCS (ALT 636 FOR OP/ED): Mod: JZ | Performed by: STUDENT IN AN ORGANIZED HEALTH CARE EDUCATION/TRAINING PROGRAM

## 2025-05-06 PROCEDURE — 70450 CT HEAD/BRAIN W/O DYE: CPT | Performed by: RADIOLOGY

## 2025-05-06 PROCEDURE — 82330 ASSAY OF CALCIUM: CPT | Performed by: STUDENT IN AN ORGANIZED HEALTH CARE EDUCATION/TRAINING PROGRAM

## 2025-05-06 PROCEDURE — 82947 ASSAY GLUCOSE BLOOD QUANT: CPT

## 2025-05-06 PROCEDURE — 99291 CRITICAL CARE FIRST HOUR: CPT

## 2025-05-06 RX ORDER — ATORVASTATIN CALCIUM 10 MG/1
10 TABLET, FILM COATED ORAL NIGHTLY
Status: DISCONTINUED | OUTPATIENT
Start: 2025-05-06 | End: 2025-05-11 | Stop reason: HOSPADM

## 2025-05-06 RX ORDER — CARVEDILOL 12.5 MG/1
6.25 TABLET ORAL EVERY 12 HOURS
Status: DISCONTINUED | OUTPATIENT
Start: 2025-05-06 | End: 2025-05-11 | Stop reason: HOSPADM

## 2025-05-06 RX ORDER — ACETAMINOPHEN 160 MG/5ML
650 SOLUTION ORAL EVERY 4 HOURS PRN
Status: DISCONTINUED | OUTPATIENT
Start: 2025-05-06 | End: 2025-05-11 | Stop reason: HOSPADM

## 2025-05-06 RX ORDER — HYDRALAZINE HYDROCHLORIDE 50 MG/1
75 TABLET, FILM COATED ORAL EVERY 6 HOURS
Status: DISCONTINUED | OUTPATIENT
Start: 2025-05-06 | End: 2025-05-11 | Stop reason: HOSPADM

## 2025-05-06 RX ORDER — OXYCODONE HYDROCHLORIDE 5 MG/1
5 TABLET ORAL EVERY 8 HOURS PRN
Refills: 0 | Status: DISCONTINUED | OUTPATIENT
Start: 2025-05-06 | End: 2025-05-11 | Stop reason: HOSPADM

## 2025-05-06 RX ORDER — ACETAMINOPHEN 325 MG/1
650 TABLET ORAL EVERY 4 HOURS PRN
Status: DISCONTINUED | OUTPATIENT
Start: 2025-05-06 | End: 2025-05-11 | Stop reason: HOSPADM

## 2025-05-06 RX ORDER — LANOLIN ALCOHOL/MO/W.PET/CERES
400 CREAM (GRAM) TOPICAL DAILY
Status: DISCONTINUED | OUTPATIENT
Start: 2025-05-07 | End: 2025-05-11 | Stop reason: HOSPADM

## 2025-05-06 RX ORDER — ACETAMINOPHEN 650 MG/1
650 SUPPOSITORY RECTAL EVERY 4 HOURS PRN
Status: DISCONTINUED | OUTPATIENT
Start: 2025-05-06 | End: 2025-05-11 | Stop reason: HOSPADM

## 2025-05-06 RX ORDER — AMOXICILLIN 250 MG
2 CAPSULE ORAL 2 TIMES DAILY
Status: DISCONTINUED | OUTPATIENT
Start: 2025-05-06 | End: 2025-05-11 | Stop reason: HOSPADM

## 2025-05-06 RX ORDER — POLYETHYLENE GLYCOL 3350 17 G/17G
17 POWDER, FOR SOLUTION ORAL DAILY
Status: DISCONTINUED | OUTPATIENT
Start: 2025-05-07 | End: 2025-05-11 | Stop reason: HOSPADM

## 2025-05-06 RX ORDER — AMLODIPINE BESYLATE 10 MG/1
10 TABLET ORAL DAILY
Status: DISCONTINUED | OUTPATIENT
Start: 2025-05-07 | End: 2025-05-11 | Stop reason: HOSPADM

## 2025-05-06 RX ORDER — LISINOPRIL 20 MG/1
20 TABLET ORAL DAILY
Status: DISCONTINUED | OUTPATIENT
Start: 2025-05-07 | End: 2025-05-07

## 2025-05-06 RX ADMIN — OXYCODONE HYDROCHLORIDE 5 MG: 5 TABLET ORAL at 01:51

## 2025-05-06 RX ADMIN — AMLODIPINE BESYLATE 10 MG: 10 TABLET ORAL at 08:35

## 2025-05-06 RX ADMIN — Medication 30 PERCENT: at 08:52

## 2025-05-06 RX ADMIN — HYDRALAZINE HYDROCHLORIDE 75 MG: 50 TABLET ORAL at 15:00

## 2025-05-06 RX ADMIN — LISINOPRIL 20 MG: 20 TABLET ORAL at 08:35

## 2025-05-06 RX ADMIN — POTASSIUM CHLORIDE 40 MEQ: 1.5 POWDER, FOR SOLUTION ORAL at 06:17

## 2025-05-06 RX ADMIN — HYDRALAZINE HYDROCHLORIDE 75 MG: 50 TABLET, FILM COATED ORAL at 21:49

## 2025-05-06 RX ADMIN — BISACODYL 10 MG: 10 SUPPOSITORY RECTAL at 08:35

## 2025-05-06 RX ADMIN — MAGNESIUM OXIDE TAB 400 MG (241.3 MG ELEMENTAL MG) 400 MG: 400 (241.3 MG) TAB at 08:35

## 2025-05-06 RX ADMIN — HYDRALAZINE HYDROCHLORIDE 75 MG: 50 TABLET ORAL at 01:51

## 2025-05-06 RX ADMIN — CARVEDILOL 6.25 MG: 12.5 TABLET, FILM COATED ORAL at 21:49

## 2025-05-06 RX ADMIN — ALBUTEROL SULFATE 2.5 MG: 2.5 SOLUTION RESPIRATORY (INHALATION) at 16:38

## 2025-05-06 RX ADMIN — CARVEDILOL 6.25 MG: 12.5 TABLET, FILM COATED ORAL at 06:17

## 2025-05-06 RX ADMIN — PANTOPRAZOLE SODIUM 40 MG: 40 INJECTION, POWDER, FOR SOLUTION INTRAVENOUS at 06:17

## 2025-05-06 RX ADMIN — SENNOSIDES AND DOCUSATE SODIUM 2 TABLET: 50; 8.6 TABLET ORAL at 08:35

## 2025-05-06 RX ADMIN — HYDRALAZINE HYDROCHLORIDE 75 MG: 50 TABLET ORAL at 08:35

## 2025-05-06 RX ADMIN — OXYCODONE HYDROCHLORIDE 5 MG: 5 TABLET ORAL at 08:35

## 2025-05-06 RX ADMIN — INSULIN LISPRO 1 UNITS: 100 INJECTION, SOLUTION INTRAVENOUS; SUBCUTANEOUS at 12:04

## 2025-05-06 RX ADMIN — POLYETHYLENE GLYCOL 3350 17 G: 17 POWDER, FOR SOLUTION ORAL at 08:35

## 2025-05-06 RX ADMIN — ATORVASTATIN CALCIUM 10 MG: 10 TABLET, FILM COATED ORAL at 21:49

## 2025-05-06 RX ADMIN — SENNOSIDES AND DOCUSATE SODIUM 2 TABLET: 8.6; 5 TABLET ORAL at 21:49

## 2025-05-06 RX ADMIN — ALBUTEROL SULFATE 2.5 MG: 2.5 SOLUTION RESPIRATORY (INHALATION) at 09:02

## 2025-05-06 RX ADMIN — Medication 30 PERCENT: at 03:32

## 2025-05-06 ASSESSMENT — PAIN SCALES - GENERAL
PAINLEVEL_OUTOF10: 0 - NO PAIN
PAINLEVEL_OUTOF10: 0 - NO PAIN

## 2025-05-06 ASSESSMENT — PAIN - FUNCTIONAL ASSESSMENT
PAIN_FUNCTIONAL_ASSESSMENT: CPOT (CRITICAL CARE PAIN OBSERVATION TOOL)

## 2025-05-06 NOTE — PROGRESS NOTES
"Social Work Discharge Planning note:    -Patient discussed during interdisciplinary rounds.   -Team members present: NP, PT and OT, and SW  -Plan per medical team: Pt would be medically ready for discharge to LTACH level of care.   -Payer: Erica  -Status: Inpatient  -Discharge disposition: [facility] and updates were sent this morning. [facility] is still verifying Pt's insurance, and once verified they will start insurance precert. FRANCESCA updated Pt's , Oliver, and gave him a printed SNF list in case Pt's insurance ultimately denies LTACH level of care.   -Anticipated Date of Discharge:  5/7/25    Update (14:32): Per [facility], \"Yes, willing to accept patient. Precert submitted for [place] location under Dr. Blanco via [employer] portal. Ref # [id number].\"    This SW   ALBA Wiggins, LSW    Office: [phone]  Secure chat via [employer]     "

## 2025-05-06 NOTE — CARE PLAN
Problem: General Stroke  Goal: Maintain BP within ordered limits throughout shift  Outcome: Progressing  Goal: No symptoms of hemorrhage throughout shift  Outcome: Progressing     Problem: Safety - Adult  Goal: Free from fall injury  Outcome: Progressing     Problem: Discharge Planning  Goal: Discharge to home or other facility with appropriate resources  Outcome: Progressing     Problem: Nutrition  Goal: Nutrient intake appropriate for maintaining nutritional needs  Outcome: Progressing     Problem: Knowledge Deficit  Goal: Patient/family/caregiver demonstrates understanding of disease process, treatment plan, medications, and discharge instructions  Outcome: Progressing

## 2025-05-06 NOTE — PROGRESS NOTES
[facility]  NEUROSCIENCE INTENSIVE CARE UNIT  DAILY PROGRESS NOTE       Patient Name: Muriel De Souza   MRN: [id number]     Admit Date: 2025     : 1955 AGE: 69 y.o. GENDER: female        Subjective    Muriel De Souza is a 69 y.o. female with h/o HTN, HLD p/w HA, arrested in CT scanner at OSH, ROSC after 7 min CPR,  CTH w diffuse SAH and L cerebellar ICH, triventriculomegally, effaced 4th vents, CTA with L cerebellar AVM w venous varices s/p RF EVD and now admitted for further management.    Per chart review, patient had received bad news at home and then had sudden onset headache and vomiting. Found to have SAH at OSH with course complicated by cardiac arrest. She was intubated and sedated and transferred from [place], [place] to [facility].    Significant Events:  -  came in with L cerebellar AVM bleed with course complicated by cardiac arrest ROSC after 7 min CPR, uppon arrival to [facility] NSU, EVD drain placed, went to OR for decompression and AVM resection  - : - during cough assist, clifford down to 20s, order was d/c'd; PEG tube bleeding, saturated 2x2, abdominal binder, gown. Paged  GI on call fellow, they think perhaps some capillaries are bleeding and would like abdominal imaging today    Interval Events:   - no active issues overnight       Objective   VITALS (24H):  Temp:  [36 °C (96.8 °F)-36.5 °C (97.7 °F)] 36.5 °C (97.7 °F)  Heart Rate:  [66-86] 76  Resp:  [8-23] 14  BP: (143-176)/(60-79) 171/79  Arterial Line BP 1: (179-240)/(64-84) 220/78  FiO2 (%):  [30 %] 30 %  INTAKE/OUTPUT:  Intake/Output Summary (Last 24 hours) at 2025 0807  Last data filed at 2025 0700  Gross per 24 hour   Intake 1693.15 ml   Output 2548 ml   Net -854.85 ml     VENT SETTINGS:  Vent Mode: Volume control/assist control  FiO2 (%):  [30 %] 30 %  S RR:  [10-12] 10  S VT:  [400 mL] 400 mL  PEEP/CPAP (cm H2O):  [5 cm H20] 5 cm H20  KY SUP:  [12 cm H20] 12 cm H20  MAP (cm H2O):  [8-11] 11       PHYSICAL  EXAM:  NEURO:  - Eyes closed to nox stim  - pupils minimally reactive to light  - corneals negative  - cough reflex intact  - gag reflex not tested as patient is not on vent (pt on trach)  - some minimal response in uppers to nox stim  - triple flexion in bilateral lowers  CV:  - RRR on telemetry, NSR  - Arterial line in place  RESP:  - trach  - transmitted upper airway sounds on auscultation  :  - Brooks catheter in place  GI:  - PEG in place  SKIN:  - Intact  - left arm swollen and tense to palpation, not pitting, non-erythematous    MEDICATIONS:  Scheduled: PRN: Continuous:   Scheduled Medications[1] PRN Medications[2] Continuous Medications[3]     IMAGING RESULTS:  CT head wo IV contrast   Final Result   * Diffuse subarachnoid hemorrhage with hydrocephalus   *Left cerebellar hematoma suggesting a likely source.        MACRO:   none        Signed by: Cruz Salgado 4/22/2025 11:59 AM   Dictation workstation:   [id number]      CT angio head and neck w and wo IV contrast   Final Result   * Diffuse subarachnoid hemorrhage with hydrocephalus as described   *Suspected vascular malformation in the left cerebellar hemisphere   with enlarged arterial and venous structures largely in the left   cerebellar hemisphere and residual partial opacification of a venous   aneurysm near the torcula. *Bilateral carotid stenosis as described        MACRO:   none        Signed by: Cruz Salgado 4/22/2025 12:07 PM   Dictation workstation:   [id number]             Assessment/Plan    Muriel De Souza is a 69 y.o. female with h/o HTN, HLD p/w HA, arrested in CT scanner at OSH, ROSC after 7 min CPR,  CTH w diffuse SAH and L cerebellar ICH, triventriculomegally, effaced 4th vents, CTA with L cerebellar AVM w venous varices s/p RF EVD as well as AVM resection on 4/22 and now admitted for further management. S/p trach, PEG.  shunt placed on 5/05. Medically cleared for discharge to facility.    Changes 05/06/25  - medically  cleared for discharge to facility  - will resume DVT proph POD2 (5/07)    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  #Intracranial hypertension  Assessment:  - First day 4/22 presented with SAH, L cerebellar ICH, and ventriculomegaly on CTH  - 4/22 CTA showing L cerebellar AVM  - 4/22 RF EVD for ICH  - 4/22 OR AVM resection  - MRI done 4/29   - LP 5/1 (WBC 5, RBC ^ 1000, Glucose ^ 106, protein 27, CSF culture NGTD 5/3, negative Gram stain)  - Pt is neurologically stable  - No seizure activity on EEG  - Maintained SBP < 180  - Normonatremic goal, sodium down trending without any D5W  - PEG and trach placed    Plan:  - NSU  - Keppra  - EVD in place, maintained at 10  - BP goal: SBP < 180    --> PRN: Labetalol, Hydralazine, and Nicardipine   - IV nicardipine 0.25 mg/min  - PO hydralazine 75 q6  - Amlodipine 10mg oral   - PO Carvedilol 6.25 BID  - PO Lisinopril 20 OD   - Normonatremic goal  - Plan for  shunt next week  - Wean off fentanyl as tolerated  - F/U CSF cultures  - Neuro Checks: Q1H  - Sedation: Off  - Pain: PRN  - Nausea: ondansetron  - PT/OT/SLP    CARDIOVASCULAR:  #Cardiac arrest  #Lactic acidosis, improving  # elevated troponins, improved  Assessment:  - 7 min to ROSC  - Assess for ischemic and stress-induced cardiomyopathy   - Assess for arrhythmias following cardiac arrest   - Cards consult (4/23)  - Type 2 MI (demand ischemia), leading to troponin up-trend (4/23)  - Trop trending down, d/c'd 4/24  - ECHO 4/23:  CONCLUSIONS:   1. Left ventricular ejection fraction is normal, calculated by Adrian's biplane at 71%.   2. Spectral Doppler shows a Grade I (impaired relaxation pattern) of left ventricular diastolic filling with normal left atrial filling pressure.   3. There is normal right ventricular global systolic function.   4. The left atrium is mildly dilated.   5. There is no evidence of a patent foramen  ovale.   6. The inferior vena cava appears mildly dilated, on vent.  - Pt is hemodynamically stable, SBP goal < 180    Plan:  - Monitor on telemetry  - SBP goal < 180 (see above)    RESPIRATORY:  #Acute hypoxic respiratory failure  #Mechanical ventilation  #Bilateral plural effusions  #Pulmonary edema    Results from last 7 days   Lab Units 05/06/25  0402 05/05/25 1647 05/05/25 0210 05/04/25 1702 05/04/25  0123   SODIUM mmol/L 140 141 143 143 145   POTASSIUM mmol/L 3.6 3.7 3.9 3.7 3.9   CREATININE mg/dL 0.56 0.61 0.69 0.77 0.80   BUN mg/dL 25* 24* 26* 27* 29*     Assessment:  - Vent requirements stable, FiO2 40 PEEP 10  - CXR 5/2, persistent bilateral pleural effusions  - Diuresed with 40 mg Lasix with -1.4 L net negative UOP 3.8 5/2  - Continue to diurese, replete potassium as needed  - Mild bilateral end-inspiratory wheezing, likely due to pulmonary edema    Plan:  - Continue mechanical ventilation for decreased LOC  - Monitor for increased secretions/desaturations    RENAL/:  #Pre-renal azotemia, resolving  Assessment:  Results from last 72 hours   Lab Units 05/06/25 0402 05/05/25 1647 05/05/25 0210 05/04/25 1702 05/04/25  0123   CREATININE mg/dL 0.56 0.61 0.69 0.77 0.80   BUN mg/dL 25* 24* 26* 27* 29*   SODIUM mmol/L 140 141 143 143 145   - UOP 3.8 / 24 hours 5/3  - BUN stable 5/3  - Cr stable 5/3    Plan:  - Monitor with daily RFP  - Maintain forbes catheter for: critically ill patient who need accurate urinary output measurements    FEN/GI:  #TIM  Results from last 72 hours   Lab Units 05/06/25 0402 05/05/25 1647 05/05/25 0210 05/04/25 1702 05/04/25  0123   SODIUM mmol/L 140 141 143 143 145   POTASSIUM mmol/L 3.6 3.7 3.9 3.7 3.9   PHOSPHORUS mg/dL 2.9 3.0 3.2 3.3 3.2   - Hypokalemia due to ongoing diuresis with Lasix  - Normonatremic sodium goal 5/3  - Sodium down trending without D5W, 5/3  - Stable phos 5/3  - Will repeat Lasix 5/3 for persistent bilateral pleural effusions  - PEG tube placed 5/2,  started trickle feeds    Plan:  - Monitor and replace electrolytes per protocol  - Replete K+ as needed  - Sodium goal normo natremic per NSGY  - IVF: PRN  - Diet: Trickle feeds through PEG, pending nutrition recs  - Bowel Regimen: Docusate-Senna PRN and Miralax PRN    ENDOCRINE:  #TIM  Assessment:  Results from last 7 days   Lab Units 25  0739 25  0438 25  0402 25  0023 25  1952 25  1647 25  1523 25  1208 25  0501 25  0210 25  2002 25  1702 25  0329 25  0123 25  1924 25  1747   POCT GLUCOSE mg/dL 136* 144*  --  125* 133*  --  116* 156*   < >  --    < >  --    < >  --    < >  --    GLUCOSE mg/dL  --   --  140*  --   --  125*  --   --   --  148*  --  176*  --  173*  --  179*   SODIUM mmol/L  --   --  140  --   --  141  --   --   --  143  --  143  --  145  --  144    < > = values in this interval not displayed.    - HbA1C 5.5 on     Plan:  - Accuchecks & ISS PRN     HEMATOLOGY:  #Anemia, likely dilutional  Assessment:  - Baseline Hgb: 12.0  - Baseline Plts: 287  Results from last 7 days   Lab Units 25  0402 25  16425  0210   HEMOGLOBIN g/dL 8.6* 8.4* 7.9*   HEMATOCRIT % 27.2* 26.5* 25.1*   PLATELETS AUTO x10*3/uL 338 307 313     Plan:  - Continue to monitor with daily CBC and Coag panel  - maintain active type and screen as needed  - Transfuse at Hb < 7    INFECTIOUS DISEASE:  #TIM  Assessment:  Results from last 7 days   Lab Units 25  0402 25  1647 25  0210   WBC AUTO x10*3/uL 13.9* 13.4* 12.3*    - Temp (24hrs), Av.3 °C (97.4 °F), Min:36 °C (96.8 °F), Max:36.5 °C (97.7 °F)  - White count elevated 5/3, likely reactive due to post-trach and PEG  - Pt afebrile  - CSF gram stain negative, NGTD 5/3  - Bcx no growth at 4 days ()  - Sputum cult  no predominant organism  - UA  unremarkable  - Negative pro calcitonin   - Vanc/Zosyn started  for desaturation and  increased O2 requirement overnight  - Vanc/Zosyn stopped 4/28, low suspicion for infection given clean Bcx, no fevers, resolving leukocytosis, and negative pro calcitonin 4/28    Plan  - Continue to monitor for signs of infection     MUSCULOSKELETAL:  - No acute issues    SKIN:  #Swollen left arm  Assessment:   - LUE with persistent swelling since 4/23  - Non-pitting edema, non-erythematous  - POD6  - SQH started (4/25)  - No DVT in LE (4/25)  - No DVT in RUE (4/25)  - Superficial thrombosis in basilic vein in LUE (4/25)    Plan:   - Turns and skin care per NSU protocol    ACCESS:  - R Femoral  - R arterial  - L PIV    PROPHYLAXIS:  - DVT Ppx: SCDs, SQH held for VPS => resume 5/07  - GI: PPI    RESTRAINTS:  Not indicated/Patient does not meet criteria for restraints    Jian Ellington MD PhD  Neuroscience Intensive Care       [1] amLODIPine, 10 mg, oral, Daily  atorvastatin, 10 mg, oral, Nightly  bisacodyl, 10 mg, rectal, Daily  carvedilol, 6.25 mg, oral, q12h  hydrALAZINE, 75 mg, oral, q6h  insulin lispro, 0-5 Units, subcutaneous, q4h  lisinopril, 20 mg, oral, Daily  magnesium oxide, 400 mg, oral, Daily  oxyCODONE, 5 mg, g-tube, q8h  pantoprazole, 40 mg, intravenous, Daily before breakfast  perflutren protein A microsphere, 0.5 mL, intravenous, Once in imaging  polyethylene glycol, 17 g, oral, Daily  sennosides-docusate sodium, 2 tablet, oral, BID  sulfur hexafluoride microsphr, 2 mL, intravenous, Once in imaging     [2] PRN medications: albuterol, calcium gluconate, calcium gluconate, dextrose, dextrose, fentaNYL, glucagon, glucagon, hydrALAZINE, HYDROmorphone, labetaloL, magnesium sulfate, magnesium sulfate, oxygen, potassium chloride CR **OR** potassium chloride, potassium chloride CR **OR** potassium chloride, propofol  [3]

## 2025-05-06 NOTE — SIGNIFICANT EVENT
Otolaryngology - Head and Neck Surgery Significant Event Note      Patient seen and examined. No issues with trach. Trach sutures removed. Velcro tie tightened appropriately.    - Continue routine trach care  - Please call back if patient has been off vent and not in need of positive pressure therapy for 24-48 hours and we can change to cuffless trach before discharge  - Please order homegoing trach supplies prior to discharge  - ENT is scheduling follow up  - ENT will continue to follow and see weekly    please page with any questions or concerns     ENT  pager [phone]

## 2025-05-06 NOTE — PROGRESS NOTES
"Muriel De Souza is a 69 y.o. female on day 14 of admission presenting with AVM (arteriovenous malformation) ([facility]).    Subjective   No events overnight    Objective     Physical Exam  Trach vented   ECNS  OU2R  -cough/gag  BUE withdraws  BLE TF  Cranial incisions with covering and minimal strikethrough  Abdominal incision c/d/I  PEG site c/d/i    Last Recorded Vitals  Blood pressure 152/73, pulse 76, temperature 36.2 °C (97.2 °F), temperature source Temporal, resp. rate 13, height 1.727 m (5' 7.99\"), weight 100 kg (221 lb 1.9 oz), SpO2 97%.  Intake/Output last 3 Shifts:  I/O last 3 completed shifts:  In: 2593.2 (25.9 mL/kg) [I.V.:1333.2 (13.3 mL/kg); NG/GT:1260]  Out: 6823 (68 mL/kg) [Urine:6380 (1.8 mL/kg/hr); Emesis/NG output:100; Drains:293; Blood:50]  Weight: 100.3 kg     Relevant Results  Results from last 72 hours   Lab Units 05/05/25  1647 05/05/25  0210 05/04/25  1702 05/04/25  0123   GLUCOSE mg/dL 125* 148*   < > 173*   SODIUM mmol/L 141 143   < > 145   POTASSIUM mmol/L 3.7 3.9   < > 3.9   CHLORIDE mmol/L 104 103   < > 104   CO2 mmol/L 30 35*   < > 35*   ANION GAP mmol/L 11 9*   < > 10   BUN mg/dL 24* 26*   < > 29*   CREATININE mg/dL 0.61 0.69   < > 0.80   EGFR mL/min/1.73m*2 >90 >90   < > 80   CALCIUM mg/dL 8.1* 8.1*   < > 7.8*   PHOSPHORUS mg/dL 3.0 3.2   < > 3.2   ALBUMIN g/dL 2.8* 2.7*   < > 2.6*   MAGNESIUM mg/dL  --  2.16  --  2.37   POCT CALCIUM IONIZED (MMOL/L) IN BLOOD mmol/L  --  1.18  --  1.16    < > = values in this interval not displayed.      Results from last 72 hours   Lab Units 05/05/25  1647 05/05/25  0210   WBC AUTO x10*3/uL 13.4* 12.3*   NRBC AUTO /100 WBCs 0.0 0.0   RBC AUTO x10*6/uL 2.88* 2.69*   HEMOGLOBIN g/dL 8.4* 7.9*   HEMATOCRIT % 26.5* 25.1*   MCV fL 92 93   MCH pg 29.2 29.4   MCHC g/dL 31.7* 31.5*   RDW % 14.4 14.5   PLATELETS AUTO x10*3/uL 307 313        Assessment & Plan  AVM (arteriovenous malformation) ([facility])    Muriel De Souza is a 69 y.o. w/ h/o HTN, HLD p/w DURBIN, " arrested in CT scanner at OSH, ROSC after 7 min CPR, CTH w diffuse SAH and L cerebellar ICH, triventriculomegally, effaced 4th vents, CTA H/N L cerebellar AVM w venous varices, s/p 1u Plt and DDAVP, s/p RF EVD (OP 18), 4/22 s/p SOC/C1 lami for AVM resection, CTH POC, stable vents, 4/22 TEG R low s/p 1u FFP, 4/23 TTE EF 71%, 4/23 s/p angio tentorial dural AV fistual w direct cortical venous drainage fed by b/l MMA, b/l tentorial arteries, incidental 2mm R pcomm aneurysm, 4/24 CTH slight decr vents  4/29 MRI midbrain and b/l cerebellar hemisphere diffusion hits, 5/1 s/p trach, 5/2 s/p PEG    5/5 s/p R EVD removal, RF VPS (Certas at 5), CTH cath in position, stable prior EVD tract hemorrhage, rCTH stable    Plan:  NSU  Goal riki Na, q12 Na  SBP goal <160  Con't hydral, lisinopril, coreg  Final path - blood clot and normal vessels  GI recs re: PEG tube  SCDs  PTOT  Dispo to LTACH planning- stable for discharge    Kristy Solares MD

## 2025-05-06 NOTE — CARE PLAN
The clinical goals for the shift include monitor her mental status including GCS and NIH, maintain BP parameters with prescribed PRN medication, coordinate care to complete CT scan, monitor artificial airway patency, and implement ABCDEF bundle.    Over the shift, the patient did not make progress toward the following goals. Barriers to progression include the sequela of her disease process. Recommendations to address these barriers include continue to assists patient with ADLs and include family in care decision.     Problem: General Stroke  Goal: Establish a mutual long term goal with patient by discharge  Outcome: Not Progressing  Goal: Demonstrate improvement in neurological exam throughout the shift  Outcome: Not Progressing  Goal: Participate in treatment (ie., meds, therapy) throughout shift  Outcome: Not Progressing  Goal: No symptoms of hemorrhage throughout shift  Outcome: Not Progressing  Goal: Out of bed three times today  Outcome: Not Progressing     Problem: Pain - Adult  Goal: Verbalizes/displays adequate comfort level or baseline comfort level  Outcome: Not Progressing     Problem: Knowledge Deficit  Goal: Patient/family/caregiver demonstrates understanding of disease process, treatment plan, medications, and discharge instructions  Outcome: Not Progressing     Problem: Mechanical Ventilation  Goal: Ability to express needs and understand communication  Outcome: Not Progressing  Goal: Mobility/activity is maintained at optimum level for patient  Outcome: Not Progressing     Problem: Skin  Goal: Participates in plan/prevention/treatment measures  Outcome: Not Progressing

## 2025-05-07 ENCOUNTER — APPOINTMENT (OUTPATIENT)
Dept: RADIOLOGY | Facility: HOSPITAL | Age: 70
DRG: 003 | End: 2025-05-07
Payer: COMMERCIAL

## 2025-05-07 LAB
ALBUMIN SERPL BCP-MCNC: 2.6 G/DL (ref 3.4–5)
ALBUMIN SERPL BCP-MCNC: 2.9 G/DL (ref 3.4–5)
ANION GAP SERPL CALC-SCNC: 10 MMOL/L (ref 10–20)
ANION GAP SERPL CALC-SCNC: 11 MMOL/L (ref 10–20)
BASOPHILS # BLD AUTO: 0.02 X10*3/UL (ref 0–0.1)
BASOPHILS # BLD AUTO: 0.04 X10*3/UL (ref 0–0.1)
BASOPHILS NFR BLD AUTO: 0.2 %
BASOPHILS NFR BLD AUTO: 0.3 %
BLOOD EXPIRATION DATE: NORMAL
BLOOD EXPIRATION DATE: NORMAL
BUN SERPL-MCNC: 26 MG/DL (ref 6–23)
BUN SERPL-MCNC: 28 MG/DL (ref 6–23)
CA-I BLD-SCNC: 1.22 MMOL/L (ref 1.1–1.33)
CALCIUM SERPL-MCNC: 8.1 MG/DL (ref 8.6–10.6)
CALCIUM SERPL-MCNC: 8.3 MG/DL (ref 8.6–10.6)
CHLORIDE SERPL-SCNC: 106 MMOL/L (ref 98–107)
CHLORIDE SERPL-SCNC: 107 MMOL/L (ref 98–107)
CO2 SERPL-SCNC: 28 MMOL/L (ref 21–32)
CO2 SERPL-SCNC: 30 MMOL/L (ref 21–32)
CREAT SERPL-MCNC: 0.62 MG/DL (ref 0.5–1.05)
CREAT SERPL-MCNC: 0.64 MG/DL (ref 0.5–1.05)
DISPENSE STATUS: NORMAL
DISPENSE STATUS: NORMAL
EGFRCR SERPLBLD CKD-EPI 2021: >90 ML/MIN/1.73M*2
EGFRCR SERPLBLD CKD-EPI 2021: >90 ML/MIN/1.73M*2
EOSINOPHIL # BLD AUTO: 0.09 X10*3/UL (ref 0–0.7)
EOSINOPHIL # BLD AUTO: 0.12 X10*3/UL (ref 0–0.7)
EOSINOPHIL NFR BLD AUTO: 0.7 %
EOSINOPHIL NFR BLD AUTO: 1.1 %
ERYTHROCYTE [DISTWIDTH] IN BLOOD BY AUTOMATED COUNT: 15.2 % (ref 11.5–14.5)
ERYTHROCYTE [DISTWIDTH] IN BLOOD BY AUTOMATED COUNT: 15.3 % (ref 11.5–14.5)
GLUCOSE BLD MANUAL STRIP-MCNC: 134 MG/DL (ref 74–99)
GLUCOSE BLD MANUAL STRIP-MCNC: 146 MG/DL (ref 74–99)
GLUCOSE BLD MANUAL STRIP-MCNC: 161 MG/DL (ref 74–99)
GLUCOSE SERPL-MCNC: 148 MG/DL (ref 74–99)
GLUCOSE SERPL-MCNC: 167 MG/DL (ref 74–99)
HCT VFR BLD AUTO: 24.1 % (ref 36–46)
HCT VFR BLD AUTO: 24.6 % (ref 36–46)
HGB BLD-MCNC: 7.5 G/DL (ref 12–16)
HGB BLD-MCNC: 7.9 G/DL (ref 12–16)
IMM GRANULOCYTES # BLD AUTO: 0.05 X10*3/UL (ref 0–0.7)
IMM GRANULOCYTES # BLD AUTO: 0.08 X10*3/UL (ref 0–0.7)
IMM GRANULOCYTES NFR BLD AUTO: 0.5 % (ref 0–0.9)
IMM GRANULOCYTES NFR BLD AUTO: 0.6 % (ref 0–0.9)
LYMPHOCYTES # BLD AUTO: 1.22 X10*3/UL (ref 1.2–4.8)
LYMPHOCYTES # BLD AUTO: 1.26 X10*3/UL (ref 1.2–4.8)
LYMPHOCYTES NFR BLD AUTO: 11.2 %
LYMPHOCYTES NFR BLD AUTO: 9.7 %
MAGNESIUM SERPL-MCNC: 2.31 MG/DL (ref 1.6–2.4)
MCH RBC QN AUTO: 29 PG (ref 26–34)
MCH RBC QN AUTO: 29.2 PG (ref 26–34)
MCHC RBC AUTO-ENTMCNC: 30.5 G/DL (ref 32–36)
MCHC RBC AUTO-ENTMCNC: 32.8 G/DL (ref 32–36)
MCV RBC AUTO: 89 FL (ref 80–100)
MCV RBC AUTO: 96 FL (ref 80–100)
MONOCYTES # BLD AUTO: 0.86 X10*3/UL (ref 0.1–1)
MONOCYTES # BLD AUTO: 0.99 X10*3/UL (ref 0.1–1)
MONOCYTES NFR BLD AUTO: 7.7 %
MONOCYTES NFR BLD AUTO: 7.9 %
NEUTROPHILS # BLD AUTO: 10.47 X10*3/UL (ref 1.2–7.7)
NEUTROPHILS # BLD AUTO: 8.6 X10*3/UL (ref 1.2–7.7)
NEUTROPHILS NFR BLD AUTO: 79.1 %
NEUTROPHILS NFR BLD AUTO: 81 %
NRBC BLD-RTO: 0 /100 WBCS (ref 0–0)
NRBC BLD-RTO: 0 /100 WBCS (ref 0–0)
PHOSPHATE SERPL-MCNC: 2.8 MG/DL (ref 2.5–4.9)
PHOSPHATE SERPL-MCNC: 3.2 MG/DL (ref 2.5–4.9)
PLATELET # BLD AUTO: 366 X10*3/UL (ref 150–450)
PLATELET # BLD AUTO: 406 X10*3/UL (ref 150–450)
POTASSIUM SERPL-SCNC: 3.8 MMOL/L (ref 3.5–5.3)
POTASSIUM SERPL-SCNC: 4 MMOL/L (ref 3.5–5.3)
PRODUCT BLOOD TYPE: 6200
PRODUCT BLOOD TYPE: 6200
PRODUCT CODE: NORMAL
PRODUCT CODE: NORMAL
RBC # BLD AUTO: 2.57 X10*6/UL (ref 4–5.2)
RBC # BLD AUTO: 2.72 X10*6/UL (ref 4–5.2)
SODIUM SERPL-SCNC: 142 MMOL/L (ref 136–145)
SODIUM SERPL-SCNC: 142 MMOL/L (ref 136–145)
UNIT ABO: NORMAL
UNIT ABO: NORMAL
UNIT NUMBER: NORMAL
UNIT NUMBER: NORMAL
UNIT RH: NORMAL
UNIT RH: NORMAL
UNIT VOLUME: 350
UNIT VOLUME: 350
WBC # BLD AUTO: 10.9 X10*3/UL (ref 4.4–11.3)
WBC # BLD AUTO: 12.9 X10*3/UL (ref 4.4–11.3)
XM INTEP: NORMAL
XM INTEP: NORMAL

## 2025-05-07 PROCEDURE — 85025 COMPLETE CBC W/AUTO DIFF WBC: CPT | Performed by: STUDENT IN AN ORGANIZED HEALTH CARE EDUCATION/TRAINING PROGRAM

## 2025-05-07 PROCEDURE — 82330 ASSAY OF CALCIUM: CPT | Performed by: STUDENT IN AN ORGANIZED HEALTH CARE EDUCATION/TRAINING PROGRAM

## 2025-05-07 PROCEDURE — 2500000005 HC RX 250 GENERAL PHARMACY W/O HCPCS: Performed by: STUDENT IN AN ORGANIZED HEALTH CARE EDUCATION/TRAINING PROGRAM

## 2025-05-07 PROCEDURE — 83735 ASSAY OF MAGNESIUM: CPT | Performed by: STUDENT IN AN ORGANIZED HEALTH CARE EDUCATION/TRAINING PROGRAM

## 2025-05-07 PROCEDURE — 51701 INSERT BLADDER CATHETER: CPT

## 2025-05-07 PROCEDURE — 80069 RENAL FUNCTION PANEL: CPT | Performed by: STUDENT IN AN ORGANIZED HEALTH CARE EDUCATION/TRAINING PROGRAM

## 2025-05-07 PROCEDURE — 2500000002 HC RX 250 W HCPCS SELF ADMINISTERED DRUGS (ALT 637 FOR MEDICARE OP, ALT 636 FOR OP/ED): Performed by: STUDENT IN AN ORGANIZED HEALTH CARE EDUCATION/TRAINING PROGRAM

## 2025-05-07 PROCEDURE — 2020000001 HC ICU ROOM DAILY

## 2025-05-07 PROCEDURE — 36415 COLL VENOUS BLD VENIPUNCTURE: CPT | Performed by: STUDENT IN AN ORGANIZED HEALTH CARE EDUCATION/TRAINING PROGRAM

## 2025-05-07 PROCEDURE — 2500000004 HC RX 250 GENERAL PHARMACY W/ HCPCS (ALT 636 FOR OP/ED)

## 2025-05-07 PROCEDURE — 2500000001 HC RX 250 WO HCPCS SELF ADMINISTERED DRUGS (ALT 637 FOR MEDICARE OP): Performed by: STUDENT IN AN ORGANIZED HEALTH CARE EDUCATION/TRAINING PROGRAM

## 2025-05-07 PROCEDURE — 70450 CT HEAD/BRAIN W/O DYE: CPT

## 2025-05-07 PROCEDURE — 2500000004 HC RX 250 GENERAL PHARMACY W/ HCPCS (ALT 636 FOR OP/ED): Mod: JZ | Performed by: STUDENT IN AN ORGANIZED HEALTH CARE EDUCATION/TRAINING PROGRAM

## 2025-05-07 PROCEDURE — 82947 ASSAY GLUCOSE BLOOD QUANT: CPT

## 2025-05-07 PROCEDURE — 2500000001 HC RX 250 WO HCPCS SELF ADMINISTERED DRUGS (ALT 637 FOR MEDICARE OP)

## 2025-05-07 PROCEDURE — 99291 CRITICAL CARE FIRST HOUR: CPT

## 2025-05-07 PROCEDURE — 2500000004 HC RX 250 GENERAL PHARMACY W/ HCPCS (ALT 636 FOR OP/ED): Performed by: STUDENT IN AN ORGANIZED HEALTH CARE EDUCATION/TRAINING PROGRAM

## 2025-05-07 PROCEDURE — 70450 CT HEAD/BRAIN W/O DYE: CPT | Performed by: RADIOLOGY

## 2025-05-07 PROCEDURE — 94003 VENT MGMT INPAT SUBQ DAY: CPT

## 2025-05-07 PROCEDURE — 94640 AIRWAY INHALATION TREATMENT: CPT

## 2025-05-07 PROCEDURE — 37799 UNLISTED PX VASCULAR SURGERY: CPT | Performed by: STUDENT IN AN ORGANIZED HEALTH CARE EDUCATION/TRAINING PROGRAM

## 2025-05-07 RX ORDER — HEPARIN SODIUM 5000 [USP'U]/ML
5000 INJECTION, SOLUTION INTRAVENOUS; SUBCUTANEOUS EVERY 8 HOURS
Status: DISCONTINUED | OUTPATIENT
Start: 2025-05-07 | End: 2025-05-11 | Stop reason: HOSPADM

## 2025-05-07 RX ORDER — LISINOPRIL 20 MG/1
40 TABLET ORAL DAILY
Status: DISCONTINUED | OUTPATIENT
Start: 2025-05-08 | End: 2025-05-11 | Stop reason: HOSPADM

## 2025-05-07 RX ORDER — LISINOPRIL 20 MG/1
20 TABLET ORAL ONCE
Status: COMPLETED | OUTPATIENT
Start: 2025-05-07 | End: 2025-05-07

## 2025-05-07 RX ADMIN — HEPARIN SODIUM 5000 UNITS: 5000 INJECTION INTRAVENOUS; SUBCUTANEOUS at 05:20

## 2025-05-07 RX ADMIN — Medication 30 PERCENT: at 20:42

## 2025-05-07 RX ADMIN — HYDRALAZINE HYDROCHLORIDE 75 MG: 50 TABLET, FILM COATED ORAL at 21:00

## 2025-05-07 RX ADMIN — MAGNESIUM OXIDE TAB 400 MG (241.3 MG ELEMENTAL MG) 400 MG: 400 (241.3 MG) TAB at 08:10

## 2025-05-07 RX ADMIN — INSULIN LISPRO 1 UNITS: 100 INJECTION, SOLUTION INTRAVENOUS; SUBCUTANEOUS at 08:12

## 2025-05-07 RX ADMIN — LISINOPRIL 20 MG: 20 TABLET ORAL at 08:11

## 2025-05-07 RX ADMIN — HYDRALAZINE HYDROCHLORIDE 75 MG: 50 TABLET, FILM COATED ORAL at 05:20

## 2025-05-07 RX ADMIN — SENNOSIDES AND DOCUSATE SODIUM 2 TABLET: 8.6; 5 TABLET ORAL at 08:11

## 2025-05-07 RX ADMIN — HYDRALAZINE HYDROCHLORIDE 75 MG: 50 TABLET, FILM COATED ORAL at 16:08

## 2025-05-07 RX ADMIN — BISACODYL 10 MG: 10 SUPPOSITORY RECTAL at 08:11

## 2025-05-07 RX ADMIN — LISINOPRIL 20 MG: 20 TABLET ORAL at 13:25

## 2025-05-07 RX ADMIN — HEPARIN SODIUM 5000 UNITS: 5000 INJECTION INTRAVENOUS; SUBCUTANEOUS at 18:18

## 2025-05-07 RX ADMIN — HYDRALAZINE HYDROCHLORIDE 75 MG: 50 TABLET, FILM COATED ORAL at 10:48

## 2025-05-07 RX ADMIN — CARVEDILOL 6.25 MG: 12.5 TABLET, FILM COATED ORAL at 06:55

## 2025-05-07 RX ADMIN — CARVEDILOL 6.25 MG: 12.5 TABLET, FILM COATED ORAL at 20:58

## 2025-05-07 RX ADMIN — ALBUTEROL SULFATE 2.5 MG: 2.5 SOLUTION RESPIRATORY (INHALATION) at 08:30

## 2025-05-07 RX ADMIN — SENNOSIDES AND DOCUSATE SODIUM 2 TABLET: 8.6; 5 TABLET ORAL at 20:58

## 2025-05-07 RX ADMIN — PANTOPRAZOLE SODIUM 40 MG: 40 INJECTION, POWDER, FOR SOLUTION INTRAVENOUS at 06:55

## 2025-05-07 RX ADMIN — AMLODIPINE BESYLATE 10 MG: 10 TABLET ORAL at 08:11

## 2025-05-07 RX ADMIN — POLYETHYLENE GLYCOL 3350 17 G: 17 POWDER, FOR SOLUTION ORAL at 08:10

## 2025-05-07 RX ADMIN — Medication 30 PERCENT: at 08:32

## 2025-05-07 RX ADMIN — ATORVASTATIN CALCIUM 10 MG: 10 TABLET, FILM COATED ORAL at 20:58

## 2025-05-07 RX ADMIN — HEPARIN SODIUM 5000 UNITS: 5000 INJECTION INTRAVENOUS; SUBCUTANEOUS at 10:48

## 2025-05-07 ASSESSMENT — PAIN - FUNCTIONAL ASSESSMENT
PAIN_FUNCTIONAL_ASSESSMENT: CPOT (CRITICAL CARE PAIN OBSERVATION TOOL)

## 2025-05-07 NOTE — PROGRESS NOTES
[facility]  NEUROSCIENCE INTENSIVE CARE UNIT  DAILY PROGRESS NOTE       Patient Name: Muriel De Souza   MRN: [id number]     Admit Date: 2025     : 1955 AGE: 69 y.o. GENDER: female        Subjective    Muriel De Souza is a 69 y.o. female with h/o HTN, HLD p/w HA, arrested in CT scanner at OSH, ROSC after 7 min CPR,  CTH w diffuse SAH and L cerebellar ICH, triventriculomegally, effaced 4th vents, CTA with L cerebellar AVM w venous varices s/p RF EVD and now admitted for further management.    Per chart review, patient had received bad news at home and then had sudden onset headache and vomiting. Found to have SAH at OSH with course complicated by cardiac arrest. She was intubated and sedated and transferred from [place], [place] to [facility].    Significant Events:  -  came in with L cerebellar AVM bleed with course complicated by cardiac arrest ROSC after 7 min CPR, uppon arrival to [facility] NSU, EVD drain placed, went to OR for decompression and AVM resection  - : - during cough assist, clifford down to 20s, order was d/c'd; PEG tube bleeding, saturated 2x2, abdominal binder, gown. Paged  GI on call fellow, they think perhaps some capillaries are bleeding and would like abdominal imaging today    Interval Events:   - no active issues overnight,       Objective   VITALS (24H):  Temp:  [35.9 °C (96.6 °F)-36.8 °C (98.2 °F)] 36.8 °C (98.2 °F)  Heart Rate:  [66-77] 73  Resp:  [11-21] 21  BP: (133-186)/(55-73) 186/70  Arterial Line BP 1: (172-193)/(54-70) 175/57  FiO2 (%):  [30 %] 30 %  INTAKE/OUTPUT:  Intake/Output Summary (Last 24 hours) at 2025 0751  Last data filed at 2025 0700  Gross per 24 hour   Intake 1300 ml   Output 1570 ml   Net -270 ml     VENT SETTINGS:  Vent Mode: Pressure support  FiO2 (%):  [30 %] 30 %  PEEP/CPAP (cm H2O):  [5 cm H20] 5 cm H20  IL SUP:  [10 cm H20-12 cm H20] 10 cm H20       PHYSICAL EXAM:  NEURO:  - Eyes closed to nox stim  - pupils minimally reactive to  light  - corneals negative  - cough reflex intact  - gag reflex not tested as patient is not on vent (pt on trach)  - some minimal response in uppers to nox stim  - triple flexion in bilateral lowers  CV:  - RRR on telemetry, NSR  - Arterial line in place  RESP:  - trach  - transmitted upper airway sounds on auscultation  :  - Brooks catheter in place  GI:  - PEG in place  SKIN:  - Intact  - left arm swollen and tense to palpation, not pitting, non-erythematous    MEDICATIONS:  Scheduled: PRN: Continuous:   Scheduled Medications[1] PRN Medications[2] Continuous Medications[3]     IMAGING RESULTS:  CT head wo IV contrast   Final Result   * Diffuse subarachnoid hemorrhage with hydrocephalus   *Left cerebellar hematoma suggesting a likely source.        MACRO:   none        Signed by: Cruz Salgado 4/22/2025 11:59 AM   Dictation workstation:   [id number]      CT angio head and neck w and wo IV contrast   Final Result   * Diffuse subarachnoid hemorrhage with hydrocephalus as described   *Suspected vascular malformation in the left cerebellar hemisphere   with enlarged arterial and venous structures largely in the left   cerebellar hemisphere and residual partial opacification of a venous   aneurysm near the torcula. *Bilateral carotid stenosis as described        MACRO:   none        Signed by: Cruz Salgado 4/22/2025 12:07 PM   Dictation workstation:   [id number]             Assessment/Plan    Muriel De Souza is a 69 y.o. female with h/o HTN, HLD p/w HA, arrested in CT scanner at OSH, ROSC after 7 min CPR,  CTH w diffuse SAH and L cerebellar ICH, triventriculomegally, effaced 4th vents, CTA with L cerebellar AVM w venous varices s/p RF EVD as well as AVM resection on 4/22 and now admitted for further management. S/p trach, PEG.  shunt placed on 5/05. Medically cleared for discharge to facility.    Changes 05/07/25  - medically cleared for discharge to facility  - CT head today for adjustment of  shunt  settings if needed  - resuming DVT proph  - will tune BP meds a bit today    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  #Intracranial hypertension  Assessment:  - First day 4/22 presented with SAH, L cerebellar ICH, and ventriculomegaly on CTH  - 4/22 CTA showing L cerebellar AVM  - 4/22 RF EVD for ICH  - 4/22 OR AVM resection  - MRI done 4/29   - LP 5/1 (WBC 5, RBC ^ 1000, Glucose ^ 106, protein 27, CSF culture NGTD 5/3, negative Gram stain)  - Pt is neurologically stable  - No seizure activity on EEG  - Maintained SBP < 180  - Normonatremic goal, sodium down trending without any D5W  - PEG and trach placed    Plan:  - NSU  - Keppra  - EVD in place, maintained at 10  - BP goal: SBP < 180    --> PRN: Labetalol, Hydralazine, and Nicardipine   - IV nicardipine 0.25 mg/min  - PO hydralazine 75 q6  - Amlodipine 10mg oral   - PO Carvedilol 6.25 BID  - PO Lisinopril 20 OD   - Normonatremic goal  - Plan for  shunt next week  - Wean off fentanyl as tolerated  - F/U CSF cultures  - Neuro Checks: Q1H  - Sedation: Off  - Pain: PRN  - Nausea: ondansetron  - PT/OT/SLP    CARDIOVASCULAR:  #Cardiac arrest  #Lactic acidosis, improving  # elevated troponins, improved  Assessment:  - 7 min to ROSC  - Assess for ischemic and stress-induced cardiomyopathy   - Assess for arrhythmias following cardiac arrest   - Cards consult (4/23)  - Type 2 MI (demand ischemia), leading to troponin up-trend (4/23)  - Trop trending down, d/c'd 4/24  - ECHO 4/23:  CONCLUSIONS:   1. Left ventricular ejection fraction is normal, calculated by Adrian's biplane at 71%.   2. Spectral Doppler shows a Grade I (impaired relaxation pattern) of left ventricular diastolic filling with normal left atrial filling pressure.   3. There is normal right ventricular global systolic function.   4. The left atrium is mildly dilated.   5. There is no evidence of a patent  foramen ovale.   6. The inferior vena cava appears mildly dilated, on vent.  - Pt is hemodynamically stable, SBP goal < 180    Plan:  - Monitor on telemetry  - SBP goal < 180 (see above)    RESPIRATORY:  #Acute hypoxic respiratory failure  #Mechanical ventilation  #Bilateral plural effusions  #Pulmonary edema    Results from last 7 days   Lab Units 05/07/25  0104 05/06/25  1729 05/06/25  0402 05/05/25  1647 05/05/25  0210   SODIUM mmol/L 142 142 140 141 143   POTASSIUM mmol/L 4.0 4.0 3.6 3.7 3.9   CREATININE mg/dL 0.62 0.62 0.56 0.61 0.69   BUN mg/dL 26* 25* 25* 24* 26*     Assessment:  - Vent requirements stable, FiO2 40 PEEP 10  - CXR 5/2, persistent bilateral pleural effusions  - Diuresed with 40 mg Lasix with -1.4 L net negative UOP 3.8 5/2  - Continue to diurese, replete potassium as needed  - Mild bilateral end-inspiratory wheezing, likely due to pulmonary edema    Plan:  - Continue mechanical ventilation for decreased LOC  - Monitor for increased secretions/desaturations    RENAL/:  #Pre-renal azotemia, resolving  Assessment:  Results from last 72 hours   Lab Units 05/07/25 0104 05/06/25 1729 05/06/25 0402 05/05/25 1647 05/05/25  0210   CREATININE mg/dL 0.62 0.62 0.56 0.61 0.69   BUN mg/dL 26* 25* 25* 24* 26*   SODIUM mmol/L 142 142 140 141 143   - UOP 3.8 / 24 hours 5/3  - BUN stable 5/3  - Cr stable 5/3    Plan:  - Monitor with daily RFP  - Maintain forbes catheter for: critically ill patient who need accurate urinary output measurements    FEN/GI:  #TIM  Results from last 72 hours   Lab Units 05/07/25 0104 05/06/25 1729 05/06/25  0402 05/05/25  1647 05/05/25  0210   SODIUM mmol/L 142 142 140 141 143   POTASSIUM mmol/L 4.0 4.0 3.6 3.7 3.9   PHOSPHORUS mg/dL 3.2 3.1 2.9 3.0 3.2   - Hypokalemia due to ongoing diuresis with Lasix  - Normonatremic sodium goal 5/3  - Sodium down trending without D5W, 5/3  - Stable phos 5/3  - Will repeat Lasix 5/3 for persistent bilateral pleural effusions  - PEG tube  placed , started trickle feeds    Plan:  - Monitor and replace electrolytes per protocol  - Replete K+ as needed  - Sodium goal normo natremic per NSGY  - IVF: PRN  - Diet: Trickle feeds through PEG, pending nutrition recs  - Bowel Regimen: Docusate-Senna PRN and Miralax PRN    ENDOCRINE:  #TIM  Assessment:  Results from last 7 days   Lab Units 25  0745 25  0350 25  0104 25  2339 25  17225  1528 25  1125 25  0438 25  0402 25  19525  1647 25  0501 25  0210 25  2002 25  1702   POCT GLUCOSE mg/dL 161* 146*  --  147* 139*  --  113* 152*   < >  --    < >  --    < >  --    < >  --    GLUCOSE mg/dL  --   --  148*  --   --  124*  --   --   --  140*  --  125*  --  148*  --  176*   SODIUM mmol/L  --   --  142  --   --  142  --   --   --  140  --  141  --  143  --  143    < > = values in this interval not displayed.    - HbA1C 5.5 on     Plan:  - Accuchecks & ISS PRN     HEMATOLOGY:  #Anemia, likely dilutional  Assessment:  - Baseline Hgb: 12.0  - Baseline Plts: 287  Results from last 7 days   Lab Units 25  0103 05/06/25  1729 05/06/25  0402   HEMOGLOBIN g/dL 7.5* 7.9* 8.6*   HEMATOCRIT % 24.6* 23.6* 27.2*   PLATELETS AUTO x10*3/uL 366 344 338     Plan:  - Continue to monitor with daily CBC and Coag panel  - maintain active type and screen as needed  - Transfuse at Hb < 7    INFECTIOUS DISEASE:  #TIM  Assessment:  Results from last 7 days   Lab Units 25  0103 25  17225  0402   WBC AUTO x10*3/uL 10.9 11.9* 13.9*    - Temp (24hrs), Av.2 °C (97.2 °F), Min:35.9 °C (96.6 °F), Max:36.8 °C (98.2 °F)  - White count elevated 5/3, likely reactive due to post-trach and PEG  - Pt afebrile  - CSF gram stain negative, NGTD 5/3  - Bcx no growth at 4 days ()  - Sputum cult  no predominant organism  - UA  unremarkable  - Negative pro calcitonin   - Vanc/Zosyn started  for  desaturation and increased O2 requirement overnight  - Vanc/Zosyn stopped 4/28, low suspicion for infection given clean Bcx, no fevers, resolving leukocytosis, and negative pro calcitonin 4/28    Plan  - Continue to monitor for signs of infection     MUSCULOSKELETAL:  - No acute issues    SKIN:  #Swollen left arm  Assessment:   - LUE with persistent swelling since 4/23  - Non-pitting edema, non-erythematous  - POD6  - SQH started (4/25)  - No DVT in LE (4/25)  - No DVT in RUE (4/25)  - Superficial thrombosis in basilic vein in LUE (4/25)    Plan:   - Turns and skin care per NSU protocol    ACCESS:  - PIVs    PROPHYLAXIS:  - DVT Ppx: SCDs, SQH  - GI: PPI PRN    RESTRAINTS:  Not indicated/Patient does not meet criteria for restraints    Jian Ellington MD PhD  Neuroscience Intensive Care         [1] amLODIPine, 10 mg, g-tube, Daily  atorvastatin, 10 mg, g-tube, Nightly  bisacodyl, 10 mg, rectal, Daily  carvedilol, 6.25 mg, g-tube, q12h  heparin (porcine), 5,000 Units, subcutaneous, q8h  hydrALAZINE, 75 mg, g-tube, q6h  insulin lispro, 0-5 Units, subcutaneous, q4h  lisinopril, 20 mg, g-tube, Daily  magnesium oxide, 400 mg, g-tube, Daily  pantoprazole, 40 mg, intravenous, Daily before breakfast  perflutren protein A microsphere, 0.5 mL, intravenous, Once in imaging  polyethylene glycol, 17 g, g-tube, Daily  sennosides-docusate sodium, 2 tablet, g-tube, BID  sulfur hexafluoride microsphr, 2 mL, intravenous, Once in imaging     [2] PRN medications: acetaminophen **OR** acetaminophen **OR** acetaminophen, albuterol, calcium gluconate, calcium gluconate, dextrose, dextrose, glucagon, glucagon, hydrALAZINE, HYDROmorphone, labetaloL, magnesium sulfate, magnesium sulfate, oxyCODONE, oxygen, potassium chloride CR **OR** potassium chloride, potassium chloride CR **OR** potassium chloride  [3]

## 2025-05-07 NOTE — CARE PLAN
Interprofessional Rounds    Summary:  ICH with ruptured AVM, sp suboccipital crani. Trach and PEG placed, pending LTACH,  involved.     Participants: Advance Practice Provider, Ethicist, Occupational Therapist, Physical Therapist, Physician, RN, and     Care Plan Reviewed with:  Interdisciplinary Team

## 2025-05-07 NOTE — PROGRESS NOTES
05/07/25 1546   Discharge Planning   Home or Post Acute Services Post acute facilities (Rehab/SNF/etc)   Type of Post Acute Facility Services Other (Comment)  (LTACH)   Expected Discharge Disposition Long Term   Patient Choice   Provider Choice list and CMS website (https://medicare.gov/care-compare#search) for post-acute Quality and Resource Measure Data were provided and reviewed with: Family     Social Work Discharge Planning note:    Per medical team, Pt continues to be medically ready for discharge to LTACH. As of the time of writing this note, insurance precert is still pending. SW will update this note if precert is received, otherwise SW will plan to follow up tomorrow.     This ALBA Olguin, LSW    Office: [phone]  Secure chat via [employer]

## 2025-05-07 NOTE — PROGRESS NOTES
"Muriel De Souza is a 69 y.o. female on day 15 of admission presenting with AVM (arteriovenous malformation) ([facility]).    Subjective   No events overnight    Objective     Physical Exam  Trach vented   ECNS  OU4R  +cough  BUE withdraws  BLE TF  Cranial c/d/i  Abdominal incision c/d/I  PEG site c/d/i    Last Recorded Vitals  Blood pressure 168/67, pulse 77, temperature 36.8 °C (98.2 °F), resp. rate 14, height 1.727 m (5' 7.99\"), weight 95.4 kg (210 lb 5.1 oz), SpO2 97%.  Intake/Output last 3 Shifts:  I/O last 3 completed shifts:  In: 2135 (22.4 mL/kg) [I.V.:375 (3.9 mL/kg); NG/GT:1760]  Out: 2500 (26.2 mL/kg) [Urine:2500 (0.7 mL/kg/hr)]  Weight: 95.4 kg     Relevant Results  Results from last 72 hours   Lab Units 05/07/25  0104 05/07/25  0103 05/06/25  1729 05/06/25  0402   GLUCOSE mg/dL 148*  --  124* 140*   SODIUM mmol/L 142  --  142 140   POTASSIUM mmol/L 4.0  --  4.0 3.6   CHLORIDE mmol/L 107  --  107 104   CO2 mmol/L 28  --  27 26   ANION GAP mmol/L 11  --  12 14   BUN mg/dL 26*  --  25* 25*   CREATININE mg/dL 0.62  --  0.62 0.56   EGFR mL/min/1.73m*2 >90  --  >90 >90   CALCIUM mg/dL 8.1*  --  8.2* 8.3*   PHOSPHORUS mg/dL 3.2  --  3.1 2.9   ALBUMIN g/dL 2.6*  --  2.7* 2.9*   MAGNESIUM mg/dL  --  2.31  --  2.07   POCT CALCIUM IONIZED (MMOL/L) IN BLOOD mmol/L  --  1.22  --  1.14      Results from last 72 hours   Lab Units 05/07/25 0103 05/06/25  1729   WBC AUTO x10*3/uL 10.9 11.9*   NRBC AUTO /100 WBCs 0.0 0.0   RBC AUTO x10*6/uL 2.57* 2.66*   HEMOGLOBIN g/dL 7.5* 7.9*   HEMATOCRIT % 24.6* 23.6*   MCV fL 96 89   MCH pg 29.2 29.7   MCHC g/dL 30.5* 33.5   RDW % 15.3* 15.2*   PLATELETS AUTO x10*3/uL 366 344        Assessment & Plan  AVM (arteriovenous malformation) ([facility])    Muriel De Souza is a 69 y.o. 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, s/p 1u Plt " and DDAVP, s/p RF EVD (OP 18), 4/22 s/p SOC/C1 lami for AVM resection, CTH POC, stable vents, 4/22 TEG R low s/p 1u FFP, 4/23 TTE EF 71%, 4/23 s/p angio tentorial dural AV fistual w direct cortical venous drainage fed by b/l MMA, b/l tentorial arteries, incidental 2mm R pcomm aneurysm, 4/24 CTH slight decr vents  4/29 MRI midbrain and b/l cerebellar hemisphere diffusion hits, 5/1 s/p trach, 5/2 s/p PEG    5/5 s/p R EVD removal, RF VPS (Certas at 5), CTH cath in position, stable prior EVD tract hemorrhage, rCTH stable    Plan:  NSU  Goal riki Na, q12 Na  SBP goal <160  Con't hydral, lisinopril, coreg  SCD, SQH  PTOT  Dispo to LTACH planning- stable for discharge    Kiran Robledo MD

## 2025-05-08 LAB
ALBUMIN SERPL BCP-MCNC: 2.7 G/DL (ref 3.4–5)
ALBUMIN SERPL BCP-MCNC: 3 G/DL (ref 3.4–5)
ANION GAP SERPL CALC-SCNC: 10 MMOL/L (ref 10–20)
ANION GAP SERPL CALC-SCNC: 12 MMOL/L (ref 10–20)
BACTERIA CSF CULT: NORMAL
BASOPHILS # BLD AUTO: 0.03 X10*3/UL (ref 0–0.1)
BASOPHILS # BLD AUTO: 0.04 X10*3/UL (ref 0–0.1)
BASOPHILS NFR BLD AUTO: 0.3 %
BASOPHILS NFR BLD AUTO: 0.3 %
BUN SERPL-MCNC: 22 MG/DL (ref 6–23)
BUN SERPL-MCNC: 27 MG/DL (ref 6–23)
CA-I BLD-SCNC: 1.18 MMOL/L (ref 1.1–1.33)
CALCIUM SERPL-MCNC: 8.1 MG/DL (ref 8.6–10.6)
CALCIUM SERPL-MCNC: 8.5 MG/DL (ref 8.6–10.6)
CHLORIDE SERPL-SCNC: 103 MMOL/L (ref 98–107)
CHLORIDE SERPL-SCNC: 104 MMOL/L (ref 98–107)
CO2 SERPL-SCNC: 28 MMOL/L (ref 21–32)
CO2 SERPL-SCNC: 28 MMOL/L (ref 21–32)
CREAT SERPL-MCNC: 0.51 MG/DL (ref 0.5–1.05)
CREAT SERPL-MCNC: 0.57 MG/DL (ref 0.5–1.05)
EGFRCR SERPLBLD CKD-EPI 2021: >90 ML/MIN/1.73M*2
EGFRCR SERPLBLD CKD-EPI 2021: >90 ML/MIN/1.73M*2
EOSINOPHIL # BLD AUTO: 0.15 X10*3/UL (ref 0–0.7)
EOSINOPHIL # BLD AUTO: 0.15 X10*3/UL (ref 0–0.7)
EOSINOPHIL NFR BLD AUTO: 1.2 %
EOSINOPHIL NFR BLD AUTO: 1.3 %
ERYTHROCYTE [DISTWIDTH] IN BLOOD BY AUTOMATED COUNT: 15.1 % (ref 11.5–14.5)
ERYTHROCYTE [DISTWIDTH] IN BLOOD BY AUTOMATED COUNT: 15.2 % (ref 11.5–14.5)
GLUCOSE SERPL-MCNC: 146 MG/DL (ref 74–99)
GLUCOSE SERPL-MCNC: 152 MG/DL (ref 74–99)
GRAM STN SPEC: NORMAL
GRAM STN SPEC: NORMAL
HCT VFR BLD AUTO: 24.3 % (ref 36–46)
HCT VFR BLD AUTO: 27.8 % (ref 36–46)
HGB BLD-MCNC: 7.5 G/DL (ref 12–16)
HGB BLD-MCNC: 8.7 G/DL (ref 12–16)
IMM GRANULOCYTES # BLD AUTO: 0.07 X10*3/UL (ref 0–0.7)
IMM GRANULOCYTES # BLD AUTO: 0.08 X10*3/UL (ref 0–0.7)
IMM GRANULOCYTES NFR BLD AUTO: 0.6 % (ref 0–0.9)
IMM GRANULOCYTES NFR BLD AUTO: 0.7 % (ref 0–0.9)
LYMPHOCYTES # BLD AUTO: 1.32 X10*3/UL (ref 1.2–4.8)
LYMPHOCYTES # BLD AUTO: 1.34 X10*3/UL (ref 1.2–4.8)
LYMPHOCYTES NFR BLD AUTO: 10.9 %
LYMPHOCYTES NFR BLD AUTO: 11.7 %
MAGNESIUM SERPL-MCNC: 2.11 MG/DL (ref 1.6–2.4)
MCH RBC QN AUTO: 28.7 PG (ref 26–34)
MCH RBC QN AUTO: 29.2 PG (ref 26–34)
MCHC RBC AUTO-ENTMCNC: 30.9 G/DL (ref 32–36)
MCHC RBC AUTO-ENTMCNC: 31.3 G/DL (ref 32–36)
MCV RBC AUTO: 93 FL (ref 80–100)
MCV RBC AUTO: 93 FL (ref 80–100)
MONOCYTES # BLD AUTO: 0.62 X10*3/UL (ref 0.1–1)
MONOCYTES # BLD AUTO: 0.95 X10*3/UL (ref 0.1–1)
MONOCYTES NFR BLD AUTO: 5.1 %
MONOCYTES NFR BLD AUTO: 8.3 %
NEUTROPHILS # BLD AUTO: 8.95 X10*3/UL (ref 1.2–7.7)
NEUTROPHILS # BLD AUTO: 9.93 X10*3/UL (ref 1.2–7.7)
NEUTROPHILS NFR BLD AUTO: 77.7 %
NEUTROPHILS NFR BLD AUTO: 81.9 %
NRBC BLD-RTO: 0 /100 WBCS (ref 0–0)
NRBC BLD-RTO: 0 /100 WBCS (ref 0–0)
PHOSPHATE SERPL-MCNC: 2.7 MG/DL (ref 2.5–4.9)
PHOSPHATE SERPL-MCNC: 2.9 MG/DL (ref 2.5–4.9)
PLATELET # BLD AUTO: 342 X10*3/UL (ref 150–450)
PLATELET # BLD AUTO: 459 X10*3/UL (ref 150–450)
POTASSIUM SERPL-SCNC: 3.5 MMOL/L (ref 3.5–5.3)
POTASSIUM SERPL-SCNC: 4.1 MMOL/L (ref 3.5–5.3)
RBC # BLD AUTO: 2.61 X10*6/UL (ref 4–5.2)
RBC # BLD AUTO: 2.98 X10*6/UL (ref 4–5.2)
SODIUM SERPL-SCNC: 137 MMOL/L (ref 136–145)
SODIUM SERPL-SCNC: 140 MMOL/L (ref 136–145)
WBC # BLD AUTO: 11.5 X10*3/UL (ref 4.4–11.3)
WBC # BLD AUTO: 12.1 X10*3/UL (ref 4.4–11.3)

## 2025-05-08 PROCEDURE — 36415 COLL VENOUS BLD VENIPUNCTURE: CPT | Performed by: STUDENT IN AN ORGANIZED HEALTH CARE EDUCATION/TRAINING PROGRAM

## 2025-05-08 PROCEDURE — 83735 ASSAY OF MAGNESIUM: CPT | Performed by: STUDENT IN AN ORGANIZED HEALTH CARE EDUCATION/TRAINING PROGRAM

## 2025-05-08 PROCEDURE — 85025 COMPLETE CBC W/AUTO DIFF WBC: CPT | Performed by: STUDENT IN AN ORGANIZED HEALTH CARE EDUCATION/TRAINING PROGRAM

## 2025-05-08 PROCEDURE — 94003 VENT MGMT INPAT SUBQ DAY: CPT

## 2025-05-08 PROCEDURE — 51701 INSERT BLADDER CATHETER: CPT

## 2025-05-08 PROCEDURE — 2500000001 HC RX 250 WO HCPCS SELF ADMINISTERED DRUGS (ALT 637 FOR MEDICARE OP): Performed by: STUDENT IN AN ORGANIZED HEALTH CARE EDUCATION/TRAINING PROGRAM

## 2025-05-08 PROCEDURE — 2500000005 HC RX 250 GENERAL PHARMACY W/O HCPCS: Performed by: STUDENT IN AN ORGANIZED HEALTH CARE EDUCATION/TRAINING PROGRAM

## 2025-05-08 PROCEDURE — 2500000001 HC RX 250 WO HCPCS SELF ADMINISTERED DRUGS (ALT 637 FOR MEDICARE OP): Performed by: REGISTERED NURSE

## 2025-05-08 PROCEDURE — 99291 CRITICAL CARE FIRST HOUR: CPT

## 2025-05-08 PROCEDURE — 2500000004 HC RX 250 GENERAL PHARMACY W/ HCPCS (ALT 636 FOR OP/ED): Mod: JZ | Performed by: STUDENT IN AN ORGANIZED HEALTH CARE EDUCATION/TRAINING PROGRAM

## 2025-05-08 PROCEDURE — 2020000001 HC ICU ROOM DAILY

## 2025-05-08 PROCEDURE — 2500000004 HC RX 250 GENERAL PHARMACY W/ HCPCS (ALT 636 FOR OP/ED)

## 2025-05-08 PROCEDURE — 2500000001 HC RX 250 WO HCPCS SELF ADMINISTERED DRUGS (ALT 637 FOR MEDICARE OP)

## 2025-05-08 PROCEDURE — 84100 ASSAY OF PHOSPHORUS: CPT | Performed by: STUDENT IN AN ORGANIZED HEALTH CARE EDUCATION/TRAINING PROGRAM

## 2025-05-08 PROCEDURE — 2500000004 HC RX 250 GENERAL PHARMACY W/ HCPCS (ALT 636 FOR OP/ED): Performed by: STUDENT IN AN ORGANIZED HEALTH CARE EDUCATION/TRAINING PROGRAM

## 2025-05-08 PROCEDURE — 82330 ASSAY OF CALCIUM: CPT | Performed by: STUDENT IN AN ORGANIZED HEALTH CARE EDUCATION/TRAINING PROGRAM

## 2025-05-08 RX ORDER — POTASSIUM CHLORIDE 1.5 G/1.58G
40 POWDER, FOR SOLUTION ORAL ONCE
Status: COMPLETED | OUTPATIENT
Start: 2025-05-08 | End: 2025-05-08

## 2025-05-08 RX ADMIN — Medication 30 PERCENT: at 03:36

## 2025-05-08 RX ADMIN — BISACODYL 10 MG: 10 SUPPOSITORY RECTAL at 09:40

## 2025-05-08 RX ADMIN — HEPARIN SODIUM 5000 UNITS: 5000 INJECTION INTRAVENOUS; SUBCUTANEOUS at 21:31

## 2025-05-08 RX ADMIN — PANTOPRAZOLE SODIUM 40 MG: 40 INJECTION, POWDER, FOR SOLUTION INTRAVENOUS at 06:11

## 2025-05-08 RX ADMIN — SENNOSIDES AND DOCUSATE SODIUM 2 TABLET: 8.6; 5 TABLET ORAL at 09:39

## 2025-05-08 RX ADMIN — HYDRALAZINE HYDROCHLORIDE 75 MG: 50 TABLET, FILM COATED ORAL at 16:56

## 2025-05-08 RX ADMIN — SENNOSIDES AND DOCUSATE SODIUM 2 TABLET: 8.6; 5 TABLET ORAL at 21:42

## 2025-05-08 RX ADMIN — MAGNESIUM OXIDE TAB 400 MG (241.3 MG ELEMENTAL MG) 400 MG: 400 (241.3 MG) TAB at 09:39

## 2025-05-08 RX ADMIN — CARVEDILOL 6.25 MG: 12.5 TABLET, FILM COATED ORAL at 09:39

## 2025-05-08 RX ADMIN — HEPARIN SODIUM 5000 UNITS: 5000 INJECTION INTRAVENOUS; SUBCUTANEOUS at 12:01

## 2025-05-08 RX ADMIN — HEPARIN SODIUM 5000 UNITS: 5000 INJECTION INTRAVENOUS; SUBCUTANEOUS at 04:03

## 2025-05-08 RX ADMIN — HYDRALAZINE HYDROCHLORIDE 75 MG: 50 TABLET, FILM COATED ORAL at 04:04

## 2025-05-08 RX ADMIN — ATORVASTATIN CALCIUM 10 MG: 10 TABLET, FILM COATED ORAL at 21:31

## 2025-05-08 RX ADMIN — HYDRALAZINE HYDROCHLORIDE 75 MG: 50 TABLET, FILM COATED ORAL at 21:31

## 2025-05-08 RX ADMIN — CARVEDILOL 6.25 MG: 12.5 TABLET, FILM COATED ORAL at 21:30

## 2025-05-08 RX ADMIN — AMLODIPINE BESYLATE 10 MG: 10 TABLET ORAL at 09:39

## 2025-05-08 RX ADMIN — POLYETHYLENE GLYCOL 3350 17 G: 17 POWDER, FOR SOLUTION ORAL at 09:39

## 2025-05-08 RX ADMIN — HYDRALAZINE HYDROCHLORIDE 75 MG: 50 TABLET, FILM COATED ORAL at 09:39

## 2025-05-08 RX ADMIN — POTASSIUM CHLORIDE 40 MEQ: 1.5 POWDER, FOR SOLUTION ORAL at 06:11

## 2025-05-08 RX ADMIN — LISINOPRIL 40 MG: 20 TABLET ORAL at 09:39

## 2025-05-08 NOTE — PROGRESS NOTES
[facility]  NEUROSCIENCE INTENSIVE CARE UNIT  DAILY PROGRESS NOTE       Patient Name: Muriel De Souza   MRN: [id number]     Admit Date: 2025     : 1955 AGE: 69 y.o. GENDER: female        Subjective    Muriel De Souza is a 69 y.o. female with h/o HTN, HLD p/w HA, arrested in CT scanner at OSH, ROSC after 7 min CPR,  CTH w diffuse SAH and L cerebellar ICH, triventriculomegally, effaced 4th vents, CTA with L cerebellar AVM w venous varices s/p RF EVD and now admitted for further management.    Per chart review, patient had received bad news at home and then had sudden onset headache and vomiting. Found to have SAH at OSH with course complicated by cardiac arrest. She was intubated and sedated and transferred from [place], [place] to [facility].    Significant Events:  -  came in with L cerebellar AVM bleed with course complicated by cardiac arrest ROSC after 7 min CPR, uppon arrival to [facility] NSU, EVD drain placed, went to OR for decompression and AVM resection  - : - during cough assist, clifford down to 20s, order was d/c'd; PEG tube bleeding, saturated 2x2, abdominal binder, gown. Paged  GI on call fellow, they think perhaps some capillaries are bleeding and would like abdominal imaging today    Interval Events:   - no active issues overnight,       Objective   VITALS (24H):  Temp:  [35.6 °C (96.1 °F)-36.8 °C (98.2 °F)] 36.1 °C (97 °F)  Heart Rate:  [64-82] 68  Resp:  [11-21] 13  BP: (147-186)/(52-72) 160/67  FiO2 (%):  [30 %] 30 %  INTAKE/OUTPUT:  Intake/Output Summary (Last 24 hours) at 2025 0630  Last data filed at 2025 0600  Gross per 24 hour   Intake 1880 ml   Output 1600 ml   Net 280 ml     VENT SETTINGS:  Vent Mode: Volume control/assist control  FiO2 (%):  [30 %] 30 %  S RR:  [10] 10  S VT:  [400 mL] 400 mL  PEEP/CPAP (cm H2O):  [5 cm H20] 5 cm H20  DE SUP:  [10 cm H20-12 cm H20] 12 cm H20  MAP (cm H2O):  [8-10] 8       PHYSICAL EXAM:  NEURO:  - Eyes closed to nox stim  -  pupils minimally reactive to light  - corneals negative  - cough reflex intact  - gag reflex not tested as patient is not on vent (pt on trach)  - some minimal response in uppers to nox stim  - triple flexion in bilateral lowers  CV:  - RRR on telemetry, NSR  - Arterial line in place  RESP:  - trach  - transmitted upper airway sounds on auscultation  :  - Brooks catheter in place  GI:  - PEG in place  SKIN:  - Intact  - left arm swollen and tense to palpation, not pitting, non-erythematous    MEDICATIONS:  Scheduled: PRN: Continuous:   Scheduled Medications[1] PRN Medications[2] Continuous Medications[3]     IMAGING RESULTS:  CT head wo IV contrast   Final Result   * Diffuse subarachnoid hemorrhage with hydrocephalus   *Left cerebellar hematoma suggesting a likely source.        MACRO:   none        Signed by: Cruz Salgado 4/22/2025 11:59 AM   Dictation workstation:   [id number]      CT angio head and neck w and wo IV contrast   Final Result   * Diffuse subarachnoid hemorrhage with hydrocephalus as described   *Suspected vascular malformation in the left cerebellar hemisphere   with enlarged arterial and venous structures largely in the left   cerebellar hemisphere and residual partial opacification of a venous   aneurysm near the torcula. *Bilateral carotid stenosis as described        MACRO:   none        Signed by: Cruz Salgado 4/22/2025 12:07 PM   Dictation workstation:   [id number]             Assessment/Plan    Muriel De Souza is a 69 y.o. female with h/o HTN, HLD p/w HA, arrested in CT scanner at OSH, ROSC after 7 min CPR,  CTH w diffuse SAH and L cerebellar ICH, triventriculomegally, effaced 4th vents, CTA with L cerebellar AVM w venous varices s/p RF EVD as well as AVM resection on 4/22 and now admitted for further management. S/p trach, PEG.  shunt placed on 5/05. Medically cleared for discharge to facility.    Changes 05/08/25  - medically cleared for discharge to facility; will need to  remove occipital sutures (NSGY) before leaving    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  #Intracranial hypertension  Assessment:  - First day 4/22 presented with SAH, L cerebellar ICH, and ventriculomegaly on CTH  - 4/22 CTA showing L cerebellar AVM  - 4/22 RF EVD for ICH  - 4/22 OR AVM resection  - MRI done 4/29   - LP 5/1 (WBC 5, RBC ^ 1000, Glucose ^ 106, protein 27, CSF culture NGTD 5/3, negative Gram stain)  - Pt is neurologically stable  - No seizure activity on EEG  - Maintained SBP < 180  - Normonatremic goal, sodium down trending without any D5W  - PEG and trach placed    Plan:  - NSU  - Keppra  - EVD in place, maintained at 10  - BP goal: SBP < 180    --> PRN: Labetalol, Hydralazine, and Nicardipine   - IV nicardipine 0.25 mg/min  - PO hydralazine 75 q6  - Amlodipine 10mg oral   - PO Carvedilol 6.25 BID  - PO Lisinopril 20 OD   - Normonatremic goal  - Plan for  shunt next week  - Wean off fentanyl as tolerated  - F/U CSF cultures  - Neuro Checks: Q1H  - Sedation: Off  - Pain: PRN  - Nausea: ondansetron  - PT/OT/SLP    CARDIOVASCULAR:  #Cardiac arrest  #Lactic acidosis, improving  # elevated troponins, improved  Assessment:  - 7 min to ROSC  - Assess for ischemic and stress-induced cardiomyopathy   - Assess for arrhythmias following cardiac arrest   - Cards consult (4/23)  - Type 2 MI (demand ischemia), leading to troponin up-trend (4/23)  - Trop trending down, d/c'd 4/24  - ECHO 4/23:  CONCLUSIONS:   1. Left ventricular ejection fraction is normal, calculated by Adrian's biplane at 71%.   2. Spectral Doppler shows a Grade I (impaired relaxation pattern) of left ventricular diastolic filling with normal left atrial filling pressure.   3. There is normal right ventricular global systolic function.   4. The left atrium is mildly dilated.   5. There is no evidence of a patent foramen ovale.   6. The  inferior vena cava appears mildly dilated, on vent.  - Pt is hemodynamically stable, SBP goal < 180    Plan:  - Monitor on telemetry  - SBP goal < 180 (see above)    RESPIRATORY:  #Acute hypoxic respiratory failure  #Mechanical ventilation  #Bilateral plural effusions  #Pulmonary edema    Results from last 7 days   Lab Units 05/08/25  0023 05/07/25  1618 05/07/25  0104 05/06/25  1729 05/06/25  0402   SODIUM mmol/L 137 142 142 142 140   POTASSIUM mmol/L 3.5 3.8 4.0 4.0 3.6   CREATININE mg/dL 0.57 0.64 0.62 0.62 0.56   BUN mg/dL 27* 28* 26* 25* 25*     Assessment:  - Vent requirements stable, FiO2 40 PEEP 10  - CXR 5/2, persistent bilateral pleural effusions  - Diuresed with 40 mg Lasix with -1.4 L net negative UOP 3.8 5/2  - Continue to diurese, replete potassium as needed  - Mild bilateral end-inspiratory wheezing, likely due to pulmonary edema    Plan:  - Continue mechanical ventilation for decreased LOC  - Monitor for increased secretions/desaturations    RENAL/:  #Pre-renal azotemia, resolving  Assessment:  Results from last 72 hours   Lab Units 05/08/25  0023 05/07/25  1618 05/07/25 0104 05/06/25 1729 05/06/25  0402   CREATININE mg/dL 0.57 0.64 0.62 0.62 0.56   BUN mg/dL 27* 28* 26* 25* 25*   SODIUM mmol/L 137 142 142 142 140   - UOP 3.8 / 24 hours 5/3  - BUN stable 5/3  - Cr stable 5/3    Plan:  - Monitor with daily RFP  - Maintain forbes catheter for: critically ill patient who need accurate urinary output measurements    FEN/GI:  #TIM  Results from last 72 hours   Lab Units 05/08/25  0023 05/07/25  1618 05/07/25  0104 05/06/25 1729 05/06/25  0402   SODIUM mmol/L 137 142 142 142 140   POTASSIUM mmol/L 3.5 3.8 4.0 4.0 3.6   PHOSPHORUS mg/dL 2.7 2.8 3.2 3.1 2.9   - Hypokalemia due to ongoing diuresis with Lasix  - Normonatremic sodium goal 5/3  - Sodium down trending without D5W, 5/3  - Stable phos 5/3  - Will repeat Lasix 5/3 for persistent bilateral pleural effusions  - PEG tube placed 5/2, started trickle  feeds    Plan:  - Monitor and replace electrolytes per protocol  - Replete K+ as needed  - Sodium goal normo natremic per NSGY  - IVF: PRN  - Diet: Trickle feeds through PEG, pending nutrition recs  - Bowel Regimen: Docusate-Senna PRN and Miralax PRN    ENDOCRINE:  #TIM  Assessment:  Results from last 7 days   Lab Units 25  0023 25  1618 25  1124 25  0745 25  0350 25  0104 25  2339 25  1729 25  1528 25  0438 25  0402 25  19525  1647   POCT GLUCOSE mg/dL  --   --  134* 161* 146*  --  147* 139*  --  113*   < >  --    < >  --    GLUCOSE mg/dL 152* 167*  --   --   --  148*  --   --  124*  --   --  140*  --  125*   SODIUM mmol/L 137 142  --   --   --  142  --   --  142  --   --  140  --  141    < > = values in this interval not displayed.    - HbA1C 5.5 on     Plan:  - Accuchecks & ISS PRN     HEMATOLOGY:  #Anemia, likely dilutional  Assessment:  - Baseline Hgb: 12.0  - Baseline Plts: 287  Results from last 7 days   Lab Units 25  0023 25  0103   HEMOGLOBIN g/dL 7.5* 7.9* 7.5*   HEMATOCRIT % 24.3* 24.1* 24.6*   PLATELETS AUTO x10*3/uL 342 406 366     Plan:  - Continue to monitor with daily CBC and Coag panel  - maintain active type and screen as needed  - Transfuse at Hb < 7    INFECTIOUS DISEASE:  #TIM  Assessment:  Results from last 7 days   Lab Units 25  0023 25  1618 25  0103   WBC AUTO x10*3/uL 11.5* 12.9* 10.9    - Temp (24hrs), Av.2 °C (97.1 °F), Min:35.6 °C (96.1 °F), Max:36.8 °C (98.2 °F)  - White count elevated 5/3, likely reactive due to post-trach and PEG  - Pt afebrile  - CSF gram stain negative, NGTD 5/3  - Bcx no growth at 4 days ()  - Sputum cult  no predominant organism  - UA  unremarkable  - Negative pro calcitonin   - Vanc/Zosyn started  for desaturation and increased O2 requirement overnight  - Vanc/Zosyn stopped , low suspicion for  infection given clean Bcx, no fevers, resolving leukocytosis, and negative pro calcitonin 4/28    Plan  - Continue to monitor for signs of infection     MUSCULOSKELETAL:  - No acute issues    SKIN:  #Swollen left arm  Assessment:   - LUE with persistent swelling since 4/23  - Non-pitting edema, non-erythematous  - POD6  - SQH started (4/25)  - No DVT in LE (4/25)  - No DVT in RUE (4/25)  - Superficial thrombosis in basilic vein in LUE (4/25)    Plan:   - Turns and skin care per NSU protocol    ACCESS:  - PIVs    PROPHYLAXIS:  - DVT Ppx: SCDs, SQH  - GI: PPI PRN    RESTRAINTS:  Not indicated/Patient does not meet criteria for restraints    Jian Ellington MD PhD  Neuroscience Intensive Care         [1] amLODIPine, 10 mg, g-tube, Daily  atorvastatin, 10 mg, g-tube, Nightly  bisacodyl, 10 mg, rectal, Daily  carvedilol, 6.25 mg, g-tube, q12h  heparin (porcine), 5,000 Units, subcutaneous, q8h  hydrALAZINE, 75 mg, g-tube, q6h  lisinopril, 40 mg, g-tube, Daily  magnesium oxide, 400 mg, g-tube, Daily  pantoprazole, 40 mg, intravenous, Daily before breakfast  perflutren protein A microsphere, 0.5 mL, intravenous, Once in imaging  polyethylene glycol, 17 g, g-tube, Daily  sennosides-docusate sodium, 2 tablet, g-tube, BID  sulfur hexafluoride microsphr, 2 mL, intravenous, Once in imaging     [2] PRN medications: acetaminophen **OR** acetaminophen **OR** acetaminophen, albuterol, calcium gluconate, calcium gluconate, dextrose, dextrose, glucagon, glucagon, hydrALAZINE, HYDROmorphone, labetaloL, magnesium sulfate, magnesium sulfate, oxyCODONE, oxygen, potassium chloride CR **OR** potassium chloride, potassium chloride CR **OR** potassium chloride  [3]

## 2025-05-08 NOTE — CARE PLAN
The patient's goals for the shift include      The clinical goals for the shift include pt will keep sbp< 180    Problem: General Stroke  Goal: Establish a mutual long term goal with patient by discharge  Outcome: Progressing  Goal: Demonstrate improvement in neurological exam throughout the shift  Outcome: Progressing  Goal: Maintain BP within ordered limits throughout shift  Outcome: Progressing  Goal: Participate in treatment (ie., meds, therapy) throughout shift  Outcome: Progressing  Goal: No symptoms of aspiration throughout shift  Outcome: Progressing  Goal: No symptoms of hemorrhage throughout shift  Outcome: Progressing  Goal: Tolerate enteral feeding throughout shift  Outcome: Progressing  Goal: Decreased nausea/vomiting throughout shift  Outcome: Progressing  Goal: Controlled blood glucose throughout shift  Outcome: Progressing  Goal: Out of bed three times today  Outcome: Progressing     Problem: ICU Stroke  Goal: Maintain ICP within ordered limits throughout shift  Outcome: Progressing  Goal: Tolerate EVD clamping trial throughout shift  Outcome: Progressing  Goal: Tolerate ventilator weaning trial during shift  Outcome: Progressing  Goal: Maintain patent airway throughout shift  Outcome: Progressing  Goal: Achieve/maintain targeted sodium level throughout shift  Outcome: Progressing     Problem: Pain - Adult  Goal: Verbalizes/displays adequate comfort level or baseline comfort level  Outcome: Progressing     Problem: Safety - Adult  Goal: Free from fall injury  Outcome: Progressing     Problem: Discharge Planning  Goal: Discharge to home or other facility with appropriate resources  Outcome: Progressing     Problem: Chronic Conditions and Co-morbidities  Goal: Patient's chronic conditions and co-morbidity symptoms are monitored and maintained or improved  Outcome: Progressing     Problem: Nutrition  Goal: Nutrient intake appropriate for maintaining nutritional needs  Outcome: Progressing     Problem:  Knowledge Deficit  Goal: Patient/family/caregiver demonstrates understanding of disease process, treatment plan, medications, and discharge instructions  Outcome: Progressing     Problem: Mechanical Ventilation  Goal: Patient Will Maintain Patent Airway  Outcome: Progressing  Goal: Oral health is maintained or improved  Outcome: Progressing  Goal: Tracheostomy will be managed safely  Outcome: Progressing  Goal: ET tube will be managed safely  Outcome: Progressing  Goal: Ability to express needs and understand communication  Outcome: Progressing  Goal: Mobility/activity is maintained at optimum level for patient  Outcome: Progressing     Problem: Skin  Goal: Decreased wound size/increased tissue granulation at next dressing change  Outcome: Progressing  Flowsheets (Taken 5/3/2025 0408 by Mario He, RN)  Decreased wound size/increased tissue granulation at next dressing change: Promote sleep for wound healing  Goal: Participates in plan/prevention/treatment measures  Outcome: Progressing  Goal: Prevent/manage excess moisture  Outcome: Progressing  Flowsheets (Taken 4/25/2025 0856 by Maranda Ryan, RN)  Prevent/manage excess moisture:   Cleanse incontinence/protect with barrier cream   Monitor for/manage infection if present   Follow provider orders for dressing changes   Use wicking fabric (obtain order)   Moisturize dry skin  Goal: Prevent/minimize sheer/friction injuries  Outcome: Progressing  Flowsheets (Taken 4/24/2025 0113 by Maranda Ryan, SONIA)  Prevent/minimize sheer/friction injuries:   Complete micro-shifts as needed if patient unable. Adjust patient position to relieve pressure points, not a full turn   HOB 30 degrees or less   Increase activity/out of bed for meals   Turn/reposition every 2 hours/use positioning/transfer devices   Use pull sheet   Utilize specialty bed per algorithm  Goal: Promote/optimize nutrition  Outcome: Progressing  Flowsheets (Taken 5/7/2025 2034)  Promote/optimize nutrition:    Consume > 50% meals/supplements   Monitor/record intake including meals   Discuss with provider if NPO > 2 days  Goal: Promote skin healing  Outcome: Progressing  Flowsheets (Taken 5/7/2025 2034)  Promote skin healing:   Protective dressings over bony prominences   Turn/reposition every 2 hours/use positioning/transfer devices   Assess skin/pad under line(s)/device(s)   Rotate device position/do not position patient on device   Ensure correct size (line/device) and apply per  instructions

## 2025-05-08 NOTE — PROGRESS NOTES
"Muriel De Souza is a 69 y.o. female on day 16 of admission presenting with AVM (arteriovenous malformation) ([facility]).    Subjective   No events overnight    Objective     Physical Exam  Trach vented   ECNS  OU4R  +cough  BUE withdraws  BLE TF  Cranial c/d/i  Abdominal incision c/d/I  PEG site c/d/i    Last Recorded Vitals  Blood pressure 168/65, pulse 73, temperature 35.6 °C (96.1 °F), temperature source Temporal, resp. rate 17, height 1.727 m (5' 7.99\"), weight 91.5 kg (201 lb 11.5 oz), SpO2 97%.  Intake/Output last 3 Shifts:  I/O last 3 completed shifts:  In: 2820 (30.8 mL/kg) [NG/GT:2820]  Out: 2380 (26 mL/kg) [Urine:2380 (0.7 mL/kg/hr)]  Weight: 91.5 kg     Relevant Results  Results from last 72 hours   Lab Units 05/08/25  0023 05/07/25  1618 05/07/25  0104 05/07/25  0103   GLUCOSE mg/dL 152* 167*   < >  --    SODIUM mmol/L 137 142   < >  --    POTASSIUM mmol/L 3.5 3.8   < >  --    CHLORIDE mmol/L 103 106   < >  --    CO2 mmol/L 28 30   < >  --    ANION GAP mmol/L 10 10   < >  --    BUN mg/dL 27* 28*   < >  --    CREATININE mg/dL 0.57 0.64   < >  --    EGFR mL/min/1.73m*2 >90 >90   < >  --    CALCIUM mg/dL 8.1* 8.3*   < >  --    PHOSPHORUS mg/dL 2.7 2.8   < >  --    ALBUMIN g/dL 2.7* 2.9*   < >  --    MAGNESIUM mg/dL 2.11  --   --  2.31   POCT CALCIUM IONIZED (MMOL/L) IN BLOOD mmol/L 1.18  --   --  1.22    < > = values in this interval not displayed.      Results from last 72 hours   Lab Units 05/08/25  0023 05/07/25  1618   WBC AUTO x10*3/uL 11.5* 12.9*   NRBC AUTO /100 WBCs 0.0 0.0   RBC AUTO x10*6/uL 2.61* 2.72*   HEMOGLOBIN g/dL 7.5* 7.9*   HEMATOCRIT % 24.3* 24.1*   MCV fL 93 89   MCH pg 28.7 29.0   MCHC g/dL 30.9* 32.8   RDW % 15.1* 15.2*   PLATELETS AUTO x10*3/uL 342 406        Assessment & Plan  AVM (arteriovenous malformation) ([facility])    Muriel Alcantaraumber is a 69 y.o. 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

## 2025-05-08 NOTE — PROGRESS NOTES
05/08/25 1557   Discharge Planning   Type of Post Acute Facility Services   (LTACH)   Expected Discharge Disposition Long Term   Patient Choice   Provider Choice list and CMS website (https://medicare.gov/care-compare#search) for post-acute Quality and Resource Measure Data were provided and reviewed with: Family     Social Work Discharge Planning note:         Per medical team, Pt continues to be medically ready for discharge to [facility]. Insurance precert has been pending since Tuesday afternoon. Erica. Per Pt's , he spoke with Erica yesterday and was told that their medical director has the request and will make a determination today. As of the time of writing this note, precert has not been received. FRANCESCA requested DSC to escalate. SW will update this note and staff if precert is received today, otherwise SW will plan to follow up tomorrow.      This ALBA Olguin, LSW    Office: [phone]  Secure chat via Haiku

## 2025-05-09 ENCOUNTER — APPOINTMENT (OUTPATIENT)
Dept: RADIOLOGY | Facility: HOSPITAL | Age: 70
DRG: 003 | End: 2025-05-09
Payer: COMMERCIAL

## 2025-05-09 LAB
ALBUMIN SERPL BCP-MCNC: 2.8 G/DL (ref 3.4–5)
ALBUMIN SERPL BCP-MCNC: 2.9 G/DL (ref 3.4–5)
ANION GAP SERPL CALC-SCNC: 14 MMOL/L (ref 10–20)
ANION GAP SERPL CALC-SCNC: 9 MMOL/L (ref 10–20)
APPEARANCE UR: ABNORMAL
BACTERIA #/AREA URNS AUTO: ABNORMAL /HPF
BASOPHILS # BLD AUTO: 0.04 X10*3/UL (ref 0–0.1)
BASOPHILS # BLD AUTO: 0.04 X10*3/UL (ref 0–0.1)
BASOPHILS NFR BLD AUTO: 0.3 %
BASOPHILS NFR BLD AUTO: 0.3 %
BILIRUB UR STRIP.AUTO-MCNC: NEGATIVE MG/DL
BUN SERPL-MCNC: 22 MG/DL (ref 6–23)
BUN SERPL-MCNC: 23 MG/DL (ref 6–23)
CA-I BLD-SCNC: 1.15 MMOL/L (ref 1.1–1.33)
CALCIUM SERPL-MCNC: 8.2 MG/DL (ref 8.6–10.6)
CALCIUM SERPL-MCNC: 8.5 MG/DL (ref 8.6–10.6)
CHLORIDE SERPL-SCNC: 104 MMOL/L (ref 98–107)
CHLORIDE SERPL-SCNC: 105 MMOL/L (ref 98–107)
CO2 SERPL-SCNC: 24 MMOL/L (ref 21–32)
CO2 SERPL-SCNC: 29 MMOL/L (ref 21–32)
COLOR UR: YELLOW
CREAT SERPL-MCNC: 0.52 MG/DL (ref 0.5–1.05)
CREAT SERPL-MCNC: 0.55 MG/DL (ref 0.5–1.05)
EGFRCR SERPLBLD CKD-EPI 2021: >90 ML/MIN/1.73M*2
EGFRCR SERPLBLD CKD-EPI 2021: >90 ML/MIN/1.73M*2
EOSINOPHIL # BLD AUTO: 0.14 X10*3/UL (ref 0–0.7)
EOSINOPHIL # BLD AUTO: 0.16 X10*3/UL (ref 0–0.7)
EOSINOPHIL NFR BLD AUTO: 1 %
EOSINOPHIL NFR BLD AUTO: 1.3 %
ERYTHROCYTE [DISTWIDTH] IN BLOOD BY AUTOMATED COUNT: 15.2 % (ref 11.5–14.5)
ERYTHROCYTE [DISTWIDTH] IN BLOOD BY AUTOMATED COUNT: 15.5 % (ref 11.5–14.5)
GLUCOSE SERPL-MCNC: 141 MG/DL (ref 74–99)
GLUCOSE SERPL-MCNC: 142 MG/DL (ref 74–99)
GLUCOSE UR STRIP.AUTO-MCNC: NORMAL MG/DL
HCT VFR BLD AUTO: 22.9 % (ref 36–46)
HCT VFR BLD AUTO: 24.6 % (ref 36–46)
HGB BLD-MCNC: 7.5 G/DL (ref 12–16)
HGB BLD-MCNC: 7.7 G/DL (ref 12–16)
IMM GRANULOCYTES # BLD AUTO: 0.08 X10*3/UL (ref 0–0.7)
IMM GRANULOCYTES # BLD AUTO: 0.09 X10*3/UL (ref 0–0.7)
IMM GRANULOCYTES NFR BLD AUTO: 0.5 % (ref 0–0.9)
IMM GRANULOCYTES NFR BLD AUTO: 0.7 % (ref 0–0.9)
KETONES UR STRIP.AUTO-MCNC: NEGATIVE MG/DL
LEUKOCYTE ESTERASE UR QL STRIP.AUTO: ABNORMAL
LYMPHOCYTES # BLD AUTO: 1.26 X10*3/UL (ref 1.2–4.8)
LYMPHOCYTES # BLD AUTO: 1.46 X10*3/UL (ref 1.2–4.8)
LYMPHOCYTES NFR BLD AUTO: 11.7 %
LYMPHOCYTES NFR BLD AUTO: 8.6 %
MAGNESIUM SERPL-MCNC: 1.95 MG/DL (ref 1.6–2.4)
MCH RBC QN AUTO: 28.8 PG (ref 26–34)
MCH RBC QN AUTO: 29.4 PG (ref 26–34)
MCHC RBC AUTO-ENTMCNC: 30.5 G/DL (ref 32–36)
MCHC RBC AUTO-ENTMCNC: 33.6 G/DL (ref 32–36)
MCV RBC AUTO: 87 FL (ref 80–100)
MCV RBC AUTO: 95 FL (ref 80–100)
MONOCYTES # BLD AUTO: 0.81 X10*3/UL (ref 0.1–1)
MONOCYTES # BLD AUTO: 0.84 X10*3/UL (ref 0.1–1)
MONOCYTES NFR BLD AUTO: 5.5 %
MONOCYTES NFR BLD AUTO: 6.7 %
NEUTROPHILS # BLD AUTO: 12.32 X10*3/UL (ref 1.2–7.7)
NEUTROPHILS # BLD AUTO: 9.89 X10*3/UL (ref 1.2–7.7)
NEUTROPHILS NFR BLD AUTO: 79.3 %
NEUTROPHILS NFR BLD AUTO: 84.1 %
NITRITE UR QL STRIP.AUTO: NEGATIVE
NRBC BLD-RTO: 0 /100 WBCS (ref 0–0)
NRBC BLD-RTO: 0 /100 WBCS (ref 0–0)
PH UR STRIP.AUTO: 7 [PH]
PHOSPHATE SERPL-MCNC: 2.9 MG/DL (ref 2.5–4.9)
PHOSPHATE SERPL-MCNC: 3.4 MG/DL (ref 2.5–4.9)
PLATELET # BLD AUTO: 399 X10*3/UL (ref 150–450)
PLATELET # BLD AUTO: 416 X10*3/UL (ref 150–450)
POTASSIUM SERPL-SCNC: 3.7 MMOL/L (ref 3.5–5.3)
POTASSIUM SERPL-SCNC: 4.2 MMOL/L (ref 3.5–5.3)
PROT UR STRIP.AUTO-MCNC: ABNORMAL MG/DL
RBC # BLD AUTO: 2.6 X10*6/UL (ref 4–5.2)
RBC # BLD AUTO: 2.62 X10*6/UL (ref 4–5.2)
RBC # UR STRIP.AUTO: ABNORMAL MG/DL
RBC #/AREA URNS AUTO: >20 /HPF
SODIUM SERPL-SCNC: 138 MMOL/L (ref 136–145)
SODIUM SERPL-SCNC: 139 MMOL/L (ref 136–145)
SP GR UR STRIP.AUTO: 1.02
UROBILINOGEN UR STRIP.AUTO-MCNC: NORMAL MG/DL
WBC # BLD AUTO: 12.5 X10*3/UL (ref 4.4–11.3)
WBC # BLD AUTO: 14.7 X10*3/UL (ref 4.4–11.3)
WBC #/AREA URNS AUTO: ABNORMAL /HPF

## 2025-05-09 PROCEDURE — 70450 CT HEAD/BRAIN W/O DYE: CPT

## 2025-05-09 PROCEDURE — 2500000002 HC RX 250 W HCPCS SELF ADMINISTERED DRUGS (ALT 637 FOR MEDICARE OP, ALT 636 FOR OP/ED)

## 2025-05-09 PROCEDURE — 2500000004 HC RX 250 GENERAL PHARMACY W/ HCPCS (ALT 636 FOR OP/ED): Performed by: STUDENT IN AN ORGANIZED HEALTH CARE EDUCATION/TRAINING PROGRAM

## 2025-05-09 PROCEDURE — 83735 ASSAY OF MAGNESIUM: CPT | Performed by: STUDENT IN AN ORGANIZED HEALTH CARE EDUCATION/TRAINING PROGRAM

## 2025-05-09 PROCEDURE — 94003 VENT MGMT INPAT SUBQ DAY: CPT

## 2025-05-09 PROCEDURE — 80069 RENAL FUNCTION PANEL: CPT | Performed by: STUDENT IN AN ORGANIZED HEALTH CARE EDUCATION/TRAINING PROGRAM

## 2025-05-09 PROCEDURE — 87086 URINE CULTURE/COLONY COUNT: CPT

## 2025-05-09 PROCEDURE — 81003 URINALYSIS AUTO W/O SCOPE: CPT

## 2025-05-09 PROCEDURE — 36415 COLL VENOUS BLD VENIPUNCTURE: CPT | Performed by: STUDENT IN AN ORGANIZED HEALTH CARE EDUCATION/TRAINING PROGRAM

## 2025-05-09 PROCEDURE — 2020000001 HC ICU ROOM DAILY

## 2025-05-09 PROCEDURE — 51701 INSERT BLADDER CATHETER: CPT

## 2025-05-09 PROCEDURE — 82330 ASSAY OF CALCIUM: CPT | Performed by: STUDENT IN AN ORGANIZED HEALTH CARE EDUCATION/TRAINING PROGRAM

## 2025-05-09 PROCEDURE — 2500000001 HC RX 250 WO HCPCS SELF ADMINISTERED DRUGS (ALT 637 FOR MEDICARE OP): Performed by: STUDENT IN AN ORGANIZED HEALTH CARE EDUCATION/TRAINING PROGRAM

## 2025-05-09 PROCEDURE — 85025 COMPLETE CBC W/AUTO DIFF WBC: CPT | Performed by: STUDENT IN AN ORGANIZED HEALTH CARE EDUCATION/TRAINING PROGRAM

## 2025-05-09 PROCEDURE — 2500000005 HC RX 250 GENERAL PHARMACY W/O HCPCS: Performed by: STUDENT IN AN ORGANIZED HEALTH CARE EDUCATION/TRAINING PROGRAM

## 2025-05-09 PROCEDURE — 2500000004 HC RX 250 GENERAL PHARMACY W/ HCPCS (ALT 636 FOR OP/ED)

## 2025-05-09 PROCEDURE — 70450 CT HEAD/BRAIN W/O DYE: CPT | Performed by: RADIOLOGY

## 2025-05-09 PROCEDURE — 2500000001 HC RX 250 WO HCPCS SELF ADMINISTERED DRUGS (ALT 637 FOR MEDICARE OP)

## 2025-05-09 PROCEDURE — 99291 CRITICAL CARE FIRST HOUR: CPT

## 2025-05-09 PROCEDURE — 2500000004 HC RX 250 GENERAL PHARMACY W/ HCPCS (ALT 636 FOR OP/ED): Mod: JZ | Performed by: STUDENT IN AN ORGANIZED HEALTH CARE EDUCATION/TRAINING PROGRAM

## 2025-05-09 RX ORDER — SULFAMETHOXAZOLE AND TRIMETHOPRIM 800; 160 MG/1; MG/1
1 TABLET ORAL EVERY 12 HOURS SCHEDULED
Status: DISCONTINUED | OUTPATIENT
Start: 2025-05-09 | End: 2025-05-10

## 2025-05-09 RX ADMIN — BISACODYL 10 MG: 10 SUPPOSITORY RECTAL at 08:29

## 2025-05-09 RX ADMIN — MAGNESIUM OXIDE TAB 400 MG (241.3 MG ELEMENTAL MG) 400 MG: 400 (241.3 MG) TAB at 08:28

## 2025-05-09 RX ADMIN — ATORVASTATIN CALCIUM 10 MG: 10 TABLET, FILM COATED ORAL at 20:19

## 2025-05-09 RX ADMIN — HEPARIN SODIUM 5000 UNITS: 5000 INJECTION INTRAVENOUS; SUBCUTANEOUS at 20:13

## 2025-05-09 RX ADMIN — Medication 50 L/MIN: at 20:01

## 2025-05-09 RX ADMIN — PANTOPRAZOLE SODIUM 40 MG: 40 INJECTION, POWDER, FOR SOLUTION INTRAVENOUS at 06:15

## 2025-05-09 RX ADMIN — SENNOSIDES AND DOCUSATE SODIUM 2 TABLET: 8.6; 5 TABLET ORAL at 08:28

## 2025-05-09 RX ADMIN — HYDRALAZINE HYDROCHLORIDE 75 MG: 50 TABLET, FILM COATED ORAL at 03:54

## 2025-05-09 RX ADMIN — HYDRALAZINE HYDROCHLORIDE 75 MG: 50 TABLET, FILM COATED ORAL at 18:10

## 2025-05-09 RX ADMIN — CARVEDILOL 6.25 MG: 12.5 TABLET, FILM COATED ORAL at 08:28

## 2025-05-09 RX ADMIN — HEPARIN SODIUM 5000 UNITS: 5000 INJECTION INTRAVENOUS; SUBCUTANEOUS at 11:34

## 2025-05-09 RX ADMIN — HYDRALAZINE HYDROCHLORIDE 75 MG: 50 TABLET, FILM COATED ORAL at 11:34

## 2025-05-09 RX ADMIN — SENNOSIDES AND DOCUSATE SODIUM 2 TABLET: 8.6; 5 TABLET ORAL at 20:19

## 2025-05-09 RX ADMIN — HEPARIN SODIUM 5000 UNITS: 5000 INJECTION INTRAVENOUS; SUBCUTANEOUS at 03:54

## 2025-05-09 RX ADMIN — AMLODIPINE BESYLATE 10 MG: 10 TABLET ORAL at 08:28

## 2025-05-09 RX ADMIN — POLYETHYLENE GLYCOL 3350 17 G: 17 POWDER, FOR SOLUTION ORAL at 08:29

## 2025-05-09 RX ADMIN — LISINOPRIL 40 MG: 20 TABLET ORAL at 08:28

## 2025-05-09 RX ADMIN — Medication 50 L/MIN: at 07:58

## 2025-05-09 RX ADMIN — CARVEDILOL 6.25 MG: 12.5 TABLET, FILM COATED ORAL at 20:13

## 2025-05-09 RX ADMIN — SULFAMETHOXAZOLE AND TRIMETHOPRIM 1 TABLET: 800; 160 TABLET ORAL at 20:19

## 2025-05-09 RX ADMIN — SULFAMETHOXAZOLE AND TRIMETHOPRIM 1 TABLET: 800; 160 TABLET ORAL at 15:34

## 2025-05-09 ASSESSMENT — PAIN - FUNCTIONAL ASSESSMENT: PAIN_FUNCTIONAL_ASSESSMENT: CPOT (CRITICAL CARE PAIN OBSERVATION TOOL)

## 2025-05-09 NOTE — PROGRESS NOTES
05/09/25 1445   Discharge Planning   Home or Post Acute Services Post acute facilities (Rehab/SNF/etc)   Type of Post Acute Facility Services   (LTACH)   Expected Discharge Disposition Long Term   Patient Choice   Provider Choice list and CMS website (https://medicare.gov/care-compare#search) for post-acute Quality and Resource Measure Data were provided and reviewed with: Family     Social Work Discharge Planning note:    Per medical team, Pt is medically ready for discharge to LTACH.        [facility] Landisman received precert this morning, which is good through 5/16/25. [facility] not [facility] today due to having to cancel their anticipated discharge. [facility] thinks they should have a bed available tomorrow. The [facility] liaison, Jayson ([phone]), will be working this weekend and will update Careport once he is informed the bed is available. Our , Paradise Gonzalez (EPIC message), is covering tomorrow. Family is aware and medical team updated. RN report will be to either [phone] or [phone].      This SW   ALBA Wiggins, LSW    Office: [phone]  Secure chat via Haiku

## 2025-05-09 NOTE — PROGRESS NOTES
"Muriel De Souza is a 69 y.o. female on day 17 of admission presenting with AVM (arteriovenous malformation) ([facility]).    Subjective   No events overnight    Objective     Physical Exam  Trach vented   ECNS  OU4R  +cough  BUE withdraws  BLE TF  Cranial c/d/i  Abdominal incision c/d/I  PEG site c/d/i    Last Recorded Vitals  Blood pressure 137/55, pulse 74, temperature 36 °C (96.8 °F), temperature source Temporal, resp. rate 17, height 1.727 m (5' 7.99\"), weight 91.5 kg (201 lb 11.5 oz), SpO2 96%.  Intake/Output last 3 Shifts:  I/O last 3 completed shifts:  In: 2600 (28.4 mL/kg) [NG/GT:2600]  Out: 2350 (25.7 mL/kg) [Urine:2350 (0.7 mL/kg/hr)]  Weight: 91.5 kg     Relevant Results  Results from last 72 hours   Lab Units 05/09/25  0050 05/08/25 1746 05/08/25  0023   GLUCOSE mg/dL 142* 146* 152*   SODIUM mmol/L 139 140 137   POTASSIUM mmol/L 4.2 4.1 3.5   CHLORIDE mmol/L 105 104 103   CO2 mmol/L 24 28 28   ANION GAP mmol/L 14 12 10   BUN mg/dL 23 22 27*   CREATININE mg/dL 0.52 0.51 0.57   EGFR mL/min/1.73m*2 >90 >90 >90   CALCIUM mg/dL 8.2* 8.5* 8.1*   PHOSPHORUS mg/dL 2.9 2.9 2.7   ALBUMIN g/dL 2.8* 3.0* 2.7*   MAGNESIUM mg/dL 1.95  --  2.11   POCT CALCIUM IONIZED (MMOL/L) IN BLOOD mmol/L 1.15  --  1.18      Results from last 72 hours   Lab Units 05/09/25 0050 05/08/25 1746   WBC AUTO x10*3/uL 12.5* 12.1*   NRBC AUTO /100 WBCs 0.0 0.0   RBC AUTO x10*6/uL 2.60* 2.98*   HEMOGLOBIN g/dL 7.5* 8.7*   HEMATOCRIT % 24.6* 27.8*   MCV fL 95 93   MCH pg 28.8 29.2   MCHC g/dL 30.5* 31.3*   RDW % 15.2* 15.2*   PLATELETS AUTO x10*3/uL 399 459*        Assessment & Plan  AVM (arteriovenous malformation) ([facility])    Muriel De Souza is a 69 y.o. 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, s/p 1u Plt and DDAVP, s/p RF EVD (OP 18), 4/22 s/p SOC/C1 lami for AVM resection, CTH " POC, stable vents, 4/22 TEG R low s/p 1u FFP, 4/23 TTE EF 71%, 4/23 s/p angio tentorial dural AV fistual w direct cortical venous drainage fed by b/l MMA, b/l tentorial arteries, incidental 2mm R pcomm aneurysm, 4/24 CTH slight decr vents  4/29 MRI midbrain and b/l cerebellar hemisphere diffusion hits, 5/1 s/p trach, 5/2 s/p PEG    5/5 s/p R EVD removal, RF VPS (Certas at 5), CTH cath in position, stable prior EVD tract hemorrhage, rCTH stable  5/7 Certas dialed to 6    Plan:  NSU  CTH prior to dispo  SBP goal <160  Con't hydral, lisinopril, coreg  SCD, SQH  PTOT  Dispo to LTACH planning- stable for discharge    Chavez Vang MD

## 2025-05-09 NOTE — PROGRESS NOTES
[facility]  NEUROSCIENCE INTENSIVE CARE UNIT  DAILY PROGRESS NOTE       Patient Name: Muriel De Souza   MRN: [id number]     Admit Date: 2025     : 1955 AGE: 69 y.o. GENDER: female        Subjective    Muriel De Souza is a 69 y.o. female with h/o HTN, HLD p/w HA, arrested in CT scanner at OSH, ROSC after 7 min CPR,  CTH w diffuse SAH and L cerebellar ICH, triventriculomegally, effaced 4th vents, CTA with L cerebellar AVM w venous varices s/p RF EVD and now admitted for further management.    Per chart review, patient had received bad news at home and then had sudden onset headache and vomiting. Found to have SAH at OSH with course complicated by cardiac arrest. She was intubated and sedated and transferred from [place], [place] to [facility].    Significant Events:  -  came in with L cerebellar AVM bleed with course complicated by cardiac arrest ROSC after 7 min CPR, uppon arrival to [facility] NSU, EVD drain placed, went to OR for decompression and AVM resection  - : - during cough assist, clifford down to 20s, order was d/c'd; PEG tube bleeding, saturated 2x2, abdominal binder, gown. Paged  GI on call fellow, they think perhaps some capillaries are bleeding and would like abdominal imaging today    Interval Events:   - no active issues overnight, reportedly having foul smelling urine so Ucx sent, requiring straight caths       Objective   VITALS (24H):  Temp:  [35.9 °C (96.6 °F)-36.4 °C (97.5 °F)] 36 °C (96.8 °F)  Heart Rate:  [68-81] 74  Resp:  [13-24] 17  BP: (137-183)/(53-82) 137/55  FiO2 (%):  [30 %] 30 %  INTAKE/OUTPUT:  Intake/Output Summary (Last 24 hours) at 2025 0820  Last data filed at 2025 0600  Gross per 24 hour   Intake 1320 ml   Output 1600 ml   Net -280 ml     VENT SETTINGS:  Vent Mode: Volume control/assist control  FiO2 (%):  [30 %] 30 %  S RR:  [10] 10  S VT:  [400 mL] 400 mL  PEEP/CPAP (cm H2O):  [5 cm H20] 5 cm H20  MAP (cm H2O):  [8-11] 8       PHYSICAL  EXAM:  NEURO:  - Eyes closed to nox stim  - pupils minimally reactive to light  - corneals negative  - cough reflex intact  - gag reflex not tested as patient is not on vent (pt on trach)  - some minimal response in uppers to nox stim  - triple flexion in bilateral lowers  CV:  - RRR on telemetry, NSR  - Arterial line in place  RESP:  - trach  - transmitted upper airway sounds on auscultation  :  - Brooks catheter in place  GI:  - PEG in place  SKIN:  - Intact  - left arm swollen and tense to palpation, not pitting, non-erythematous    MEDICATIONS:  Scheduled: PRN: Continuous:   Scheduled Medications[1] PRN Medications[2] Continuous Medications[3]     IMAGING RESULTS:  CT head wo IV contrast   Final Result   * Diffuse subarachnoid hemorrhage with hydrocephalus   *Left cerebellar hematoma suggesting a likely source.        MACRO:   none        Signed by: Cruz Salgado 4/22/2025 11:59 AM   Dictation workstation:   [id number]      CT angio head and neck w and wo IV contrast   Final Result   * Diffuse subarachnoid hemorrhage with hydrocephalus as described   *Suspected vascular malformation in the left cerebellar hemisphere   with enlarged arterial and venous structures largely in the left   cerebellar hemisphere and residual partial opacification of a venous   aneurysm near the torcula. *Bilateral carotid stenosis as described        MACRO:   none        Signed by: Cruz Salgado 4/22/2025 12:07 PM   Dictation workstation:   [id number]             Assessment/Plan    Muriel De Souza is a 69 y.o. female with h/o HTN, HLD p/w HA, arrested in CT scanner at OSH, ROSC after 7 min CPR,  CTH w diffuse SAH and L cerebellar ICH, triventriculomegally, effaced 4th vents, CTA with L cerebellar AVM w venous varices s/p RF EVD as well as AVM resection on 4/22 and now admitted for further management. S/p trach, PEG.  shunt placed on 5/05. Medically cleared for discharge to facility.    Changes 05/09/25  - medically  cleared for discharge to facility; will need to remove occipital sutures (NSGY) before leaving also CT head stability  - Ucx sent for foul smelling urine    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  #Intracranial hypertension  Assessment:  - First day 4/22 presented with SAH, L cerebellar ICH, and ventriculomegaly on CTH  - 4/22 CTA showing L cerebellar AVM  - 4/22 RF EVD for ICH  - 4/22 OR AVM resection  - MRI done 4/29   - LP 5/1 (WBC 5, RBC ^ 1000, Glucose ^ 106, protein 27, CSF culture NGTD 5/3, negative Gram stain)  - Pt is neurologically stable  - No seizure activity on EEG  - Maintained SBP < 180  - Normonatremic goal, sodium down trending without any D5W  - PEG and trach placed    Plan:  - NSU  - Keppra  - EVD in place, maintained at 10  - BP goal: SBP < 180    --> PRN: Labetalol, Hydralazine, and Nicardipine   - IV nicardipine 0.25 mg/min  - PO hydralazine 75 q6  - Amlodipine 10mg oral   - PO Carvedilol 6.25 BID  - PO Lisinopril 20 OD   - Normonatremic goal  - Plan for  shunt next week  - Wean off fentanyl as tolerated  - F/U CSF cultures  - Neuro Checks: Q1H  - Sedation: Off  - Pain: PRN  - Nausea: ondansetron  - PT/OT/SLP    CARDIOVASCULAR:  #Cardiac arrest  #Lactic acidosis, improving  # elevated troponins, improved  Assessment:  - 7 min to ROSC  - Assess for ischemic and stress-induced cardiomyopathy   - Assess for arrhythmias following cardiac arrest   - Cards consult (4/23)  - Type 2 MI (demand ischemia), leading to troponin up-trend (4/23)  - Trop trending down, d/c'd 4/24  - ECHO 4/23:  CONCLUSIONS:   1. Left ventricular ejection fraction is normal, calculated by Adrain's biplane at 71%.   2. Spectral Doppler shows a Grade I (impaired relaxation pattern) of left ventricular diastolic filling with normal left atrial filling pressure.   3. There is normal right ventricular global systolic function.    4. The left atrium is mildly dilated.   5. There is no evidence of a patent foramen ovale.   6. The inferior vena cava appears mildly dilated, on vent.  - Pt is hemodynamically stable, SBP goal < 180    Plan:  - Monitor on telemetry  - SBP goal < 180 (see above)    RESPIRATORY:  #Acute hypoxic respiratory failure  #Mechanical ventilation  #Bilateral plural effusions  #Pulmonary edema    Results from last 7 days   Lab Units 05/09/25  0050 05/08/25 1746 05/08/25  0023 05/07/25 1618 05/07/25  0104   SODIUM mmol/L 139 140 137 142 142   POTASSIUM mmol/L 4.2 4.1 3.5 3.8 4.0   CREATININE mg/dL 0.52 0.51 0.57 0.64 0.62   BUN mg/dL 23 22 27* 28* 26*     Assessment:  - Vent requirements stable, FiO2 40 PEEP 10  - CXR 5/2, persistent bilateral pleural effusions  - Diuresed with 40 mg Lasix with -1.4 L net negative UOP 3.8 5/2  - Continue to diurese, replete potassium as needed  - Mild bilateral end-inspiratory wheezing, likely due to pulmonary edema    Plan:  - Continue mechanical ventilation for decreased LOC  - Monitor for increased secretions/desaturations    RENAL/:  #Pre-renal azotemia, resolving  Assessment:  Results from last 72 hours   Lab Units 05/09/25  0050 05/08/25 1746 05/08/25 0023 05/07/25 1618 05/07/25  0104   CREATININE mg/dL 0.52 0.51 0.57 0.64 0.62   BUN mg/dL 23 22 27* 28* 26*   SODIUM mmol/L 139 140 137 142 142   - UOP 3.8 / 24 hours 5/3  - BUN stable 5/3  - Cr stable 5/3    Plan:  - Monitor with daily RFP  - Maintain forbes catheter for: critically ill patient who need accurate urinary output measurements    FEN/GI:  #TIM  Results from last 72 hours   Lab Units 05/09/25  0050 05/08/25 1746 05/08/25  0023 05/07/25 1618 05/07/25  0104   SODIUM mmol/L 139 140 137 142 142   POTASSIUM mmol/L 4.2 4.1 3.5 3.8 4.0   PHOSPHORUS mg/dL 2.9 2.9 2.7 2.8 3.2   - Hypokalemia due to ongoing diuresis with Lasix  - Normonatremic sodium goal 5/3  - Sodium down trending without D5W, 5/3  - Stable phos 5/3  - Will  repeat Lasix 5/3 for persistent bilateral pleural effusions  - PEG tube placed , started trickle feeds    Plan:  - Monitor and replace electrolytes per protocol  - Replete K+ as needed  - Sodium goal normo natremic per NSGY  - IVF: PRN  - Diet: Trickle feeds through PEG, pending nutrition recs  - Bowel Regimen: Docusate-Senna PRN and Miralax PRN    ENDOCRINE:  #TIM  Assessment:  Results from last 7 days   Lab Units 25  0050 25  1746 25  0023 25  1618 25  1124 25  0745 25  0350 25  0104 25  2339 25  1959 25  1729 25  1528   POCT GLUCOSE mg/dL  --   --   --   --  134* 161* 146*  --  147* 139*  --  113*   GLUCOSE mg/dL 142* 146* 152* 167*  --   --   --  148*  --   --  124*  --    SODIUM mmol/L 139 140 137 142  --   --   --  142  --   --  142  --     - HbA1C 5.5 on     Plan:  - Accuchecks & ISS PRN     HEMATOLOGY:  #Anemia, likely dilutional  Assessment:  - Baseline Hgb: 12.0  - Baseline Plts: 287  Results from last 7 days   Lab Units 25  0023   HEMOGLOBIN g/dL 7.5* 8.7* 7.5*   HEMATOCRIT % 24.6* 27.8* 24.3*   PLATELETS AUTO x10*3/uL 399 459* 342     Plan:  - Continue to monitor with daily CBC and Coag panel  - maintain active type and screen as needed  - Transfuse at Hb < 7    INFECTIOUS DISEASE:  #TIM  Assessment:  Results from last 7 days   Lab Units 25  0023   WBC AUTO x10*3/uL 12.5* 12.1* 11.5*    - Temp (24hrs), Av °C (96.8 °F), Min:35.9 °C (96.6 °F), Max:36.4 °C (97.5 °F)  - White count elevated 5/3, likely reactive due to post-trach and PEG  - Pt afebrile  - CSF gram stain negative, NGTD 5/3  - Bcx no growth at 4 days ()  - Sputum cult  no predominant organism  - UA  unremarkable  - Negative pro calcitonin   - Vanc/Zosyn started  for desaturation and increased O2 requirement overnight  - Vanc/Zosyn stopped , low suspicion for infection  given clean Bcx, no fevers, resolving leukocytosis, and negative pro calcitonin 4/28    Plan  - Continue to monitor for signs of infection     MUSCULOSKELETAL:  - No acute issues    SKIN:  #Swollen left arm  Assessment:   - LUE with persistent swelling since 4/23  - Non-pitting edema, non-erythematous  - POD6  - SQH started (4/25)  - No DVT in LE (4/25)  - No DVT in RUE (4/25)  - Superficial thrombosis in basilic vein in LUE (4/25)    Plan:   - Turns and skin care per NSU protocol    ACCESS:  - PIVs    PROPHYLAXIS:  - DVT Ppx: SCDs, SQH  - GI: PPI PRN    RESTRAINTS:  Not indicated/Patient does not meet criteria for restraints    Jian Ellingtno MD PhD  Neuroscience Intensive Care         [1] amLODIPine, 10 mg, g-tube, Daily  atorvastatin, 10 mg, g-tube, Nightly  bisacodyl, 10 mg, rectal, Daily  carvedilol, 6.25 mg, g-tube, q12h  heparin (porcine), 5,000 Units, subcutaneous, q8h  hydrALAZINE, 75 mg, g-tube, q6h  lisinopril, 40 mg, g-tube, Daily  magnesium oxide, 400 mg, g-tube, Daily  pantoprazole, 40 mg, intravenous, Daily before breakfast  perflutren protein A microsphere, 0.5 mL, intravenous, Once in imaging  polyethylene glycol, 17 g, g-tube, Daily  sennosides-docusate sodium, 2 tablet, g-tube, BID  sulfur hexafluoride microsphr, 2 mL, intravenous, Once in imaging     [2] PRN medications: acetaminophen **OR** acetaminophen **OR** acetaminophen, albuterol, calcium gluconate, calcium gluconate, dextrose, dextrose, glucagon, glucagon, hydrALAZINE, HYDROmorphone, labetaloL, magnesium sulfate, magnesium sulfate, oxyCODONE, oxygen, potassium chloride CR **OR** potassium chloride, potassium chloride CR **OR** potassium chloride  [3]

## 2025-05-09 NOTE — CONSULTS
"Nutrition Follow Up Assessment:   Nutrition Assessment         Nutrition History:  Food and Nutrient History: RN reported pt tolerating Isosource 1.5 @ 40ml/hr.       Anthropometrics:  Height: 172.7 cm (5' 7.99\")   Weight: 91.5 kg (201 lb 11.5 oz)   BMI (Calculated): 30.68  IBW/kg (Dietitian Calculated): 63.6 kg    Weight Change %:  Weight History / % Weight Change: Weight from initial assessment on 4/24 was 93kg. Current weight similar to this - not reflective of significant loss.    Nutrition Significant Labs:  CBC Trend:   Results from last 7 days   Lab Units 05/09/25 0050 05/08/25 1746 05/08/25 0023 05/07/25  1618   WBC AUTO x10*3/uL 12.5* 12.1* 11.5* 12.9*   RBC AUTO x10*6/uL 2.60* 2.98* 2.61* 2.72*   HEMOGLOBIN g/dL 7.5* 8.7* 7.5* 7.9*   HEMATOCRIT % 24.6* 27.8* 24.3* 24.1*   MCV fL 95 93 93 89   PLATELETS AUTO x10*3/uL 399 459* 342 406    , BMP Trend:   Results from last 7 days   Lab Units 05/09/25 0050 05/08/25 1746 05/08/25 0023 05/07/25  1618   GLUCOSE mg/dL 142* 146* 152* 167*   CALCIUM mg/dL 8.2* 8.5* 8.1* 8.3*   SODIUM mmol/L 139 140 137 142   POTASSIUM mmol/L 4.2 4.1 3.5 3.8   CO2 mmol/L 24 28 28 30   CHLORIDE mmol/L 105 104 103 106   BUN mg/dL 23 22 27* 28*   CREATININE mg/dL 0.52 0.51 0.57 0.64    , BG POCT trend:   Results from last 7 days   Lab Units 05/07/25  1124 05/07/25  0745 05/07/25  0350 05/06/25  2339 05/06/25  1959   POCT GLUCOSE mg/dL 134* 161* 146* 147* 139*    , Renal Lab Trend:   Results from last 7 days   Lab Units 05/09/25  0050 05/08/25  1746 05/08/25  0023 05/07/25  1618   POTASSIUM mmol/L 4.2 4.1 3.5 3.8   PHOSPHORUS mg/dL 2.9 2.9 2.7 2.8   SODIUM mmol/L 139 140 137 142   MAGNESIUM mg/dL 1.95  --  2.11  --    EGFR mL/min/1.73m*2 >90 >90 >90 >90   BUN mg/dL 23 22 27* 28*   CREATININE mg/dL 0.52 0.51 0.57 0.64      Scheduled medications  Scheduled Medications[1]    I/O:   Last BM Date: 05/09/25; Stool Appearance: Soft (05/09/25 1400)    Dietary Orders (From admission, onward) "       Start     Ordered    05/06/25 1556  Enteral feeding with NPO Isosource 1.5; 40; 200; Every 6 hours  Diet effective now        Comments: Nutrition Recommendations:  1. At this time it would be appropriate to change tube feed regimen to standard formula:   a. Start Isosource 1.5 @ 20ml/hr x 12hrs, then increase  to goal of 40ml/hr.   b. Provide 1 packet Pro Stat AWC BID.   c. Additional water flushes per team   i. TF provides 733mls free H2O.   Question Answer Comment   Tube feeding formula: Isosource 1.5    Tube feeding additive: Pro-Stat AWC    Tube feeding additive frequency: Once a day    Tube feeding continuous rate (mL/hr): 40    Tube feeding flush (mL): 200    Flush frequency: Every 6 hours        05/06/25 1557                     Estimated Needs:   Total Energy Estimated Needs in 24 hours (kCal):  (8791-3347)  Method for Estimating Needs: comparing kcal/kg vs [place] as pt now >1 week and s/p trach/PEG  Total Protein Estimated Needs in 24 Hours (g): 100 g  Method for Estimating 24 Hour Protein Needs: ~1.5g/kg per adj wt  Total Fluid Estimated Needs in 24 Hours (mL):  (per team)        Nutrition Diagnosis   Malnutrition Diagnosis  Patient has Malnutrition Diagnosis: No    Nutrition Diagnosis  Patient has Nutrition Diagnosis: Yes  Diagnosis Status (1): Active  Nutrition Diagnosis 1: Increased nutrient needs  Related to (1): increased metabolic demand  As Evidenced by (1): s/p resection       Nutrition Interventions/Recommendations   Nutrition prescription for enteral nutrition    Nutrition Recommendations:  Individualized Nutrition Prescription Provided for :   Continue Isosource 1.5 @ 40ml/hr + 1 packet Pro Stat AWC BID to provide 1640kcals and 99g pro       Nutrition Monitoring and Evaluation   Food/Nutrient Related History Monitoring  Monitoring and Evaluation Plan: Enteral and parenteral nutrition intake determination  Enteral and Parenteral Nutrition Intake Determination: Enteral nutrition intake -  Tolerate TF at goal rate    Anthropometric Measurements  Monitoring and Evaluation Plan: Body weight  Body Weight: Body weight - Maintain stable weight    Biochemical Data, Medical Tests and Procedures  Monitoring and Evaluation Plan: Electrolyte/renal panel, Glucose/endocrine profile  Electrolyte and Renal Panel: Electrolytes within normal limits  Glucose/Endocrine Profile: Glucose within normal limits ( mg/dL)    Goal Status: Some progress toward goal(s)    Time Spent (min): 30 minutes            [1] amLODIPine, 10 mg, g-tube, Daily  atorvastatin, 10 mg, g-tube, Nightly  bisacodyl, 10 mg, rectal, Daily  carvedilol, 6.25 mg, g-tube, q12h  heparin (porcine), 5,000 Units, subcutaneous, q8h  hydrALAZINE, 75 mg, g-tube, q6h  lisinopril, 40 mg, g-tube, Daily  magnesium oxide, 400 mg, g-tube, Daily  pantoprazole, 40 mg, intravenous, Daily before breakfast  perflutren protein A microsphere, 0.5 mL, intravenous, Once in imaging  polyethylene glycol, 17 g, g-tube, Daily  sennosides-docusate sodium, 2 tablet, g-tube, BID  sulfamethoxazole-trimethoprim, 1 tablet, oral, q12h BHARGAV  sulfur hexafluoride microsphr, 2 mL, intravenous, Once in imaging

## 2025-05-09 NOTE — CARE PLAN
The patient's goals for the shift include      The clinical goals for the shift include (P) pt will keep sbp< 180      Problem: General Stroke  Goal: Establish a mutual long term goal with patient by discharge  Outcome: Progressing  Goal: Demonstrate improvement in neurological exam throughout the shift  Outcome: Progressing  Goal: Maintain BP within ordered limits throughout shift  Outcome: Progressing  Goal: Participate in treatment (ie., meds, therapy) throughout shift  Outcome: Progressing  Goal: No symptoms of aspiration throughout shift  Outcome: Progressing  Goal: No symptoms of hemorrhage throughout shift  Outcome: Progressing  Goal: Tolerate enteral feeding throughout shift  Outcome: Progressing  Goal: Decreased nausea/vomiting throughout shift  Outcome: Progressing  Goal: Controlled blood glucose throughout shift  Outcome: Progressing  Goal: Out of bed three times today  Outcome: Progressing     Problem: ICU Stroke  Goal: Maintain ICP within ordered limits throughout shift  Outcome: Progressing  Goal: Tolerate EVD clamping trial throughout shift  Outcome: Progressing  Goal: Tolerate ventilator weaning trial during shift  Outcome: Progressing  Goal: Maintain patent airway throughout shift  Outcome: Progressing  Goal: Achieve/maintain targeted sodium level throughout shift  Outcome: Progressing     Problem: Pain - Adult  Goal: Verbalizes/displays adequate comfort level or baseline comfort level  Outcome: Progressing     Problem: Safety - Adult  Goal: Free from fall injury  Outcome: Progressing     Problem: Discharge Planning  Goal: Discharge to home or other facility with appropriate resources  Outcome: Progressing     Problem: Chronic Conditions and Co-morbidities  Goal: Patient's chronic conditions and co-morbidity symptoms are monitored and maintained or improved  Outcome: Progressing     Problem: Nutrition  Goal: Nutrient intake appropriate for maintaining nutritional needs  Outcome: Progressing      Problem: Knowledge Deficit  Goal: Patient/family/caregiver demonstrates understanding of disease process, treatment plan, medications, and discharge instructions  Outcome: Progressing     Problem: Mechanical Ventilation  Goal: Patient Will Maintain Patent Airway  Outcome: Progressing  Goal: Oral health is maintained or improved  Outcome: Progressing  Goal: Tracheostomy will be managed safely  Outcome: Progressing  Goal: ET tube will be managed safely  Outcome: Progressing  Goal: Ability to express needs and understand communication  Outcome: Progressing  Goal: Mobility/activity is maintained at optimum level for patient  Outcome: Progressing     Problem: Skin  Goal: Decreased wound size/increased tissue granulation at next dressing change  Outcome: Progressing  Goal: Participates in plan/prevention/treatment measures  Outcome: Progressing  Goal: Prevent/manage excess moisture  Outcome: Progressing  Goal: Prevent/minimize sheer/friction injuries  Outcome: Progressing  Goal: Promote/optimize nutrition  Outcome: Progressing  Goal: Promote skin healing  Outcome: Progressing

## 2025-05-10 LAB
ALBUMIN SERPL BCP-MCNC: 2.8 G/DL (ref 3.4–5)
ANION GAP SERPL CALC-SCNC: 9 MMOL/L (ref 10–20)
BASOPHILS # BLD AUTO: 0.03 X10*3/UL (ref 0–0.1)
BASOPHILS NFR BLD AUTO: 0.2 %
BUN SERPL-MCNC: 23 MG/DL (ref 6–23)
CALCIUM SERPL-MCNC: 8.5 MG/DL (ref 8.6–10.6)
CHLORIDE SERPL-SCNC: 104 MMOL/L (ref 98–107)
CO2 SERPL-SCNC: 29 MMOL/L (ref 21–32)
CREAT SERPL-MCNC: 0.59 MG/DL (ref 0.5–1.05)
EGFRCR SERPLBLD CKD-EPI 2021: >90 ML/MIN/1.73M*2
EOSINOPHIL # BLD AUTO: 0.08 X10*3/UL (ref 0–0.7)
EOSINOPHIL NFR BLD AUTO: 0.5 %
ERYTHROCYTE [DISTWIDTH] IN BLOOD BY AUTOMATED COUNT: 15.8 % (ref 11.5–14.5)
GLUCOSE BLD MANUAL STRIP-MCNC: 152 MG/DL (ref 74–99)
GLUCOSE SERPL-MCNC: 147 MG/DL (ref 74–99)
HCT VFR BLD AUTO: 22.9 % (ref 36–46)
HGB BLD-MCNC: 7.2 G/DL (ref 12–16)
IMM GRANULOCYTES # BLD AUTO: 0.08 X10*3/UL (ref 0–0.7)
IMM GRANULOCYTES NFR BLD AUTO: 0.5 % (ref 0–0.9)
LYMPHOCYTES # BLD AUTO: 1.14 X10*3/UL (ref 1.2–4.8)
LYMPHOCYTES NFR BLD AUTO: 7.8 %
MCH RBC QN AUTO: 28.9 PG (ref 26–34)
MCHC RBC AUTO-ENTMCNC: 31.4 G/DL (ref 32–36)
MCV RBC AUTO: 92 FL (ref 80–100)
MONOCYTES # BLD AUTO: 0.82 X10*3/UL (ref 0.1–1)
MONOCYTES NFR BLD AUTO: 5.6 %
NEUTROPHILS # BLD AUTO: 12.53 X10*3/UL (ref 1.2–7.7)
NEUTROPHILS NFR BLD AUTO: 85.4 %
NRBC BLD-RTO: 0 /100 WBCS (ref 0–0)
PHOSPHATE SERPL-MCNC: 3.5 MG/DL (ref 2.5–4.9)
PLATELET # BLD AUTO: 411 X10*3/UL (ref 150–450)
POTASSIUM SERPL-SCNC: 3.7 MMOL/L (ref 3.5–5.3)
RBC # BLD AUTO: 2.49 X10*6/UL (ref 4–5.2)
SODIUM SERPL-SCNC: 138 MMOL/L (ref 136–145)
WBC # BLD AUTO: 14.7 X10*3/UL (ref 4.4–11.3)

## 2025-05-10 PROCEDURE — 94003 VENT MGMT INPAT SUBQ DAY: CPT

## 2025-05-10 PROCEDURE — 99291 CRITICAL CARE FIRST HOUR: CPT

## 2025-05-10 PROCEDURE — 2500000004 HC RX 250 GENERAL PHARMACY W/ HCPCS (ALT 636 FOR OP/ED): Mod: JZ | Performed by: STUDENT IN AN ORGANIZED HEALTH CARE EDUCATION/TRAINING PROGRAM

## 2025-05-10 PROCEDURE — 2020000001 HC ICU ROOM DAILY

## 2025-05-10 PROCEDURE — 51701 INSERT BLADDER CATHETER: CPT

## 2025-05-10 PROCEDURE — 85025 COMPLETE CBC W/AUTO DIFF WBC: CPT | Performed by: STUDENT IN AN ORGANIZED HEALTH CARE EDUCATION/TRAINING PROGRAM

## 2025-05-10 PROCEDURE — 2500000001 HC RX 250 WO HCPCS SELF ADMINISTERED DRUGS (ALT 637 FOR MEDICARE OP)

## 2025-05-10 PROCEDURE — 2500000005 HC RX 250 GENERAL PHARMACY W/O HCPCS: Performed by: STUDENT IN AN ORGANIZED HEALTH CARE EDUCATION/TRAINING PROGRAM

## 2025-05-10 PROCEDURE — 2500000004 HC RX 250 GENERAL PHARMACY W/ HCPCS (ALT 636 FOR OP/ED)

## 2025-05-10 PROCEDURE — 36415 COLL VENOUS BLD VENIPUNCTURE: CPT | Performed by: STUDENT IN AN ORGANIZED HEALTH CARE EDUCATION/TRAINING PROGRAM

## 2025-05-10 PROCEDURE — 82947 ASSAY GLUCOSE BLOOD QUANT: CPT

## 2025-05-10 PROCEDURE — 2500000002 HC RX 250 W HCPCS SELF ADMINISTERED DRUGS (ALT 637 FOR MEDICARE OP, ALT 636 FOR OP/ED)

## 2025-05-10 PROCEDURE — 2500000004 HC RX 250 GENERAL PHARMACY W/ HCPCS (ALT 636 FOR OP/ED): Performed by: STUDENT IN AN ORGANIZED HEALTH CARE EDUCATION/TRAINING PROGRAM

## 2025-05-10 PROCEDURE — 2500000001 HC RX 250 WO HCPCS SELF ADMINISTERED DRUGS (ALT 637 FOR MEDICARE OP): Performed by: STUDENT IN AN ORGANIZED HEALTH CARE EDUCATION/TRAINING PROGRAM

## 2025-05-10 PROCEDURE — 80069 RENAL FUNCTION PANEL: CPT | Performed by: STUDENT IN AN ORGANIZED HEALTH CARE EDUCATION/TRAINING PROGRAM

## 2025-05-10 RX ORDER — HEPARIN SODIUM 5000 [USP'U]/ML
5000 INJECTION, SOLUTION INTRAVENOUS; SUBCUTANEOUS EVERY 8 HOURS
Start: 2025-05-10

## 2025-05-10 RX ORDER — CEFTRIAXONE 1 G/50ML
1 INJECTION, SOLUTION INTRAVENOUS EVERY 24 HOURS
Start: 2025-05-11

## 2025-05-10 RX ORDER — LISINOPRIL 40 MG/1
40 TABLET ORAL DAILY
Start: 2025-05-11

## 2025-05-10 RX ORDER — ALBUTEROL SULFATE 0.83 MG/ML
2.5 SOLUTION RESPIRATORY (INHALATION) EVERY 4 HOURS PRN
Start: 2025-05-10

## 2025-05-10 RX ORDER — CARVEDILOL 6.25 MG/1
6.25 TABLET ORAL EVERY 12 HOURS
Start: 2025-05-10

## 2025-05-10 RX ORDER — AMLODIPINE BESYLATE 10 MG/1
10 TABLET ORAL DAILY
Start: 2025-05-11

## 2025-05-10 RX ORDER — HYDRALAZINE HYDROCHLORIDE 25 MG/1
75 TABLET, FILM COATED ORAL EVERY 6 HOURS
Start: 2025-05-10

## 2025-05-10 RX ORDER — CEFTRIAXONE 1 G/50ML
1 INJECTION, SOLUTION INTRAVENOUS EVERY 24 HOURS
Status: DISCONTINUED | OUTPATIENT
Start: 2025-05-10 | End: 2025-05-11 | Stop reason: HOSPADM

## 2025-05-10 RX ADMIN — HYDRALAZINE HYDROCHLORIDE 75 MG: 50 TABLET, FILM COATED ORAL at 12:21

## 2025-05-10 RX ADMIN — POLYETHYLENE GLYCOL 3350 17 G: 17 POWDER, FOR SOLUTION ORAL at 08:26

## 2025-05-10 RX ADMIN — Medication 40 PERCENT: at 19:31

## 2025-05-10 RX ADMIN — HYDRALAZINE HYDROCHLORIDE 75 MG: 50 TABLET, FILM COATED ORAL at 18:25

## 2025-05-10 RX ADMIN — LISINOPRIL 40 MG: 20 TABLET ORAL at 08:25

## 2025-05-10 RX ADMIN — HEPARIN SODIUM 5000 UNITS: 5000 INJECTION INTRAVENOUS; SUBCUTANEOUS at 02:15

## 2025-05-10 RX ADMIN — CARVEDILOL 6.25 MG: 12.5 TABLET, FILM COATED ORAL at 21:22

## 2025-05-10 RX ADMIN — HEPARIN SODIUM 5000 UNITS: 5000 INJECTION INTRAVENOUS; SUBCUTANEOUS at 21:21

## 2025-05-10 RX ADMIN — SENNOSIDES AND DOCUSATE SODIUM 2 TABLET: 8.6; 5 TABLET ORAL at 08:26

## 2025-05-10 RX ADMIN — PANTOPRAZOLE SODIUM 40 MG: 40 INJECTION, POWDER, FOR SOLUTION INTRAVENOUS at 06:31

## 2025-05-10 RX ADMIN — HYDRALAZINE HYDROCHLORIDE 75 MG: 50 TABLET, FILM COATED ORAL at 00:44

## 2025-05-10 RX ADMIN — ATORVASTATIN CALCIUM 10 MG: 10 TABLET, FILM COATED ORAL at 21:23

## 2025-05-10 RX ADMIN — HYDRALAZINE HYDROCHLORIDE 75 MG: 50 TABLET, FILM COATED ORAL at 06:31

## 2025-05-10 RX ADMIN — SULFAMETHOXAZOLE AND TRIMETHOPRIM 1 TABLET: 800; 160 TABLET ORAL at 08:25

## 2025-05-10 RX ADMIN — MAGNESIUM OXIDE TAB 400 MG (241.3 MG ELEMENTAL MG) 400 MG: 400 (241.3 MG) TAB at 08:26

## 2025-05-10 RX ADMIN — HEPARIN SODIUM 5000 UNITS: 5000 INJECTION INTRAVENOUS; SUBCUTANEOUS at 12:21

## 2025-05-10 RX ADMIN — CARVEDILOL 6.25 MG: 12.5 TABLET, FILM COATED ORAL at 08:25

## 2025-05-10 RX ADMIN — AMLODIPINE BESYLATE 10 MG: 10 TABLET ORAL at 08:25

## 2025-05-10 RX ADMIN — POTASSIUM CHLORIDE 40 MEQ: 1.5 POWDER, FOR SOLUTION ORAL at 03:09

## 2025-05-10 RX ADMIN — CEFTRIAXONE SODIUM 1 G: 1 INJECTION, SOLUTION INTRAVENOUS at 12:00

## 2025-05-10 RX ADMIN — Medication 50 L/MIN: at 08:25

## 2025-05-10 ASSESSMENT — PAIN - FUNCTIONAL ASSESSMENT
PAIN_FUNCTIONAL_ASSESSMENT: CPOT (CRITICAL CARE PAIN OBSERVATION TOOL)

## 2025-05-10 NOTE — PROGRESS NOTES
[facility]  NEUROSCIENCE INTENSIVE CARE UNIT  DAILY PROGRESS NOTE       Patient Name: Muriel De Souza   MRN: [id number]     Admit Date: 2025     : 1955 AGE: 69 y.o. GENDER: female        Subjective    Muriel De Souza is a 69 y.o. female with h/o HTN, HLD p/w HA, arrested in CT scanner at OSH, ROSC after 7 min CPR,  CTH w diffuse SAH and L cerebellar ICH, triventriculomegally, effaced 4th vents, CTA with L cerebellar AVM w venous varices s/p RF EVD and now admitted for further management.    Per chart review, patient had received bad news at home and then had sudden onset headache and vomiting. Found to have SAH at OSH with course complicated by cardiac arrest. She was intubated and sedated and transferred from [place], [place] to [facility].    Significant Events:  -  came in with L cerebellar AVM bleed with course complicated by cardiac arrest ROSC after 7 min CPR, uppon arrival to [facility] NSU, EVD drain placed, went to OR for decompression and AVM resection  - : - during cough assist, clifford down to 20s, order was d/c'd; PEG tube bleeding, saturated 2x2, abdominal binder, gown. Paged  GI on call fellow, they think perhaps some capillaries are bleeding and would like abdominal imaging today    Interval Events:   - started UTI treatment with bactrim       Objective   VITALS (24H):  Temp:  [35.4 °C (95.7 °F)-36 °C (96.8 °F)] 35.7 °C (96.3 °F)  Heart Rate:  [61-78] 69  Resp:  [12-26] 17  BP: (105-182)/(45-79) 143/66  FiO2 (%):  [30 %] 30 %  INTAKE/OUTPUT:  Intake/Output Summary (Last 24 hours) at 5/10/2025 0703  Last data filed at 5/10/2025 0600  Gross per 24 hour   Intake 2020 ml   Output 1050 ml   Net 970 ml     VENT SETTINGS:  Vent Mode: Volume control/assist control  FiO2 (%):  [30 %] 30 %  S RR:  [10] 10  S VT:  [400 mL] 400 mL  PEEP/CPAP (cm H2O):  [5 cm H20] 5 cm H20  MAP (cm H2O):  [8-9.9] 9.9       PHYSICAL EXAM:  NEURO:  - Eyes closed to nox stim  - pupils minimally reactive to  light  - corneals negative  - cough reflex intact  - gag reflex not tested as patient is not on vent (pt on trach)  - some minimal response in uppers to nox stim  - triple flexion in bilateral lowers  CV:  - RRR on telemetry, NSR  - Arterial line in place  RESP:  - trach  - transmitted upper airway sounds on auscultation  :  - straight cath  GI:  - PEG in place  SKIN:  - Intact  - left arm swollen and tense to palpation, not pitting, non-erythematous    MEDICATIONS:  Scheduled: PRN: Continuous:   Scheduled Medications[1] PRN Medications[2] Continuous Medications[3]     IMAGING RESULTS:  CT head wo IV contrast   Final Result   * Diffuse subarachnoid hemorrhage with hydrocephalus   *Left cerebellar hematoma suggesting a likely source.        MACRO:   none        Signed by: Cruz Salgado 4/22/2025 11:59 AM   Dictation workstation:   [id number]      CT angio head and neck w and wo IV contrast   Final Result   * Diffuse subarachnoid hemorrhage with hydrocephalus as described   *Suspected vascular malformation in the left cerebellar hemisphere   with enlarged arterial and venous structures largely in the left   cerebellar hemisphere and residual partial opacification of a venous   aneurysm near the torcula. *Bilateral carotid stenosis as described        MACRO:   none        Signed by: Cruz Salgado 4/22/2025 12:07 PM   Dictation workstation:   [id number]             Assessment/Plan    Muriel De Souza is a 69 y.o. 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 S/p trach, PEG.  shunt placed on 5/05. Medically cleared for discharge to facility.    Changes 05/10/25  - medically cleared for  discharge to facility; will need to remove occipital sutures (NSGY) before leaving    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  #Intracranial hypertension  Assessment:  - First day 4/22 presented with SAH, L cerebellar ICH, and ventriculomegaly on CTH  - 4/22 CTA showing L cerebellar AVM  - 4/22 RF EVD for ICH  - 4/22 OR AVM resection  - MRI done 4/29   - LP 5/1 (WBC 5, RBC ^ 1000, Glucose ^ 106, protein 27, CSF culture NGTD 5/3, negative Gram stain)  - Pt is neurologically stable  - No seizure activity on EEG  - Maintained SBP < 180  - Normonatremic goal, sodium down trending without any D5W  - PEG and trach placed    Plan:  - NSU  - Keppra  - EVD in place, maintained at 10  - BP goal: SBP < 180    --> PRN: Labetalol, Hydralazine, and Nicardipine   - IV nicardipine 0.25 mg/min  - PO hydralazine 75 q6  - Amlodipine 10mg oral   - PO Carvedilol 6.25 BID  - PO Lisinopril 20 OD   - Normonatremic goal  - Plan for  shunt next week  - Wean off fentanyl as tolerated  - F/U CSF cultures  - Neuro Checks: Q1H  - Sedation: Off  - Pain: PRN  - Nausea: ondansetron  - PT/OT/SLP    CARDIOVASCULAR:  #Cardiac arrest  #Lactic acidosis, improving  # elevated troponins, improved  Assessment:  - 7 min to ROSC  - Assess for ischemic and stress-induced cardiomyopathy   - Assess for arrhythmias following cardiac arrest   - Cards consult (4/23)  - Type 2 MI (demand ischemia), leading to troponin up-trend (4/23)  - Trop trending down, d/c'd 4/24  - ECHO 4/23:  CONCLUSIONS:   1. Left ventricular ejection fraction is normal, calculated by Adrian's biplane at 71%.   2. Spectral Doppler shows a Grade I (impaired relaxation pattern) of left ventricular diastolic filling with normal left atrial filling pressure.   3. There is normal right ventricular global systolic function.   4. The left atrium is mildly dilated.   5. There is no evidence of a  patent foramen ovale.   6. The inferior vena cava appears mildly dilated, on vent.  - Pt is hemodynamically stable, SBP goal < 180    Plan:  - Monitor on telemetry  - SBP goal < 180 (see above)    RESPIRATORY:  #Acute hypoxic respiratory failure  #Mechanical ventilation  #Bilateral plural effusions  #Pulmonary edema    Results from last 7 days   Lab Units 05/10/25  0213 05/09/25 1811 05/09/25 0050 05/08/25 1746 05/08/25  0023   SODIUM mmol/L 138 138 139 140 137   POTASSIUM mmol/L 3.7 3.7 4.2 4.1 3.5   CREATININE mg/dL 0.59 0.55 0.52 0.51 0.57   BUN mg/dL 23 22 23 22 27*     Assessment:  - Vent requirements stable, FiO2 40 PEEP 10  - CXR 5/2, persistent bilateral pleural effusions  - Diuresed with 40 mg Lasix with -1.4 L net negative UOP 3.8 5/2  - Continue to diurese, replete potassium as needed  - Mild bilateral end-inspiratory wheezing, likely due to pulmonary edema    Plan:  - Continue mechanical ventilation for decreased LOC  - Monitor for increased secretions/desaturations    RENAL/:  #Pre-renal azotemia, resolving  Assessment:  Results from last 72 hours   Lab Units 05/10/25  0213 05/09/25  1811 05/09/25  0050 05/08/25  1746 05/08/25  0023   CREATININE mg/dL 0.59 0.55 0.52 0.51 0.57   BUN mg/dL 23 22 23 22 27*   SODIUM mmol/L 138 138 139 140 137   - UOP 3.8 / 24 hours 5/3  - BUN stable 5/3  - Cr stable 5/3    Plan:  - Monitor with daily RFP  - Maintain forbes catheter for: critically ill patient who need accurate urinary output measurements    FEN/GI:  #TIM  Results from last 72 hours   Lab Units 05/10/25  0213 05/09/25  1811 05/09/25  0050 05/08/25  1746 05/08/25  0023   SODIUM mmol/L 138 138 139 140 137   POTASSIUM mmol/L 3.7 3.7 4.2 4.1 3.5   PHOSPHORUS mg/dL 3.5 3.4 2.9 2.9 2.7   - Hypokalemia due to ongoing diuresis with Lasix  - Normonatremic sodium goal 5/3  - Sodium down trending without D5W, 5/3  - Stable phos 5/3  - Will repeat Lasix 5/3 for persistent bilateral pleural effusions  - PEG tube  placed , started trickle feeds    Plan:  - Monitor and replace electrolytes per protocol  - Replete K+ as needed  - Sodium goal normo natremic per NSGY  - IVF: PRN  - Diet: Trickle feeds through PEG, pending nutrition recs  - Bowel Regimen: Docusate-Senna PRN and Miralax PRN    ENDOCRINE:  #TIM  Assessment:  Results from last 7 days   Lab Units 05/10/25  0213 05/09/25  18125  0050 25  1746 25  0023 25  1618 25  1124 25  0745 25  0350 25  0104 25  2339 25  1959 25  1729 25  1528   POCT GLUCOSE mg/dL  --   --   --   --   --   --  134* 161* 146*  --  147* 139*  --  113*   GLUCOSE mg/dL 147* 141* 142* 146* 152* 167*  --   --   --    < >  --   --    < >  --    SODIUM mmol/L 138 138 139 140 137 142  --   --   --    < >  --   --    < >  --     < > = values in this interval not displayed.    - HbA1C 5.5 on     Plan:  - Accuchecks & ISS PRN     HEMATOLOGY:  #Anemia, likely dilutional  Assessment:  - Baseline Hgb: 12.0  - Baseline Plts: 287  Results from last 7 days   Lab Units 05/10/25  0213 05/09/25  1811 05/09/25  0050   HEMOGLOBIN g/dL 7.2* 7.7* 7.5*   HEMATOCRIT % 22.9* 22.9* 24.6*   PLATELETS AUTO x10*3/uL 411 416 399     Plan:  - Continue to monitor with daily CBC and Coag panel  - maintain active type and screen as needed  - Transfuse at Hb < 7    INFECTIOUS DISEASE:  #TIM  Assessment:  Results from last 7 days   Lab Units 05/10/25  0213 05/09/25  1811 05/09/25  0050   WBC AUTO x10*3/uL 14.7* 14.7* 12.5*    - Temp (24hrs), Av.7 °C (96.3 °F), Min:35.4 °C (95.7 °F), Max:36 °C (96.8 °F)  - White count elevated 5/3, likely reactive due to post-trach and PEG  - Pt afebrile  - CSF gram stain negative, NGTD 5/3  - Bcx no growth at 4 days ()  - Sputum cult  no predominant organism  - UA  unremarkable  - Negative pro calcitonin   - Vanc/Zosyn started  for desaturation and increased O2 requirement overnight  - Vanc/Zosyn  stopped 4/28, low suspicion for infection given clean Bcx, no fevers, resolving leukocytosis, and negative pro calcitonin 4/28    Plan  - Continue to monitor for signs of infection     MUSCULOSKELETAL:  - No acute issues    SKIN:  #Swollen left arm  Assessment:   - LUE with persistent swelling since 4/23  - Non-pitting edema, non-erythematous  - POD6  - SQH started (4/25)  - No DVT in LE (4/25)  - No DVT in RUE (4/25)  - Superficial thrombosis in basilic vein in LUE (4/25)    Plan:   - Turns and skin care per NSU protocol    ACCESS:  - PIVs    PROPHYLAXIS:  - DVT Ppx: SCDs, SQH  - GI: PPI PRN    RESTRAINTS:  Not indicated/Patient does not meet criteria for restraints    Jian Ellington MD PhD  Neuroscience Intensive Care         [1] amLODIPine, 10 mg, g-tube, Daily  atorvastatin, 10 mg, g-tube, Nightly  bisacodyl, 10 mg, rectal, Daily  carvedilol, 6.25 mg, g-tube, q12h  heparin (porcine), 5,000 Units, subcutaneous, q8h  hydrALAZINE, 75 mg, g-tube, q6h  lisinopril, 40 mg, g-tube, Daily  magnesium oxide, 400 mg, g-tube, Daily  pantoprazole, 40 mg, intravenous, Daily before breakfast  perflutren protein A microsphere, 0.5 mL, intravenous, Once in imaging  polyethylene glycol, 17 g, g-tube, Daily  sennosides-docusate sodium, 2 tablet, g-tube, BID  sulfamethoxazole-trimethoprim, 1 tablet, oral, q12h BHARGAV  sulfur hexafluoride microsphr, 2 mL, intravenous, Once in imaging     [2] PRN medications: acetaminophen **OR** acetaminophen **OR** acetaminophen, albuterol, calcium gluconate, calcium gluconate, dextrose, dextrose, glucagon, glucagon, hydrALAZINE, HYDROmorphone, labetaloL, magnesium sulfate, magnesium sulfate, oxyCODONE, oxygen, potassium chloride CR **OR** potassium chloride, potassium chloride CR **OR** potassium chloride  [3]

## 2025-05-10 NOTE — PROGRESS NOTES
"Muriel De Souza is a 69 y.o. female on day 18 of admission presenting with AVM (arteriovenous malformation) ([facility]).    Subjective   No events overnight    Objective     Physical Exam  Trach vented   ECNS  OU4R  +cough  BUE withdraws  BLE TF  Cranial c/d/i  Abdominal incision c/d/I  PEG site c/d/i    Last Recorded Vitals  Blood pressure 132/66, pulse 68, temperature 35.7 °C (96.3 °F), temperature source Temporal, resp. rate 13, height 1.727 m (5' 7.99\"), weight 88.4 kg (194 lb 14.2 oz), SpO2 96%.  Intake/Output last 3 Shifts:  I/O last 3 completed shifts:  In: 2440 (26.7 mL/kg) [NG/GT:2440]  Out: 2100 (23 mL/kg) [Urine:2100 (0.6 mL/kg/hr)]  Weight: 91.5 kg     Relevant Results  Results from last 72 hours   Lab Units 05/10/25  0213 05/09/25  1811 05/09/25  0050 05/08/25  1746 05/08/25  0023   GLUCOSE mg/dL 147* 141* 142*   < > 152*   SODIUM mmol/L 138 138 139   < > 137   POTASSIUM mmol/L 3.7 3.7 4.2   < > 3.5   CHLORIDE mmol/L 104 104 105   < > 103   CO2 mmol/L 29 29 24   < > 28   ANION GAP mmol/L 9* 9* 14   < > 10   BUN mg/dL 23 22 23   < > 27*   CREATININE mg/dL 0.59 0.55 0.52   < > 0.57   EGFR mL/min/1.73m*2 >90 >90 >90   < > >90   CALCIUM mg/dL 8.5* 8.5* 8.2*   < > 8.1*   PHOSPHORUS mg/dL 3.5 3.4 2.9   < > 2.7   ALBUMIN g/dL 2.8* 2.9* 2.8*   < > 2.7*   MAGNESIUM mg/dL  --   --  1.95  --  2.11   POCT CALCIUM IONIZED (MMOL/L) IN BLOOD mmol/L  --   --  1.15  --  1.18    < > = values in this interval not displayed.      Results from last 72 hours   Lab Units 05/10/25  0213 05/09/25  1811   WBC AUTO x10*3/uL 14.7* 14.7*   NRBC AUTO /100 WBCs 0.0 0.0   RBC AUTO x10*6/uL 2.49* 2.62*   HEMOGLOBIN g/dL 7.2* 7.7*   HEMATOCRIT % 22.9* 22.9*   MCV fL 92 87   MCH pg 28.9 29.4   MCHC g/dL 31.4* 33.6   RDW % 15.8* 15.5*   PLATELETS AUTO x10*3/uL 411 416        Assessment & Plan  AVM (arteriovenous malformation) ([facility])    Muriel De Souza is a 69 y.o. w/ h/o HTN, HLD p/w HA, arrested in CT scanner at OSH, ROSC after 7 min " CPR, CTH w diffuse SAH and L cerebellar ICH, triventriculomegally, effaced 4th vents, CTA H/N L cerebellar AVM w venous varices, s/p 1u Plt and DDAVP, s/p RF EVD (OP 18), 4/22 s/p SOC/C1 lami for AVM resection, CTH POC, stable vents, 4/22 TEG R low s/p 1u FFP, 4/23 TTE EF 71%, 4/23 s/p angio tentorial dural AV fistual w direct cortical venous drainage fed by b/l MMA, b/l tentorial arteries, incidental 2mm R pcomm aneurysm, 4/24 CTH slight decr vents  4/29 MRI midbrain and b/l cerebellar hemisphere diffusion hits, 5/1 s/p trach, 5/2 s/p PEG    5/5 s/p R EVD removal, RF VPS (Certas at 5), CTH cath in position, stable prior EVD tract hemorrhage, rCTH stable  5/7 Certas dialed to 6  5/9 CTH sable    Plan:  NSU  SBP goal <160  Con't hydral, lisinopril, coreg  SCD, SQH  PTOT  Dispo to LTACH planning- patient medically ready for discharge    Yanna Rice MD

## 2025-05-10 NOTE — DISCHARGE SUMMARY
Discharge Diagnosis  AVM (arteriovenous malformation) ([facility])    Issues Requiring Follow-Up  NSGY follow up 4 weeks after discharge with repeat CT head at that time for ventricle stability  Complete e coli UTI treatment    Test Results Pending At Discharge  Pending Labs       Order Current Status    Extra Urine Gray Tube Collected (05/09/25 0851)    Urinalysis with Reflex Culture and Microscopic In process    Urine Culture Preliminary result            Hospital Course  Muriel De Souza is a 70 yo female with HTN and HLD who presented to OSH with sudden onset headache and vomiting. CTH at OSH showed SAH. She went into cardiac arrest in the scanner. ROSC achieved after 7 min CPR. She was sedated and intubated. Transferred to  NICU for further management on 4/22.    Hypertensive on arrival. IV nicardipine started. CTH and CTA 4/22 showed diffuse SAH with hydrocephalus and L cerebellar AVM. EVD placed for decompression. Taken to OR on 4/22 for decompression and AVM resection. A-line placed for BP monitoring. Aneurysm clips were placed on feeder and draining vessels. The clot was removed. She was taken back to the NICU.     She remained intubated and sedated on return to the NICU. EVD in place. Post-operative BP goal was  - 140. She was hypertensive on return on NICU.  BP was controlled with IV nicardipine and sedation with propofol and fentanyl. IV nicardipine switched to IV clevidpine and PO atenolol was added on 4/23 for better BP control. She remained on fentanyl and propofol.     Neuro exams during this time were done on sedation as patient would become hypertensive off sedation. Neuro exam was fairly consistent. She was sedated and unresponsive. Pupillary reflex was intact with absence of VOR, corneal, cough, and gag reflexes. She had a swollen L arm with non-pitting edema. DVT US showed no DVTs in UEs and LEs, only a L superficial basilic vein clot. No further management indicated.    On 4/25, she  desaturated, increasing ventilator demands. Started on Vanc and Zosyn for concern for VAP in the setting of leukocytosis and increased ventilator needs. Antihypertensives were switched to IV nicardipine and PO carvedilol 6.25 mg BID.  She was taken off fentanyl and propofol on 4/25. Neuro exam was unchanged off sedation.    CXR on 4/27 demonstrated L pleural effusion. She was diuresed with Lasix. Repeat CXRs demonstrated persistence of L pleural effusion, requiring additional diuresis.     Vanc/Zosyn stopped on 4/28 given clean blood cultures, absence of fever, resolving leukocytosis and negative procalcitonin.     MRI completed on 4/29 with demonstration of L > R cerebellar FLAIR hyper intensities. Small foci of increased diffusion signal overlying the brainstem suspicious for additional smaller subacute infarction. Limited FLAIR hyper intensities in the brainstem.     Goals of care discussion with family on 4/30.     Developed UTI (e coli) on 5/08 and was treated with abx. Treatment ongoing at time of discharge.    [facility] PEG and  shunt placed.                  Pertinent Physical Exam At Time of Discharge  Physical Exam  Trach vented   ECNS  OU4R  +cough  BUE withdraws  BLE TF  Cranial c/d/i  Abdominal incision c/d/I  PEG site c/d/i    Home Medications     Medication List      START taking these medications     albuterol 2.5 mg /3 mL (0.083 %) nebulizer solution; Take 3 mL (2.5 mg)   by nebulization every 4 hours if needed for wheezing.   amLODIPine 10 mg tablet; Commonly known as: Norvasc; Take 1 tablet (10   mg) by g-tube once daily.; Start taking on: May 11, 2025   carvedilol 6.25 mg tablet; Commonly known as: Coreg; Take 1 tablet (6.25   mg) by g-tube every 12 hours.   cefTRIAXone 1 gram/50 mL IVPB; Commonly known as: Rocephin; Infuse 50 mL   (1 g) at 100 mL/hr over 30 minutes into a venous catheter once every 24   hours.; Start taking on: May 11, 2025   heparin  (porcine) 5,000 unit/mL injection; Inject 1 mL (5,000 Units)   under the skin every 8 hours.   hydrALAZINE 25 mg tablet; Commonly known as: Apresoline; Take 3 tablets   (75 mg) by g-tube every 6 hours.   lisinopril 40 mg tablet; Take 1 tablet (40 mg) by g-tube once daily.;   Start taking on: May 11, 2025     CONTINUE taking these medications     atorvastatin 10 mg tablet; Commonly known as: Lipitor     STOP taking these medications     atenolol 25 mg tablet; Commonly known as: Tenormin   CALCIUM ORAL   escitalopram 20 mg tablet; Commonly known as: Lexapro       Outpatient Follow-Up  No future appointments.    Jian Ellington MD PhD

## 2025-05-10 NOTE — DISCHARGE INSTRUCTIONS
Mrs. De Souza,    You were treated in the NSU of [facility] for a left cerebellar bleed. You are now stable with a shunt placed in your brain as well to prevent excessive pressure. Please follow up with your neurosurgeons in about 4 weeks for them to monitor your brain pressures with CT head and also to check on your surgical site. You do have a urinary tract infection at time of discharge and have received two days of antibiotics. Your facility is okay to stop the antibiotics once your urinary tract infection is fully treated.    Thank you for entrusting us with your care and we hope you have a speedy recovery.    Sincerely,  Your  Neurocritical Care Team

## 2025-05-11 VITALS
HEIGHT: 68 IN | OXYGEN SATURATION: 96 % | RESPIRATION RATE: 18 BRPM | BODY MASS INDEX: 31.31 KG/M2 | WEIGHT: 206.57 LBS | SYSTOLIC BLOOD PRESSURE: 143 MMHG | TEMPERATURE: 96.8 F | HEART RATE: 74 BPM | DIASTOLIC BLOOD PRESSURE: 58 MMHG

## 2025-05-11 LAB
ALBUMIN SERPL BCP-MCNC: 2.9 G/DL (ref 3.4–5)
ANION GAP SERPL CALC-SCNC: 14 MMOL/L (ref 10–20)
BACTERIA UR CULT: ABNORMAL
BASOPHILS # BLD AUTO: 0.05 X10*3/UL (ref 0–0.1)
BASOPHILS NFR BLD AUTO: 0.3 %
BUN SERPL-MCNC: 20 MG/DL (ref 6–23)
CALCIUM SERPL-MCNC: 8.4 MG/DL (ref 8.6–10.6)
CHLORIDE SERPL-SCNC: 102 MMOL/L (ref 98–107)
CO2 SERPL-SCNC: 25 MMOL/L (ref 21–32)
CREAT SERPL-MCNC: 0.62 MG/DL (ref 0.5–1.05)
EGFRCR SERPLBLD CKD-EPI 2021: >90 ML/MIN/1.73M*2
EOSINOPHIL # BLD AUTO: 0.08 X10*3/UL (ref 0–0.7)
EOSINOPHIL NFR BLD AUTO: 0.6 %
ERYTHROCYTE [DISTWIDTH] IN BLOOD BY AUTOMATED COUNT: 16 % (ref 11.5–14.5)
GLUCOSE SERPL-MCNC: 156 MG/DL (ref 74–99)
HCT VFR BLD AUTO: 24.3 % (ref 36–46)
HGB BLD-MCNC: 7.7 G/DL (ref 12–16)
IMM GRANULOCYTES # BLD AUTO: 0.08 X10*3/UL (ref 0–0.7)
IMM GRANULOCYTES NFR BLD AUTO: 0.6 % (ref 0–0.9)
LYMPHOCYTES # BLD AUTO: 1.41 X10*3/UL (ref 1.2–4.8)
LYMPHOCYTES NFR BLD AUTO: 9.8 %
MCH RBC QN AUTO: 29.7 PG (ref 26–34)
MCHC RBC AUTO-ENTMCNC: 31.7 G/DL (ref 32–36)
MCV RBC AUTO: 94 FL (ref 80–100)
MONOCYTES # BLD AUTO: 0.92 X10*3/UL (ref 0.1–1)
MONOCYTES NFR BLD AUTO: 6.4 %
NEUTROPHILS # BLD AUTO: 11.92 X10*3/UL (ref 1.2–7.7)
NEUTROPHILS NFR BLD AUTO: 82.3 %
NRBC BLD-RTO: 0 /100 WBCS (ref 0–0)
PHOSPHATE SERPL-MCNC: 3.1 MG/DL (ref 2.5–4.9)
PLATELET # BLD AUTO: 474 X10*3/UL (ref 150–450)
POTASSIUM SERPL-SCNC: 4.5 MMOL/L (ref 3.5–5.3)
RBC # BLD AUTO: 2.59 X10*6/UL (ref 4–5.2)
SODIUM SERPL-SCNC: 136 MMOL/L (ref 136–145)
WBC # BLD AUTO: 14.5 X10*3/UL (ref 4.4–11.3)

## 2025-05-11 PROCEDURE — 94003 VENT MGMT INPAT SUBQ DAY: CPT

## 2025-05-11 PROCEDURE — 80069 RENAL FUNCTION PANEL: CPT | Performed by: STUDENT IN AN ORGANIZED HEALTH CARE EDUCATION/TRAINING PROGRAM

## 2025-05-11 PROCEDURE — 2500000004 HC RX 250 GENERAL PHARMACY W/ HCPCS (ALT 636 FOR OP/ED): Mod: JZ | Performed by: STUDENT IN AN ORGANIZED HEALTH CARE EDUCATION/TRAINING PROGRAM

## 2025-05-11 PROCEDURE — 36415 COLL VENOUS BLD VENIPUNCTURE: CPT | Performed by: STUDENT IN AN ORGANIZED HEALTH CARE EDUCATION/TRAINING PROGRAM

## 2025-05-11 PROCEDURE — 85025 COMPLETE CBC W/AUTO DIFF WBC: CPT | Performed by: STUDENT IN AN ORGANIZED HEALTH CARE EDUCATION/TRAINING PROGRAM

## 2025-05-11 PROCEDURE — 2500000001 HC RX 250 WO HCPCS SELF ADMINISTERED DRUGS (ALT 637 FOR MEDICARE OP)

## 2025-05-11 RX ADMIN — HEPARIN SODIUM 5000 UNITS: 5000 INJECTION INTRAVENOUS; SUBCUTANEOUS at 04:11

## 2025-05-11 RX ADMIN — HYDRALAZINE HYDROCHLORIDE 75 MG: 50 TABLET, FILM COATED ORAL at 00:06

## 2025-05-12 ENCOUNTER — TELEPHONE (OUTPATIENT)
Dept: OTOLARYNGOLOGY | Facility: HOSPITAL | Age: 70
End: 2025-05-12
Payer: COMMERCIAL

## 2025-05-12 NOTE — TELEPHONE ENCOUNTER
"I received a referral for this patient to be scheduled with Dr. Dawson end of May 2025. I spoke with her  who stated that \"she is still in a coma\" and he declines to schedule at this time.   "

## 2025-06-04 ENCOUNTER — HOSPITAL ENCOUNTER (OUTPATIENT)
Dept: RADIOLOGY | Facility: EXTERNAL LOCATION | Age: 70
Discharge: HOME | End: 2025-06-04

## 2025-06-09 ENCOUNTER — TELEPHONE (OUTPATIENT)
Dept: NEUROSURGERY | Facility: HOSPITAL | Age: 70
End: 2025-06-09
Payer: COMMERCIAL

## 2025-06-09 ENCOUNTER — HOSPITAL ENCOUNTER (OUTPATIENT)
Dept: RADIOLOGY | Facility: CLINIC | Age: 70
End: 2025-06-09
Payer: COMMERCIAL

## 2025-06-09 NOTE — TELEPHONE ENCOUNTER
----- Message from Chani LY sent at 6/9/2025 11:04 AM EDT -----  Regarding: CT Cuauhtemoc Khan,     Please call Farida or Cyndi in regards to MsRo De Souza CT.  They want to know if Dr. Yanes had chance to review the scan and what is his recommendations.  [phone]    Chani

## 2025-06-09 NOTE — TELEPHONE ENCOUNTER
I called and spoke to Farida SCOTT to discuss what Dr. Hood recommended after viewing CT scan. He said that her CT scan looked great and there's nothing specifically to follow up on; and there’s nothing we can answer in terms of long term recovery or prognosis. A referral can be placed to neurologist or physiatrist.      Farida SCOTT, informed me that patient was going to be discharged to a Nursing Home in the near future, on a ventilator, so seeing a neurologist would not be suitable.

## (undated) DEVICE — BOOT, SUTURE-AID, YELLOW, STERILE, LF

## (undated) DEVICE — PREP, SKIN, DURAPREP, 6 CC

## (undated) DEVICE — DRESSING, GAUZE, PETROLATUM, PATCH, XEROFORM, 1 X 8 IN, STERILE

## (undated) DEVICE — SPONGE, HEMOSTATIC, CELLULOSE, SURGICEL, NU-KNIT, 3 X 4 IN

## (undated) DEVICE — BAG, DECANTER

## (undated) DEVICE — PERFORATOR, CRANIAL 4OD 11ID 3S WHITE

## (undated) DEVICE — SUTURE, VICRYL, 0, 27 IN, UR-6, VIOLET

## (undated) DEVICE — BANDAGE, GAUZE, CONFORMING, KERLIX, 6 PLY, 4.5 IN X 4.1 YD

## (undated) DEVICE — TROCAR, KII OPTICAL BLADELESS 5MM Z THREAD 100MM LNGTH

## (undated) DEVICE — CATHETER, URETHRAL, ROBNEL,  8 FR, 16 IN, LF, RED

## (undated) DEVICE — TUBING, SMOKE EVAC, 3/8 X 10 FT

## (undated) DEVICE — SUTURE, NUROLON, 4-0, 18 IN, TF, CONTROL RELEASE, MULTIPACK, BLACK

## (undated) DEVICE — SPONGE, HEMOSTAT, SURGICEL FIBRILLAR, ABS, 4 X 4, LF

## (undated) DEVICE — BLADE, OPHTHALMIC, MINI, STRAIGHT, DOUBLE BEVEL, SHARP ALL AROUND

## (undated) DEVICE — MARKER, SKIN, RULER AND LABEL PACK, CUSTOM

## (undated) DEVICE — BUR, STRAIGHT ROUTER

## (undated) DEVICE — TUBING, SUCTION, CONNECTING, STERILE 0.25 X 120 IN., LF

## (undated) DEVICE — STAY SET, SURGICAL, 12MM BLUNT HOOK, 8/PK

## (undated) DEVICE — APPLICATOR, CHLORAPREP, W/ORANGE TINT, 26ML

## (undated) DEVICE — DEVICE, ADHERUS, AUTOSPRAY, ET DURAL SEALANT

## (undated) DEVICE — DRAPE, MICROSCOPE, FOR ZEISS 65MM, VARI-LENS II, 52 X 150

## (undated) DEVICE — DRESSING, GAUZE, 16 PLY, 4 X 4 IN, STERILE

## (undated) DEVICE — DEVICE, ADHERUS, AUTOSPRAY, ET DURAL SEALANT, NO TIP

## (undated) DEVICE — TUBE SET, PNEUMOCLEAR, SMOKE EVACU, HIGH-FLOW

## (undated) DEVICE — SEALANT, DURASEAL, 5ML

## (undated) DEVICE — TROCAR, OPTICAL, BLADELESS, 12MM, THREADED, 100MM LENGTH

## (undated) DEVICE — DRAPE, IRRIGATION, W/POUCH, ADHESIVE STRIP, STERI DRAPE, 19 X 23 IN, DISPOSABLE, STERILE

## (undated) DEVICE — ADAPTER, LUER STUB, 16 G

## (undated) DEVICE — COVER, CART, 45 X 27 X 48 IN, CLEAR

## (undated) DEVICE — ANTIFOG, SOLUTION, FOG-OUT

## (undated) DEVICE — CATHETER TRAY, SURESTEP, 16FR, URINE METER W/STATLOCK

## (undated) DEVICE — GOWN, SURGICAL, SMARTGOWN, XLARGE, STERILE

## (undated) DEVICE — NEEDLE, INSUFFLATION, VERRES, HIGH FLO, SURGINEEDLE, 150 MM, DISPOSABLE

## (undated) DEVICE — SEALANT, HEMOSTATIC, FLOSEAL, 10 ML

## (undated) DEVICE — COVER, TABLE, UHC

## (undated) DEVICE — SUTURE, SILK, 2-0, 18 IN, BLACK

## (undated) DEVICE — PAD, GROUNDING, ELECTROSURGICAL, W/9 FT CABLE, POLYHESIVE II, ADULT, LF

## (undated) DEVICE — DRAPE, SHEET, UTILITY, NON ABSORBENT, 18 X 26 IN, LF

## (undated) DEVICE — DRAPE PACK, CRANIOTOMY, CUSTOM, UHC

## (undated) DEVICE — SPHERE, STEALTHSTATION, 5-PK

## (undated) DEVICE — TUBING SET, INSUFFLATION

## (undated) DEVICE — BAG, PLASTIC, 10 X 17 IN

## (undated) DEVICE — SOLUTION, INJECTION, USP, SODIUM CHLORIDE 0.9%, .9 NACL, 250 ML, BAG

## (undated) DEVICE — PLEDGET, PTFE, FIRM, 3/8 X 3/16 X 1/6 IN, MULTIPACK

## (undated) DEVICE — MANIFOLD, 4 PORT NEPTUNE STANDARD

## (undated) DEVICE — Device

## (undated) DEVICE — PIN, SKULL, MAYFIELD, ADULT

## (undated) DEVICE — TAPE, SILK, DURAPORE, 3 IN X 10 YD, LF

## (undated) DEVICE — APPLICATOR, PREP, CHLORAPREP, W/ORANGE TINT, 10.5ML

## (undated) DEVICE — CATHETER, RED RUBBER 14FR

## (undated) DEVICE — DRAPE, TIBURON, SPLIT SHEET, REINF ADH STRIP, 77X122

## (undated) DEVICE — RETRACTOR, HOOK, FISH, NEURO

## (undated) DEVICE — SPONGE, HEMOSTATIC, CELLULOSE, SURGICEL, 2 X 14 IN

## (undated) DEVICE — DRAPE, INCISE, ANTIMICROBIAL, IOBAN 2, STERI DRAPE, 23 X 33 IN, DISPOSABLE, STERILE

## (undated) DEVICE — REST, HEAD, BAGEL, 9 IN

## (undated) DEVICE — DRESSING, ISLAND, TELFA, 4 X 5 IN

## (undated) DEVICE — DRAPE, SHEET, 17 X 23 IN

## (undated) DEVICE — SYRINGE, 60 CC, IRRIGATION, BULB, CONTRO-BULB, PAPER POUCH

## (undated) DEVICE — STYLET, AXIEM STEALTH NAVIGATION

## (undated) DEVICE — TRACKER, STINGRAY, NON-INVASIVE, AXIEM STEALTH NAVIGATION

## (undated) DEVICE — PREP, IODOPHOR, W/ALCOHOL, DURAPREP, W/APPLICATOR, 26 CC

## (undated) DEVICE — BUR, PERFORATOR, CRANIAL, ACRA-CUT 14/11MM, CDM

## (undated) DEVICE — BUR, ACORN 6MM PRECISION

## (undated) DEVICE — PITCHER, GRADUATE, 32 OZ (1200CC), STERILE

## (undated) DEVICE — INTRODUCER KIT, PERCUTANEOUS, 10 FR

## (undated) DEVICE — SPONGE, HEMOSTATIC, GELATIN, SURGIFOAM, 8 X 12.5 CM X 10 MM

## (undated) DEVICE — DRESSING, ADHESIVE, ISLAND, TELFA, 2 X 3.75 IN, LF

## (undated) DEVICE — ADHESIVE, SKIN, MASTISOL, 2/3 CC VIAL

## (undated) DEVICE — SUTURE, VICRYL 4-0, TAPER POINT, RB-1 UNDYED 8-18 INCH

## (undated) DEVICE — SUTURE, SILK, 2-0, TIES, 12-30 IN, BLACK

## (undated) DEVICE — SUTURE, SILK, 0, 24 IN, SUTUPAK, BLACK

## (undated) DEVICE — NEEDLE, ECLIPSE, 25GA X 1-1/2 IN

## (undated) DEVICE — SUTURE, MONOCRYL, 4-0, 18 IN, PS2, UNDYED

## (undated) DEVICE — PROTECTOR, NERVE, ULNAR, PINK

## (undated) DEVICE — ADHESIVE, SKIN, DERMABOND ADVANCED, 15CM, PEN-STYLE

## (undated) DEVICE — BUR, ROUTER BIT, FA2, TAPERED, 2.3MM